# Patient Record
Sex: MALE | Race: WHITE | NOT HISPANIC OR LATINO | Employment: OTHER | URBAN - METROPOLITAN AREA
[De-identification: names, ages, dates, MRNs, and addresses within clinical notes are randomized per-mention and may not be internally consistent; named-entity substitution may affect disease eponyms.]

---

## 2017-05-08 RX ORDER — FLUOXETINE 10 MG/1
CAPSULE ORAL
Qty: 30 CAP | Refills: 5 | Status: SHIPPED | OUTPATIENT
Start: 2017-05-08 | End: 2017-10-25 | Stop reason: SDUPTHER

## 2017-05-19 ENCOUNTER — OFFICE VISIT (OUTPATIENT)
Dept: INTERNAL MEDICINE | Facility: IMAGING CENTER | Age: 82
End: 2017-05-19
Payer: MEDICARE

## 2017-05-19 VITALS
HEART RATE: 82 BPM | SYSTOLIC BLOOD PRESSURE: 128 MMHG | DIASTOLIC BLOOD PRESSURE: 70 MMHG | WEIGHT: 181 LBS | TEMPERATURE: 98.1 F | OXYGEN SATURATION: 92 % | HEIGHT: 70 IN | RESPIRATION RATE: 14 BRPM | BODY MASS INDEX: 25.91 KG/M2

## 2017-05-19 DIAGNOSIS — K58.0 IRRITABLE BOWEL SYNDROME WITH DIARRHEA: ICD-10-CM

## 2017-05-19 DIAGNOSIS — M54.50 ACUTE RIGHT-SIDED LOW BACK PAIN WITHOUT SCIATICA: ICD-10-CM

## 2017-05-19 PROCEDURE — G8599 NO ASA/ANTIPLAT THER USE RNG: HCPCS | Performed by: FAMILY MEDICINE

## 2017-05-19 PROCEDURE — 1036F TOBACCO NON-USER: CPT | Performed by: FAMILY MEDICINE

## 2017-05-19 PROCEDURE — 4040F PNEUMOC VAC/ADMIN/RCVD: CPT | Performed by: FAMILY MEDICINE

## 2017-05-19 PROCEDURE — 99214 OFFICE O/P EST MOD 30 MIN: CPT | Performed by: FAMILY MEDICINE

## 2017-05-19 PROCEDURE — 1101F PT FALLS ASSESS-DOCD LE1/YR: CPT | Mod: 8P | Performed by: FAMILY MEDICINE

## 2017-05-19 PROCEDURE — G8419 CALC BMI OUT NRM PARAM NOF/U: HCPCS | Performed by: FAMILY MEDICINE

## 2017-05-19 PROCEDURE — G8432 DEP SCR NOT DOC, RNG: HCPCS | Performed by: FAMILY MEDICINE

## 2017-05-19 RX ORDER — CHOLESTYRAMINE 4 G/9G
1 POWDER, FOR SUSPENSION ORAL DAILY
Qty: 30 EACH | Refills: 6 | Status: SHIPPED | OUTPATIENT
Start: 2017-05-19 | End: 2017-10-17

## 2017-05-19 NOTE — PROGRESS NOTES
Chief Complaint   Patient presents with   • Diarrhea   • Back Pain     right sided       HISTORY OF PRESENT ILLNESS: Patient is a 81 y.o. male established patient who presents today complaining several loose stools a day for the past 2-3 weeks. He gets this intermittently. No change in his diet or medications. No recent travel. No recent antibiotics. No fever or chills. No abdominal pain. He has had looser stools. He has a sense of urgency. Some increasing gas. He's had no accidents. He does not have to get up at night. No cramping or abdominal pain. Imodium does help. He denies any blood or dark stools.    His weight has been stable. He had something similar a couple years ago but did respond to Questran. He does have his gallbladder.    He's also complaining of some right lower back pain. It is in the area of his SI joint. She remembers no injuries. It comes and goes. No radicular symptoms. He has not taken anything for it.    His last colonoscopy was in 2013 and it was in incomplete colonoscopy due to severe diverticulosis and difficulty with the exam.  He then had a CT scan last August which showed the diverticulosis. No gallstones.      Patient Active Problem List    Diagnosis Date Noted   • Vitamin D deficiency 08/27/2015   • History of shingles 08/27/2015   • PHN (postherpetic neuralgia) 08/27/2015   • Elevated glucose 07/08/2015   • Hyperlipidemia 07/08/2015   • Other specified hypothyroidism 07/08/2015   • MCI (mild cognitive impairment) 07/08/2015   • Mild carotid artery disease 07/08/2015   • TIA (transient ischemic attack) 11/30/2012     Current Outpatient Prescriptions on File Prior to Visit   Medication Sig Dispense Refill   • fluoxetine (PROZAC) 10 MG Cap TAKE ONE CAPSULE BY MOUTH ONCE DAILY FOR DEPRESSION 30 Cap 5   • levothyroxine (SYNTHROID) 75 MCG Tab TAKE 1 TAB BY MOUTH EVERY DAY. 30 Tab 11   • atorvastatin (LIPITOR) 20 MG Tab TAKE 1 TAB BY MOUTH EVERY DAY. 30 Tab 10   • liothyronine (CYTOMEL) 5  "MCG Tab TAKE 2 TABLET BY ORAL ROUTE EVERY DAY 60 Tab 11   • cyanocobalamin (VITAMIN B-12) 100 MCG Tab Take 100 mcg by mouth every day.     • vitamin D (CHOLECALCIFEROL) 1000 UNIT TABS Take 1,000 Units by mouth. 2 tablets daily      • cholestyramine (QUESTRAN) 4 G packet Take 2-4 g by mouth every day. 30 Each 3     No current facility-administered medications on file prior to visit.       Past medical, surgical, family, and social history is reviewed and updated in Epic chart by me today.   Medications and allergies reviewed and updated in Epic chart by me today.     REVIEW OF SYSTEMS:  GENERAL: No fatigue, no weight loss.  HEENT:  Ears--no earache, no change in hearing, no dizziness, no tinnitus.                 Eyes--no blurred vision, no discharge or pain                 Throat--No sore throat, no dysphagia, no hoarseness  CV:  No chest pain,dyspnea,palpitations or edema.  RESP:  No sob,cough,wheezing or hemoptysis.  GI: As above. No nausea or vomiting. No heartburn  :  No dysuria, polyuria, hematuria, incontinence, or nocturia.  MS: Back pain as above.  NEURO:  No seizures, syncope, paralysis, tremor, or weakness.  SKIN: No new or concerning skin lesions or changes.   PSYCH: Mood fine.    Filed Vitals:    05/19/17 1200   BP: 128/70   Pulse: 82   Temp: 36.7 °C (98.1 °F)   Resp: 14   Height: 1.778 m (5' 10\")   Weight: 82.101 kg (181 lb)   SpO2: 92%     Physical Exam:  Gen: Well developed, well nourished. No acute distress.  Alert and oriented x 3.  Neck:  Supple, no adenopathy or thyromegaly.  Heart:  Regular rate and rhythm.  Normal S1, S2. No murmur, gallop or rub.  Lungs:  Clear, No wheezes,rales or rhonchi.  Abdomen: Soft, nontender, nondistended. Normal bowel sounds. No hepatosplenomegaly or masses, or hernias. No rebound or guarding.  Back: His spine is nontender. He does have some tenderness to palpation over the right SI joint. Full range of motion of his back. No erythema, no rashes.  Extremities:  No " edema.  Psych: Mood and affect are appropriate.        Assessment/Plan:  1. Irritable bowel syndrome with diarrhea . I suspect he does have some irritable bowel with diarrhea. Since Questran works well for him previously we'll have him start with 1 mg daily. He may titrate as needed. If symptoms do not improve encouraged him to call.     2. Acute right-sided low back pain without sciatica . May take Tylenol, heating pad, massage.        Follow-up as needed.

## 2017-06-14 PROBLEM — L40.9 PSORIASIS: Status: ACTIVE | Noted: 2017-06-14

## 2017-07-31 RX ORDER — ATORVASTATIN CALCIUM 20 MG/1
TABLET, FILM COATED ORAL
Qty: 30 TAB | Refills: 10 | Status: SHIPPED | OUTPATIENT
Start: 2017-07-31 | End: 2018-07-24 | Stop reason: SDUPTHER

## 2017-08-09 RX ORDER — LIOTHYRONINE SODIUM 5 UG/1
TABLET ORAL
Qty: 60 TAB | Refills: 0 | Status: SHIPPED | OUTPATIENT
Start: 2017-08-09 | End: 2017-10-11 | Stop reason: SDUPTHER

## 2017-10-11 DIAGNOSIS — R53.83 FATIGUE, UNSPECIFIED TYPE: ICD-10-CM

## 2017-10-11 DIAGNOSIS — E55.9 VITAMIN D DEFICIENCY: ICD-10-CM

## 2017-10-11 DIAGNOSIS — E03.8 OTHER SPECIFIED HYPOTHYROIDISM: ICD-10-CM

## 2017-10-11 DIAGNOSIS — E78.2 MIXED HYPERLIPIDEMIA: ICD-10-CM

## 2017-10-11 DIAGNOSIS — E11.9 TYPE 2 DIABETES MELLITUS WITHOUT COMPLICATION, WITHOUT LONG-TERM CURRENT USE OF INSULIN (HCC): ICD-10-CM

## 2017-10-11 RX ORDER — LEVOTHYROXINE SODIUM 0.07 MG/1
TABLET ORAL
Qty: 30 TAB | Refills: 3 | Status: SHIPPED | OUTPATIENT
Start: 2017-10-11 | End: 2018-02-02 | Stop reason: SDUPTHER

## 2017-10-11 RX ORDER — LIOTHYRONINE SODIUM 5 UG/1
TABLET ORAL
Qty: 60 TAB | Refills: 3 | Status: SHIPPED | OUTPATIENT
Start: 2017-10-11 | End: 2018-02-02 | Stop reason: SDUPTHER

## 2017-10-11 NOTE — TELEPHONE ENCOUNTER
I refilled Alfred thyroid meds.   But he is overdue for labwork. I ordered everything and would like him to do fasting labs and them make appt with me.

## 2017-10-12 ENCOUNTER — HOSPITAL ENCOUNTER (OUTPATIENT)
Dept: LAB | Facility: MEDICAL CENTER | Age: 82
End: 2017-10-12
Attending: FAMILY MEDICINE
Payer: MEDICARE

## 2017-10-12 DIAGNOSIS — E11.9 TYPE 2 DIABETES MELLITUS WITHOUT COMPLICATION, WITHOUT LONG-TERM CURRENT USE OF INSULIN (HCC): ICD-10-CM

## 2017-10-12 DIAGNOSIS — E78.2 MIXED HYPERLIPIDEMIA: ICD-10-CM

## 2017-10-12 DIAGNOSIS — E03.8 OTHER SPECIFIED HYPOTHYROIDISM: ICD-10-CM

## 2017-10-12 DIAGNOSIS — E55.9 VITAMIN D DEFICIENCY: ICD-10-CM

## 2017-10-12 DIAGNOSIS — R53.83 FATIGUE, UNSPECIFIED TYPE: ICD-10-CM

## 2017-10-12 LAB
25(OH)D3 SERPL-MCNC: 37 NG/ML (ref 30–100)
ALBUMIN SERPL BCP-MCNC: 4.2 G/DL (ref 3.2–4.9)
ALBUMIN/GLOB SERPL: 1.5 G/DL
ALP SERPL-CCNC: 62 U/L (ref 30–99)
ALT SERPL-CCNC: 41 U/L (ref 2–50)
ANION GAP SERPL CALC-SCNC: 7 MMOL/L (ref 0–11.9)
AST SERPL-CCNC: 25 U/L (ref 12–45)
BASOPHILS # BLD AUTO: 0.3 % (ref 0–1.8)
BASOPHILS # BLD: 0.02 K/UL (ref 0–0.12)
BILIRUB SERPL-MCNC: 0.9 MG/DL (ref 0.1–1.5)
BUN SERPL-MCNC: 18 MG/DL (ref 8–22)
CALCIUM SERPL-MCNC: 9.7 MG/DL (ref 8.5–10.5)
CHLORIDE SERPL-SCNC: 107 MMOL/L (ref 96–112)
CHOLEST SERPL-MCNC: 128 MG/DL (ref 100–199)
CO2 SERPL-SCNC: 26 MMOL/L (ref 20–33)
CREAT SERPL-MCNC: 0.9 MG/DL (ref 0.5–1.4)
CREAT UR-MCNC: 91 MG/DL
EOSINOPHIL # BLD AUTO: 0.34 K/UL (ref 0–0.51)
EOSINOPHIL NFR BLD: 5 % (ref 0–6.9)
ERYTHROCYTE [DISTWIDTH] IN BLOOD BY AUTOMATED COUNT: 49.1 FL (ref 35.9–50)
EST. AVERAGE GLUCOSE BLD GHB EST-MCNC: 180 MG/DL
GFR SERPL CREATININE-BSD FRML MDRD: >60 ML/MIN/1.73 M 2
GLOBULIN SER CALC-MCNC: 2.8 G/DL (ref 1.9–3.5)
GLUCOSE SERPL-MCNC: 151 MG/DL (ref 65–99)
HBA1C MFR BLD: 7.9 % (ref 0–5.6)
HCT VFR BLD AUTO: 44.5 % (ref 42–52)
HDLC SERPL-MCNC: 40 MG/DL
HGB BLD-MCNC: 14.7 G/DL (ref 14–18)
IMM GRANULOCYTES # BLD AUTO: 0.02 K/UL (ref 0–0.11)
IMM GRANULOCYTES NFR BLD AUTO: 0.3 % (ref 0–0.9)
LDLC SERPL CALC-MCNC: 62 MG/DL
LYMPHOCYTES # BLD AUTO: 1.9 K/UL (ref 1–4.8)
LYMPHOCYTES NFR BLD: 27.9 % (ref 22–41)
MCH RBC QN AUTO: 31.4 PG (ref 27–33)
MCHC RBC AUTO-ENTMCNC: 33 G/DL (ref 33.7–35.3)
MCV RBC AUTO: 95.1 FL (ref 81.4–97.8)
MICROALBUMIN UR-MCNC: <0.7 MG/DL
MICROALBUMIN/CREAT UR: NORMAL MG/G (ref 0–30)
MONOCYTES # BLD AUTO: 0.62 K/UL (ref 0–0.85)
MONOCYTES NFR BLD AUTO: 9.1 % (ref 0–13.4)
NEUTROPHILS # BLD AUTO: 3.91 K/UL (ref 1.82–7.42)
NEUTROPHILS NFR BLD: 57.4 % (ref 44–72)
NRBC # BLD AUTO: 0 K/UL
NRBC BLD AUTO-RTO: 0 /100 WBC
PLATELET # BLD AUTO: 206 K/UL (ref 164–446)
PMV BLD AUTO: 11 FL (ref 9–12.9)
POTASSIUM SERPL-SCNC: 4.5 MMOL/L (ref 3.6–5.5)
PROT SERPL-MCNC: 7 G/DL (ref 6–8.2)
RBC # BLD AUTO: 4.68 M/UL (ref 4.7–6.1)
SODIUM SERPL-SCNC: 140 MMOL/L (ref 135–145)
T3FREE SERPL-MCNC: 3.28 PG/ML (ref 2.4–4.2)
T4 FREE SERPL-MCNC: 0.91 NG/DL (ref 0.53–1.43)
TRIGL SERPL-MCNC: 128 MG/DL (ref 0–149)
TSH SERPL DL<=0.005 MIU/L-ACNC: 2.34 UIU/ML (ref 0.3–3.7)
WBC # BLD AUTO: 6.8 K/UL (ref 4.8–10.8)

## 2017-10-12 PROCEDURE — 84481 FREE ASSAY (FT-3): CPT

## 2017-10-12 PROCEDURE — 82570 ASSAY OF URINE CREATININE: CPT

## 2017-10-12 PROCEDURE — 83036 HEMOGLOBIN GLYCOSYLATED A1C: CPT

## 2017-10-12 PROCEDURE — 36415 COLL VENOUS BLD VENIPUNCTURE: CPT

## 2017-10-12 PROCEDURE — 85025 COMPLETE CBC W/AUTO DIFF WBC: CPT

## 2017-10-12 PROCEDURE — 82306 VITAMIN D 25 HYDROXY: CPT

## 2017-10-12 PROCEDURE — 80053 COMPREHEN METABOLIC PANEL: CPT

## 2017-10-12 PROCEDURE — 82043 UR ALBUMIN QUANTITATIVE: CPT

## 2017-10-12 PROCEDURE — 84439 ASSAY OF FREE THYROXINE: CPT

## 2017-10-12 PROCEDURE — 84443 ASSAY THYROID STIM HORMONE: CPT

## 2017-10-12 PROCEDURE — 80061 LIPID PANEL: CPT

## 2017-10-17 ENCOUNTER — OFFICE VISIT (OUTPATIENT)
Dept: INTERNAL MEDICINE | Facility: IMAGING CENTER | Age: 82
End: 2017-10-17
Payer: MEDICARE

## 2017-10-17 VITALS
HEART RATE: 70 BPM | RESPIRATION RATE: 14 BRPM | OXYGEN SATURATION: 94 % | TEMPERATURE: 97.4 F | DIASTOLIC BLOOD PRESSURE: 70 MMHG | SYSTOLIC BLOOD PRESSURE: 138 MMHG | HEIGHT: 70 IN | BODY MASS INDEX: 25.48 KG/M2 | WEIGHT: 178 LBS

## 2017-10-17 DIAGNOSIS — E55.9 VITAMIN D DEFICIENCY: ICD-10-CM

## 2017-10-17 DIAGNOSIS — E78.2 MIXED HYPERLIPIDEMIA: ICD-10-CM

## 2017-10-17 DIAGNOSIS — R35.1 NOCTURIA: ICD-10-CM

## 2017-10-17 DIAGNOSIS — G31.84 MCI (MILD COGNITIVE IMPAIRMENT): ICD-10-CM

## 2017-10-17 DIAGNOSIS — Z00.00 MEDICARE ANNUAL WELLNESS VISIT, SUBSEQUENT: ICD-10-CM

## 2017-10-17 DIAGNOSIS — E11.9 TYPE 2 DIABETES MELLITUS WITHOUT COMPLICATION, WITHOUT LONG-TERM CURRENT USE OF INSULIN (HCC): ICD-10-CM

## 2017-10-17 DIAGNOSIS — E03.8 OTHER SPECIFIED HYPOTHYROIDISM: ICD-10-CM

## 2017-10-17 DIAGNOSIS — Z23 NEED FOR INFLUENZA VACCINATION: ICD-10-CM

## 2017-10-17 DIAGNOSIS — I77.9 BILATERAL CAROTID ARTERY DISEASE (HCC): ICD-10-CM

## 2017-10-17 PROCEDURE — G0008 ADMIN INFLUENZA VIRUS VAC: HCPCS | Performed by: FAMILY MEDICINE

## 2017-10-17 PROCEDURE — G0439 PPPS, SUBSEQ VISIT: HCPCS | Mod: 25 | Performed by: FAMILY MEDICINE

## 2017-10-17 PROCEDURE — 90662 IIV NO PRSV INCREASED AG IM: CPT | Performed by: FAMILY MEDICINE

## 2017-10-17 RX ORDER — GLIMEPIRIDE 2 MG/1
2 TABLET ORAL EVERY MORNING
Qty: 90 TAB | Refills: 1 | Status: SHIPPED | OUTPATIENT
Start: 2017-10-17 | End: 2018-04-09 | Stop reason: SDUPTHER

## 2017-10-17 RX ORDER — LANCETS 30 GAUGE
EACH MISCELLANEOUS
Qty: 100 EACH | Refills: 3 | Status: SHIPPED | OUTPATIENT
Start: 2017-10-17 | End: 2021-03-14

## 2017-10-17 RX ORDER — TAMSULOSIN HYDROCHLORIDE 0.4 MG/1
0.4 CAPSULE ORAL
Qty: 90 CAP | Refills: 1 | Status: SHIPPED | OUTPATIENT
Start: 2017-10-17 | End: 2018-04-09 | Stop reason: SDUPTHER

## 2017-10-17 ASSESSMENT — PATIENT HEALTH QUESTIONNAIRE - PHQ9: CLINICAL INTERPRETATION OF PHQ2 SCORE: 0

## 2017-10-18 PROBLEM — E11.9 TYPE 2 DIABETES MELLITUS WITHOUT COMPLICATION, WITHOUT LONG-TERM CURRENT USE OF INSULIN (HCC): Status: ACTIVE | Noted: 2017-10-18

## 2017-10-18 PROBLEM — I77.9 BILATERAL CAROTID ARTERY DISEASE (HCC): Status: ACTIVE | Noted: 2017-10-18

## 2017-10-18 NOTE — PROGRESS NOTES
Chief Complaint   Patient presents with   • Annual Wellness Visit     and follow up labs         HPI:  Alfred is a 82 y.o. Established male patient here for Medicare Annual Wellness Visit. He is generally doing well. He has had borderline diabetes for several years. His A1c has jumped from 6.7 last year to 7.9 this year. He admits there some room for improvement in his diet. He has never taken medications. He is emphatic that he does not want to take metformin because of family member had a bad reaction.  He does not monitor his blood sugars. The rest of his lab was acceptable.    He does have hypothyroidism and is on replacement therapy. He has mild cognitive impairment and Dr. Mathis has him on a low-dose of Prozac which has helped with his focusing and concentrating.        Patient Active Problem List    Diagnosis Date Noted   • Type 2 diabetes mellitus without complication, without long-term current use of insulin (CMS-HCC) 10/18/2017   • Bilateral carotid artery disease (CMS-HCC) 10/18/2017   • Psoriasis 06/14/2017   • Vitamin D deficiency 08/27/2015   • History of shingles 08/27/2015   • PHN (postherpetic neuralgia) 08/27/2015   • Hyperlipidemia 07/08/2015   • Other specified hypothyroidism 07/08/2015   • MCI (mild cognitive impairment) 07/08/2015   • TIA (transient ischemic attack) 11/30/2012       Current Outpatient Prescriptions   Medication Sig Dispense Refill   • aspirin EC (ECOTRIN) 81 MG Tablet Delayed Response Take 81 mg by mouth every day.     • tamsulosin (FLOMAX) 0.4 MG capsule Take 1 Cap by mouth ONE-HALF HOUR AFTER BREAKFAST. For bladder 90 Cap 1   • glimepiride (AMARYL) 2 MG Tab Take 1 Tab by mouth every morning. For diabetes 90 Tab 1   • Lancets Misc Lancets order: Lancets for glucose meter preferred on patient's insurance. . Sig: use to monitor glucose once daily and prn 100 Each 3   • Blood Glucose Monitoring Suppl Supplies Misc Test strips order: Test strips for glucose meter preferred on  patient's insurance.. Sig: us to monitor glucose every am and as needed. 50 Each 6   • Blood Glucose Monitoring Suppl Device Meter: Dispense glucose meter preferred on patient's insurance.  Sig. Use as directed for blood sugar monitoring. 1 Device 0   • levothyroxine (SYNTHROID) 75 MCG Tab TAKE 1 TAB BY MOUTH EVERY DAY. 30 Tab 3   • liothyronine (CYTOMEL) 5 MCG Tab TAKE 2 TABLETS BY MOUTH ONCE DAILY 60 Tab 3   • atorvastatin (LIPITOR) 20 MG Tab TAKE 1 TAB BY MOUTH EVERY DAY. 30 Tab 10   • fluoxetine (PROZAC) 10 MG Cap TAKE ONE CAPSULE BY MOUTH ONCE DAILY FOR DEPRESSION 30 Cap 5   • cyanocobalamin (VITAMIN B-12) 100 MCG Tab Take 100 mcg by mouth every day.     • vitamin D (CHOLECALCIFEROL) 1000 UNIT TABS Take 1,000 Units by mouth. 2 tablets daily      • cholestyramine (QUESTRAN) 4 G packet Take 2-4 g by mouth every day. 30 Each 3     No current facility-administered medications for this visit.         The patient reports adherence to this regimen     Current supplements as per medication list.   Chronic narcotic pain medicines: no     Allergies: Pcn [penicillins] and Sulfa drugs    Current social contact/activities: Activity with family and friends. He still enjoys fishing.  Exercise: Not regularly.  Is patient current with immunizations?  yes       He  reports that he has never smoked. He has never used smokeless tobacco. He reports that he does not drink alcohol or use drugs.        DPA/Advanced directive: .has an advanced directive -I encouraged him to bring in a copy.    ROS:    Gait: Uses no assistive device   Ostomy: no   Other tubes: no   Amputations: no   Chronic oxygen use no   Last eye exam: Within the past year.  : Denies incontinence. --He does complain of frequent urination and waking at night.      Screening:      Depression Screening    Little interest or pleasure in doing things?  0 - not at all  Feeling down, depressed , or hopeless? 0 - not at all  Patient Health Questionnaire Score: 0     If  depressive symptoms identified deferred to follow up visit unless specifically addressed in assessment and plan.      Screening for Cognitive Impairment    Three Minute Recall (apple, watch, dhara)  3/3    Draw clock face with all 12 numbers set to the hand to show 10 minutes past 11 o'clock  1    Cognitive concerns identified deferred for follow up unless specifically addressed in assessment and plan.    Fall Risk Assessment    Has the patient had two or more falls in the last year or any fall with injury in the last year?  No    Safety Assessment    Throw rugs on floor.  Yes  Handrails on all stairs.  Yes  Good lighting in all hallways.  Yes  Difficulty hearing.  Yes  Patient counseled about all safety risks that were identified.    Functional Assessment ADLs    Are there any barriers preventing you from cooking for yourself or meeting nutritional needs?  No.    Are there any barriers preventing you from driving safely or obtaining transportation?  no   Are there any barriers preventing you from using a telephone or calling for help?  Yes. -hearing   Are there any barriers preventing you from shopping?  No.    Are there any barriers preventing you from taking care of your own finances?  No.    Are there any barriers preventing you from managing your medications?  No.    Are currently engaging any exercise or physical activity?  No.              Patient Care Team:  Day Moraes M.D. as PCP - General (Family Medicine)  Faviola Lennon R.N. as Registered Nurse    Derm:  [facility]  Opth:  Was Dr. Rai  Seeing Dr. Sangeeta Mathis for memory concerns.   GI:  [facility]-- Dr. Hall    Social History   Substance Use Topics   • Smoking status: Never Smoker   • Smokeless tobacco: Never Used   • Alcohol use No      Comment: occ     Family History   Problem Relation Age of Onset   • Stroke Brother    • Stroke Father      He  has a past medical history of Asthma; Diabetes (CMS-HCC); Diverticulitis;  "Esophageal stricture; Hiatal hernia; History of shingles (8/27/2015); Hyperlipidemia (7/8/2015); Hypertension; Hypothyroid; PHN (postherpetic neuralgia) (8/27/2015); Psoriasis (6/14/2017); TIA (transient ischemic attack); and Vitamin D deficiency (8/27/2015). He also has no past medical history of Cancer (CMS-HCC).   Past Surgical History:   Procedure Laterality Date   • CATARACT EXTRACTION WITH IOL     • TONSILLECTOMY     • VASECTOMY       REVIEW OF SYSTEMS:  GENERAL: No fatigue, no weight loss.  HEENT:  Ears--no earache, no change in hearing, no dizziness, no tinnitus.                 Eyes--no blurred vision, no discharge or pain                 Throat--No sore throat, no dysphagia, no hoarseness  CV:  No chest pain,dyspnea,palpitations or edema.  RESP:  No sob,cough,wheezing or hemoptysis.  GI: No dysphagia, heartburn,abdominal pain, nausea, vomiting, diarrhea or constipation.       No melena, jaundice, bleeding, incontinence or change in bowel habits.  : Frequent urination and he wakes about 3 times at night to urinate.  MS:  No joint swelling, myalgias, or arthralgias.  NEURO:  No seizures, syncope, paralysis, tremor, or weakness.  SKIN: No new or concerning skin lesions or changes.   PSYCH: Mood fine.        Exam:     Blood pressure 138/70, pulse 70, temperature 36.3 °C (97.4 °F), resp. rate 14, height 1.778 m (5' 10\"), weight 80.7 kg (178 lb), SpO2 94 %. Body mass index is 25.54 kg/m².    Hearing fair.  - with aids  Dentition good  Alert, oriented in no acute distress.  Eye contact is good, speech goal directed, affect calm  PHYSICAL EXAM;  GENERAL;  WN/WD, No acute distress.   Alert and oriented ×3.  HEENT:  Head normocephalic and atraumatic.    PERRLA, EOMI. No conjunctival injection, no icterus.     TM's normal, nasal mucosa and mouth with no abnormalities.    Oropharynx is clear with no lesions.  NECK: Supple, no adenopathy or thyromegaly.  CV: RR. Normal S1,S2. No murmur, gallop or rub.          No " JVD, Carotid pulses 2+ and sym. No bruits.  LUNGS:  Clear, no wheezes, rales or rhonchi.  BREASTS: Symmetric, no masses or tenderness. No nipple discharge.  AXILLA:  No masses or tenderness.  ABDOMEN: Soft, NT, nondistended. Bowel sounds normal. No hepatospenomegaly or masses. No rebound or guarding. No hernias.  .EXTREMITIES:  No edema.   Feet are examined. Skin is intact and in good condition. Pulses are 2+ and symmetric. Sensation to light tough is intact bilaterally and symmetric.   Monofilament exam is normal bilaterally.   NEURO:  CN II-XII intact. Motor and sensation grossly intact.  SKIN: No rashes or abnormal lesions.    Lab Results   Component Value Date/Time    CHOLSTRLTOT 128 10/12/2017 08:22 AM    LDL 62 10/12/2017 08:22 AM    HDL 40 10/12/2017 08:22 AM    TRIGLYCERIDE 128 10/12/2017 08:22 AM       Lab Results   Component Value Date/Time    SODIUM 140 10/12/2017 08:22 AM    POTASSIUM 4.5 10/12/2017 08:22 AM    CHLORIDE 107 10/12/2017 08:22 AM    CO2 26 10/12/2017 08:22 AM    GLUCOSE 151 (H) 10/12/2017 08:22 AM    BUN 18 10/12/2017 08:22 AM    CREATININE 0.90 10/12/2017 08:22 AM     Lab Results   Component Value Date/Time    ALKPHOSPHAT 62 10/12/2017 08:22 AM    ASTSGOT 25 10/12/2017 08:22 AM    ALTSGPT 41 10/12/2017 08:22 AM    TBILIRUBIN 0.9 10/12/2017 08:22 AM        Lab Results   Component Value Date/Time    WBC 6.8 10/12/2017 08:22 AM    RBC 4.68 (L) 10/12/2017 08:22 AM    HEMOGLOBIN 14.7 10/12/2017 08:22 AM    HEMATOCRIT 44.5 10/12/2017 08:22 AM    MCV 95.1 10/12/2017 08:22 AM    MCH 31.4 10/12/2017 08:22 AM    MCHC 33.0 (L) 10/12/2017 08:22 AM    MPV 11.0 10/12/2017 08:22 AM    NEUTSPOLYS 57.40 10/12/2017 08:22 AM    LYMPHOCYTES 27.90 10/12/2017 08:22 AM    MONOCYTES 9.10 10/12/2017 08:22 AM    EOSINOPHILS 5.00 10/12/2017 08:22 AM    BASOPHILS 0.30 10/12/2017 08:22 AM        Hemoglobin A1c. 7.9.    Assessment and Plan. The following treatment and monitoring plan is recommended:    1. Medicare  annual wellness visit, subsequent . Following diagnoses are all addressed.     2. Need for influenza vaccination  INFLUENZA VACCINE, HIGH DOSE (65+ ONLY)   3. Type 2 diabetes mellitus without complication, without long-term current use of insulin (CMS-HCC) . Education regarding diet, exercise. I encouraged him to start walking 20 minutes daily slowly increasing up to 30 minutes daily. Monitor blood sugar each morning. His goal fasting blood sugar is between 100 and 130. He knows how to use the monitor. His wife is type II diabetic. Education regarding foot and eye care. Encouraged him to get his eye exam annually. Start Amaryl 2 mg. He is on a statin and aspirin. Consider addition of lisinopril. However we'll start him on Flomax for his BPH symptoms. Watch for lightheadedness. And has tended to start lisinopril at the same time.  glimepiride (AMARYL) 2 MG Tab    Education regarding symptoms of low blood sugar. Discussed how to treat a low blood sugar.     Lancets Misc    Blood Glucose Monitoring Suppl Supplies Misc    Blood Glucose Monitoring Suppl Device   4. Bilateral carotid artery disease (CMS-HCC.. Continue statin)     5. Vitamin D deficiency . Encouraged vitamin D replacement.     6. Mixed hyperlipidemia     7. Other specified hypothyroidism . Continue present replacement therapy.     8. MCI (mild cognitive impairment) . Continue low-dose Prozac. He seems to be doing quite well.     9. Nocturia . And Flomax and monitor symptoms.       10. Healthcare Maintenance. Counseled re: nutrition, activity and safety. Reviewed immunizations.     Services needed: No services needed at this time  Health Care Screening recommendations as per orders if indicated.  Referrals offered: PT/OT/Nutrition counseling/Behavioral Health/Smoking cessation as per orders if indicated.    Discussion today about general wellness and lifestyle habits:    · Prevent falls and reduce trip hazards; Cautioned about securing or removing  taina.  · Have a working fire alarm and carbon monoxide detector;   · Engage in regular physical activity and social activities   ·       Follow-up: 3 months.

## 2017-10-25 RX ORDER — FLUOXETINE 10 MG/1
CAPSULE ORAL
Qty: 90 CAP | Refills: 3 | Status: SHIPPED | OUTPATIENT
Start: 2017-10-25 | End: 2017-11-13 | Stop reason: SDUPTHER

## 2017-11-13 RX ORDER — FLUOXETINE 10 MG/1
CAPSULE ORAL
Qty: 90 CAP | Refills: 3 | Status: SHIPPED | OUTPATIENT
Start: 2017-11-13 | End: 2018-07-24

## 2018-01-17 ENCOUNTER — OFFICE VISIT (OUTPATIENT)
Dept: INTERNAL MEDICINE | Facility: IMAGING CENTER | Age: 83
End: 2018-01-17
Payer: MEDICARE

## 2018-01-17 VITALS
TEMPERATURE: 97.2 F | DIASTOLIC BLOOD PRESSURE: 70 MMHG | HEIGHT: 70 IN | OXYGEN SATURATION: 93 % | WEIGHT: 178 LBS | RESPIRATION RATE: 14 BRPM | BODY MASS INDEX: 25.48 KG/M2 | HEART RATE: 68 BPM | SYSTOLIC BLOOD PRESSURE: 136 MMHG

## 2018-01-17 DIAGNOSIS — E78.2 MIXED HYPERLIPIDEMIA: ICD-10-CM

## 2018-01-17 DIAGNOSIS — E03.9 HYPOTHYROIDISM, UNSPECIFIED TYPE: ICD-10-CM

## 2018-01-17 DIAGNOSIS — E11.9 TYPE 2 DIABETES MELLITUS WITHOUT COMPLICATION, WITHOUT LONG-TERM CURRENT USE OF INSULIN (HCC): ICD-10-CM

## 2018-01-17 LAB
HBA1C MFR BLD: 6.1 % (ref ?–5.8)
INT CON NEG: NEGATIVE
INT CON POS: POSITIVE

## 2018-01-17 PROCEDURE — 99214 OFFICE O/P EST MOD 30 MIN: CPT | Performed by: FAMILY MEDICINE

## 2018-01-17 PROCEDURE — 83036 HEMOGLOBIN GLYCOSYLATED A1C: CPT | Performed by: FAMILY MEDICINE

## 2018-01-17 NOTE — PATIENT INSTRUCTIONS
Current Outpatient Prescriptions:   •  fluoxetine (PROZAC) 10 MG Cap, TAKE ONE CAPSULE BY MOUTH ONCE DAILY FOR DEPRESSION, Disp: 90 Cap, Rfl: 3  •  aspirin EC (ECOTRIN) 81 MG Tablet Delayed Response, Take 81 mg by mouth every day., Disp: , Rfl:   •  tamsulosin (FLOMAX) 0.4 MG capsule, Take 1 Cap by mouth ONE-HALF HOUR AFTER BREAKFAST. For bladder, Disp: 90 Cap, Rfl: 1  •  glimepiride (AMARYL) 2 MG Tab, Take 1 Tab by mouth every morning. For diabetes, Disp: 90 Tab, Rfl: 1  •  levothyroxine (SYNTHROID) 75 MCG Tab, TAKE 1 TAB BY MOUTH EVERY DAY., Disp: 30 Tab, Rfl: 3  •  liothyronine (CYTOMEL) 5 MCG Tab, TAKE 2 TABLETS BY MOUTH ONCE DAILY, Disp: 60 Tab, Rfl: 3  •  atorvastatin (LIPITOR) 20 MG Tab, TAKE 1 TAB BY MOUTH EVERY DAY., Disp: 30 Tab, Rfl: 10  •  cholestyramine (QUESTRAN) 4 G packet, Take 2-4 g by mouth every day., Disp: 30 Each, Rfl: 3  •  cyanocobalamin (VITAMIN B-12) 100 MCG Tab, Take 100 mcg by mouth every day., Disp: , Rfl:   •  vitamin D (CHOLECALCIFEROL) 1000 UNIT TABS, Take 1,000 Units by mouth. 2 tablets daily , Disp: , Rfl:   •  Lancets Misc, Lancets order: Lancets for glucose meter preferred on patient's insurance. . Sig: use to monitor glucose once daily and prn, Disp: 100 Each, Rfl: 3  •  Blood Glucose Monitoring Suppl Supplies Misc, Test strips order: Test strips for glucose meter preferred on patient's insurance.. Sig: us to monitor glucose every am and as needed., Disp: 50 Each, Rfl: 6  •  Blood Glucose Monitoring Suppl Device, Meter: Dispense glucose meter preferred on patient's insurance.  Sig. Use as directed for blood sugar monitoring., Disp: 1 Device, Rfl: 0

## 2018-01-18 NOTE — PROGRESS NOTES
Chief Complaint   Patient presents with   • Diabetes       HISTORY OF PRESENT ILLNESS: Patient is a 82 y.o. male established patient who presents today to follow-up on type 2 diabetes. His lab work in October showed his A1c to be 7.9. He was started on glimepiride. He declined to take metformin because his son had suffered some renal issues while on metformin. He is taking just 2 mg of glimepiride.  He is monitoring his blood sugars and his morning numbers are running between 90 and 120. He denies any low blood sugars. He is also walking his dog twice daily for about 15 minutes each time. His weight has stayed stable. He is on a statin and aspirin.  He denies numbness or tingling in his feet. States he is up-to-date with his eye doctor.    He also has hypothyroidism. He is on levothyroxine and Cytomel. His last lab work was good in October. He denies any weight loss, no palpitations, no diarrhea.  Generally he is feeling very well.    He did lose his son within the past 2 months. States the holidays were difficult but he feels like he is doing much better. He is on low-dose Prozac and does not want to change anything. Denies any thoughts of hopelessness or suicide. He is  and lives with his wife. Good support system.      Patient Active Problem List    Diagnosis Date Noted   • Type 2 diabetes mellitus without complication, without long-term current use of insulin (CMS-HCC) 10/18/2017   • Bilateral carotid artery disease (CMS-HCC) 10/18/2017   • Psoriasis 06/14/2017   • Vitamin D deficiency 08/27/2015   • History of shingles 08/27/2015   • PHN (postherpetic neuralgia) 08/27/2015   • Hyperlipidemia 07/08/2015   • Hypothyroidism 07/08/2015   • MCI (mild cognitive impairment) 07/08/2015   • TIA (transient ischemic attack) 11/30/2012       Current Outpatient Prescriptions:   •  fluoxetine (PROZAC) 10 MG Cap, TAKE ONE CAPSULE BY MOUTH ONCE DAILY FOR DEPRESSION, Disp: 90 Cap, Rfl: 3  •  aspirin EC (ECOTRIN) 81 MG  Tablet Delayed Response, Take 81 mg by mouth every day., Disp: , Rfl:   •  tamsulosin (FLOMAX) 0.4 MG capsule, Take 1 Cap by mouth ONE-HALF HOUR AFTER BREAKFAST. For bladder, Disp: 90 Cap, Rfl: 1  •  glimepiride (AMARYL) 2 MG Tab, Take 1 Tab by mouth every morning. For diabetes, Disp: 90 Tab, Rfl: 1  •  levothyroxine (SYNTHROID) 75 MCG Tab, TAKE 1 TAB BY MOUTH EVERY DAY., Disp: 30 Tab, Rfl: 3  •  liothyronine (CYTOMEL) 5 MCG Tab, TAKE 2 TABLETS BY MOUTH ONCE DAILY, Disp: 60 Tab, Rfl: 3  •  atorvastatin (LIPITOR) 20 MG Tab, TAKE 1 TAB BY MOUTH EVERY DAY., Disp: 30 Tab, Rfl: 10  •  cholestyramine (QUESTRAN) 4 G packet, Take 2-4 g by mouth every day., Disp: 30 Each, Rfl: 3  •  cyanocobalamin (VITAMIN B-12) 100 MCG Tab, Take 100 mcg by mouth every day., Disp: , Rfl:   •  vitamin D (CHOLECALCIFEROL) 1000 UNIT TABS, Take 1,000 Units by mouth. 2 tablets daily , Disp: , Rfl:   •  Lancets Misc, Lancets order: Lancets for glucose meter preferred on patient's insurance. . Sig: use to monitor glucose once daily and prn, Disp: 100 Each, Rfl: 3  •  Blood Glucose Monitoring Suppl Supplies Misc, Test strips order: Test strips for glucose meter preferred on patient's insurance.. Sig: us to monitor glucose every am and as needed., Disp: 50 Each, Rfl: 6  •  Blood Glucose Monitoring Suppl Device, Meter: Dispense glucose meter preferred on patient's insurance.  Sig. Use as directed for blood sugar monitoring., Disp: 1 Device, Rfl: 0      Past medical, surgical, family, and social history is reviewed and updated in Epic chart by me today.   Medications and allergies reviewed and updated in Epic chart by me today.     REVIEW OF SYSTEMS:  GENERAL: No fatigue, no weight loss.  HEENT:  Ears--no earache, no change in hearing, no dizziness, no tinnitus                 Eyes--no blurred vision, no discharge or pain                 Throat--No sore throat, no dysphagia, no hoarseness  CV:  No chest pain,dyspnea,palpitations or edema.  RESP:  No  "sob,cough,wheezing or hemoptysis.  GI: No dysphagia, heartburn,abdominal pain, nausea, vomiting, diarrhea or constipation.       No melena, jaundice, bleeding, incontinence or change in bowel habits.  :  No dysuria, polyuria, hematuria, incontinence, or nocturia.  MS:  No joint swelling, myalgias, or arthralgias.  NEURO:  No seizures, syncope, paralysis, tremor, or weakness.  SKIN: No new or concerning skin lesions or changes.   PSYCH: Mood fine.    Vitals:    01/17/18 1400   BP: 136/70   Pulse: 68   Resp: 14   Temp: 36.2 °C (97.2 °F)   SpO2: 93%   Weight: 80.7 kg (178 lb)   Height: 1.778 m (5' 10\")     Physical Exam:  Gen: Well developed, well nourished. No acute distress.  Neck:  Supple, no adenopathy or thyromegaly.  Heart:  Regular rate and rhythm.  Normal S1, S2. No murmur, gallop or rub.  Lungs:  Clear, No wheezes,rales or rhonchi.  Extremities:  No edema.  Psych: Mood and affect are appropriate.      Fingerstick A1c today is excellent at 6.1.    Assessment/Plan:  1. Type 2 diabetes mellitus without complication, without long-term current use of insulin (CMS-[facility]). Excellent control. He'll continue glimepiride 2 mg. We did discuss that this may cause hypoglycemia especially if he skips meals. Discussed treatment of hypoglycemia and he will always keep some glucose tablets or juice with him. Education regarding diet, exercise, foot and eye care. Follow-up in 3 months.  POCT A1C   2. Mixed hyperlipidemia . Continue statin.     3. Hypothyroidism, unspecified type . Continue levothyroxine and Cytomel at present doses          "

## 2018-02-02 DIAGNOSIS — E03.8 OTHER SPECIFIED HYPOTHYROIDISM: ICD-10-CM

## 2018-02-02 RX ORDER — LIOTHYRONINE SODIUM 5 UG/1
TABLET ORAL
Qty: 60 TAB | Refills: 3 | Status: SHIPPED | OUTPATIENT
Start: 2018-02-02 | End: 2018-05-09 | Stop reason: SDUPTHER

## 2018-02-02 RX ORDER — LEVOTHYROXINE SODIUM 0.07 MG/1
TABLET ORAL
Qty: 30 TAB | Refills: 3 | Status: SHIPPED | OUTPATIENT
Start: 2018-02-02 | End: 2018-06-03 | Stop reason: SDUPTHER

## 2018-04-09 DIAGNOSIS — E11.9 TYPE 2 DIABETES MELLITUS WITHOUT COMPLICATION, WITHOUT LONG-TERM CURRENT USE OF INSULIN (HCC): ICD-10-CM

## 2018-04-09 RX ORDER — TAMSULOSIN HYDROCHLORIDE 0.4 MG/1
0.4 CAPSULE ORAL
Qty: 90 CAP | Refills: 1 | Status: SHIPPED | OUTPATIENT
Start: 2018-04-09 | End: 2018-10-12 | Stop reason: SDUPTHER

## 2018-04-09 RX ORDER — GLIMEPIRIDE 2 MG/1
2 TABLET ORAL EVERY MORNING
Qty: 90 TAB | Refills: 1 | Status: SHIPPED | OUTPATIENT
Start: 2018-04-09 | End: 2018-07-18 | Stop reason: SDUPTHER

## 2018-04-10 ENCOUNTER — APPOINTMENT (RX ONLY)
Dept: URBAN - METROPOLITAN AREA CLINIC 20 | Facility: CLINIC | Age: 83
Setting detail: DERMATOLOGY
End: 2018-04-10

## 2018-04-10 DIAGNOSIS — Z85.828 PERSONAL HISTORY OF OTHER MALIGNANT NEOPLASM OF SKIN: ICD-10-CM

## 2018-04-10 DIAGNOSIS — L81.4 OTHER MELANIN HYPERPIGMENTATION: ICD-10-CM

## 2018-04-10 DIAGNOSIS — L82.1 OTHER SEBORRHEIC KERATOSIS: ICD-10-CM

## 2018-04-10 DIAGNOSIS — D22 MELANOCYTIC NEVI: ICD-10-CM

## 2018-04-10 DIAGNOSIS — L57.0 ACTINIC KERATOSIS: ICD-10-CM

## 2018-04-10 DIAGNOSIS — D18.0 HEMANGIOMA: ICD-10-CM

## 2018-04-10 DIAGNOSIS — Z71.89 OTHER SPECIFIED COUNSELING: ICD-10-CM

## 2018-04-10 PROBLEM — D48.5 NEOPLASM OF UNCERTAIN BEHAVIOR OF SKIN: Status: ACTIVE | Noted: 2018-04-10

## 2018-04-10 PROBLEM — D22.62 MELANOCYTIC NEVI OF LEFT UPPER LIMB, INCLUDING SHOULDER: Status: ACTIVE | Noted: 2018-04-10

## 2018-04-10 PROBLEM — D22.61 MELANOCYTIC NEVI OF RIGHT UPPER LIMB, INCLUDING SHOULDER: Status: ACTIVE | Noted: 2018-04-10

## 2018-04-10 PROBLEM — D18.01 HEMANGIOMA OF SKIN AND SUBCUTANEOUS TISSUE: Status: ACTIVE | Noted: 2018-04-10

## 2018-04-10 PROBLEM — D22.5 MELANOCYTIC NEVI OF TRUNK: Status: ACTIVE | Noted: 2018-04-10

## 2018-04-10 PROCEDURE — ? COUNSELING

## 2018-04-10 PROCEDURE — 69100 BIOPSY OF EXTERNAL EAR: CPT | Mod: 59

## 2018-04-10 PROCEDURE — ? BIOPSY BY SHAVE METHOD

## 2018-04-10 PROCEDURE — 17000 DESTRUCT PREMALG LESION: CPT

## 2018-04-10 PROCEDURE — 17003 DESTRUCT PREMALG LES 2-14: CPT

## 2018-04-10 PROCEDURE — 99214 OFFICE O/P EST MOD 30 MIN: CPT | Mod: 25

## 2018-04-10 PROCEDURE — ? OBSERVATION

## 2018-04-10 PROCEDURE — ? LIQUID NITROGEN

## 2018-04-10 PROCEDURE — ? SUNSCREEN RECOMMENDATIONS

## 2018-04-10 ASSESSMENT — LOCATION DETAILED DESCRIPTION DERM
LOCATION DETAILED: SUPERIOR THORACIC SPINE
LOCATION DETAILED: LEFT VENTRAL PROXIMAL FOREARM
LOCATION DETAILED: LEFT INFERIOR VERMILION LIP
LOCATION DETAILED: POSTERIOR MID-PARIETAL SCALP
LOCATION DETAILED: RIGHT RADIAL DORSAL HAND
LOCATION DETAILED: RIGHT CENTRAL LATERAL NECK
LOCATION DETAILED: LEFT RADIAL DORSAL HAND
LOCATION DETAILED: RIGHT INFERIOR MEDIAL FOREHEAD
LOCATION DETAILED: RIGHT INFERIOR UPPER BACK
LOCATION DETAILED: INFERIOR THORACIC SPINE
LOCATION DETAILED: RIGHT MEDIAL UPPER BACK
LOCATION DETAILED: LEFT SUPERIOR PARIETAL SCALP
LOCATION DETAILED: LEFT PROXIMAL DORSAL FOREARM
LOCATION DETAILED: RIGHT VENTRAL DISTAL FOREARM
LOCATION DETAILED: LEFT SUPERIOR FOREHEAD
LOCATION DETAILED: LEFT SUPERIOR MEDIAL MALAR CHEEK
LOCATION DETAILED: RIGHT PROXIMAL DORSAL FOREARM

## 2018-04-10 ASSESSMENT — LOCATION SIMPLE DESCRIPTION DERM
LOCATION SIMPLE: LEFT FOREARM
LOCATION SIMPLE: LEFT CHEEK
LOCATION SIMPLE: NECK
LOCATION SIMPLE: LEFT HAND
LOCATION SIMPLE: UPPER BACK
LOCATION SIMPLE: LEFT FOREHEAD
LOCATION SIMPLE: POSTERIOR SCALP
LOCATION SIMPLE: SCALP
LOCATION SIMPLE: RIGHT HAND
LOCATION SIMPLE: RIGHT UPPER BACK
LOCATION SIMPLE: RIGHT FOREARM
LOCATION SIMPLE: RIGHT FOREHEAD
LOCATION SIMPLE: LEFT LIP

## 2018-04-10 ASSESSMENT — LOCATION ZONE DERM
LOCATION ZONE: TRUNK
LOCATION ZONE: HAND
LOCATION ZONE: FACE
LOCATION ZONE: ARM
LOCATION ZONE: LIP
LOCATION ZONE: SCALP
LOCATION ZONE: NECK

## 2018-04-10 NOTE — PROCEDURE: LIQUID NITROGEN
Consent: The patient's consent was obtained including but not limited to risks of crusting, scabbing, blistering, scarring, darker or lighter pigmentary change, recurrence, incomplete removal and infection. RTC in 2 months if lesion(s) persistent.
Render Post-Care Instructions In Note?: yes
Duration Of Freeze Thaw-Cycle (Seconds): 10
Number Of Freeze-Thaw Cycles: 2 freeze-thaw cycles
Post-Care Instructions: I reviewed with the patient in detail post-care instructions. Patient is to wear sunprotection, and avoid picking at any of the treated lesions. Pt may apply Vaseline to crusted or scabbing areas.
Detail Level: Detailed

## 2018-04-10 NOTE — PROCEDURE: BIOPSY BY SHAVE METHOD
Render Post-Care Instructions In Note?: yes
Post-Care Instructions: I reviewed with the patient in detail post-care instructions. Patient is to keep the biopsy site clean once daily and then apply petroleum and bandaid  until healed.
Size Of Lesion In Cm: 1
Type Of Destruction Used: Curettage
Consent: Written consent was obtained and risks were reviewed including but not limited to scarring, infection, bleeding, scabbing, incomplete removal, nerve damage and allergy to anesthesia.
Hemostasis: Drysol and Electrocautery
Biopsy Type: H and E
Dressing: Band-Aid
Destruction After The Procedure: No
Anesthesia Type: 1% lidocaine with 1:100,000 epinephrine and 408mcg clindamycin/ml and a 1:10 solution of 8.4% sodium bicarbonate
X Size Of Lesion In Cm: 0.8
Biopsy Method: Personna blade
Lab: [id number]
Notification Instructions: Patient will be notified of biopsy results. However, patient instructed to call the office if not contacted within 2 weeks.
Lab Facility: 
Billing Type: Third-Party Bill
Additional Anesthesia Volume In Cc (Will Not Render If 0): 0
Wound Care: Bacitracin
Detail Level: Detailed

## 2018-04-12 DIAGNOSIS — E11.9 TYPE 2 DIABETES MELLITUS WITHOUT COMPLICATION, WITHOUT LONG-TERM CURRENT USE OF INSULIN (HCC): ICD-10-CM

## 2018-04-12 RX ORDER — BLOOD-GLUCOSE METER
KIT MISCELLANEOUS
Qty: 50 STRIP | Refills: 6 | Status: SHIPPED | OUTPATIENT
Start: 2018-04-12 | End: 2018-12-08 | Stop reason: SDUPTHER

## 2018-05-09 DIAGNOSIS — E03.8 OTHER SPECIFIED HYPOTHYROIDISM: ICD-10-CM

## 2018-05-09 RX ORDER — LIOTHYRONINE SODIUM 5 UG/1
10 TABLET ORAL 2 TIMES DAILY
Qty: 60 TAB | Refills: 3 | Status: SHIPPED | OUTPATIENT
Start: 2018-05-09 | End: 2018-05-18 | Stop reason: SDUPTHER

## 2018-05-18 DIAGNOSIS — E03.8 OTHER SPECIFIED HYPOTHYROIDISM: ICD-10-CM

## 2018-05-18 RX ORDER — LIOTHYRONINE SODIUM 5 UG/1
10 TABLET ORAL 2 TIMES DAILY
Qty: 180 TAB | Refills: 3 | Status: SHIPPED | OUTPATIENT
Start: 2018-05-18 | End: 2018-07-17 | Stop reason: SDUPTHER

## 2018-05-26 DIAGNOSIS — E03.8 OTHER SPECIFIED HYPOTHYROIDISM: ICD-10-CM

## 2018-05-29 RX ORDER — LIOTHYRONINE SODIUM 5 UG/1
TABLET ORAL
Qty: 60 TAB | Refills: 3 | Status: SHIPPED | OUTPATIENT
Start: 2018-05-29 | End: 2019-04-22

## 2018-06-03 DIAGNOSIS — E03.8 OTHER SPECIFIED HYPOTHYROIDISM: ICD-10-CM

## 2018-06-04 RX ORDER — LEVOTHYROXINE SODIUM 0.07 MG/1
TABLET ORAL
Qty: 90 TAB | Refills: 3 | Status: SHIPPED | OUTPATIENT
Start: 2018-06-04 | End: 2019-03-28 | Stop reason: SDUPTHER

## 2018-07-02 ENCOUNTER — TELEPHONE (OUTPATIENT)
Dept: INTERNAL MEDICINE | Facility: IMAGING CENTER | Age: 83
End: 2018-07-02

## 2018-07-02 NOTE — TELEPHONE ENCOUNTER
Alfred came in at his wife's appointment. He states his blood sugars are running around 180 fasting. He's on 2mg of glimeperide. Could not tolerate metformin in the past and refused to try again.  Recommended he double the glimepiride to 4 mg daily. Watch for hypoglycemia. Hold if his a.m. blood sugar is less than 120  And follow-up with an appointment next month

## 2018-07-13 ENCOUNTER — APPOINTMENT (RX ONLY)
Dept: URBAN - METROPOLITAN AREA CLINIC 20 | Facility: CLINIC | Age: 83
Setting detail: DERMATOLOGY
End: 2018-07-13

## 2018-07-13 PROBLEM — C44.219 BASAL CELL CARCINOMA OF SKIN OF LEFT EAR AND EXTERNAL AURICULAR CANAL: Status: ACTIVE | Noted: 2018-07-13

## 2018-07-13 PROCEDURE — ? MOHS SURGERY PHONE CONSULTATION

## 2018-07-13 NOTE — PROCEDURE: MOHS SURGERY PHONE CONSULTATION
Patient's Insurance: [employer]
Does The Patient Have An Artificial Heart Valve?: No
Has The Pathology Report Been Received?: Yes
Patient Preferred Phone Number: [phone]
Time Of Mohs Surgery: 8:45AM
Date Of Mohs Surgery: 07/30/2018
Office Location Of Mohs Surgery: [facility]
Referring Provider: DONOVAN SIMS
Which Antibiotic Do They Take For Surgical Prophylaxis?: Amoxicillin (2 grams)
Patient Reported Location: LEFT SU[facility]
Pathology Accession #: [id number]
Detail Level: Simple

## 2018-07-17 DIAGNOSIS — E03.8 OTHER SPECIFIED HYPOTHYROIDISM: ICD-10-CM

## 2018-07-17 RX ORDER — LIOTHYRONINE SODIUM 5 UG/1
10 TABLET ORAL DAILY
Qty: 180 TAB | Refills: 1 | Status: SHIPPED | OUTPATIENT
Start: 2018-07-17 | End: 2018-07-24

## 2018-07-17 NOTE — TELEPHONE ENCOUNTER
Please let Cam know I only want him to take 2 cytomel's daily (not 4)  He may take them at same time.

## 2018-07-18 DIAGNOSIS — E11.9 TYPE 2 DIABETES MELLITUS WITHOUT COMPLICATION, WITHOUT LONG-TERM CURRENT USE OF INSULIN (HCC): ICD-10-CM

## 2018-07-18 RX ORDER — GLIMEPIRIDE 2 MG/1
2 TABLET ORAL EVERY MORNING
Qty: 30 TAB | Refills: 1 | Status: SHIPPED | OUTPATIENT
Start: 2018-07-18 | End: 2018-07-18

## 2018-07-18 RX ORDER — GLIMEPIRIDE 4 MG/1
4 TABLET ORAL EVERY MORNING
Qty: 30 TAB | Refills: 3 | Status: SHIPPED | OUTPATIENT
Start: 2018-07-18 | End: 2018-11-05 | Stop reason: SDUPTHER

## 2018-07-24 ENCOUNTER — OFFICE VISIT (OUTPATIENT)
Dept: INTERNAL MEDICINE | Facility: IMAGING CENTER | Age: 83
End: 2018-07-24
Payer: MEDICARE

## 2018-07-24 VITALS
DIASTOLIC BLOOD PRESSURE: 65 MMHG | HEIGHT: 70 IN | SYSTOLIC BLOOD PRESSURE: 132 MMHG | RESPIRATION RATE: 14 BRPM | TEMPERATURE: 97.5 F | OXYGEN SATURATION: 94 % | WEIGHT: 179 LBS | BODY MASS INDEX: 25.62 KG/M2 | HEART RATE: 66 BPM

## 2018-07-24 DIAGNOSIS — E11.9 TYPE 2 DIABETES MELLITUS WITHOUT COMPLICATION, WITHOUT LONG-TERM CURRENT USE OF INSULIN (HCC): ICD-10-CM

## 2018-07-24 DIAGNOSIS — E78.2 MIXED HYPERLIPIDEMIA: ICD-10-CM

## 2018-07-24 DIAGNOSIS — E03.9 HYPOTHYROIDISM, UNSPECIFIED TYPE: ICD-10-CM

## 2018-07-24 LAB
HBA1C MFR BLD: 6.5 % (ref ?–5.8)
INT CON NEG: NEGATIVE
INT CON POS: POSITIVE

## 2018-07-24 PROCEDURE — 99214 OFFICE O/P EST MOD 30 MIN: CPT | Performed by: FAMILY MEDICINE

## 2018-07-24 PROCEDURE — 83036 HEMOGLOBIN GLYCOSYLATED A1C: CPT | Performed by: FAMILY MEDICINE

## 2018-07-24 RX ORDER — FLUOXETINE HYDROCHLORIDE 20 MG/1
CAPSULE ORAL
Refills: 6 | COMMUNITY
Start: 2018-06-14 | End: 2018-07-24

## 2018-07-24 RX ORDER — ATORVASTATIN CALCIUM 20 MG/1
TABLET, FILM COATED ORAL
Qty: 90 TAB | Refills: 1 | Status: SHIPPED | OUTPATIENT
Start: 2018-07-24 | End: 2019-01-22 | Stop reason: SDUPTHER

## 2018-07-24 NOTE — PROGRESS NOTES
Chief Complaint   Patient presents with   • Diabetes       HISTORY OF PRESENT ILLNESS: Patient is a 82 y.o. male established patient who presents today to follow-up on type 2 diabetes.  He is taking Amaryl 4 mg.  Previously he was on 2 mg.  He was in with his wife and stated his sugars were running close to 200.  We had him increase to 4 mg.  He has been monitoring his blood sugar since his morning readings are running around 140-150.  He denies any low blood sugars.  He declines taking metformin, worried about kidney issues.  He is trying to go for a walk most days and is focusing on his diet as well.  His A1c today is 6.5.  He is on a statin.  And an aspirin.  He has not tolerated even low-dose ACE inhibitors due to lightheadedness.  He denies any numbness or tingling in his feet and he is overdue to see an ophthalmologist.  He states his previous eye doctor was Dr. Rai and retired.    He is also on levothyroxine and Cytomel for hypothyroidism.  He takes 10 mcg of Cytomel once daily.  He states he feels level on this dose.  He denies any palpitations.  No diarrhea and no constipation.  His thyroid function tests have been appropriate.            Patient Active Problem List    Diagnosis Date Noted   • Type 2 diabetes mellitus without complication, without long-term current use of insulin ([facility]) 10/18/2017   • Bilateral carotid artery disease ([facility]) 10/18/2017   • Psoriasis 06/14/2017   • Vitamin D deficiency 08/27/2015   • History of shingles 08/27/2015   • PHN (postherpetic neuralgia) 08/27/2015   • Hyperlipidemia 07/08/2015   • Hypothyroidism 07/08/2015   • MCI (mild cognitive impairment) 07/08/2015   • TIA (transient ischemic attack) 11/30/2012       Current Outpatient Prescriptions:   •  atorvastatin (LIPITOR) 20 MG Tab, TAKE 1 TAB BY MOUTH EVERY DAY., Disp: 90 Tab, Rfl: 1  •  glimepiride (AMARYL) 4 MG Tab, Take 1 Tab by mouth every morning., Disp: 30 Tab, Rfl: 3  •  levothyroxine (SYNTHROID) 75 MCG Tab, TAKE  1 TAB BY MOUTH EVERY DAY., Disp: 90 Tab, Rfl: 3  •  liothyronine (CYTOMEL) 5 MCG Tab, TAKE 2 TABLETS BY MOUTH ONCE DAILY, Disp: 60 Tab, Rfl: 3  •  tamsulosin (FLOMAX) 0.4 MG capsule, TAKE 1 CAP BY MOUTH ONE-HALF HOUR AFTER BREAKFAST. FOR BLADDER, Disp: 90 Cap, Rfl: 1  •  aspirin EC (ECOTRIN) 81 MG Tablet Delayed Response, Take 81 mg by mouth every day., Disp: , Rfl:   •  cyanocobalamin (VITAMIN B-12) 100 MCG Tab, Take 100 mcg by mouth every day., Disp: , Rfl:   •  vitamin D (CHOLECALCIFEROL) 1000 UNIT TABS, Take 1,000 Units by mouth. 2 tablets daily , Disp: , Rfl:   •  FREESTYLE LITE strip, USE TO TEST BLOOD SUGAR EACH MORNING AND AS NEEDED, Disp: 50 Strip, Rfl: 6  •  Lancets Misc, Lancets order: Lancets for glucose meter preferred on patient's insurance. . Sig: use to monitor glucose once daily and prn, Disp: 100 Each, Rfl: 3  •  Blood Glucose Monitoring Suppl Device, Meter: Dispense glucose meter preferred on patient's insurance.  Sig. Use as directed for blood sugar monitoring., Disp: 1 Device, Rfl: 0  •  cholestyramine (QUESTRAN) 4 G packet, Take 2-4 g by mouth every day., Disp: 30 Each, Rfl: 3          Past medical, surgical, family, and social history is reviewed and updated in Epic chart by me today.   Medications and allergies reviewed and updated in Epic chart by me today.     REVIEW OF SYSTEMS:  GENERAL: No fatigue, no weight loss.  HEENT:  Ears--no earache, no change in hearing, no dizziness, no tinnitus.                 Eyes--no blurred vision, no discharge or pain                 Throat--No sore throat, no dysphagia, no hoarseness  CV:  No chest pain,dyspnea,palpitations or edema.  RESP:  No sob,cough,wheezing or hemoptysis.  GI: No dysphagia, heartburn,abdominal pain, nausea, vomiting, diarrhea or constipation.       No melena, jaundice, bleeding, incontinence or change in bowel habits.  :  No dysuria, polyuria, hematuria, incontinence, or nocturia.  MS:  No joint swelling, myalgias, or  "arthralgias.  NEURO:  No seizures, syncope, paralysis, tremor, or weakness.  SKIN: No new or concerning skin lesions or changes.   PSYCH: Mood fine.    Vitals:    07/24/18 1000   BP: 132/65   Pulse: 66   Resp: 14   Temp: 36.4 °C (97.5 °F)   SpO2: 94%   Weight: 81.2 kg (179 lb)   Height: 1.778 m (5' 10\")     Physical Exam:  Gen: Well developed, well nourished. No acute distress.  Alert and oriented x 3.  Neck:  Supple, no adenopathy or thyromegaly.  Heart:  Regular rate and rhythm.  Normal S1, S2. No murmur, gallop or rub.  Lungs:  Clear, No wheezes,rales or rhonchi.  Extremities:  No edema.  Psych: Mood and affect are appropriate.    Fingerstick A1c today is 6.5.      Assessment/Plan:  1. Type 2 diabetes mellitus without complication, without long-term current use of insulin ([facility]).  Good control.  He will continue Amaryl 4 mg daily.  Continue with exercise and diet.  Counseled regarding foot care.  Recommend he see another physician at [facility]. POCT A1C   2. Mixed hyperlipidemia.  Controlled with atorvastatin diet and exercise.    3. Hypothyroidism, unspecified type.  Stable on his present medications.      Follow-up in October for her annual wellness exam and complete blood work.  "

## 2018-07-30 ENCOUNTER — APPOINTMENT (RX ONLY)
Dept: URBAN - METROPOLITAN AREA CLINIC 36 | Facility: CLINIC | Age: 83
Setting detail: DERMATOLOGY
End: 2018-07-30

## 2018-07-30 VITALS — HEART RATE: 56 BPM | DIASTOLIC BLOOD PRESSURE: 72 MMHG | SYSTOLIC BLOOD PRESSURE: 140 MMHG

## 2018-07-30 DIAGNOSIS — L57.8 OTHER SKIN CHANGES DUE TO CHRONIC EXPOSURE TO NONIONIZING RADIATION: ICD-10-CM

## 2018-07-30 PROBLEM — C44.219 BASAL CELL CARCINOMA OF SKIN OF LEFT EAR AND EXTERNAL AURICULAR CANAL: Status: ACTIVE | Noted: 2018-07-30

## 2018-07-30 PROCEDURE — ? PRESCRIPTION

## 2018-07-30 PROCEDURE — 13152 CMPLX RPR E/N/E/L 2.6-7.5 CM: CPT

## 2018-07-30 PROCEDURE — 17311 MOHS 1 STAGE H/N/HF/G: CPT

## 2018-07-30 PROCEDURE — ? COUNSELING

## 2018-07-30 PROCEDURE — ? MOHS SURGERY

## 2018-07-30 PROCEDURE — ? POST-OP WOUND CHECK (NO GLOBAL PERIOD)

## 2018-07-30 RX ORDER — DOXYCYCLINE 100 MG/1
CAPSULE ORAL
Qty: 14 | Refills: 0 | Status: ERX

## 2018-07-30 NOTE — PROCEDURE: POST-OP WOUND CHECK (NO GLOBAL PERIOD)
Detail Level: Detailed
Wound Assessment Override (Optional): this was a mohs from last year. reviewed the map and the photo from last year. scar well healed and site confirmed to be distinct from this one we are treating today

## 2018-08-08 ENCOUNTER — APPOINTMENT (RX ONLY)
Dept: URBAN - METROPOLITAN AREA CLINIC 36 | Facility: CLINIC | Age: 83
Setting detail: DERMATOLOGY
End: 2018-08-08

## 2018-08-08 DIAGNOSIS — Z48.02 ENCOUNTER FOR REMOVAL OF SUTURES: ICD-10-CM

## 2018-08-08 PROCEDURE — ? SUTURE REMOVAL (GLOBAL PERIOD)

## 2018-08-08 PROCEDURE — 99024 POSTOP FOLLOW-UP VISIT: CPT

## 2018-08-08 ASSESSMENT — LOCATION SIMPLE DESCRIPTION DERM: LOCATION SIMPLE: LEFT EAR

## 2018-08-08 ASSESSMENT — LOCATION ZONE DERM: LOCATION ZONE: EAR

## 2018-08-08 ASSESSMENT — LOCATION DETAILED DESCRIPTION DERM: LOCATION DETAILED: LEFT SUPERIOR HELIX

## 2018-08-08 NOTE — PROCEDURE: SUTURE REMOVAL (GLOBAL PERIOD)
Detail Level: Detailed
Add 09035 Cpt? (Important Note: In 2017 The Use Of 47470 Is Being Tracked By Cms To Determine Future Global Period Reimbursement For Global Periods): yes

## 2018-08-10 ENCOUNTER — APPOINTMENT (RX ONLY)
Dept: URBAN - METROPOLITAN AREA CLINIC 20 | Facility: CLINIC | Age: 83
Setting detail: DERMATOLOGY
End: 2018-08-10

## 2018-08-10 ENCOUNTER — OFFICE VISIT (OUTPATIENT)
Dept: INTERNAL MEDICINE | Facility: IMAGING CENTER | Age: 83
End: 2018-08-10
Payer: MEDICARE

## 2018-08-10 VITALS
SYSTOLIC BLOOD PRESSURE: 128 MMHG | BODY MASS INDEX: 25.62 KG/M2 | DIASTOLIC BLOOD PRESSURE: 70 MMHG | RESPIRATION RATE: 14 BRPM | HEIGHT: 70 IN | WEIGHT: 179 LBS | TEMPERATURE: 97.9 F | HEART RATE: 75 BPM | OXYGEN SATURATION: 98 %

## 2018-08-10 DIAGNOSIS — Z71.89 COUNSELING REGARDING END OF LIFE DECISION MAKING: ICD-10-CM

## 2018-08-10 DIAGNOSIS — Z85.828 PERSONAL HISTORY OF OTHER MALIGNANT NEOPLASM OF SKIN: ICD-10-CM

## 2018-08-10 DIAGNOSIS — D18.0 HEMANGIOMA: ICD-10-CM

## 2018-08-10 DIAGNOSIS — D22 MELANOCYTIC NEVI: ICD-10-CM

## 2018-08-10 DIAGNOSIS — L82.1 OTHER SEBORRHEIC KERATOSIS: ICD-10-CM

## 2018-08-10 DIAGNOSIS — Z71.89 OTHER SPECIFIED COUNSELING: ICD-10-CM

## 2018-08-10 DIAGNOSIS — L81.4 OTHER MELANIN HYPERPIGMENTATION: ICD-10-CM

## 2018-08-10 DIAGNOSIS — Z78.9 POLST (PHYSICIAN ORDERS FOR LIFE-SUSTAINING TREATMENT): ICD-10-CM

## 2018-08-10 PROBLEM — D18.01 HEMANGIOMA OF SKIN AND SUBCUTANEOUS TISSUE: Status: ACTIVE | Noted: 2018-08-10

## 2018-08-10 PROBLEM — D22.62 MELANOCYTIC NEVI OF LEFT UPPER LIMB, INCLUDING SHOULDER: Status: ACTIVE | Noted: 2018-08-10

## 2018-08-10 PROBLEM — D22.61 MELANOCYTIC NEVI OF RIGHT UPPER LIMB, INCLUDING SHOULDER: Status: ACTIVE | Noted: 2018-08-10

## 2018-08-10 PROBLEM — D48.5 NEOPLASM OF UNCERTAIN BEHAVIOR OF SKIN: Status: ACTIVE | Noted: 2018-08-10

## 2018-08-10 PROBLEM — D22.5 MELANOCYTIC NEVI OF TRUNK: Status: ACTIVE | Noted: 2018-08-10

## 2018-08-10 PROCEDURE — 11100: CPT

## 2018-08-10 PROCEDURE — ? COUNSELING

## 2018-08-10 PROCEDURE — ? BIOPSY BY SHAVE METHOD

## 2018-08-10 PROCEDURE — 99213 OFFICE O/P EST LOW 20 MIN: CPT | Performed by: FAMILY MEDICINE

## 2018-08-10 PROCEDURE — 99214 OFFICE O/P EST MOD 30 MIN: CPT | Mod: 25

## 2018-08-10 PROCEDURE — ? OBSERVATION

## 2018-08-10 PROCEDURE — ? SUNSCREEN RECOMMENDATIONS

## 2018-08-10 ASSESSMENT — LOCATION SIMPLE DESCRIPTION DERM
LOCATION SIMPLE: RIGHT HAND
LOCATION SIMPLE: LEFT LIP
LOCATION SIMPLE: LEFT FOREARM
LOCATION SIMPLE: RIGHT FOREHEAD
LOCATION SIMPLE: POSTERIOR NECK
LOCATION SIMPLE: RIGHT FOREARM
LOCATION SIMPLE: LEFT EAR
LOCATION SIMPLE: RIGHT UPPER BACK
LOCATION SIMPLE: UPPER BACK
LOCATION SIMPLE: POSTERIOR SCALP
LOCATION SIMPLE: LEFT HAND
LOCATION SIMPLE: NECK

## 2018-08-10 ASSESSMENT — LOCATION DETAILED DESCRIPTION DERM
LOCATION DETAILED: RIGHT INFERIOR UPPER BACK
LOCATION DETAILED: SUPERIOR THORACIC SPINE
LOCATION DETAILED: LEFT INFERIOR VERMILION LIP
LOCATION DETAILED: LEFT RADIAL DORSAL HAND
LOCATION DETAILED: LEFT SUPERIOR HELIX
LOCATION DETAILED: LEFT PROXIMAL DORSAL FOREARM
LOCATION DETAILED: RIGHT VENTRAL DISTAL FOREARM
LOCATION DETAILED: RIGHT PROXIMAL DORSAL FOREARM
LOCATION DETAILED: LEFT MEDIAL TRAPEZIAL NECK
LOCATION DETAILED: POSTERIOR MID-PARIETAL SCALP
LOCATION DETAILED: RIGHT MEDIAL UPPER BACK
LOCATION DETAILED: RIGHT RADIAL DORSAL HAND
LOCATION DETAILED: INFERIOR THORACIC SPINE
LOCATION DETAILED: LEFT VENTRAL PROXIMAL FOREARM
LOCATION DETAILED: RIGHT CENTRAL LATERAL NECK
LOCATION DETAILED: RIGHT INFERIOR MEDIAL FOREHEAD
LOCATION DETAILED: LEFT ANTERIOR EARLOBE

## 2018-08-10 ASSESSMENT — LOCATION ZONE DERM
LOCATION ZONE: EAR
LOCATION ZONE: TRUNK
LOCATION ZONE: ARM
LOCATION ZONE: FACE
LOCATION ZONE: SCALP
LOCATION ZONE: LIP
LOCATION ZONE: NECK
LOCATION ZONE: HAND

## 2018-08-10 NOTE — PROGRESS NOTES
Chief Complaint   Patient presents with   • Other     POLST       HISTORY OF PRESENT ILLNESS: Patient is a 82 y.o. male established patient who presents today with his wife Margaux.  He would like assistance filling out a he states he does have an advanced directive POLST form.  That also expresses his wishes.  we do not have a copy of that on his chart.  He states he will bring one in.  He does not wish for treatments that will extend his life should he be found terminal or not expected to improve.        Patient Active Problem List    Diagnosis Date Noted   • Type 2 diabetes mellitus without complication, without long-term current use of insulin ([facility]) 10/18/2017   • Bilateral carotid artery disease ([facility]) 10/18/2017   • Psoriasis 06/14/2017   • Vitamin D deficiency 08/27/2015   • History of shingles 08/27/2015   • PHN (postherpetic neuralgia) 08/27/2015   • Hyperlipidemia 07/08/2015   • Hypothyroidism 07/08/2015   • MCI (mild cognitive impairment) 07/08/2015   • TIA (transient ischemic attack) 11/30/2012     Current Outpatient Prescriptions on File Prior to Visit   Medication Sig Dispense Refill   • atorvastatin (LIPITOR) 20 MG Tab TAKE 1 TAB BY MOUTH EVERY DAY. 90 Tab 1   • glimepiride (AMARYL) 4 MG Tab Take 1 Tab by mouth every morning. 30 Tab 3   • levothyroxine (SYNTHROID) 75 MCG Tab TAKE 1 TAB BY MOUTH EVERY DAY. 90 Tab 3   • liothyronine (CYTOMEL) 5 MCG Tab TAKE 2 TABLETS BY MOUTH ONCE DAILY 60 Tab 3   • tamsulosin (FLOMAX) 0.4 MG capsule TAKE 1 CAP BY MOUTH ONE-HALF HOUR AFTER BREAKFAST. FOR BLADDER 90 Cap 1   • aspirin EC (ECOTRIN) 81 MG Tablet Delayed Response Take 81 mg by mouth every day.     • cholestyramine (QUESTRAN) 4 G packet Take 2-4 g by mouth every day. 30 Each 3   • cyanocobalamin (VITAMIN B-12) 100 MCG Tab Take 100 mcg by mouth every day.     • vitamin D (CHOLECALCIFEROL) 1000 UNIT TABS Take 1,000 Units by mouth. 2 tablets daily      • FREESTYLE LITE strip USE TO TEST BLOOD SUGAR EACH MORNING  "AND AS NEEDED 50 Strip 6   • Lancets Misc Lancets order: Lancets for glucose meter preferred on patient's insurance. . Sig: use to monitor glucose once daily and prn 100 Each 3   • Blood Glucose Monitoring Suppl Device Meter: Dispense glucose meter preferred on patient's insurance.  Sig. Use as directed for blood sugar monitoring. 1 Device 0     No current facility-administered medications on file prior to visit.          Past medical, surgical, family, and social history is reviewed and updated in Epic chart by me today.   Medications and allergies reviewed and updated in Epic chart by me today.     REVIEW OF SYSTEMS:  GENERAL: No fatigue, no weight loss.  CV:  No chest pain,dyspnea,palpitations or edema.  RESP:  No sob,cough,wheezing or hemoptysis.  GI: No dysphagia, heartburn,abdominal pain, nausea, vomiting, diarrhea or constipation.       No melena, jaundice, bleeding, incontinence or change in bowel habits.  :  No dysuria, polyuria, hematuria, incontinence, or nocturia.  MS:  No joint swelling, myalgias, or arthralgias.  NEURO:  No seizures, syncope, paralysis, tremor, or weakness.  SKIN: No new or concerning skin lesions or changes.   PSYCH: Mood fine.    Vitals:    08/10/18 1300   BP: 128/70   Pulse: 75   Resp: 14   Temp: 36.6 °C (97.9 °F)   SpO2: 98%   Weight: 81.2 kg (179 lb)   Height: 1.778 m (5' 10\")     Physical Exam:  Gen: Well developed, well nourished. No acute distress.  Alert and oriented x 3.  Neck:  Supple, no adenopathy or thyromegaly.  Heart:  Regular rate and rhythm.  Normal S1, S2. No murmur, gallop or rub.  Lungs:  Clear, No wheezes,rales or rhonchi.  Extremities:  No edema.  Psych: Mood and affect are appropriate.    Assessment/Plan:  1. POLST (Physician Orders for Life-Sustaining Treatment).  We discussed the reason for a POLST form. He filled it out under my  supervision today.  He is clear that should he be found without a pulse or respirations that he does not want CPR/resuscitation " attempted.  He also  wishes comfort care only if his condition is  determined to be terminal or with chance of little improvement.    2. Counseling regarding end of life decision making

## 2018-08-10 NOTE — PROCEDURE: BIOPSY BY SHAVE METHOD
Notification Instructions: Patient will be notified of biopsy results. However, patient instructed to call the office if not contacted within 2 weeks.
Lab: [id number]
Detail Level: Detailed
Anesthesia Type: 1% lidocaine with 1:100,000 epinephrine and a 1:12 solution of 8.4% sodium bicarbonate
X Size Of Lesion In Cm: 0.3
Anesthesia Volume In Cc: 1
Bill 44765 For Specimen Handling/Conveyance To Laboratory?: no
Additional Anesthesia Volume In Cc (Will Not Render If 0): 0
Consent: Written consent was obtained and risks were reviewed including but not limited to scarring, infection, bleeding, scabbing, incomplete removal, nerve damage and allergy to anesthesia.
Depth Of Biopsy: dermis
Billing Type: Third-Party Bill
Was A Bandage Applied: Yes
Lab Facility: 
Type Of Destruction Used: Curettage
Biopsy Type: H and E
Biopsy Method: Personna blade
Dressing: Band-Aid
Wound Care: Bacitracin
Hemostasis: Drysol and Electrocautery
Post-Care Instructions: I reviewed with the patient in detail post-care instructions. Patient is to keep the biopsy site clean once daily and then apply petroleum and bandaid  until healed.

## 2018-08-29 ENCOUNTER — APPOINTMENT (RX ONLY)
Dept: URBAN - METROPOLITAN AREA CLINIC 20 | Facility: CLINIC | Age: 83
Setting detail: DERMATOLOGY
End: 2018-08-29

## 2018-08-29 DIAGNOSIS — Z71.89 OTHER SPECIFIED COUNSELING: ICD-10-CM

## 2018-08-29 DIAGNOSIS — L90.5 SCAR CONDITIONS AND FIBROSIS OF SKIN: ICD-10-CM

## 2018-08-29 PROCEDURE — ? OBSERVATION

## 2018-08-29 PROCEDURE — ? COUNSELING

## 2018-08-29 PROCEDURE — ? POST-OP WOUND CHECK

## 2018-08-29 ASSESSMENT — LOCATION ZONE DERM: LOCATION ZONE: NECK

## 2018-08-29 ASSESSMENT — LOCATION DETAILED DESCRIPTION DERM: LOCATION DETAILED: MID TRAPEZIAL NECK

## 2018-08-29 ASSESSMENT — LOCATION SIMPLE DESCRIPTION DERM: LOCATION SIMPLE: TRAPEZIAL NECK

## 2018-10-09 ENCOUNTER — NON-PROVIDER VISIT (OUTPATIENT)
Dept: INTERNAL MEDICINE | Facility: IMAGING CENTER | Age: 83
End: 2018-10-09
Payer: MEDICARE

## 2018-10-09 DIAGNOSIS — Z23 NEED FOR INFLUENZA VACCINATION: ICD-10-CM

## 2018-10-09 PROCEDURE — 90471 IMMUNIZATION ADMIN: CPT | Performed by: FAMILY MEDICINE

## 2018-10-09 PROCEDURE — 90662 IIV NO PRSV INCREASED AG IM: CPT | Performed by: FAMILY MEDICINE

## 2018-10-12 RX ORDER — TAMSULOSIN HYDROCHLORIDE 0.4 MG/1
CAPSULE ORAL
Qty: 90 CAP | Refills: 1 | Status: SHIPPED | OUTPATIENT
Start: 2018-10-12 | End: 2018-10-24

## 2018-10-24 ENCOUNTER — OFFICE VISIT (OUTPATIENT)
Dept: INTERNAL MEDICINE | Facility: IMAGING CENTER | Age: 83
End: 2018-10-24
Payer: MEDICARE

## 2018-10-24 VITALS
WEIGHT: 179 LBS | HEART RATE: 68 BPM | HEIGHT: 70 IN | TEMPERATURE: 98.2 F | SYSTOLIC BLOOD PRESSURE: 126 MMHG | RESPIRATION RATE: 14 BRPM | DIASTOLIC BLOOD PRESSURE: 72 MMHG | BODY MASS INDEX: 25.62 KG/M2 | OXYGEN SATURATION: 97 %

## 2018-10-24 DIAGNOSIS — Z00.00 MEDICARE ANNUAL WELLNESS VISIT, SUBSEQUENT: ICD-10-CM

## 2018-10-24 DIAGNOSIS — G31.84 MCI (MILD COGNITIVE IMPAIRMENT): ICD-10-CM

## 2018-10-24 DIAGNOSIS — E78.2 MIXED HYPERLIPIDEMIA: ICD-10-CM

## 2018-10-24 DIAGNOSIS — F43.21 SITUATIONAL DEPRESSION: ICD-10-CM

## 2018-10-24 DIAGNOSIS — E03.9 HYPOTHYROIDISM, UNSPECIFIED TYPE: ICD-10-CM

## 2018-10-24 DIAGNOSIS — E11.9 TYPE 2 DIABETES MELLITUS WITHOUT COMPLICATION, WITHOUT LONG-TERM CURRENT USE OF INSULIN (HCC): ICD-10-CM

## 2018-10-24 DIAGNOSIS — I65.23 BILATERAL CAROTID ARTERY STENOSIS: ICD-10-CM

## 2018-10-24 PROCEDURE — G0439 PPPS, SUBSEQ VISIT: HCPCS | Performed by: FAMILY MEDICINE

## 2018-10-24 ASSESSMENT — PATIENT HEALTH QUESTIONNAIRE - PHQ9
SUM OF ALL RESPONSES TO PHQ QUESTIONS 1-9: 3
CLINICAL INTERPRETATION OF PHQ2 SCORE: 2
5. POOR APPETITE OR OVEREATING: 0 - NOT AT ALL

## 2018-10-24 ASSESSMENT — ENCOUNTER SYMPTOMS: GENERAL WELL-BEING: GOOD

## 2018-10-24 ASSESSMENT — ACTIVITIES OF DAILY LIVING (ADL): BATHING_REQUIRES_ASSISTANCE: 0

## 2018-10-24 NOTE — PROGRESS NOTES
Chief Complaint   Patient presents with   • Annual Wellness Visit         HPI:  Alfred is a 83 y.o. established male patient here for Medicare Annual Wellness Visit .  He is generally doing well.  His greatest concern is his wife's health.  She has multiple medical problems that she has been dealing with.  He is frustrated that he is not able to help her more.  He denies thoughts of hopelessness or suicide.  He does take Prozac.  States generally his mood is fair.  His appetite is good.  His sleep is good.    He has type 2 diabetes.  He is on glimepiride.  He brings in his numbers today and they are all acceptable.  His last A1c was 6.5 in July.  He is due to see his eye doctor this month and he denies any neuropathy symptoms.        Patient Active Problem List    Diagnosis Date Noted   • Type 2 diabetes mellitus without complication, without long-term current use of insulin ([facility]) 10/18/2017   • Bilateral carotid artery disease ([facility]) 10/18/2017   • Psoriasis 06/14/2017   • Vitamin D deficiency 08/27/2015   • History of shingles 08/27/2015   • PHN (postherpetic neuralgia) 08/27/2015   • Hyperlipidemia 07/08/2015   • Hypothyroidism 07/08/2015   • MCI (mild cognitive impairment) 07/08/2015   • TIA (transient ischemic attack) 11/30/2012       Current Outpatient Prescriptions   Medication Sig Dispense Refill   • atorvastatin (LIPITOR) 20 MG Tab TAKE 1 TAB BY MOUTH EVERY DAY. 90 Tab 1   • glimepiride (AMARYL) 4 MG Tab Take 1 Tab by mouth every morning. 30 Tab 3   • levothyroxine (SYNTHROID) 75 MCG Tab TAKE 1 TAB BY MOUTH EVERY DAY. 90 Tab 3   • liothyronine (CYTOMEL) 5 MCG Tab TAKE 2 TABLETS BY MOUTH ONCE DAILY 60 Tab 3   • aspirin EC (ECOTRIN) 81 MG Tablet Delayed Response Take 81 mg by mouth every day.     • FREESTYLE LITE strip USE TO TEST BLOOD SUGAR EACH MORNING AND AS NEEDED 50 Strip 6   • Lancets Misc Lancets order: Lancets for glucose meter preferred on patient's insurance. . Sig: use to monitor glucose once daily  and prn 100 Each 3   • Blood Glucose Monitoring Suppl Device Meter: Dispense glucose meter preferred on patient's insurance.  Sig. Use as directed for blood sugar monitoring. 1 Device 0   • cholestyramine (QUESTRAN) 4 G packet Take 2-4 g by mouth every day. 30 Each 3   • cyanocobalamin (VITAMIN B-12) 100 MCG Tab Take 100 mcg by mouth every day.     • vitamin D (CHOLECALCIFEROL) 1000 UNIT TABS Take 1,000 Units by mouth. 2 tablets daily        No current facility-administered medications for this visit.         The patient reports adherence to this regimen   Current supplements as per medication list.   Chronic narcotic pain medicines: no     Allergies: Pcn [penicillins] and Sulfa drugs    Current social contact/activities: Active with family.  Exercise: He walks the dog several times a week.  Is patient current with immunizations?  yes       He  reports that he has never smoked. He has never used smokeless tobacco. He reports that he does not drink alcohol or use drugs.        DPA/Advanced directive: .has an advanced directive - a copy has been provided     ROS:    Gait: Uses no assistive device   Ostomy: no   Other tubes: no   Amputations: no   Chronic oxygen use no   Last eye exam: One year ago.  : Denies incontinence.       Screening:          Depression Screening    Little interest or pleasure in doing things?  1 - several days  Feeling down, depressed , or hopeless? 1 - several days  Trouble falling or staying asleep, or sleeping too much?  0 - not at all  Feeling tired or having little energy?  1 - several days  Poor appetite or overeating?  0 - not at all  Feeling bad about yourself - or that you are a failure or have let yourself or your family down? 0 - not at all  Trouble concentrating on things, such as reading the newspaper or watching television? 0 - not at all  Moving or speaking so slowly that other people could have noticed.  Or the opposite - being so fidgety or restless that you have been moving  around a lot more than usual?  0 - not at all  Thoughts that you would be better off dead, or of hurting yourself?  0 - not at all  Patient Health Questionnaire Score: 3    If depressive symptoms identified deferred to follow up visit unless specifically addressed in assessment and plan.    Interpretation of PHQ-9 Total Score   Score Severity   1-4 No Depression   5-9 Mild Depression   10-14 Moderate Depression   15-19 Moderately Severe Depression   20-27 Severe Depression      Screening for Cognitive Impairment    Three Minute Recall (leader, season, table) 2/3    Joseph clock face with all 12 numbers and set the hands to show 10 past 11.  Yes    Cognitive concerns identified deferred for follow up unless specifically addressed in assessment and plan.    Fall Risk Assessment    Has the patient had two or more falls in the last year or any fall with injury in the last year?  No    Safety Assessment    Throw rugs on floor.  No  Handrails on all stairs.  Yes  Good lighting in all hallways.  Yes  Difficulty hearing.  Yes  Patient counseled about all safety risks that were identified.    Functional Assessment ADLs    Are there any barriers preventing you from cooking for yourself or meeting nutritional needs?  No.    Are there any barriers preventing you from driving safely or obtaining transportation?  No.    Are there any barriers preventing you from using a telephone or calling for help?   .    Are there any barriers preventing you from shopping?  No.    Are there any barriers preventing you from taking care of your own finances?  No.    Are there any barriers preventing you from managing your medications?  No.    Are there any barriers preventing you from showering, bathing or dressing yourself? No.    Are you currently engaging in any exercise or physical activity?  No.     What is your perception of your health?  Good.          Patient Care Team:  Day Moraes M.D. as PCP - General (Family Medicine)  Faviola HANNA  "HAN Lennon as Registered Nurse  Derm:  [facility]  Opth:  [facility]  Seeing Dr. Sangeeta Mathis for memory concerns.   GI:  [facility]-- Dr. Hall    Social History   Substance Use Topics   • Smoking status: Never Smoker   • Smokeless tobacco: Never Used   • Alcohol use No      Comment: occ     Family History   Problem Relation Age of Onset   • Stroke Brother    • Stroke Father      He  has a past medical history of Asthma; Diabetes (HCC); Diverticulitis; Esophageal stricture; Hiatal hernia; History of shingles (8/27/2015); Hyperlipidemia (7/8/2015); Hypertension; Hypothyroid; PHN (postherpetic neuralgia) (8/27/2015); Psoriasis (6/14/2017); TIA (transient ischemic attack); and Vitamin D deficiency (8/27/2015). He also has no past medical history of Cancer (HCC).   Past Surgical History:   Procedure Laterality Date   • CATARACT EXTRACTION WITH IOL     • TONSILLECTOMY     • VASECTOMY       REVIEW OF SYSTEMS:  GENERAL: No fatigue, no weight loss.  HEENT:  Ears--no earache, no change in hearing, no dizziness, no tinnitus.                 Eyes--no blurred vision, no discharge or pain                 Throat--No sore throat, no dysphagia, no hoarseness  CV:  No chest pain,dyspnea,palpitations or edema.  RESP:  No sob,cough,wheezing or hemoptysis.  GI: No dysphagia, heartburn,abdominal pain, nausea, vomiting, diarrhea or constipation.       No melena, jaundice, bleeding, incontinence or change in bowel habits.  :  No dysuria, polyuria, hematuria, incontinence, or nocturia.  MS:  No joint swelling, myalgias, or arthralgias.  NEURO:  No seizures, syncope, paralysis, tremor, or weakness.  SKIN: No new or concerning skin lesions or changes.   PSYCH: Mood fine.        Exam:     Blood pressure 126/72, pulse 68, temperature 36.8 °C (98.2 °F), resp. rate 14, height 1.778 m (5' 10\"), weight 81.2 kg (179 lb), SpO2 97 %. Body mass index is 25.68 kg/m².    Hearing fair.  Uses hearing aids  Dentition " fair  Alert, oriented in no acute distress.  Eye contact is good, speech goal directed, affect calm  PHYSICAL EXAM;  GENERAL;  WN/WD, No acute distress.   HEENT:  Head normocephalic and atraumatic.    PERRLA, EOMI. No conjunctival injection, no icterus.     TM's normal, nasal mucosa and mouth with no abnormalities.    Oropharynx is clear with no lesions.  NECK: Supple, no adenopathy or thyromegaly.  CV: RR. Normal S1,S2. No murmur, gallop or rub.          No JVD, Carotid pulses 2+ and sym. No bruits.  LUNGS:  Clear, no wheezes, rales or rhonchi.  ABDOMEN: Soft, NT, nondistended. Bowel sounds normal. No hepatospenomegaly or masses. No rebound or guarding. No hernias.  EXTREMITIES:  No edema.   Feet are examined. Skin is intact and in good condition. Pulses are 2+ and symmetric. Sensation to light tough is intact bilaterally and symmetric.   Monofilament exam is normal bilaterally.   NEURO:  CN II-XII intact. Motor and sensation grossly intact. DTR'S normal and symmetric.  SKIN: No rashes or abnormal lesions.            Assessment and Plan. The following treatment and monitoring plan is recommended:    1. Medicare annual wellness visit, subsequent.  Following diagnoses are addressed.    2. Mixed hyperlipidemia.  Monitor lab work.  He will continue on statin, healthy diet and exercise.    3. Hypothyroidism, unspecified type.  Monitor lab work.  Continue replacement therapy.    4. MCI (mild cognitive impairment).  Stable.  He does see Dr. Mathis every 6 months.  There have been no significant changes in his memory.    5. Type 2 diabetes mellitus without complication, without long-term current use of insulin ([facility]).  Controlled.  Continue present medications.  Monitor lab work.  Counseled regarding foot, eye care.    6. Bilateral carotid artery stenosis.  Stable.  Continue statin, 81 mg aspirin.    7. Situational depression.  Counseled.  Offered counseling.  He has good family support.  Stressed that he does need to  take care of himself.  He will continue on fluoxetine.    8. Healthcare Maintenance. Counseled re: nutrition, activity and safety. Reviewed immunizations.         Services needed: No services needed at this time  Health Care Screening recommendations as per orders if indicated.  Referrals offered: PT/OT/Nutrition counseling/Behavioral Health/Smoking cessation as per orders if indicated.    Discussion today about general wellness and lifestyle habits:    · Prevent falls and reduce trip hazards; Cautioned about securing or removing rugs.  · Have a working fire alarm and carbon monoxide detector;   · Engage in regular physical activity and social activities       Follow-up: 3 months and prn.

## 2018-10-31 ENCOUNTER — HOSPITAL ENCOUNTER (OUTPATIENT)
Facility: MEDICAL CENTER | Age: 83
End: 2018-10-31
Attending: FAMILY MEDICINE
Payer: MEDICARE

## 2018-10-31 ENCOUNTER — NON-PROVIDER VISIT (OUTPATIENT)
Dept: INTERNAL MEDICINE | Facility: IMAGING CENTER | Age: 83
End: 2018-10-31
Payer: MEDICARE

## 2018-10-31 DIAGNOSIS — G31.84 MCI (MILD COGNITIVE IMPAIRMENT): ICD-10-CM

## 2018-10-31 DIAGNOSIS — E03.9 HYPOTHYROIDISM, UNSPECIFIED TYPE: ICD-10-CM

## 2018-10-31 DIAGNOSIS — E78.2 MIXED HYPERLIPIDEMIA: ICD-10-CM

## 2018-10-31 DIAGNOSIS — E11.9 TYPE 2 DIABETES MELLITUS WITHOUT COMPLICATION, WITHOUT LONG-TERM CURRENT USE OF INSULIN (HCC): ICD-10-CM

## 2018-10-31 LAB
BASOPHILS # BLD AUTO: 0.5 % (ref 0–1.8)
BASOPHILS # BLD: 0.03 K/UL (ref 0–0.12)
EOSINOPHIL # BLD AUTO: 0.36 K/UL (ref 0–0.51)
EOSINOPHIL NFR BLD: 5.4 % (ref 0–6.9)
ERYTHROCYTE [DISTWIDTH] IN BLOOD BY AUTOMATED COUNT: 50.9 FL (ref 35.9–50)
HCT VFR BLD AUTO: 47.8 % (ref 42–52)
HGB BLD-MCNC: 15.1 G/DL (ref 14–18)
IMM GRANULOCYTES # BLD AUTO: 0.01 K/UL (ref 0–0.11)
IMM GRANULOCYTES NFR BLD AUTO: 0.2 % (ref 0–0.9)
LYMPHOCYTES # BLD AUTO: 1.97 K/UL (ref 1–4.8)
LYMPHOCYTES NFR BLD: 29.7 % (ref 22–41)
MCH RBC QN AUTO: 30.6 PG (ref 27–33)
MCHC RBC AUTO-ENTMCNC: 31.6 G/DL (ref 33.7–35.3)
MCV RBC AUTO: 97 FL (ref 81.4–97.8)
MONOCYTES # BLD AUTO: 0.6 K/UL (ref 0–0.85)
MONOCYTES NFR BLD AUTO: 9 % (ref 0–13.4)
NEUTROPHILS # BLD AUTO: 3.67 K/UL (ref 1.82–7.42)
NEUTROPHILS NFR BLD: 55.2 % (ref 44–72)
NRBC # BLD AUTO: 0 K/UL
NRBC BLD-RTO: 0 /100 WBC
PLATELET # BLD AUTO: 164 K/UL (ref 164–446)
PMV BLD AUTO: 11 FL (ref 9–12.9)
RBC # BLD AUTO: 4.93 M/UL (ref 4.7–6.1)
WBC # BLD AUTO: 6.6 K/UL (ref 4.8–10.8)

## 2018-10-31 PROCEDURE — 83036 HEMOGLOBIN GLYCOSYLATED A1C: CPT

## 2018-10-31 PROCEDURE — 84439 ASSAY OF FREE THYROXINE: CPT

## 2018-10-31 PROCEDURE — 85025 COMPLETE CBC W/AUTO DIFF WBC: CPT

## 2018-10-31 PROCEDURE — 82570 ASSAY OF URINE CREATININE: CPT

## 2018-10-31 PROCEDURE — 80061 LIPID PANEL: CPT

## 2018-10-31 PROCEDURE — 82043 UR ALBUMIN QUANTITATIVE: CPT

## 2018-10-31 PROCEDURE — 80053 COMPREHEN METABOLIC PANEL: CPT

## 2018-10-31 PROCEDURE — 84481 FREE ASSAY (FT-3): CPT

## 2018-10-31 PROCEDURE — 84443 ASSAY THYROID STIM HORMONE: CPT

## 2018-11-01 LAB
ALBUMIN SERPL BCP-MCNC: 4.5 G/DL (ref 3.2–4.9)
ALBUMIN/GLOB SERPL: 1.5 G/DL
ALP SERPL-CCNC: 62 U/L (ref 30–99)
ALT SERPL-CCNC: 29 U/L (ref 2–50)
ANION GAP SERPL CALC-SCNC: 8 MMOL/L (ref 0–11.9)
AST SERPL-CCNC: 25 U/L (ref 12–45)
BILIRUB SERPL-MCNC: 0.6 MG/DL (ref 0.1–1.5)
BUN SERPL-MCNC: 25 MG/DL (ref 8–22)
CALCIUM SERPL-MCNC: 10.1 MG/DL (ref 8.5–10.5)
CHLORIDE SERPL-SCNC: 107 MMOL/L (ref 96–112)
CHOLEST SERPL-MCNC: 146 MG/DL (ref 100–199)
CO2 SERPL-SCNC: 26 MMOL/L (ref 20–33)
CREAT SERPL-MCNC: 1.07 MG/DL (ref 0.5–1.4)
CREAT UR-MCNC: 113 MG/DL
EST. AVERAGE GLUCOSE BLD GHB EST-MCNC: 163 MG/DL
GLOBULIN SER CALC-MCNC: 3 G/DL (ref 1.9–3.5)
GLUCOSE SERPL-MCNC: 150 MG/DL (ref 65–99)
HBA1C MFR BLD: 7.3 % (ref 0–5.6)
HDLC SERPL-MCNC: 41 MG/DL
LDLC SERPL CALC-MCNC: 78 MG/DL
MICROALBUMIN UR-MCNC: <0.7 MG/DL
MICROALBUMIN/CREAT UR: NORMAL MG/G (ref 0–30)
POTASSIUM SERPL-SCNC: 4.6 MMOL/L (ref 3.6–5.5)
PROT SERPL-MCNC: 7.5 G/DL (ref 6–8.2)
SODIUM SERPL-SCNC: 141 MMOL/L (ref 135–145)
T3FREE SERPL-MCNC: 3.3 PG/ML (ref 2.4–4.2)
T4 FREE SERPL-MCNC: 0.83 NG/DL (ref 0.53–1.43)
TRIGL SERPL-MCNC: 137 MG/DL (ref 0–149)
TSH SERPL DL<=0.005 MIU/L-ACNC: 3.4 UIU/ML (ref 0.38–5.33)

## 2018-11-05 RX ORDER — GLIMEPIRIDE 4 MG/1
TABLET ORAL
Qty: 30 TAB | Refills: 3 | Status: SHIPPED | OUTPATIENT
Start: 2018-11-05 | End: 2019-03-12 | Stop reason: SDUPTHER

## 2018-12-08 DIAGNOSIS — E11.9 TYPE 2 DIABETES MELLITUS WITHOUT COMPLICATION, WITHOUT LONG-TERM CURRENT USE OF INSULIN (HCC): ICD-10-CM

## 2018-12-10 RX ORDER — BLOOD-GLUCOSE METER
KIT MISCELLANEOUS
Qty: 50 STRIP | Refills: 2 | Status: SHIPPED | OUTPATIENT
Start: 2018-12-10 | End: 2019-03-11 | Stop reason: SDUPTHER

## 2019-01-22 RX ORDER — ATORVASTATIN CALCIUM 20 MG/1
TABLET, FILM COATED ORAL
Qty: 90 TAB | Refills: 1 | Status: SHIPPED | OUTPATIENT
Start: 2019-01-22 | End: 2019-02-20 | Stop reason: SDUPTHER

## 2019-01-27 DIAGNOSIS — E03.8 OTHER SPECIFIED HYPOTHYROIDISM: ICD-10-CM

## 2019-01-28 RX ORDER — LIOTHYRONINE SODIUM 5 UG/1
TABLET ORAL
Qty: 180 TAB | Refills: 1 | Status: SHIPPED | OUTPATIENT
Start: 2019-01-28 | End: 2019-04-03 | Stop reason: SDUPTHER

## 2019-02-20 RX ORDER — ATORVASTATIN CALCIUM 20 MG/1
20 TABLET, FILM COATED ORAL
Qty: 90 TAB | Refills: 1 | Status: SHIPPED | OUTPATIENT
Start: 2019-02-20 | End: 2019-04-03 | Stop reason: SDUPTHER

## 2019-02-25 ENCOUNTER — APPOINTMENT (RX ONLY)
Dept: URBAN - METROPOLITAN AREA CLINIC 20 | Facility: CLINIC | Age: 84
Setting detail: DERMATOLOGY
End: 2019-02-25

## 2019-02-25 DIAGNOSIS — D22 MELANOCYTIC NEVI: ICD-10-CM

## 2019-02-25 DIAGNOSIS — L20.89 OTHER ATOPIC DERMATITIS: ICD-10-CM

## 2019-02-25 DIAGNOSIS — D18.0 HEMANGIOMA: ICD-10-CM

## 2019-02-25 DIAGNOSIS — L82.1 OTHER SEBORRHEIC KERATOSIS: ICD-10-CM

## 2019-02-25 DIAGNOSIS — L57.0 ACTINIC KERATOSIS: ICD-10-CM

## 2019-02-25 DIAGNOSIS — Z85.828 PERSONAL HISTORY OF OTHER MALIGNANT NEOPLASM OF SKIN: ICD-10-CM

## 2019-02-25 DIAGNOSIS — Z71.89 OTHER SPECIFIED COUNSELING: ICD-10-CM

## 2019-02-25 DIAGNOSIS — L81.4 OTHER MELANIN HYPERPIGMENTATION: ICD-10-CM

## 2019-02-25 PROBLEM — D22.71 MELANOCYTIC NEVI OF RIGHT LOWER LIMB, INCLUDING HIP: Status: ACTIVE | Noted: 2019-02-25

## 2019-02-25 PROBLEM — D22.72 MELANOCYTIC NEVI OF LEFT LOWER LIMB, INCLUDING HIP: Status: ACTIVE | Noted: 2019-02-25

## 2019-02-25 PROBLEM — L20.84 INTRINSIC (ALLERGIC) ECZEMA: Status: ACTIVE | Noted: 2019-02-25

## 2019-02-25 PROBLEM — D22.61 MELANOCYTIC NEVI OF RIGHT UPPER LIMB, INCLUDING SHOULDER: Status: ACTIVE | Noted: 2019-02-25

## 2019-02-25 PROBLEM — D22.62 MELANOCYTIC NEVI OF LEFT UPPER LIMB, INCLUDING SHOULDER: Status: ACTIVE | Noted: 2019-02-25

## 2019-02-25 PROBLEM — D48.5 NEOPLASM OF UNCERTAIN BEHAVIOR OF SKIN: Status: ACTIVE | Noted: 2019-02-25

## 2019-02-25 PROBLEM — D18.01 HEMANGIOMA OF SKIN AND SUBCUTANEOUS TISSUE: Status: ACTIVE | Noted: 2019-02-25

## 2019-02-25 PROBLEM — D22.5 MELANOCYTIC NEVI OF TRUNK: Status: ACTIVE | Noted: 2019-02-25

## 2019-02-25 PROBLEM — D22.39 MELANOCYTIC NEVI OF OTHER PARTS OF FACE: Status: ACTIVE | Noted: 2019-02-25

## 2019-02-25 PROCEDURE — 11102 TANGNTL BX SKIN SINGLE LES: CPT

## 2019-02-25 PROCEDURE — ? LIQUID NITROGEN

## 2019-02-25 PROCEDURE — ? OBSERVATION

## 2019-02-25 PROCEDURE — ? PRESCRIPTION

## 2019-02-25 PROCEDURE — ? COUNSELING

## 2019-02-25 PROCEDURE — ? SUNSCREEN RECOMMENDATIONS

## 2019-02-25 PROCEDURE — 17000 DESTRUCT PREMALG LESION: CPT | Mod: 59

## 2019-02-25 PROCEDURE — 99214 OFFICE O/P EST MOD 30 MIN: CPT | Mod: 25

## 2019-02-25 PROCEDURE — 17003 DESTRUCT PREMALG LES 2-14: CPT

## 2019-02-25 PROCEDURE — ? BIOPSY BY SHAVE METHOD

## 2019-02-25 RX ORDER — TRIAMCINOLONE ACETONIDE 1 MG/G
CREAM TOPICAL BID
Qty: 1 | Refills: 0 | Status: ERX

## 2019-02-25 ASSESSMENT — LOCATION ZONE DERM
LOCATION ZONE: FACE
LOCATION ZONE: TRUNK
LOCATION ZONE: LIP
LOCATION ZONE: LEG
LOCATION ZONE: SCALP
LOCATION ZONE: EAR
LOCATION ZONE: NECK
LOCATION ZONE: ARM

## 2019-02-25 ASSESSMENT — LOCATION SIMPLE DESCRIPTION DERM
LOCATION SIMPLE: LEFT EAR
LOCATION SIMPLE: LEFT PRETIBIAL REGION
LOCATION SIMPLE: RIGHT LOWER BACK
LOCATION SIMPLE: POSTERIOR SCALP
LOCATION SIMPLE: RIGHT EAR
LOCATION SIMPLE: SCALP
LOCATION SIMPLE: RIGHT FOREARM
LOCATION SIMPLE: NECK
LOCATION SIMPLE: LEFT POSTERIOR THIGH
LOCATION SIMPLE: RIGHT POSTERIOR THIGH
LOCATION SIMPLE: LEFT POSTERIOR UPPER ARM
LOCATION SIMPLE: RIGHT POSTERIOR UPPER ARM
LOCATION SIMPLE: UPPER BACK
LOCATION SIMPLE: RIGHT UPPER BACK
LOCATION SIMPLE: LEFT FOREARM
LOCATION SIMPLE: LEFT LIP
LOCATION SIMPLE: RIGHT FOREHEAD
LOCATION SIMPLE: RIGHT SCALP
LOCATION SIMPLE: RIGHT CHEEK
LOCATION SIMPLE: RIGHT PRETIBIAL REGION

## 2019-02-25 ASSESSMENT — LOCATION DETAILED DESCRIPTION DERM
LOCATION DETAILED: RIGHT DISTAL POSTERIOR THIGH
LOCATION DETAILED: LEFT INFERIOR VERMILION LIP
LOCATION DETAILED: RIGHT CENTRAL PARIETAL SCALP
LOCATION DETAILED: LEFT DISTAL POSTERIOR THIGH
LOCATION DETAILED: RIGHT INFERIOR MEDIAL MIDBACK
LOCATION DETAILED: RIGHT VENTRAL PROXIMAL FOREARM
LOCATION DETAILED: RIGHT SUPERIOR HELIX
LOCATION DETAILED: RIGHT INFERIOR CENTRAL MALAR CHEEK
LOCATION DETAILED: RIGHT INFERIOR MEDIAL UPPER BACK
LOCATION DETAILED: LEFT PROXIMAL POSTERIOR UPPER ARM
LOCATION DETAILED: LEFT ANTERIOR EARLOBE
LOCATION DETAILED: POSTERIOR MID-PARIETAL SCALP
LOCATION DETAILED: LEFT LATERAL PROXIMAL PRETIBIAL REGION
LOCATION DETAILED: RIGHT SUPERIOR UPPER BACK
LOCATION DETAILED: RIGHT DISTAL POSTERIOR UPPER ARM
LOCATION DETAILED: RIGHT MEDIAL FRONTAL SCALP
LOCATION DETAILED: LEFT VENTRAL PROXIMAL FOREARM
LOCATION DETAILED: INFERIOR THORACIC SPINE
LOCATION DETAILED: RIGHT CENTRAL LATERAL NECK
LOCATION DETAILED: RIGHT PROXIMAL PRETIBIAL REGION
LOCATION DETAILED: LEFT SUPERIOR HELIX
LOCATION DETAILED: RIGHT INFERIOR FOREHEAD

## 2019-02-25 NOTE — PROCEDURE: BIOPSY BY SHAVE METHOD
Destruction After The Procedure: No
Consent: Written consent was obtained and risks were reviewed including but not limited to scarring, infection, bleeding, scabbing, incomplete removal, nerve damage and allergy to anesthesia.
Lab: [id number]
Biopsy Method: Personna blade
Detail Level: Detailed
Notification Instructions: Patient will be notified of biopsy results. However, patient instructed to call the office if not contacted within 2 weeks.
Wound Care: Bacitracin
Size Of Lesion In Cm: 0
Render Post-Care Instructions In Note?: yes
Lab Facility: 
Anesthesia Type: 1% lidocaine with 1:100,000 epinephrine and a 1:12 solution of 8.4% sodium bicarbonate
Anesthesia Volume In Cc: 1
Billing Type: Third-Party Bill
Biopsy Type: H and E
Depth Of Biopsy: dermis
Type Of Destruction Used: Curettage
Hemostasis: Drysol and Electrocautery
Post-Care Instructions: I reviewed with the patient in detail post-care instructions. Patient is to keep the biopsy site clean once daily and then apply petroleum and bandaid  until healed.
Dressing: Band-Aid

## 2019-03-07 ENCOUNTER — APPOINTMENT (RX ONLY)
Dept: URBAN - METROPOLITAN AREA CLINIC 4 | Facility: CLINIC | Age: 84
Setting detail: DERMATOLOGY
End: 2019-03-07

## 2019-03-07 PROBLEM — C44.319 BASAL CELL CARCINOMA OF SKIN OF OTHER PARTS OF FACE: Status: ACTIVE | Noted: 2019-03-07

## 2019-03-07 PROCEDURE — 11643 EXC F/E/E/N/L MAL+MRG 2.1-3: CPT | Mod: 79

## 2019-03-07 PROCEDURE — ? EXCISION

## 2019-03-07 PROCEDURE — 13132 CMPLX RPR F/C/C/M/N/AX/G/H/F: CPT | Mod: 79

## 2019-03-07 NOTE — PROCEDURE: EXCISION
Complex Repair And Xenograft Text: The defect edges were debeveled with a #15 scalpel blade.  The primary defect was closed partially with a complex linear closure.  Given the location of the defect, shape of the defect and the proximity to free margins a xenograft was deemed most appropriate to repair the remaining defect.  The graft was trimmed to fit the size of the remaining defect.  The graft was then placed in the primary defect, oriented appropriately, and sutured into place.
Dermal Closure: buried vertical mattress
Transposition Flap Text: The defect edges were debeveled with a #15 scalpel blade.  Given the location of the defect and the proximity to free margins a transposition flap was deemed most appropriate.  Using a sterile surgical marker, an appropriate transposition flap was drawn incorporating the defect.    The area thus outlined was incised deep to adipose tissue with a #15 scalpel blade.  The skin margins were undermined to an appropriate distance in all directions utilizing iris scissors.
Keystone Flap Text: The defect edges were debeveled with a #15 scalpel blade.  Given the location of the defect, shape of the defect a keystone flap was deemed most appropriate.  Using a sterile surgical marker, an appropriate keystone flap was drawn incorporating the defect, outlining the appropriate donor tissue and placing the expected incisions within the relaxed skin tension lines where possible. The area thus outlined was incised deep to adipose tissue with a #15 scalpel blade.  The skin margins were undermined to an appropriate distance in all directions around the primary defect and laterally outward around the flap utilizing iris scissors.
Paramedian Forehead Flap Text: A decision was made to reconstruct the defect utilizing an interpolation axial flap and a staged reconstruction.  A telfa template was made of the defect.  This telfa template was then used to outline the paramedian forehead pedicle flap.  The donor area for the pedicle flap was then injected with anesthesia.  The flap was excised through the skin and subcutaneous tissue down to the layer of the underlying musculature.  The pedicle flap was carefully excised within this deep plane to maintain its blood supply.  The edges of the donor site were undermined.   The donor site was closed in a primary fashion.  The pedicle was then rotated into position and sutured.  Once the tube was sutured into place, adequate blood supply was confirmed with blanching and refill.  The pedicle was then wrapped with xeroform gauze and dressed appropriately with a telfa and gauze bandage to ensure continued blood supply and protect the attached pedicle.
Excision Depth: adipose tissue
Show Repair Anesthesia Variables: Yes
Complex Repair And O-T Advancement Flap Text: The defect edges were debeveled with a #15 scalpel blade.  The primary defect was closed partially with a complex linear closure.  Given the location of the remaining defect, shape of the defect and the proximity to free margins an O-T advancement flap was deemed most appropriate for complete closure of the defect.  Using a sterile surgical marker, an appropriate advancement flap was drawn incorporating the defect and placing the expected incisions within the relaxed skin tension lines where possible.    The area thus outlined was incised deep to adipose tissue with a #15 scalpel blade.  The skin margins were undermined to an appropriate distance in all directions utilizing iris scissors.
Intermediate / Complex Repair - Final Wound Length In Cm: 5.1
Star Wedge Flap Text: The defect edges were debeveled with a #15 scalpel blade.  Given the location of the defect, shape of the defect and the proximity to free margins a star wedge flap was deemed most appropriate.  Using a sterile surgical marker, an appropriate rotation flap was drawn incorporating the defect and placing the expected incisions within the relaxed skin tension lines where possible. The area thus outlined was incised deep to adipose tissue with a #15 scalpel blade.  The skin margins were undermined to an appropriate distance in all directions utilizing iris scissors.
O-T Plasty Text: The defect edges were debeveled with a #15 scalpel blade.  Given the location of the defect, shape of the defect and the proximity to free margins an O-T plasty was deemed most appropriate.  Using a sterile surgical marker, an appropriate O-T plasty was drawn incorporating the defect and placing the expected incisions within the relaxed skin tension lines where possible.    The area thus outlined was incised deep to adipose tissue with a #15 scalpel blade.  The skin margins were undermined to an appropriate distance in all directions utilizing iris scissors.
Complex Repair And Skin Substitute Graft Text: The defect edges were debeveled with a #15 scalpel blade.  The primary defect was closed partially with a complex linear closure.  Given the location of the remaining defect, shape of the defect and the proximity to free margins a skin substitute graft was deemed most appropriate to repair the remaining defect.  The graft was trimmed to fit the size of the remaining defect.  The graft was then placed in the primary defect, oriented appropriately, and sutured into place.
Perilesional Excision Additional Text (Leave Blank If You Do Not Want): The margin was drawn around the clinically apparent lesion. Incisions were then made along these lines to the appropriate tissue plane and the lesion was extirpated.
Path Notes (To The Dermatopathologist): Please check margins.
Lab: [id number]
Lip Wedge Excision Repair Text: Given the location of the defect and the proximity to free margins a full thickness wedge repair was deemed most appropriate.  Using a sterile surgical marker, the appropriate repair was drawn incorporating the defect and placing the expected incisions perpendicular to the vermilion border.  The vermilion border was also meticulously outlined to ensure appropriate reapproximation during the repair.  The area thus outlined was incised through and through with a #15 scalpel blade.  The muscularis and dermis were reaproximated with deep sutures following hemostasis. Care was taken to realign the vermilion border before proceeding with the superficial closure.  Once the vermilion was realigned the superfical and mucosal closure was finished.
Referring Physician (Optional): Lali SCOTT
Complex Repair And O-L Flap Text: The defect edges were debeveled with a #15 scalpel blade.  The primary defect was closed partially with a complex linear closure.  Given the location of the remaining defect, shape of the defect and the proximity to free margins an O-L flap was deemed most appropriate for complete closure of the defect.  Using a sterile surgical marker, an appropriate flap was drawn incorporating the defect and placing the expected incisions within the relaxed skin tension lines where possible.    The area thus outlined was incised deep to adipose tissue with a #15 scalpel blade.  The skin margins were undermined to an appropriate distance in all directions utilizing iris scissors.
Spiral Flap Text: The defect edges were debeveled with a #15 scalpel blade.  Given the location of the defect, shape of the defect and the proximity to free margins a spiral flap was deemed most appropriate.  Using a sterile surgical marker, an appropriate rotation flap was drawn incorporating the defect and placing the expected incisions within the relaxed skin tension lines where possible. The area thus outlined was incised deep to adipose tissue with a #15 scalpel blade.  The skin margins were undermined to an appropriate distance in all directions utilizing iris scissors.
O-Z Plasty Text: The defect edges were debeveled with a #15 scalpel blade.  Given the location of the defect, shape of the defect and the proximity to free margins an O-Z plasty (double transposition flap) was deemed most appropriate.  Using a sterile surgical marker, the appropriate transposition flaps were drawn incorporating the defect and placing the expected incisions within the relaxed skin tension lines where possible.    The area thus outlined was incised deep to adipose tissue with a #15 scalpel blade.  The skin margins were undermined to an appropriate distance in all directions utilizing iris scissors.  Hemostasis was achieved with electrocautery.  The flaps were then transposed into place, one clockwise and the other counterclockwise, and anchored with interrupted buried subcutaneous sutures.
Estimated Blood Loss (Cc): minimal
Excisional Biopsy Additional Text (Leave Blank If You Do Not Want): The margin was drawn around the clinically apparent lesion. An elliptical shape was then drawn on the skin incorporating the lesion and margins.  Incisions were then made along these lines to the appropriate tissue plane and the lesion was extirpated.
Lab Facility: 
Ftsg Text: The defect edges were debeveled with a #15 scalpel blade.  Given the location of the defect, shape of the defect and the proximity to free margins a full thickness skin graft was deemed most appropriate.  Using a sterile surgical marker, the primary defect shape was transferred to the donor site. The area thus outlined was incised deep to adipose tissue with a #15 scalpel blade.  The harvested graft was then trimmed of adipose tissue until only dermis and epidermis was left.  The skin margins of the secondary defect were undermined to an appropriate distance in all directions utilizing iris scissors.  The secondary defect was closed with interrupted buried subcutaneous sutures.  The skin edges were then re-apposed with running  sutures.  The skin graft was then placed in the primary defect and oriented appropriately.
Surgeon (Optional): Marshal
Complex Repair And Bilobe Flap Text: The defect edges were debeveled with a #15 scalpel blade.  The primary defect was closed partially with a complex linear closure.  Given the location of the remaining defect, shape of the defect and the proximity to free margins a bilobe flap was deemed most appropriate for complete closure of the defect.  Using a sterile surgical marker, an appropriate advancement flap was drawn incorporating the defect and placing the expected incisions within the relaxed skin tension lines where possible.    The area thus outlined was incised deep to adipose tissue with a #15 scalpel blade.  The skin margins were undermined to an appropriate distance in all directions utilizing iris scissors.
Rotation Flap Text: The defect edges were debeveled with a #15 scalpel blade.  Given the location of the defect, shape of the defect and the proximity to free margins a rotation flap was deemed most appropriate.  Using a sterile surgical marker, an appropriate rotation flap was drawn incorporating the defect and placing the expected incisions within the relaxed skin tension lines where possible.    The area thus outlined was incised deep to adipose tissue with a #15 scalpel blade.  The skin margins were undermined to an appropriate distance in all directions utilizing iris scissors.
Double O-Z Plasty Text: The defect edges were debeveled with a #15 scalpel blade.  Given the location of the defect, shape of the defect and the proximity to free margins a Double O-Z plasty (double transposition flap) was deemed most appropriate.  Using a sterile surgical marker, the appropriate transposition flaps were drawn incorporating the defect and placing the expected incisions within the relaxed skin tension lines where possible. The area thus outlined was incised deep to adipose tissue with a #15 scalpel blade.  The skin margins were undermined to an appropriate distance in all directions utilizing iris scissors.  Hemostasis was achieved with electrocautery.  The flaps were then transposed into place, one clockwise and the other counterclockwise, and anchored with interrupted buried subcutaneous sutures.
Slit Excision Additional Text (Leave Blank If You Do Not Want): A linear line was drawn on the skin overlying the lesion. An incision was made slowly until the lesion was visualized.  Once visualized, the lesion was removed with blunt dissection.
Split-Thickness Skin Graft Text: The defect edges were debeveled with a #15 scalpel blade.  Given the location of the defect, shape of the defect and the proximity to free margins a split thickness skin graft was deemed most appropriate.  Using a sterile surgical marker, the primary defect shape was transferred to the donor site. The split thickness graft was then harvested.  The skin graft was then placed in the primary defect and oriented appropriately.
Complex Repair And Melolabial Flap Text: The defect edges were debeveled with a #15 scalpel blade.  The primary defect was closed partially with a complex linear closure.  Given the location of the remaining defect, shape of the defect and the proximity to free margins a melolabial flap was deemed most appropriate for complete closure of the defect.  Using a sterile surgical marker, an appropriate advancement flap was drawn incorporating the defect and placing the expected incisions within the relaxed skin tension lines where possible.    The area thus outlined was incised deep to adipose tissue with a #15 scalpel blade.  The skin margins were undermined to an appropriate distance in all directions utilizing iris scissors.
Graft Donor Site Bandage (Optional-Leave Blank If You Don't Want In Note): Steri-strips and a pressure bandage were applied to the donor site.
Hatchet Flap Text: The defect edges were debeveled with a #15 scalpel blade.  Given the location of the defect, shape of the defect and the proximity to free margins a hatchet flap was deemed most appropriate.  Using a sterile surgical marker, an appropriate hatchet flap was drawn incorporating the defect and placing the expected incisions within the relaxed skin tension lines where possible.    The area thus outlined was incised deep to adipose tissue with a #15 scalpel blade.  The skin margins were undermined to an appropriate distance in all directions utilizing iris scissors.
Hemostasis: Electrocautery
Saucerization Excision Additional Text (Leave Blank If You Do Not Want): The margin was drawn around the clinically apparent lesion.  Incisions were then made along these lines, in a tangential fashion, to the appropriate tissue plane and the lesion was extirpated.
Cartilage Graft Text: The defect edges were debeveled with a #15 scalpel blade.  Given the location of the defect, shape of the defect, the fact the defect involved a full thickness cartilage defect a cartilage graft was deemed most appropriate.  An appropriate donor site was identified, cleansed, and anesthetized. The cartilage graft was then harvested and transferred to the recipient site, oriented appropriately and then sutured into place.  The secondary defect was then repaired using a primary closure.
Complex Repair And Rotation Flap Text: The defect edges were debeveled with a #15 scalpel blade.  The primary defect was closed partially with a complex linear closure.  Given the location of the remaining defect, shape of the defect and the proximity to free margins a rotation flap was deemed most appropriate for complete closure of the defect.  Using a sterile surgical marker, an appropriate advancement flap was drawn incorporating the defect and placing the expected incisions within the relaxed skin tension lines where possible.    The area thus outlined was incised deep to adipose tissue with a #15 scalpel blade.  The skin margins were undermined to an appropriate distance in all directions utilizing iris scissors.
Bill 82196 For Specimen Handling/Conveyance To Laboratory?: no
Mucosal Advancement Flap Text: Given the location of the defect, shape of the defect and the proximity to free margins a mucosal advancement flap was deemed most appropriate. Incisions were made with a 15 blade scalpel in the appropriate fashion along the cutaneous vermilion border and the mucosal lip. The remaining actinically damaged mucosal tissue was excised.  The mucosal advancement flap was then elevated to the gingival sulcus with care taken to preserve the neurovascular structures and advanced into the primary defect. Care was taken to ensure that precise realignment of the vermilion border was achieved.
Consent was obtained from the patient. The risks and benefits to therapy were discussed in detail. Specifically, the risks of infection, scarring, bleeding, prolonged wound healing, incomplete removal, allergy to anesthesia, nerve injury and recurrence were addressed. Prior to the procedure, the treatment site was clearly identified and confirmed by the patient. All components of Universal Protocol/PAUSE Rule completed.
Elliptical Excision Additional Text (Leave Blank If You Do Not Want): The margin was drawn around the clinically apparent lesion.  An elliptical shape was then drawn on the skin incorporating the lesion and margins.  Incisions were then made along these lines to the appropriate tissue plane and the lesion was extirpated.
Dressing: dry sterile dressing
Complex Repair And Rhombic Flap Text: The defect edges were debeveled with a #15 scalpel blade.  The primary defect was closed partially with a complex linear closure.  Given the location of the remaining defect, shape of the defect and the proximity to free margins a rhombic flap was deemed most appropriate for complete closure of the defect.  Using a sterile surgical marker, an appropriate advancement flap was drawn incorporating the defect and placing the expected incisions within the relaxed skin tension lines where possible.    The area thus outlined was incised deep to adipose tissue with a #15 scalpel blade.  The skin margins were undermined to an appropriate distance in all directions utilizing iris scissors.
Modified Advancement Flap Text: The defect edges were debeveled with a #15 scalpel blade.  Given the location of the defect, shape of the defect and the proximity to free margins a modified advancement flap was deemed most appropriate.  Using a sterile surgical marker, an appropriate advancement flap was drawn incorporating the defect and placing the expected incisions within the relaxed skin tension lines where possible.    The area thus outlined was incised deep to adipose tissue with a #15 scalpel blade.  The skin margins were undermined to an appropriate distance in all directions utilizing iris scissors.
Deep Sutures: 4-0 Vicryl
Fusiform Excision Additional Text (Leave Blank If You Do Not Want): The margin was drawn around the clinically apparent lesion.  A fusiform shape was then drawn on the skin incorporating the lesion and margins.  Incisions were then made along these lines to the appropriate tissue plane and the lesion was extirpated.
Composite Graft Text: The defect edges were debeveled with a #15 scalpel blade.  Given the location of the defect, shape of the defect, the proximity to free margins and the fact the defect was full thickness a composite graft was deemed most appropriate.  The defect was outline and then transferred to the donor site.  A full thickness graft was then excised from the donor site. The graft was then placed in the primary defect, oriented appropriately and then sutured into place.  The secondary defect was then repaired using a primary closure.
Billing Type: Third-Party Bill
Mercedes Flap Text: The defect edges were debeveled with a #15 scalpel blade.  Given the location of the defect, shape of the defect and the proximity to free margins a Mercedes flap was deemed most appropriate.  Using a sterile surgical marker, an appropriate advancement flap was drawn incorporating the defect and placing the expected incisions within the relaxed skin tension lines where possible. The area thus outlined was incised deep to adipose tissue with a #15 scalpel blade.  The skin margins were undermined to an appropriate distance in all directions utilizing iris scissors.
Additional Anesthesia Volume In Cc: 6
Epidermal Autograft Text: The defect edges were debeveled with a #15 scalpel blade.  Given the location of the defect, shape of the defect and the proximity to free margins an epidermal autograft was deemed most appropriate.  Using a sterile surgical marker, the primary defect shape was transferred to the donor site. The epidermal graft was then harvested.  The skin graft was then placed in the primary defect and oriented appropriately.
Curvilinear Excision Additional Text (Leave Blank If You Do Not Want): The margin was drawn around the clinically apparent lesion.  A curvilinear shape was then drawn on the skin incorporating the lesion and margins.  Incisions were then made along these lines to the appropriate tissue plane and the lesion was extirpated.
Wound Care: Bacitracin
Complex Repair And Transposition Flap Text: The defect edges were debeveled with a #15 scalpel blade.  The primary defect was closed partially with a complex linear closure.  Given the location of the remaining defect, shape of the defect and the proximity to free margins a transposition flap was deemed most appropriate for complete closure of the defect.  Using a sterile surgical marker, an appropriate advancement flap was drawn incorporating the defect and placing the expected incisions within the relaxed skin tension lines where possible.    The area thus outlined was incised deep to adipose tissue with a #15 scalpel blade.  The skin margins were undermined to an appropriate distance in all directions utilizing iris scissors.
Previous Accession (Optional): [id number]
Primary Defect Length (In Cm): 0
Undermining Location (Optional): in the superficial subcutaneous fat
Advancement-Rotation Flap Text: The defect edges were debeveled with a #15 scalpel blade.  Given the location of the defect, shape of the defect and the proximity to free margins an advancement-rotation flap was deemed most appropriate.  Using a sterile surgical marker, an appropriate flap was drawn incorporating the defect and placing the expected incisions within the relaxed skin tension lines where possible. The area thus outlined was incised deep to adipose tissue with a #15 scalpel blade.  The skin margins were undermined to an appropriate distance in all directions utilizing iris scissors.
Double M-Plasty Intermediate Repair Preamble Text (Leave Blank If You Do Not Want): Undermining was performed with blunt dissection.
Dermal Autograft Text: The defect edges were debeveled with a #15 scalpel blade.  Given the location of the defect, shape of the defect and the proximity to free margins a dermal autograft was deemed most appropriate.  Using a sterile surgical marker, the primary defect shape was transferred to the donor site. The area thus outlined was incised deep to adipose tissue with a #15 scalpel blade.  The harvested graft was then trimmed of adipose and epidermal tissue until only dermis was left.  The skin graft was then placed in the primary defect and oriented appropriately.
Post-Care Instructions: I reviewed with the patient in detail post-care instructions:\\n1. Apply bacitracin over the steri-strips.  \\n2. Cut non-stick pad (Telfa) to cover the steri-strips\\n3. Apply tape (hypafix) over the non-stick pad\\n4. Change once per day for 5 days\\n5. Shower with bandage on, change bandage after shower\\n\\nPatient is not to engage in any heavy lifting, exercise, hot tub, or swimming for the next 14 days. Should the patient develop any fevers, chills, bleeding, severe pain patient will contact the office immediately.
Complex Repair And V-Y Plasty Text: The defect edges were debeveled with a #15 scalpel blade.  The primary defect was closed partially with a complex linear closure.  Given the location of the remaining defect, shape of the defect and the proximity to free margins a V-Y plasty was deemed most appropriate for complete closure of the defect.  Using a sterile surgical marker, an appropriate advancement flap was drawn incorporating the defect and placing the expected incisions within the relaxed skin tension lines where possible.    The area thus outlined was incised deep to adipose tissue with a #15 scalpel blade.  The skin margins were undermined to an appropriate distance in all directions utilizing iris scissors.
Anesthesia Volume In Cc: 12
V-Y Flap Text: The defect edges were debeveled with a #15 scalpel blade.  Given the location of the defect, shape of the defect and the proximity to free margins a V-Y flap was deemed most appropriate.  Using a sterile surgical marker, an appropriate advancement flap was drawn incorporating the defect and placing the expected incisions within the relaxed skin tension lines where possible.    The area thus outlined was incised deep to adipose tissue with a #15 scalpel blade.  The skin margins were undermined to an appropriate distance in all directions utilizing iris scissors.
S Plasty Text: Given the location and shape of the defect, and the orientation of relaxed skin tension lines, an S-plasty was deemed most appropriate for repair.  Using a sterile surgical marker, the appropriate outline of the S-plasty was drawn, incorporating the defect and placing the expected incisions within the relaxed skin tension lines where possible.  The area thus outlined was incised deep to adipose tissue with a #15 scalpel blade.  The skin margins were undermined to an appropriate distance in all directions utilizing iris scissors. The skin flaps were advanced over the defect.  The opposing margins were then approximated with interrupted buried subcutaneous sutures.
Skin Substitute Text: The defect edges were debeveled with a #15 scalpel blade.  Given the location of the defect, shape of the defect and the proximity to free margins a skin substitute graft was deemed most appropriate.  The graft material was trimmed to fit the size of the defect. The graft was then placed in the primary defect and oriented appropriately.
Home Suture Removal Text: Patient was provided a home suture removal kit and will remove their sutures at home.  If they have any questions or difficulties they will call the office.
Banner Transposition Flap Text: The defect edges were debeveled with a #15 scalpel blade.  Given the location of the defect and the proximity to free margins a Banner transposition flap was deemed most appropriate.  Using a sterile surgical marker, an appropriate flap drawn around the defect. The area thus outlined was incised deep to adipose tissue with a #15 scalpel blade.  The skin margins were undermined to an appropriate distance in all directions utilizing iris scissors.
Complex Repair And M Plasty Text: The defect edges were debeveled with a #15 scalpel blade.  The primary defect was closed partially with a complex linear closure.  Given the location of the remaining defect, shape of the defect and the proximity to free margins an M plasty was deemed most appropriate for complete closure of the defect.  Using a sterile surgical marker, an appropriate advancement flap was drawn incorporating the defect and placing the expected incisions within the relaxed skin tension lines where possible.    The area thus outlined was incised deep to adipose tissue with a #15 scalpel blade.  The skin margins were undermined to an appropriate distance in all directions utilizing iris scissors.
Date Of Previous Biopsy (Optional): 2/25/19
O-L Flap Text: The defect edges were debeveled with a #15 scalpel blade.  Given the location of the defect, shape of the defect and the proximity to free margins an O-L flap was deemed most appropriate.  Using a sterile surgical marker, an appropriate advancement flap was drawn incorporating the defect and placing the expected incisions within the relaxed skin tension lines where possible.    The area thus outlined was incised deep to adipose tissue with a #15 scalpel blade.  The skin margins were undermined to an appropriate distance in all directions utilizing iris scissors.
V-Y Plasty Text: The defect edges were debeveled with a #15 scalpel blade.  Given the location of the defect, shape of the defect and the proximity to free margins an V-Y advancement flap was deemed most appropriate.  Using a sterile surgical marker, an appropriate advancement flap was drawn incorporating the defect and placing the expected incisions within the relaxed skin tension lines where possible.    The area thus outlined was incised deep to adipose tissue with a #15 scalpel blade.  The skin margins were undermined to an appropriate distance in all directions utilizing iris scissors.
Tissue Cultured Epidermal Autograft Text: The defect edges were debeveled with a #15 scalpel blade.  Given the location of the defect, shape of the defect and the proximity to free margins a tissue cultured epidermal autograft was deemed most appropriate.  The graft was then trimmed to fit the size of the defect.  The graft was then placed in the primary defect and oriented appropriately.
Complex Repair And Double M Plasty Text: The defect edges were debeveled with a #15 scalpel blade.  The primary defect was closed partially with a complex linear closure.  Given the location of the remaining defect, shape of the defect and the proximity to free margins a double M plasty was deemed most appropriate for complete closure of the defect.  Using a sterile surgical marker, an appropriate advancement flap was drawn incorporating the defect and placing the expected incisions within the relaxed skin tension lines where possible.    The area thus outlined was incised deep to adipose tissue with a #15 scalpel blade.  The skin margins were undermined to an appropriate distance in all directions utilizing iris scissors.
Bilobed Flap Text: The defect edges were debeveled with a #15 scalpel blade.  Given the location of the defect and the proximity to free margins a bilobe flap was deemed most appropriate.  Using a sterile surgical marker, an appropriate bilobe flap drawn around the defect.    The area thus outlined was incised deep to adipose tissue with a #15 scalpel blade.  The skin margins were undermined to an appropriate distance in all directions utilizing iris scissors.
Ear Star Wedge Flap Text: The defect edges were debeveled with a #15 blade scalpel.  Given the location of the defect and the proximity to free margins (helical rim) an ear star wedge flap was deemed most appropriate.  Using a sterile surgical marker, the appropriate flap was drawn incorporating the defect and placing the expected incisions between the helical rim and antihelix where possible.  The area thus outlined was incised through and through with a #15 scalpel blade.
Anesthesia Type: 1% lidocaine with epinephrine
H Plasty Text: Given the location of the defect, shape of the defect and the proximity to free margins a H-plasty was deemed most appropriate for repair.  Using a sterile surgical marker, the appropriate advancement arms of the H-plasty were drawn incorporating the defect and placing the expected incisions within the relaxed skin tension lines where possible. The area thus outlined was incised deep to adipose tissue with a #15 scalpel blade. The skin margins were undermined to an appropriate distance in all directions utilizing iris scissors.  The opposing advancement arms were then advanced into place in opposite direction and anchored with interrupted buried subcutaneous sutures.
Xenograft Text: The defect edges were debeveled with a #15 scalpel blade.  Given the location of the defect, shape of the defect and the proximity to free margins a xenograft was deemed most appropriate.  The graft was then trimmed to fit the size of the defect.  The graft was then placed in the primary defect and oriented appropriately.
Epidermal Closure: running subcuticular
Complex Repair And W Plasty Text: The defect edges were debeveled with a #15 scalpel blade.  The primary defect was closed partially with a complex linear closure.  Given the location of the remaining defect, shape of the defect and the proximity to free margins a W plasty was deemed most appropriate for complete closure of the defect.  Using a sterile surgical marker, an appropriate advancement flap was drawn incorporating the defect and placing the expected incisions within the relaxed skin tension lines where possible.    The area thus outlined was incised deep to adipose tissue with a #15 scalpel blade.  The skin margins were undermined to an appropriate distance in all directions utilizing iris scissors.
Bilobed Transposition Flap Text: The defect edges were debeveled with a #15 scalpel blade.  Given the location of the defect and the proximity to free margins a bilobed transposition flap was deemed most appropriate.  Using a sterile surgical marker, an appropriate bilobe flap drawn around the defect.    The area thus outlined was incised deep to adipose tissue with a #15 scalpel blade.  The skin margins were undermined to an appropriate distance in all directions utilizing iris scissors.
O-T Advancement Flap Text: The defect edges were debeveled with a #15 scalpel blade.  Given the location of the defect, shape of the defect and the proximity to free margins an O-T advancement flap was deemed most appropriate.  Using a sterile surgical marker, an appropriate advancement flap was drawn incorporating the defect and placing the expected incisions within the relaxed skin tension lines where possible.    The area thus outlined was incised deep to adipose tissue with a #15 scalpel blade.  The skin margins were undermined to an appropriate distance in all directions utilizing iris scissors.
W Plasty Text: The lesion was extirpated to the level of the fat with a #15 scalpel blade.  Given the location of the defect, shape of the defect and the proximity to free margins a W-plasty was deemed most appropriate for repair.  Using a sterile surgical marker, the appropriate transposition arms of the W-plasty were drawn incorporating the defect and placing the expected incisions within the relaxed skin tension lines where possible.    The area thus outlined was incised deep to adipose tissue with a #15 scalpel blade.  The skin margins were undermined to an appropriate distance in all directions utilizing iris scissors.  The opposing transposition arms were then transposed into place in opposite direction and anchored with interrupted buried subcutaneous sutures.
Double M-Plasty Complex Repair Preamble Text (Leave Blank If You Do Not Want): Extensive wide undermining was performed.
Purse String (Intermediate) Text: Given the location of the defect and the characteristics of the surrounding skin a purse string intermediate closure was deemed most appropriate.  Undermining was performed circumfirentially around the surgical defect.  A purse string suture was then placed and tightened.
Size Of Lesion In Cm: 2.1
Bilateral Helical Rim Advancement Flap Text: The defect edges were debeveled with a #15 blade scalpel.  Given the location of the defect and the proximity to free margins (helical rim) a bilateral helical rim advancement flap was deemed most appropriate.  Using a sterile surgical marker, the appropriate advancement flaps were drawn incorporating the defect and placing the expected incisions between the helical rim and antihelix where possible.  The area thus outlined was incised through and through with a #15 scalpel blade.  With a skin hook and iris scissors, the flaps were gently and sharply undermined and freed up.
Trilobed Flap Text: The defect edges were debeveled with a #15 scalpel blade.  Given the location of the defect and the proximity to free margins a trilobed flap was deemed most appropriate.  Using a sterile surgical marker, an appropriate trilobed flap drawn around the defect.    The area thus outlined was incised deep to adipose tissue with a #15 scalpel blade.  The skin margins were undermined to an appropriate distance in all directions utilizing iris scissors.
Complex Repair And Z Plasty Text: The defect edges were debeveled with a #15 scalpel blade.  The primary defect was closed partially with a complex linear closure.  Given the location of the remaining defect, shape of the defect and the proximity to free margins a Z plasty was deemed most appropriate for complete closure of the defect.  Using a sterile surgical marker, an appropriate advancement flap was drawn incorporating the defect and placing the expected incisions within the relaxed skin tension lines where possible.    The area thus outlined was incised deep to adipose tissue with a #15 scalpel blade.  The skin margins were undermined to an appropriate distance in all directions utilizing iris scissors.
A-T Advancement Flap Text: The defect edges were debeveled with a #15 scalpel blade.  Given the location of the defect, shape of the defect and the proximity to free margins an A-T advancement flap was deemed most appropriate.  Using a sterile surgical marker, an appropriate advancement flap was drawn incorporating the defect and placing the expected incisions within the relaxed skin tension lines where possible.    The area thus outlined was incised deep to adipose tissue with a #15 scalpel blade.  The skin margins were undermined to an appropriate distance in all directions utilizing iris scissors.
Epidermal Closure Graft Donor Site (Optional): simple interrupted
Repair Anesthesia Method: local infiltration
Z Plasty Text: The lesion was extirpated to the level of the fat with a #15 scalpel blade.  Given the location of the defect, shape of the defect and the proximity to free margins a Z-plasty was deemed most appropriate for repair.  Using a sterile surgical marker, the appropriate transposition arms of the Z-plasty were drawn incorporating the defect and placing the expected incisions within the relaxed skin tension lines where possible.    The area thus outlined was incised deep to adipose tissue with a #15 scalpel blade.  The skin margins were undermined to an appropriate distance in all directions utilizing iris scissors.  The opposing transposition arms were then transposed into place in opposite direction and anchored with interrupted buried subcutaneous sutures.
Information: Selecting Yes will display possible errors in your note based on the variables you have selected. This validation is only offered as a suggestion for you. PLEASE NOTE THAT THE VALIDATION TEXT WILL BE REMOVED WHEN YOU FINALIZE YOUR NOTE. IF YOU WANT TO FAX A PRELIMINARY NOTE YOU WILL NEED TO TOGGLE THIS TO 'NO' IF YOU DO NOT WANT IT IN YOUR FAXED NOTE.
Purse String (Simple) Text: Given the location of the defect and the characteristics of the surrounding skin a purse string simple closure was deemed most appropriate.  Undermining was performed circumferentially around the surgical defect.  A purse string suture was then placed and tightened.
Helical Rim Advancement Flap Text: The defect edges were debeveled with a #15 blade scalpel.  Given the location of the defect and the proximity to free margins (helical rim) a double helical rim advancement flap was deemed most appropriate.  Using a sterile surgical marker, the appropriate advancement flaps were drawn incorporating the defect and placing the expected incisions between the helical rim and antihelix where possible.  The area thus outlined was incised through and through with a #15 scalpel blade.  With a skin hook and iris scissors, the flaps were gently and sharply undermined and freed up.
Dorsal Nasal Flap Text: The defect edges were debeveled with a #15 scalpel blade.  Given the location of the defect and the proximity to free margins a dorsal nasal flap was deemed most appropriate.  Using a sterile surgical marker, an appropriate dorsal nasal flap was drawn around the defect.    The area thus outlined was incised deep to adipose tissue with a #15 scalpel blade.  The skin margins were undermined to an appropriate distance in all directions utilizing iris scissors.
Complex Repair And Dorsal Nasal Flap Text: The defect edges were debeveled with a #15 scalpel blade.  The primary defect was closed partially with a complex linear closure.  Given the location of the remaining defect, shape of the defect and the proximity to free margins a dorsal nasal flap was deemed most appropriate for complete closure of the defect.  Using a sterile surgical marker, an appropriate flap was drawn incorporating the defect and placing the expected incisions within the relaxed skin tension lines where possible.    The area thus outlined was incised deep to adipose tissue with a #15 scalpel blade.  The skin margins were undermined to an appropriate distance in all directions utilizing iris scissors.
Crescentic Advancement Flap Text: The defect edges were debeveled with a #15 scalpel blade.  Given the location of the defect and the proximity to free margins a crescentic advancement flap was deemed most appropriate.  Using a sterile surgical marker, the appropriate advancement flap was drawn incorporating the defect and placing the expected incisions within the relaxed skin tension lines where possible.    The area thus outlined was incised deep to adipose tissue with a #15 scalpel blade.  The skin margins were undermined to an appropriate distance in all directions utilizing iris scissors.
Cheek Interpolation Flap Text: A decision was made to reconstruct the defect utilizing an interpolation axial flap and a staged reconstruction.  A telfa template was made of the defect.  This telfa template was then used to outline the Cheek Interpolation flap.  The donor area for the pedicle flap was then injected with anesthesia.  The flap was excised through the skin and subcutaneous tissue down to the layer of the underlying musculature.  The interpolation flap was carefully excised within this deep plane to maintain its blood supply.  The edges of the donor site were undermined.   The donor site was closed in a primary fashion.  The pedicle was then rotated into position and sutured.  Once the tube was sutured into place, adequate blood supply was confirmed with blanching and refill.  The pedicle was then wrapped with xeroform gauze and dressed appropriately with a telfa and gauze bandage to ensure continued blood supply and protect the attached pedicle.
Partial Purse String (Intermediate) Text: Given the location of the defect and the characteristics of the surrounding skin an intermediate purse string closure was deemed most appropriate.  Undermining was performed circumferentially around the surgical defect.  A purse string suture was then placed and tightened. Wound tension of the circular defect prevented complete closure of the wound.
Size Of Margin In Cm: 0.2
Complex Repair And Ftsg Text: The defect edges were debeveled with a #15 scalpel blade.  The primary defect was closed partially with a complex linear closure.  Given the location of the defect, shape of the defect and the proximity to free margins a full thickness skin graft was deemed most appropriate to repair the remaining defect.  The graft was trimmed to fit the size of the remaining defect.  The graft was then placed in the primary defect, oriented appropriately, and sutured into place.
Bi-Rhombic Flap Text: The defect edges were debeveled with a #15 scalpel blade.  Given the location of the defect and the proximity to free margins a bi-rhombic flap was deemed most appropriate.  Using a sterile surgical marker, an appropriate rhombic flap was drawn incorporating the defect. The area thus outlined was incised deep to adipose tissue with a #15 scalpel blade.  The skin margins were undermined to an appropriate distance in all directions utilizing iris scissors.
Island Pedicle Flap Text: The defect edges were debeveled with a #15 scalpel blade.  Given the location of the defect, shape of the defect and the proximity to free margins an island pedicle advancement flap was deemed most appropriate.  Using a sterile surgical marker, an appropriate advancement flap was drawn incorporating the defect, outlining the appropriate donor tissue and placing the expected incisions within the relaxed skin tension lines where possible.    The area thus outlined was incised deep to adipose tissue with a #15 scalpel blade.  The skin margins were undermined to an appropriate distance in all directions around the primary defect and laterally outward around the island pedicle utilizing iris scissors.  There was minimal undermining beneath the pedicle flap.
Burow's Advancement Flap Text: The defect edges were debeveled with a #15 scalpel blade.  Given the location of the defect and the proximity to free margins a Burow's advancement flap was deemed most appropriate.  Using a sterile surgical marker, the appropriate advancement flap was drawn incorporating the defect and placing the expected incisions within the relaxed skin tension lines where possible.    The area thus outlined was incised deep to adipose tissue with a #15 scalpel blade.  The skin margins were undermined to an appropriate distance in all directions utilizing iris scissors.
Cheek-To-Nose Interpolation Flap Text: A decision was made to reconstruct the defect utilizing an interpolation axial flap and a staged reconstruction.  A telfa template was made of the defect.  This telfa template was then used to outline the Cheek-To-Nose Interpolation flap.  The donor area for the pedicle flap was then injected with anesthesia.  The flap was excised through the skin and subcutaneous tissue down to the layer of the underlying musculature.  The interpolation flap was carefully excised within this deep plane to maintain its blood supply.  The edges of the donor site were undermined.   The donor site was closed in a primary fashion.  The pedicle was then rotated into position and sutured.  Once the tube was sutured into place, adequate blood supply was confirmed with blanching and refill.  The pedicle was then wrapped with xeroform gauze and dressed appropriately with a telfa and gauze bandage to ensure continued blood supply and protect the attached pedicle.
Partial Purse String (Simple) Text: Given the location of the defect and the characteristics of the surrounding skin a simple purse string closure was deemed most appropriate.  Undermining was performed circumferentially around the surgical defect.  A purse string suture was then placed and tightened. Wound tension of the circular defect prevented complete closure of the wound.
Complex Repair And Split-Thickness Skin Graft Text: The defect edges were debeveled with a #15 scalpel blade.  The primary defect was closed partially with a complex linear closure.  Given the location of the defect, shape of the defect and the proximity to free margins a split thickness skin graft was deemed most appropriate to repair the remaining defect.  The graft was trimmed to fit the size of the remaining defect.  The graft was then placed in the primary defect, oriented appropriately, and sutured into place.
Excision Method: Elliptical
Rhomboid Transposition Flap Text: The defect edges were debeveled with a #15 scalpel blade.  Given the location of the defect and the proximity to free margins a rhomboid transposition flap was deemed most appropriate.  Using a sterile surgical marker, an appropriate rhomboid flap was drawn incorporating the defect.    The area thus outlined was incised deep to adipose tissue with a #15 scalpel blade.  The skin margins were undermined to an appropriate distance in all directions utilizing iris scissors.
Island Pedicle Flap With Canthal Suspension Text: The defect edges were debeveled with a #15 scalpel blade.  Given the location of the defect, shape of the defect and the proximity to free margins an island pedicle advancement flap was deemed most appropriate.  Using a sterile surgical marker, an appropriate advancement flap was drawn incorporating the defect, outlining the appropriate donor tissue and placing the expected incisions within the relaxed skin tension lines where possible. The area thus outlined was incised deep to adipose tissue with a #15 scalpel blade.  The skin margins were undermined to an appropriate distance in all directions around the primary defect and laterally outward around the island pedicle utilizing iris scissors.  There was minimal undermining beneath the pedicle flap. A suspension suture was placed in the canthal tendon to prevent tension and prevent ectropion.
Advancement Flap (Double) Text: The defect edges were debeveled with a #15 scalpel blade.  Given the location of the defect and the proximity to free margins a double advancement flap was deemed most appropriate.  Using a sterile surgical marker, the appropriate advancement flaps were drawn incorporating the defect and placing the expected incisions within the relaxed skin tension lines where possible.    The area thus outlined was incised deep to adipose tissue with a #15 scalpel blade.  The skin margins were undermined to an appropriate distance in all directions utilizing iris scissors.
Interpolation Flap Text: A decision was made to reconstruct the defect utilizing an interpolation axial flap and a staged reconstruction.  A telfa template was made of the defect.  This telfa template was then used to outline the interpolation flap.  The donor area for the pedicle flap was then injected with anesthesia.  The flap was excised through the skin and subcutaneous tissue down to the layer of the underlying musculature.  The interpolation flap was carefully excised within this deep plane to maintain its blood supply.  The edges of the donor site were undermined.   The donor site was closed in a primary fashion.  The pedicle was then rotated into position and sutured.  Once the tube was sutured into place, adequate blood supply was confirmed with blanching and refill.  The pedicle was then wrapped with xeroform gauze and dressed appropriately with a telfa and gauze bandage to ensure continued blood supply and protect the attached pedicle.
Complex Repair And Single Advancement Flap Text: The defect edges were debeveled with a #15 scalpel blade.  The primary defect was closed partially with a complex linear closure.  Given the location of the remaining defect, shape of the defect and the proximity to free margins a single advancement flap was deemed most appropriate for complete closure of the defect.  Using a sterile surgical marker, an appropriate advancement flap was drawn incorporating the defect and placing the expected incisions within the relaxed skin tension lines where possible.    The area thus outlined was incised deep to adipose tissue with a #15 scalpel blade.  The skin margins were undermined to an appropriate distance in all directions utilizing iris scissors.
Rhombic Flap Text: The defect edges were debeveled with a #15 scalpel blade.  Given the location of the defect and the proximity to free margins a rhombic flap was deemed most appropriate.  Using a sterile surgical marker, an appropriate rhombic flap was drawn incorporating the defect.    The area thus outlined was incised deep to adipose tissue with a #15 scalpel blade.  The skin margins were undermined to an appropriate distance in all directions utilizing iris scissors.
Alar Island Pedicle Flap Text: The defect edges were debeveled with a #15 scalpel blade.  Given the location of the defect, shape of the defect and the proximity to the alar rim an island pedicle advancement flap was deemed most appropriate.  Using a sterile surgical marker, an appropriate advancement flap was drawn incorporating the defect, outlining the appropriate donor tissue and placing the expected incisions within the nasal ala running parallel to the alar rim. The area thus outlined was incised with a #15 scalpel blade.  The skin margins were undermined minimally to an appropriate distance in all directions around the primary defect and laterally outward around the island pedicle utilizing iris scissors.  There was minimal undermining beneath the pedicle flap.
Complex Repair And Epidermal Autograft Text: The defect edges were debeveled with a #15 scalpel blade.  The primary defect was closed partially with a complex linear closure.  Given the location of the defect, shape of the defect and the proximity to free margins an epidermal autograft was deemed most appropriate to repair the remaining defect.  The graft was trimmed to fit the size of the remaining defect.  The graft was then placed in the primary defect, oriented appropriately, and sutured into place.
Advancement Flap (Single) Text: The defect edges were debeveled with a #15 scalpel blade.  Given the location of the defect and the proximity to free margins a single advancement flap was deemed most appropriate.  Using a sterile surgical marker, an appropriate advancement flap was drawn incorporating the defect and placing the expected incisions within the relaxed skin tension lines where possible.    The area thus outlined was incised deep to adipose tissue with a #15 scalpel blade.  The skin margins were undermined to an appropriate distance in all directions utilizing iris scissors.
Melolabial Interpolation Flap Text: A decision was made to reconstruct the defect utilizing an interpolation axial flap and a staged reconstruction.  A telfa template was made of the defect.  This telfa template was then used to outline the melolabial interpolation flap.  The donor area for the pedicle flap was then injected with anesthesia.  The flap was excised through the skin and subcutaneous tissue down to the layer of the underlying musculature.  The pedicle flap was carefully excised within this deep plane to maintain its blood supply.  The edges of the donor site were undermined.   The donor site was closed in a primary fashion.  The pedicle was then rotated into position and sutured.  Once the tube was sutured into place, adequate blood supply was confirmed with blanching and refill.  The pedicle was then wrapped with xeroform gauze and dressed appropriately with a telfa and gauze bandage to ensure continued blood supply and protect the attached pedicle.
Complex Repair And Double Advancement Flap Text: The defect edges were debeveled with a #15 scalpel blade.  The primary defect was closed partially with a complex linear closure.  Given the location of the remaining defect, shape of the defect and the proximity to free margins a double advancement flap was deemed most appropriate for complete closure of the defect.  Using a sterile surgical marker, an appropriate advancement flap was drawn incorporating the defect and placing the expected incisions within the relaxed skin tension lines where possible.    The area thus outlined was incised deep to adipose tissue with a #15 scalpel blade.  The skin margins were undermined to an appropriate distance in all directions utilizing iris scissors.
Melolabial Transposition Flap Text: The defect edges were debeveled with a #15 scalpel blade.  Given the location of the defect and the proximity to free margins a melolabial flap was deemed most appropriate.  Using a sterile surgical marker, an appropriate melolabial transposition flap was drawn incorporating the defect.    The area thus outlined was incised deep to adipose tissue with a #15 scalpel blade.  The skin margins were undermined to an appropriate distance in all directions utilizing iris scissors.
Double Island Pedicle Flap Text: The defect edges were debeveled with a #15 scalpel blade.  Given the location of the defect, shape of the defect and the proximity to free margins a double island pedicle advancement flap was deemed most appropriate.  Using a sterile surgical marker, an appropriate advancement flap was drawn incorporating the defect, outlining the appropriate donor tissue and placing the expected incisions within the relaxed skin tension lines where possible.    The area thus outlined was incised deep to adipose tissue with a #15 scalpel blade.  The skin margins were undermined to an appropriate distance in all directions around the primary defect and laterally outward around the island pedicle utilizing iris scissors.  There was minimal undermining beneath the pedicle flap.
Complex Repair And Dermal Autograft Text: The defect edges were debeveled with a #15 scalpel blade.  The primary defect was closed partially with a complex linear closure.  Given the location of the defect, shape of the defect and the proximity to free margins an dermal autograft was deemed most appropriate to repair the remaining defect.  The graft was trimmed to fit the size of the remaining defect.  The graft was then placed in the primary defect, oriented appropriately, and sutured into place.
Epidermal Sutures: 5-0 Vicryl Rapide
No Repair - Repaired With Adjacent Surgical Defect Text (Leave Blank If You Do Not Want): After the excision the defect was repaired concurrently with another surgical defect which was in close approximation.
Detail Level: Detailed
Mastoid Interpolation Flap Text: A decision was made to reconstruct the defect utilizing an interpolation axial flap and a staged reconstruction.  A telfa template was made of the defect.  This telfa template was then used to outline the mastoid interpolation flap.  The donor area for the pedicle flap was then injected with anesthesia.  The flap was excised through the skin and subcutaneous tissue down to the layer of the underlying musculature.  The pedicle flap was carefully excised within this deep plane to maintain its blood supply.  The edges of the donor site were undermined.   The donor site was closed in a primary fashion.  The pedicle was then rotated into position and sutured.  Once the tube was sutured into place, adequate blood supply was confirmed with blanching and refill.  The pedicle was then wrapped with xeroform gauze and dressed appropriately with a telfa and gauze bandage to ensure continued blood supply and protect the attached pedicle.
Scalpel Size: 15 blade
Complex Repair And Modified Advancement Flap Text: The defect edges were debeveled with a #15 scalpel blade.  The primary defect was closed partially with a complex linear closure.  Given the location of the remaining defect, shape of the defect and the proximity to free margins a modified advancement flap was deemed most appropriate for complete closure of the defect.  Using a sterile surgical marker, an appropriate advancement flap was drawn incorporating the defect and placing the expected incisions within the relaxed skin tension lines where possible.    The area thus outlined was incised deep to adipose tissue with a #15 scalpel blade.  The skin margins were undermined to an appropriate distance in all directions utilizing iris scissors.
Pre-Excision Curettage Text (Leave Blank If You Do Not Want): Prior to drawing the surgical margin the visible lesion was removed with electrodesiccation and curettage to clearly define the lesion size.
Repair Type: Complex
Complex Repair And Tissue Cultured Epidermal Autograft Text: The defect edges were debeveled with a #15 scalpel blade.  The primary defect was closed partially with a complex linear closure.  Given the location of the defect, shape of the defect and the proximity to free margins an tissue cultured epidermal autograft was deemed most appropriate to repair the remaining defect.  The graft was trimmed to fit the size of the remaining defect.  The graft was then placed in the primary defect, oriented appropriately, and sutured into place.
Muscle Hinge Flap Text: The defect edges were debeveled with a #15 scalpel blade.  Given the size, depth and location of the defect and the proximity to free margins a muscle hinge flap was deemed most appropriate.  Using a sterile surgical marker, an appropriate hinge flap was drawn incorporating the defect. The area thus outlined was incised with a #15 scalpel blade.  The skin margins were undermined to an appropriate distance in all directions utilizing iris scissors.
Island Pedicle Flap-Requiring Vessel Identification Text: The defect edges were debeveled with a #15 scalpel blade.  Given the location of the defect, shape of the defect and the proximity to free margins an island pedicle advancement flap was deemed most appropriate.  Using a sterile surgical marker, an appropriate advancement flap was drawn, based on the axial vessel mentioned above, incorporating the defect, outlining the appropriate donor tissue and placing the expected incisions within the relaxed skin tension lines where possible.    The area thus outlined was incised deep to adipose tissue with a #15 scalpel blade.  The skin margins were undermined to an appropriate distance in all directions around the primary defect and laterally outward around the island pedicle utilizing iris scissors.  There was minimal undermining beneath the pedicle flap.
Repair Performed By Another Provider Text (Leave Blank If You Do Not Want): After the tissue was excised the defect was repaired by another provider.
Posterior Auricular Interpolation Flap Text: A decision was made to reconstruct the defect utilizing an interpolation axial flap and a staged reconstruction.  A telfa template was made of the defect.  This telfa template was then used to outline the posterior auricular interpolation flap.  The donor area for the pedicle flap was then injected with anesthesia.  The flap was excised through the skin and subcutaneous tissue down to the layer of the underlying musculature.  The pedicle flap was carefully excised within this deep plane to maintain its blood supply.  The edges of the donor site were undermined.   The donor site was closed in a primary fashion.  The pedicle was then rotated into position and sutured.  Once the tube was sutured into place, adequate blood supply was confirmed with blanching and refill.  The pedicle was then wrapped with xeroform gauze and dressed appropriately with a telfa and gauze bandage to ensure continued blood supply and protect the attached pedicle.
Positioning (Leave Blank If You Do Not Want): The patient was placed in a comfortable position exposing the surgical site.
Complex Repair And A-T Advancement Flap Text: The defect edges were debeveled with a #15 scalpel blade.  The primary defect was closed partially with a complex linear closure.  Given the location of the remaining defect, shape of the defect and the proximity to free margins an A-T advancement flap was deemed most appropriate for complete closure of the defect.  Using a sterile surgical marker, an appropriate advancement flap was drawn incorporating the defect and placing the expected incisions within the relaxed skin tension lines where possible.    The area thus outlined was incised deep to adipose tissue with a #15 scalpel blade.  The skin margins were undermined to an appropriate distance in all directions utilizing iris scissors.

## 2019-03-11 DIAGNOSIS — E11.9 TYPE 2 DIABETES MELLITUS WITHOUT COMPLICATION, WITHOUT LONG-TERM CURRENT USE OF INSULIN (HCC): ICD-10-CM

## 2019-03-11 RX ORDER — BLOOD-GLUCOSE METER
KIT MISCELLANEOUS
Qty: 50 STRIP | Refills: 2 | Status: SHIPPED | OUTPATIENT
Start: 2019-03-11 | End: 2019-04-03 | Stop reason: SDUPTHER

## 2019-03-12 RX ORDER — GLIMEPIRIDE 4 MG/1
TABLET ORAL
Qty: 30 TAB | Refills: 5 | Status: SHIPPED | OUTPATIENT
Start: 2019-03-12 | End: 2019-03-28 | Stop reason: SDUPTHER

## 2019-03-28 DIAGNOSIS — E03.8 OTHER SPECIFIED HYPOTHYROIDISM: ICD-10-CM

## 2019-03-28 RX ORDER — GLIMEPIRIDE 4 MG/1
4 TABLET ORAL EVERY MORNING
Qty: 90 TAB | Refills: 3 | Status: SHIPPED | OUTPATIENT
Start: 2019-03-28 | End: 2019-04-03 | Stop reason: SDUPTHER

## 2019-03-29 RX ORDER — LEVOTHYROXINE SODIUM 0.07 MG/1
75 TABLET ORAL
Qty: 90 TAB | Refills: 3 | Status: SHIPPED | OUTPATIENT
Start: 2019-03-29 | End: 2019-04-03 | Stop reason: SDUPTHER

## 2019-04-03 DIAGNOSIS — E11.9 TYPE 2 DIABETES MELLITUS WITHOUT COMPLICATION, WITHOUT LONG-TERM CURRENT USE OF INSULIN (HCC): ICD-10-CM

## 2019-04-03 DIAGNOSIS — E03.8 OTHER SPECIFIED HYPOTHYROIDISM: ICD-10-CM

## 2019-04-03 RX ORDER — GLIMEPIRIDE 4 MG/1
4 TABLET ORAL EVERY MORNING
Qty: 90 TAB | Refills: 3 | Status: SHIPPED | OUTPATIENT
Start: 2019-04-03 | End: 2019-04-04 | Stop reason: SDUPTHER

## 2019-04-03 RX ORDER — LEVOTHYROXINE SODIUM 0.07 MG/1
75 TABLET ORAL
Qty: 90 TAB | Refills: 3 | Status: SHIPPED | OUTPATIENT
Start: 2019-04-03 | End: 2019-04-18 | Stop reason: SDUPTHER

## 2019-04-03 RX ORDER — LIOTHYRONINE SODIUM 5 UG/1
TABLET ORAL
Qty: 180 TAB | Refills: 3 | Status: SHIPPED | OUTPATIENT
Start: 2019-04-03 | End: 2019-04-18 | Stop reason: SDUPTHER

## 2019-04-03 RX ORDER — ATORVASTATIN CALCIUM 20 MG/1
20 TABLET, FILM COATED ORAL
Qty: 90 TAB | Refills: 3 | Status: SHIPPED | OUTPATIENT
Start: 2019-04-03 | End: 2019-04-18 | Stop reason: SDUPTHER

## 2019-04-04 RX ORDER — GLIMEPIRIDE 4 MG/1
4 TABLET ORAL EVERY MORNING
Qty: 100 TAB | Refills: 3 | Status: SHIPPED | OUTPATIENT
Start: 2019-04-04 | End: 2019-04-18 | Stop reason: SDUPTHER

## 2019-04-18 DIAGNOSIS — E03.8 OTHER SPECIFIED HYPOTHYROIDISM: ICD-10-CM

## 2019-04-18 RX ORDER — LIOTHYRONINE SODIUM 5 UG/1
TABLET ORAL
Qty: 180 TAB | Refills: 3 | Status: SHIPPED | OUTPATIENT
Start: 2019-04-18 | End: 2020-12-31 | Stop reason: SDUPTHER

## 2019-04-18 RX ORDER — CHOLESTYRAMINE 4 G/9G
.5-1 POWDER, FOR SUSPENSION ORAL DAILY
Qty: 30 EACH | Refills: 3 | Status: SHIPPED | OUTPATIENT
Start: 2019-04-18 | End: 2021-08-28

## 2019-04-18 RX ORDER — GLIMEPIRIDE 4 MG/1
4 TABLET ORAL EVERY MORNING
Qty: 100 TAB | Refills: 3 | Status: SHIPPED | OUTPATIENT
Start: 2019-04-18 | End: 2020-02-18 | Stop reason: SDUPTHER

## 2019-04-18 RX ORDER — LEVOTHYROXINE SODIUM 0.07 MG/1
75 TABLET ORAL
Qty: 90 TAB | Refills: 3 | Status: SHIPPED | OUTPATIENT
Start: 2019-04-18 | End: 2020-01-16 | Stop reason: SDUPTHER

## 2019-04-18 RX ORDER — ATORVASTATIN CALCIUM 20 MG/1
20 TABLET, FILM COATED ORAL
Qty: 90 TAB | Refills: 3 | Status: SHIPPED | OUTPATIENT
Start: 2019-04-18 | End: 2019-04-29 | Stop reason: SDUPTHER

## 2019-04-22 ENCOUNTER — OFFICE VISIT (OUTPATIENT)
Dept: INTERNAL MEDICINE | Facility: IMAGING CENTER | Age: 84
End: 2019-04-22
Payer: MEDICARE

## 2019-04-22 ENCOUNTER — HOSPITAL ENCOUNTER (OUTPATIENT)
Facility: MEDICAL CENTER | Age: 84
End: 2019-04-22
Attending: FAMILY MEDICINE
Payer: MEDICARE

## 2019-04-22 VITALS
OXYGEN SATURATION: 97 % | DIASTOLIC BLOOD PRESSURE: 74 MMHG | WEIGHT: 179 LBS | HEIGHT: 70 IN | BODY MASS INDEX: 25.62 KG/M2 | SYSTOLIC BLOOD PRESSURE: 122 MMHG | RESPIRATION RATE: 14 BRPM | HEART RATE: 66 BPM | TEMPERATURE: 97.7 F

## 2019-04-22 DIAGNOSIS — I65.23 BILATERAL CAROTID ARTERY STENOSIS: ICD-10-CM

## 2019-04-22 DIAGNOSIS — Z12.5 SCREENING FOR MALIGNANT NEOPLASM OF PROSTATE: ICD-10-CM

## 2019-04-22 DIAGNOSIS — E11.69 DYSLIPIDEMIA ASSOCIATED WITH TYPE 2 DIABETES MELLITUS (HCC): ICD-10-CM

## 2019-04-22 DIAGNOSIS — I77.89 OTHER SPECIFIED DISORDERS OF ARTERIES AND ARTERIOLES (HCC): ICD-10-CM

## 2019-04-22 DIAGNOSIS — E11.9 TYPE 2 DIABETES MELLITUS WITHOUT COMPLICATION, WITHOUT LONG-TERM CURRENT USE OF INSULIN (HCC): ICD-10-CM

## 2019-04-22 DIAGNOSIS — R35.0 BENIGN PROSTATIC HYPERPLASIA WITH URINARY FREQUENCY: ICD-10-CM

## 2019-04-22 DIAGNOSIS — M79.671 RIGHT FOOT PAIN: ICD-10-CM

## 2019-04-22 DIAGNOSIS — N40.1 BENIGN PROSTATIC HYPERPLASIA WITH URINARY FREQUENCY: ICD-10-CM

## 2019-04-22 DIAGNOSIS — E78.5 DYSLIPIDEMIA ASSOCIATED WITH TYPE 2 DIABETES MELLITUS (HCC): ICD-10-CM

## 2019-04-22 LAB
HBA1C MFR BLD: 6.5 % (ref 0–5.6)
INT CON NEG: ABNORMAL
INT CON POS: ABNORMAL
PSA SERPL-MCNC: 0.17 NG/ML (ref 0–4)

## 2019-04-22 PROCEDURE — 83036 HEMOGLOBIN GLYCOSYLATED A1C: CPT | Performed by: FAMILY MEDICINE

## 2019-04-22 PROCEDURE — 84153 ASSAY OF PSA TOTAL: CPT

## 2019-04-22 PROCEDURE — 99214 OFFICE O/P EST MOD 30 MIN: CPT | Performed by: FAMILY MEDICINE

## 2019-04-22 RX ORDER — TAMSULOSIN HYDROCHLORIDE 0.4 MG/1
0.4 CAPSULE ORAL
Qty: 90 CAP | Refills: 1 | Status: SHIPPED | OUTPATIENT
Start: 2019-04-22 | End: 2019-08-21

## 2019-04-22 RX ORDER — TRIAMCINOLONE ACETONIDE 1 MG/G
CREAM TOPICAL
Refills: 0 | COMMUNITY
Start: 2019-02-25 | End: 2019-08-21

## 2019-04-22 ASSESSMENT — PATIENT HEALTH QUESTIONNAIRE - PHQ9: CLINICAL INTERPRETATION OF PHQ2 SCORE: 0

## 2019-04-24 PROBLEM — R35.0 BENIGN PROSTATIC HYPERPLASIA WITH URINARY FREQUENCY: Status: ACTIVE | Noted: 2019-04-24

## 2019-04-24 PROBLEM — N40.1 BENIGN PROSTATIC HYPERPLASIA WITH URINARY FREQUENCY: Status: ACTIVE | Noted: 2019-04-24

## 2019-04-24 NOTE — PROGRESS NOTES
Chief Complaint   Patient presents with   • Foot Pain     right   • Diabetes   • Urinary Frequency       HISTORY OF PRESENT ILLNESS: Patient is a 83 y.o. male established patient who presents today complaining of right foot pain for the past 2 months.  He denies any injury.  He complains of pain along the lateral aspect of his foot.  No swelling or discoloration.  He switched shoes and for the past week it has felt better.    He also has type II diabetic.  His A1c today is 6.5.  This is down from 7.0.  He is taking glimepiride 4 mg daily.  Unable to tolerate metformin due to GI symptoms.  He denies any low blood sugars.  He is also on a statin and aspirin.  He continues on a statin for dyslipidemia.  He does try to exercise most days.  He does walk the dog.  He denies any numbness or tingling in his feet.  And he is up-to-date with his eye exam.    His last complaint is increased urinary frequency for the past couple of years.  He describes a slower stream.  He has only.  No pain or burning.  Wakes about 3 times at night.  Denies any blood in his urine.          Patient Active Problem List    Diagnosis Date Noted   • Benign prostatic hyperplasia with urinary frequency 04/24/2019   • Type 2 diabetes mellitus without complication, without long-term current use of insulin ([facility]) 10/18/2017   • Bilateral carotid artery disease ([facility]) 10/18/2017   • Psoriasis 06/14/2017   • Vitamin D deficiency 08/27/2015   • History of shingles 08/27/2015   • PHN (postherpetic neuralgia) 08/27/2015   • Hyperlipidemia 07/08/2015   • Hypothyroidism 07/08/2015   • MCI (mild cognitive impairment) 07/08/2015   • TIA (transient ischemic attack) 11/30/2012       Current Outpatient Prescriptions:   •  tamsulosin (FLOMAX) 0.4 MG capsule, Take 1 Cap by mouth ONE-HALF HOUR AFTER BREAKFAST., Disp: 90 Cap, Rfl: 1  •  atorvastatin (LIPITOR) 20 MG Tab, Take 1 Tab by mouth every day., Disp: 90 Tab, Rfl: 3  •  cholestyramine (QUESTRAN) 4 g packet, Take  2-4 g by mouth every day., Disp: 30 Each, Rfl: 3  •  glimepiride (AMARYL) 4 MG Tab, Take 1 Tab by mouth every morning., Disp: 100 Tab, Rfl: 3  •  levothyroxine (SYNTHROID) 75 MCG Tab, Take 1 Tab by mouth every day., Disp: 90 Tab, Rfl: 3  •  liothyronine (CYTOMEL) 5 MCG Tab, TAKE 2 TABLETS BY MOUTH EVERY DAY, Disp: 180 Tab, Rfl: 3  •  vitamin D (CHOLECALCIFEROL) 1000 UNIT Tab, Take 1 Tab by mouth every day. 2 tablets daily, Disp: 60 Tab, Rfl: 3  •  aspirin EC (ECOTRIN) 81 MG Tablet Delayed Response, Take 81 mg by mouth every day., Disp: , Rfl:   •  cyanocobalamin (VITAMIN B-12) 100 MCG Tab, Take 100 mcg by mouth every day., Disp: , Rfl:   •  triamcinolone acetonide (KENALOG) 0.1 % Cream, PLEASE SEE ATTACHED FOR DETAILED DIRECTIONS, Disp: , Rfl: 0  •  glucose blood (FREESTYLE LITE) strip, USE TO TEST BLOOD SUGAR EACH MORNING AND AS NEEDED, Disp: 50 Strip, Rfl: 2  •  Lancets Misc, Lancets order: Lancets for glucose meter preferred on patient's insurance. . Sig: use to monitor glucose once daily and prn, Disp: 100 Each, Rfl: 3  •  Blood Glucose Monitoring Suppl Device, Meter: Dispense glucose meter preferred on patient's insurance.  Sig. Use as directed for blood sugar monitoring., Disp: 1 Device, Rfl: 0      Past medical, surgical, family, and social history is reviewed and updated in Epic chart by me today.   Medications and allergies reviewed and updated in Epic chart by me today.     REVIEW OF SYSTEMS:  GENERAL: No fatigue, no weight loss.  HEENT:  Ears--no earache, no change in hearing, no dizziness, no tinnitus.                 Eyes--no blurred vision, no discharge or pain                 Throat--No sore throat, no dysphagia, no hoarseness  CV:  No chest pain,dyspnea,palpitations or edema.  RESP:  No sob,cough,wheezing or hemoptysis.  GI: No dysphagia, heartburn,abdominal pain, nausea, vomiting, diarrhea or constipation.       No melena, jaundice, bleeding, incontinence or change in bowel habits.  :  As  "above  MS:  As above  NEURO:  No seizures, syncope, paralysis, tremor, or weakness.  SKIN: No new or concerning skin lesions or changes.   PSYCH: Mood fine.    Vitals:    04/22/19 0800   BP: 122/74   Pulse: 66   Resp: 14   Temp: 36.5 °C (97.7 °F)   TempSrc: Temporal   SpO2: 97%   Weight: 81.2 kg (179 lb)   Height: 1.778 m (5' 10\")     Physical Exam:  Gen: Well developed, well nourished. No acute distress.  Neck:  Supple, no adenopathy or thyromegaly.  Heart:  Regular rate and rhythm.  Normal S1, S2. No murmur, gallop or rub.  Lungs:  Clear, No wheezes,rales or rhonchi.  Extremities:  No edema.  Foot: His right foot has no swelling or discoloration.  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  He is nontender.  The place he describes as hurting in the past was along the lateral aspect of his fifth metatarsal.  His neurovascular exam is intact.  Psych: Mood and affect are appropriate.        Assessment/Plan:  1. Right foot pain.  This is feeling better in a different pair of shoes.  Will observe.  If it starts bothering him I would like to do an x-ray and he will call for the past.    2. Type 2 diabetes mellitus without complication, without long-term current use of insulin ([facility])  POCT A1C today is 6.5.  He will continue his present medications.  Education today regarding hypoglycemic episodes.  Also education regarding diet exercise, foot and eye care.   3. Benign prostatic hyperplasia with urinary frequency.  Start Flomax.  Follow-up in 3 months.    4. Screening for malignant neoplasm of prostate  PROSTATE SPECIFIC AG SCREENING   5.  Dyslipidemia.  Discussed again with 2 diabetes.  He will continue on statin and diet with exercise.  6.  History of mild carotid artery disease.  Again continue statin.     "

## 2019-04-29 RX ORDER — ATORVASTATIN CALCIUM 20 MG/1
20 TABLET, FILM COATED ORAL
Qty: 100 TAB | Refills: 3 | Status: SHIPPED | OUTPATIENT
Start: 2019-04-29 | End: 2020-08-26 | Stop reason: SDUPTHER

## 2019-05-25 DIAGNOSIS — E03.8 OTHER SPECIFIED HYPOTHYROIDISM: ICD-10-CM

## 2019-05-28 RX ORDER — LEVOTHYROXINE SODIUM 0.07 MG/1
TABLET ORAL
Qty: 90 TAB | Refills: 3 | Status: SHIPPED | OUTPATIENT
Start: 2019-05-28 | End: 2019-08-21

## 2019-06-14 DIAGNOSIS — R13.19 ESOPHAGEAL DYSPHAGIA: ICD-10-CM

## 2019-06-14 NOTE — PROGRESS NOTES
Cam was here at [facility] appt.  He is having problems with dysphagia again. Mostly solids. Several times each week.  He is trying to take small bites, chew well and drink between.    He saw Dr. Hall in past (2016) for esophageal dilation.  Will refer back to him.  Encouraged him to call for appt.  He is having no heartburn.

## 2019-08-21 ENCOUNTER — OFFICE VISIT (OUTPATIENT)
Dept: INTERNAL MEDICINE | Facility: IMAGING CENTER | Age: 84
End: 2019-08-21
Payer: MEDICARE

## 2019-08-21 VITALS
BODY MASS INDEX: 25.62 KG/M2 | SYSTOLIC BLOOD PRESSURE: 138 MMHG | HEIGHT: 70 IN | DIASTOLIC BLOOD PRESSURE: 76 MMHG | WEIGHT: 179 LBS | OXYGEN SATURATION: 99 % | RESPIRATION RATE: 14 BRPM | HEART RATE: 60 BPM | TEMPERATURE: 97.9 F

## 2019-08-21 DIAGNOSIS — E11.9 TYPE 2 DIABETES MELLITUS WITHOUT COMPLICATION, WITHOUT LONG-TERM CURRENT USE OF INSULIN (HCC): ICD-10-CM

## 2019-08-21 DIAGNOSIS — R03.0 ELEVATED BP WITHOUT DIAGNOSIS OF HYPERTENSION: ICD-10-CM

## 2019-08-21 LAB
HBA1C MFR BLD: 6.6 % (ref 0–5.6)
INT CON NEG: ABNORMAL
INT CON POS: ABNORMAL

## 2019-08-21 PROCEDURE — 83036 HEMOGLOBIN GLYCOSYLATED A1C: CPT | Performed by: FAMILY MEDICINE

## 2019-08-21 PROCEDURE — 99213 OFFICE O/P EST LOW 20 MIN: CPT | Performed by: FAMILY MEDICINE

## 2019-08-21 RX ORDER — CLOBETASOL PROPIONATE 0.5 MG/G
CREAM TOPICAL
Qty: 45 G | Refills: 0 | Status: SHIPPED | OUTPATIENT
Start: 2019-08-21 | End: 2021-08-28

## 2019-08-22 NOTE — PROGRESS NOTES
Chief Complaint   Patient presents with   • Hypertension     high at dentist   • Diabetes       HISTORY OF PRESENT ILLNESS: Patient is a 83 y.o. male established patient who presents today with concerns about an elevated blood pressure reading.  He went to the dentist today and was told his blood pressure was 170s over 90s.  He states it has never been that high.  They did his blood pressure with a wrist cuff.  He denies any chest pain, no shortness of breath, no edema.  He is not on antihypertensives.    He is also has type 2 diabetes.  He is due for an A1c.  His only medication is glimepiride 4 mg.  He is on a statin and an aspirin.  Metformin has caused too much diarrhea.  He is monitoring his diet as well.  He is up-to-date with his eye exam.  He denies any neuropathy.      Patient Active Problem List    Diagnosis Date Noted   • Benign prostatic hyperplasia with urinary frequency 04/24/2019   • Type 2 diabetes mellitus without complication, without long-term current use of insulin ([facility]) 10/18/2017   • Bilateral carotid artery disease ([facility]) 10/18/2017   • Psoriasis 06/14/2017   • Vitamin D deficiency 08/27/2015   • History of shingles 08/27/2015   • PHN (postherpetic neuralgia) 08/27/2015   • Hyperlipidemia 07/08/2015   • Hypothyroidism 07/08/2015   • MCI (mild cognitive impairment) 07/08/2015   • TIA (transient ischemic attack) 11/30/2012     Current Outpatient Medications on File Prior to Visit   Medication Sig Dispense Refill   • atorvastatin (LIPITOR) 20 MG Tab Take 1 Tab by mouth every day. 100 Tab 3   • cholestyramine (QUESTRAN) 4 g packet Take 2-4 g by mouth every day. 30 Each 3   • glimepiride (AMARYL) 4 MG Tab Take 1 Tab by mouth every morning. 100 Tab 3   • levothyroxine (SYNTHROID) 75 MCG Tab Take 1 Tab by mouth every day. 90 Tab 3   • liothyronine (CYTOMEL) 5 MCG Tab TAKE 2 TABLETS BY MOUTH EVERY  Tab 3   • vitamin D (CHOLECALCIFEROL) 1000 UNIT Tab Take 1 Tab by mouth every day. 2 tablets daily  "60 Tab 3   • aspirin EC (ECOTRIN) 81 MG Tablet Delayed Response Take 81 mg by mouth every day.     • cyanocobalamin (VITAMIN B-12) 100 MCG Tab Take 100 mcg by mouth every day.     • glucose blood (FREESTYLE LITE) strip USE TO TEST BLOOD SUGAR EACH MORNING AND AS NEEDED 50 Strip 2   • Lancets Misc Lancets order: Lancets for glucose meter preferred on patient's insurance. . Sig: use to monitor glucose once daily and prn 100 Each 3   • Blood Glucose Monitoring Suppl Device Meter: Dispense glucose meter preferred on patient's insurance.  Sig. Use as directed for blood sugar monitoring. 1 Device 0     No current facility-administered medications on file prior to visit.          Past medical, surgical, family, and social history is reviewed and updated in Epic chart by me today.   Medications and allergies reviewed and updated in Epic chart by me today.     REVIEW OF SYSTEMS:  GENERAL: No fatigue, no weight loss.  HEENT:  Ears--no earache, no change in hearing, no dizziness, no tinnitus.                 Eyes--no blurred vision, no discharge or pain                 Throat--No sore throat, no dysphagia, no hoarseness  CV:  No chest pain,dyspnea,palpitations or edema.  RESP:  No sob,cough,wheezing or hemoptysis.  GI: No dysphagia, heartburn,abdominal pain, nausea, vomiting, diarrhea or constipation.       No melena, jaundice, bleeding, incontinence or change in bowel habits.  :  No dysuria, polyuria, hematuria, incontinence, or nocturia.  MS:  No joint swelling, myalgias, or arthralgias.  NEURO:  No seizures, syncope, paralysis, tremor, or weakness.  SKIN: No new or concerning skin lesions or changes.   PSYCH: Mood fine.    Vitals:    08/21/19 1500   BP: 138/76   Pulse: 60   Resp: 14   Temp: 36.6 °C (97.9 °F)   TempSrc: Temporal   SpO2: 99%   Weight: 81.2 kg (179 lb)   Height: 1.778 m (5' 10\")     Physical Exam: Blood pressure repeat with arm cuff is 132/78  Gen: Well developed, well nourished. No acute distress.  Neck:  " Supple, no adenopathy or thyromegaly.  Heart:  Regular rate and rhythm.  Normal S1, S2. No murmur, gallop or rub.  Lungs:  Clear, No wheezes,rales or rhonchi.  Extremities:  No edema.  Psych: Mood and affect are appropriate.    Fingerstick A1c today is 6.6.      Assessment/Plan:  1. Type 2 diabetes mellitus without complication, without long-term current use of insulin ([facility]).  His diabetes is under very good control.  He denies any low blood sugars.  He will continue on Amaryl, healthy diet, regular exercise.  Education regarding foot and eye care.  Follow-up in 3 to 4 months. POCT A1C   2. Elevated BP without diagnosis of hypertension.  I reassured him that his blood pressure readings here have always been fine and that I suspect it was the cuff that they were using.      All questions were answered and he will follow-up in 3 to 4 months

## 2019-08-26 ENCOUNTER — APPOINTMENT (RX ONLY)
Dept: URBAN - METROPOLITAN AREA CLINIC 20 | Facility: CLINIC | Age: 84
Setting detail: DERMATOLOGY
End: 2019-08-26

## 2019-08-26 DIAGNOSIS — D18.0 HEMANGIOMA: ICD-10-CM

## 2019-08-26 DIAGNOSIS — L57.0 ACTINIC KERATOSIS: ICD-10-CM

## 2019-08-26 DIAGNOSIS — Z85.828 PERSONAL HISTORY OF OTHER MALIGNANT NEOPLASM OF SKIN: ICD-10-CM

## 2019-08-26 DIAGNOSIS — D22 MELANOCYTIC NEVI: ICD-10-CM

## 2019-08-26 DIAGNOSIS — L81.4 OTHER MELANIN HYPERPIGMENTATION: ICD-10-CM

## 2019-08-26 DIAGNOSIS — Z71.89 OTHER SPECIFIED COUNSELING: ICD-10-CM

## 2019-08-26 DIAGNOSIS — L82.1 OTHER SEBORRHEIC KERATOSIS: ICD-10-CM

## 2019-08-26 PROBLEM — D22.61 MELANOCYTIC NEVI OF RIGHT UPPER LIMB, INCLUDING SHOULDER: Status: ACTIVE | Noted: 2019-08-26

## 2019-08-26 PROBLEM — D22.62 MELANOCYTIC NEVI OF LEFT UPPER LIMB, INCLUDING SHOULDER: Status: ACTIVE | Noted: 2019-08-26

## 2019-08-26 PROBLEM — D22.72 MELANOCYTIC NEVI OF LEFT LOWER LIMB, INCLUDING HIP: Status: ACTIVE | Noted: 2019-08-26

## 2019-08-26 PROBLEM — D18.01 HEMANGIOMA OF SKIN AND SUBCUTANEOUS TISSUE: Status: ACTIVE | Noted: 2019-08-26

## 2019-08-26 PROBLEM — D22.39 MELANOCYTIC NEVI OF OTHER PARTS OF FACE: Status: ACTIVE | Noted: 2019-08-26

## 2019-08-26 PROBLEM — D22.71 MELANOCYTIC NEVI OF RIGHT LOWER LIMB, INCLUDING HIP: Status: ACTIVE | Noted: 2019-08-26

## 2019-08-26 PROBLEM — D22.5 MELANOCYTIC NEVI OF TRUNK: Status: ACTIVE | Noted: 2019-08-26

## 2019-08-26 PROCEDURE — 99214 OFFICE O/P EST MOD 30 MIN: CPT | Mod: 25

## 2019-08-26 PROCEDURE — 17003 DESTRUCT PREMALG LES 2-14: CPT

## 2019-08-26 PROCEDURE — ? COUNSELING

## 2019-08-26 PROCEDURE — ? OBSERVATION

## 2019-08-26 PROCEDURE — 17000 DESTRUCT PREMALG LESION: CPT

## 2019-08-26 PROCEDURE — ? LIQUID NITROGEN

## 2019-08-26 PROCEDURE — ? SUNSCREEN RECOMMENDATIONS

## 2019-08-26 ASSESSMENT — LOCATION DETAILED DESCRIPTION DERM
LOCATION DETAILED: RIGHT VENTRAL PROXIMAL FOREARM
LOCATION DETAILED: RIGHT INFERIOR MEDIAL UPPER BACK
LOCATION DETAILED: RIGHT PROXIMAL PRETIBIAL REGION
LOCATION DETAILED: LEFT VENTRAL PROXIMAL FOREARM
LOCATION DETAILED: RIGHT INFERIOR FOREHEAD
LOCATION DETAILED: RIGHT INFERIOR CENTRAL MALAR CHEEK
LOCATION DETAILED: RIGHT SCAPHA
LOCATION DETAILED: POSTERIOR MID-PARIETAL SCALP
LOCATION DETAILED: LEFT LATERAL PROXIMAL PRETIBIAL REGION
LOCATION DETAILED: RIGHT DISTAL POSTERIOR THIGH
LOCATION DETAILED: RIGHT CENTRAL LATERAL NECK
LOCATION DETAILED: INFERIOR THORACIC SPINE
LOCATION DETAILED: LEFT ANTERIOR EARLOBE
LOCATION DETAILED: RIGHT CENTRAL FRONTAL SCALP
LOCATION DETAILED: LEFT INFERIOR FOREHEAD
LOCATION DETAILED: LEFT INFERIOR VERMILION LIP
LOCATION DETAILED: RIGHT INFERIOR MEDIAL MIDBACK
LOCATION DETAILED: RIGHT SUPERIOR UPPER BACK
LOCATION DETAILED: MID-FRONTAL SCALP
LOCATION DETAILED: LEFT PROXIMAL POSTERIOR UPPER ARM
LOCATION DETAILED: LEFT DISTAL POSTERIOR THIGH
LOCATION DETAILED: LEFT SUPERIOR PARIETAL SCALP
LOCATION DETAILED: RIGHT DISTAL POSTERIOR UPPER ARM
LOCATION DETAILED: LEFT SUPERIOR HELIX

## 2019-08-26 ASSESSMENT — LOCATION SIMPLE DESCRIPTION DERM
LOCATION SIMPLE: UPPER BACK
LOCATION SIMPLE: RIGHT FOREHEAD
LOCATION SIMPLE: RIGHT CHEEK
LOCATION SIMPLE: RIGHT POSTERIOR UPPER ARM
LOCATION SIMPLE: RIGHT PRETIBIAL REGION
LOCATION SIMPLE: RIGHT UPPER BACK
LOCATION SIMPLE: POSTERIOR SCALP
LOCATION SIMPLE: LEFT POSTERIOR UPPER ARM
LOCATION SIMPLE: RIGHT POSTERIOR THIGH
LOCATION SIMPLE: RIGHT FOREARM
LOCATION SIMPLE: NECK
LOCATION SIMPLE: LEFT LIP
LOCATION SIMPLE: LEFT EAR
LOCATION SIMPLE: LEFT PRETIBIAL REGION
LOCATION SIMPLE: RIGHT LOWER BACK
LOCATION SIMPLE: RIGHT SCALP
LOCATION SIMPLE: ANTERIOR SCALP
LOCATION SIMPLE: LEFT FOREARM
LOCATION SIMPLE: LEFT FOREHEAD
LOCATION SIMPLE: RIGHT EAR
LOCATION SIMPLE: SCALP
LOCATION SIMPLE: LEFT POSTERIOR THIGH

## 2019-08-26 ASSESSMENT — LOCATION ZONE DERM
LOCATION ZONE: LEG
LOCATION ZONE: FACE
LOCATION ZONE: NECK
LOCATION ZONE: TRUNK
LOCATION ZONE: LIP
LOCATION ZONE: ARM
LOCATION ZONE: EAR
LOCATION ZONE: SCALP

## 2019-08-26 NOTE — PROCEDURE: LIQUID NITROGEN
Post-Care Instructions: I reviewed with the patient in detail post-care instructions. Patient is to wear sunprotection, and avoid picking at any of the treated lesions. Pt may apply Vaseline to crusted or scabbing areas.
Duration Of Freeze Thaw-Cycle (Seconds): 10
Render Note In Bullet Format When Appropriate: No
Consent: The patient's consent was obtained including but not limited to risks of crusting, scabbing, blistering, scarring, darker or lighter pigmentary change, recurrence, incomplete removal and infection. RTC in 2 months if lesion(s) persistent.
Detail Level: Detailed
Render Post-Care Instructions In Note?: yes
Number Of Freeze-Thaw Cycles: 2 freeze-thaw cycles

## 2019-09-25 ENCOUNTER — OFFICE VISIT (OUTPATIENT)
Dept: INTERNAL MEDICINE | Facility: IMAGING CENTER | Age: 84
End: 2019-09-25
Payer: MEDICARE

## 2019-09-25 VITALS
DIASTOLIC BLOOD PRESSURE: 70 MMHG | HEIGHT: 70 IN | HEART RATE: 67 BPM | TEMPERATURE: 98.2 F | WEIGHT: 179 LBS | BODY MASS INDEX: 25.62 KG/M2 | RESPIRATION RATE: 14 BRPM | SYSTOLIC BLOOD PRESSURE: 124 MMHG | OXYGEN SATURATION: 92 %

## 2019-09-25 DIAGNOSIS — N40.1 BENIGN PROSTATIC HYPERPLASIA WITH URINARY FREQUENCY: ICD-10-CM

## 2019-09-25 DIAGNOSIS — Z23 NEED FOR INFLUENZA VACCINATION: ICD-10-CM

## 2019-09-25 DIAGNOSIS — R35.0 BENIGN PROSTATIC HYPERPLASIA WITH URINARY FREQUENCY: ICD-10-CM

## 2019-09-25 DIAGNOSIS — R33.9 URINARY RETENTION: ICD-10-CM

## 2019-09-25 PROCEDURE — G0008 ADMIN INFLUENZA VIRUS VAC: HCPCS | Performed by: FAMILY MEDICINE

## 2019-09-25 PROCEDURE — 90662 IIV NO PRSV INCREASED AG IM: CPT | Performed by: FAMILY MEDICINE

## 2019-09-25 PROCEDURE — 99213 OFFICE O/P EST LOW 20 MIN: CPT | Mod: 25 | Performed by: FAMILY MEDICINE

## 2019-09-25 NOTE — PROGRESS NOTES
Chief Complaint   Patient presents with   • Other     difficulty urinating       HISTORY OF PRESENT ILLNESS: Patient is a 84 y.o. male established patient who presents today here complaining of urinary retention last night.  He has been having some allergy symptoms.  He took 1 Claritin-D at about 2:00 yesterday afternoon.  By 5:00 he had noticed that he was having a difficult time urinating.  It lasted through most of the night.  This morning he is doing fine.  He states he is urinating as usual.  He denies any pain.  He does not feel pressure.  No blood.    He does have a history of BPH.  He has taken tamsulosin for a short period of time and did not think it made much difference.  He wakes 2-3 times at night.  He does not Messerly want to take more medication.    In regards to his allergy symptoms he wonders what else he could take safely.      Patient Active Problem List    Diagnosis Date Noted   • Benign prostatic hyperplasia with urinary frequency 04/24/2019   • Type 2 diabetes mellitus without complication, without long-term current use of insulin ([facility]) 10/18/2017   • Bilateral carotid artery disease ([facility]) 10/18/2017   • Psoriasis 06/14/2017   • Vitamin D deficiency 08/27/2015   • History of shingles 08/27/2015   • PHN (postherpetic neuralgia) 08/27/2015   • Hyperlipidemia 07/08/2015   • Hypothyroidism 07/08/2015   • MCI (mild cognitive impairment) 07/08/2015   • TIA (transient ischemic attack) 11/30/2012     Current Outpatient Medications on File Prior to Visit   Medication Sig Dispense Refill   • atorvastatin (LIPITOR) 20 MG Tab Take 1 Tab by mouth every day. 100 Tab 3   • cholestyramine (QUESTRAN) 4 g packet Take 2-4 g by mouth every day. 30 Each 3   • glimepiride (AMARYL) 4 MG Tab Take 1 Tab by mouth every morning. 100 Tab 3   • levothyroxine (SYNTHROID) 75 MCG Tab Take 1 Tab by mouth every day. 90 Tab 3   • liothyronine (CYTOMEL) 5 MCG Tab TAKE 2 TABLETS BY MOUTH EVERY  Tab 3   • vitamin D  "(CHOLECALCIFEROL) 1000 UNIT Tab Take 1 Tab by mouth every day. 2 tablets daily 60 Tab 3   • aspirin EC (ECOTRIN) 81 MG Tablet Delayed Response Take 81 mg by mouth every day.     • cyanocobalamin (VITAMIN B-12) 100 MCG Tab Take 100 mcg by mouth every day.     • clobetasol (TEMOVATE) 0.05 % Cream Apply to involved area twice daily for 2 weeks. 45 g 0   • glucose blood (FREESTYLE LITE) strip USE TO TEST BLOOD SUGAR EACH MORNING AND AS NEEDED 50 Strip 2   • Lancets Misc Lancets order: Lancets for glucose meter preferred on patient's insurance. . Sig: use to monitor glucose once daily and prn 100 Each 3   • Blood Glucose Monitoring Suppl Device Meter: Dispense glucose meter preferred on patient's insurance.  Sig. Use as directed for blood sugar monitoring. 1 Device 0     No current facility-administered medications on file prior to visit.          Past medical, surgical, family, and social history is reviewed and updated in Epic chart by me today.   Medications and allergies reviewed and updated in Epic chart by me today.     REVIEW OF SYSTEMS:  GENERAL: No fatigue, no weight loss.  HEENT:  Allergy symptoms,--itchy eyes, nasal congestion.  No fevers or chills.  CV:  No chest pain,dyspnea,palpitations or edema.  RESP:  No sob,cough,wheezing or hemoptysis.  GI: No dysphagia, heartburn,abdominal pain, nausea, vomiting, diarrhea or constipation.       No melena, jaundice, bleeding, incontinence or change in bowel habits.  : Frequent urination and wakes 2-3 times at night.  Episode of urinary retention as above.  MS:  No joint swelling, myalgias, or arthralgias.  NEURO:  No seizures, syncope, paralysis, tremor, or weakness.  SKIN: No new or concerning skin lesions or changes.   PSYCH: Mood fine.    Vitals:    09/25/19 1400   BP: 124/70   Pulse: 67   Resp: 14   Temp: 36.8 °C (98.2 °F)   TempSrc: Temporal   SpO2: 92%   Weight: 81.2 kg (179 lb)   Height: 1.778 m (5' 10\")     Physical Exam:  GENERAL;  WD/WN, No acute " distress.  HEENT:  PERRLA, EOMI, no conjuctival injection, no icterus.                 TM's normal bilat.                 Nasal mucosa erythematous and edematous.                 Pharynx mildly injected. No exudate.  NECK: Supple with no adenopathy or thyromegaly.  LUNGS: Clear with no rales, rhonchi, or wheezes.  COR: RR with no murmur, gallop or rub.  Abdomen: Soft, nontender, nondistended. Normal bowel sounds. No hepatosplenomegaly or masses, or hernias. No rebound or guarding.  EXT: No edema.      Assessment/Plan:  1. Benign prostatic hyperplasia with urinary frequency.  Education.  He states he is doing okay now as long as he does not take any further Claritin-D.    2. Urinary retention.  This seems to be an isolated episode.  If symptoms persist or he has issues again he will call.  I do suspect this was secondary to Claritin-D and he will avoid it in the future.  He may use Flonase or Nasacort for allergies.    3. Need for influenza vaccination  INFLUENZA VACCINE, HIGH DOSE (65+ ONLY)

## 2019-11-04 RX ORDER — TAMSULOSIN HYDROCHLORIDE 0.4 MG/1
CAPSULE ORAL
Qty: 90 CAP | Refills: 1 | Status: SHIPPED | OUTPATIENT
Start: 2019-11-04 | End: 2021-11-30 | Stop reason: SDUPTHER

## 2019-11-04 RX ORDER — MELATONIN
Qty: 60 TAB | Refills: 5 | Status: SHIPPED | OUTPATIENT
Start: 2019-11-04 | End: 2020-12-14 | Stop reason: SDUPTHER

## 2020-01-16 ENCOUNTER — OFFICE VISIT (OUTPATIENT)
Dept: INTERNAL MEDICINE | Facility: IMAGING CENTER | Age: 85
End: 2020-01-16
Payer: MEDICARE

## 2020-01-16 ENCOUNTER — HOSPITAL ENCOUNTER (OUTPATIENT)
Facility: MEDICAL CENTER | Age: 85
End: 2020-01-16
Attending: FAMILY MEDICINE
Payer: MEDICARE

## 2020-01-16 VITALS
RESPIRATION RATE: 14 BRPM | OXYGEN SATURATION: 97 % | DIASTOLIC BLOOD PRESSURE: 70 MMHG | BODY MASS INDEX: 25.62 KG/M2 | TEMPERATURE: 97.9 F | HEART RATE: 68 BPM | HEIGHT: 70 IN | WEIGHT: 179 LBS | SYSTOLIC BLOOD PRESSURE: 145 MMHG

## 2020-01-16 DIAGNOSIS — N13.8 BPH WITH OBSTRUCTION/LOWER URINARY TRACT SYMPTOMS: ICD-10-CM

## 2020-01-16 DIAGNOSIS — E03.9 HYPOTHYROIDISM, UNSPECIFIED TYPE: ICD-10-CM

## 2020-01-16 DIAGNOSIS — E11.69 DYSLIPIDEMIA ASSOCIATED WITH TYPE 2 DIABETES MELLITUS (HCC): ICD-10-CM

## 2020-01-16 DIAGNOSIS — E78.5 DYSLIPIDEMIA ASSOCIATED WITH TYPE 2 DIABETES MELLITUS (HCC): ICD-10-CM

## 2020-01-16 DIAGNOSIS — N40.1 BPH WITH OBSTRUCTION/LOWER URINARY TRACT SYMPTOMS: ICD-10-CM

## 2020-01-16 DIAGNOSIS — R53.83 FATIGUE, UNSPECIFIED TYPE: ICD-10-CM

## 2020-01-16 DIAGNOSIS — I77.89 OTHER SPECIFIED DISORDERS OF ARTERIES AND ARTERIOLES (HCC): ICD-10-CM

## 2020-01-16 DIAGNOSIS — E11.9 TYPE 2 DIABETES MELLITUS WITHOUT COMPLICATION, WITHOUT LONG-TERM CURRENT USE OF INSULIN (HCC): ICD-10-CM

## 2020-01-16 DIAGNOSIS — I65.23 BILATERAL CAROTID ARTERY STENOSIS: ICD-10-CM

## 2020-01-16 LAB
ALBUMIN SERPL BCP-MCNC: 4.5 G/DL (ref 3.2–4.9)
ALBUMIN/GLOB SERPL: 1.7 G/DL
ALP SERPL-CCNC: 74 U/L (ref 30–99)
ALT SERPL-CCNC: 40 U/L (ref 2–50)
ANION GAP SERPL CALC-SCNC: 11 MMOL/L (ref 0–11.9)
AST SERPL-CCNC: 25 U/L (ref 12–45)
BASOPHILS # BLD AUTO: 0.2 % (ref 0–1.8)
BASOPHILS # BLD: 0.01 K/UL (ref 0–0.12)
BILIRUB SERPL-MCNC: 0.9 MG/DL (ref 0.1–1.5)
BUN SERPL-MCNC: 18 MG/DL (ref 8–22)
CALCIUM SERPL-MCNC: 9.6 MG/DL (ref 8.5–10.5)
CHLORIDE SERPL-SCNC: 105 MMOL/L (ref 96–112)
CO2 SERPL-SCNC: 25 MMOL/L (ref 20–33)
CREAT SERPL-MCNC: 1.02 MG/DL (ref 0.5–1.4)
EOSINOPHIL # BLD AUTO: 0.12 K/UL (ref 0–0.51)
EOSINOPHIL NFR BLD: 1.9 % (ref 0–6.9)
ERYTHROCYTE [DISTWIDTH] IN BLOOD BY AUTOMATED COUNT: 50.4 FL (ref 35.9–50)
GLOBULIN SER CALC-MCNC: 2.7 G/DL (ref 1.9–3.5)
GLUCOSE SERPL-MCNC: 139 MG/DL (ref 65–99)
HCT VFR BLD AUTO: 46.7 % (ref 42–52)
HGB BLD-MCNC: 15.3 G/DL (ref 14–18)
IMM GRANULOCYTES # BLD AUTO: 0.01 K/UL (ref 0–0.11)
IMM GRANULOCYTES NFR BLD AUTO: 0.2 % (ref 0–0.9)
LYMPHOCYTES # BLD AUTO: 1.69 K/UL (ref 1–4.8)
LYMPHOCYTES NFR BLD: 26.4 % (ref 22–41)
MCH RBC QN AUTO: 32.1 PG (ref 27–33)
MCHC RBC AUTO-ENTMCNC: 32.8 G/DL (ref 33.7–35.3)
MCV RBC AUTO: 97.9 FL (ref 81.4–97.8)
MONOCYTES # BLD AUTO: 0.56 K/UL (ref 0–0.85)
MONOCYTES NFR BLD AUTO: 8.8 % (ref 0–13.4)
NEUTROPHILS # BLD AUTO: 4.01 K/UL (ref 1.82–7.42)
NEUTROPHILS NFR BLD: 62.5 % (ref 44–72)
NRBC # BLD AUTO: 0 K/UL
NRBC BLD-RTO: 0 /100 WBC
PLATELET # BLD AUTO: 213 K/UL (ref 164–446)
PMV BLD AUTO: 11.2 FL (ref 9–12.9)
POTASSIUM SERPL-SCNC: 4.4 MMOL/L (ref 3.6–5.5)
PROT SERPL-MCNC: 7.2 G/DL (ref 6–8.2)
RBC # BLD AUTO: 4.77 M/UL (ref 4.7–6.1)
SODIUM SERPL-SCNC: 141 MMOL/L (ref 135–145)
WBC # BLD AUTO: 6.4 K/UL (ref 4.8–10.8)

## 2020-01-16 PROCEDURE — 84439 ASSAY OF FREE THYROXINE: CPT

## 2020-01-16 PROCEDURE — 84443 ASSAY THYROID STIM HORMONE: CPT

## 2020-01-16 PROCEDURE — 83036 HEMOGLOBIN GLYCOSYLATED A1C: CPT

## 2020-01-16 PROCEDURE — 85025 COMPLETE CBC W/AUTO DIFF WBC: CPT

## 2020-01-16 PROCEDURE — 80053 COMPREHEN METABOLIC PANEL: CPT

## 2020-01-16 PROCEDURE — 84481 FREE ASSAY (FT-3): CPT

## 2020-01-16 PROCEDURE — 99214 OFFICE O/P EST MOD 30 MIN: CPT | Performed by: FAMILY MEDICINE

## 2020-01-16 RX ORDER — LEVOTHYROXINE SODIUM 0.07 MG/1
75 TABLET ORAL
Qty: 90 TAB | Refills: 3 | Status: SHIPPED | OUTPATIENT
Start: 2020-01-16 | End: 2020-12-31 | Stop reason: SDUPTHER

## 2020-01-16 RX ORDER — FINASTERIDE 5 MG/1
5 TABLET, FILM COATED ORAL DAILY
Qty: 90 TAB | Refills: 1 | Status: SHIPPED | OUTPATIENT
Start: 2020-01-16 | End: 2020-12-31 | Stop reason: SDUPTHER

## 2020-01-16 RX ORDER — TAMSULOSIN HYDROCHLORIDE 0.4 MG/1
0.4 CAPSULE ORAL
Qty: 90 CAP | Refills: 1 | Status: SHIPPED | OUTPATIENT
Start: 2020-01-16 | End: 2020-08-07

## 2020-01-16 RX ORDER — VENLAFAXINE HYDROCHLORIDE 37.5 MG/1
37.5 CAPSULE, EXTENDED RELEASE ORAL
COMMUNITY
Start: 2019-12-30 | End: 2021-11-30

## 2020-01-16 ASSESSMENT — PATIENT HEALTH QUESTIONNAIRE - PHQ9: CLINICAL INTERPRETATION OF PHQ2 SCORE: 0

## 2020-01-16 NOTE — PROGRESS NOTES
Chief Complaint   Patient presents with   • Urinary Retention     prostate symptoms       HISTORY OF PRESENT ILLNESS: Patient is a 84 y.o. male established patient who presents today complaining of urinary symptoms.  He states when he gets the urge to urinate he struggles to empty his bladder.  He has a very slow stream and has some dribbling.  He has to stand for quite a while before starting to void and it takes quite a while to empty his bladder.  He is never quite sure if he has completely emptied it.  He wakes 3-4 times at night.  No pain or burning with urination.  No history of UTIs.  He has taken Flomax in the past but not real consistently.  It was prescribed in the fall and he took it for 1 month.  He did see urology years ago.    He is also due for lab work for type 2 diabetes.  He is on glimepiride.  He is exercising.  He does not monitor his sugars regularly but his last A1c was 6.6 in August.  He denies any low blood sugars.  He is on a statin.  He is not on a blood pressure medication.  He does take an 81 mg aspirin daily.  He denies any numbness or tingling in his feet.  His last eye exam showed no retinopathy.        Patient Active Problem List    Diagnosis Date Noted   • Benign prostatic hyperplasia with urinary frequency 04/24/2019   • Type 2 diabetes mellitus without complication, without long-term current use of insulin ([facility]) 10/18/2017   • Bilateral carotid artery disease ([facility]) 10/18/2017   • Psoriasis 06/14/2017   • Vitamin D deficiency 08/27/2015   • History of shingles 08/27/2015   • PHN (postherpetic neuralgia) 08/27/2015   • Hyperlipidemia 07/08/2015   • Hypothyroidism 07/08/2015   • MCI (mild cognitive impairment) 07/08/2015   • TIA (transient ischemic attack) 11/30/2012     Current Outpatient Medications on File Prior to Visit   Medication Sig Dispense Refill   • atorvastatin (LIPITOR) 20 MG Tab Take 1 Tab by mouth every day. 100 Tab 3   • glimepiride (AMARYL) 4 MG Tab Take 1 Tab by  mouth every morning. 100 Tab 3   • liothyronine (CYTOMEL) 5 MCG Tab TAKE 2 TABLETS BY MOUTH EVERY  Tab 3   • aspirin EC (ECOTRIN) 81 MG Tablet Delayed Response Take 81 mg by mouth every day.     • venlafaxine XR (EFFEXOR XR) 37.5 MG CAPSULE SR 24 HR TK ONE C PO QAM AFTER MEAL     • tamsulosin (FLOMAX) 0.4 MG capsule TAKE 1 CAPSULE BY MOUTH 0.5 HOUR AFTER BREAKFAST 90 Cap 1   • vitamin D3, cholecalciferol, 1000 UNIT Tab TAKE 2 TABLETS BY MOUTH EVERY DAY 60 Tab 5   • clobetasol (TEMOVATE) 0.05 % Cream Apply to involved area twice daily for 2 weeks. 45 g 0   • cholestyramine (QUESTRAN) 4 g packet Take 2-4 g by mouth every day. 30 Each 3   • glucose blood (FREESTYLE LITE) strip USE TO TEST BLOOD SUGAR EACH MORNING AND AS NEEDED 50 Strip 2   • Lancets Misc Lancets order: Lancets for glucose meter preferred on patient's insurance. . Sig: use to monitor glucose once daily and prn 100 Each 3   • Blood Glucose Monitoring Suppl Device Meter: Dispense glucose meter preferred on patient's insurance.  Sig. Use as directed for blood sugar monitoring. 1 Device 0   • cyanocobalamin (VITAMIN B-12) 100 MCG Tab Take 100 mcg by mouth every day.       No current facility-administered medications on file prior to visit.          Past medical, surgical, family, and social history is reviewed and updated in Epic chart by me today.   Medications and allergies reviewed and updated in Epic chart by me today.     REVIEW OF SYSTEMS:  GENERAL: Mild fatigue.  Fatigue, no weight loss.  HEENT:  Ears--no earache, no change in hearing, no dizziness, no tinnitus.                 Eyes--no blurred vision, no discharge or pain                 Throat--No sore throat, no dysphagia, no hoarseness  CV:  No chest pain,dyspnea,palpitations or edema.  RESP:  No sob,cough,wheezing or hemoptysis.  GI: No dysphagia, heartburn,abdominal pain, nausea, vomiting, diarrhea or constipation.       No melena, jaundice, bleeding, incontinence or change in bowel  "habits.  : Symptoms as above.  MS:  No joint swelling, myalgias, or arthralgias.  NEURO:  No seizures, syncope, paralysis, tremor, or weakness.  SKIN: No new or concerning skin lesions or changes.   PSYCH: Mood fine--Dr. Mathis started him on Effexor.  He states his mood is good.  He denies any thoughts of depression, hopelessness or suicide..    Vitals:    01/16/20 1000   BP: 145/70   Pulse: 68   Resp: 14   Temp: 36.6 °C (97.9 °F)   TempSrc: Temporal   SpO2: 97%   Weight: 81.2 kg (179 lb)   Height: 1.778 m (5' 10\")     Physical Exam:  Gen: Well developed, well nourished. No acute distress.  Neck:  Supple, no adenopathy or thyromegaly.  Heart:  Regular rate and rhythm.  Normal S1, S2. No murmur, gallop or rub.  Lungs:  Clear, No wheezes,rales or rhonchi.  Abdomen: Soft, nontender, nondistended. Normal bowel sounds. No hepatosplenomegaly or masses, or hernias. No rebound or guarding.  Rectal: No masses lesions or tenderness.  His prostate is enlarged, smooth, nontender.  Extremities:  No edema.  Psych: Mood and affect are appropriate.      Assessment/Plan:  1. BPH with obstruction/lower urinary tract symptoms.  We will start him back on Flomax 0.4 mg daily.  Also add Proscar 5 mg.  I did also like him to get a urology consult.  Follow-up here in 3 months.    2. Type 2 diabetes mellitus without complication, without long-term current use of insulin ([facility])  Comp Metabolic Panel   Counseled regarding diet, exercise, foot and eye care.  Monitor lab work. Lipid Profile    HEMOGLOBIN A1C   3. Dyslipidemia associated with type 2 diabetes mellitus ([facility])  Comp Metabolic Panel    Lipid Profile   4. Hypothyroidism, unspecified type.  He will continue his present medications.  Monitor lab work TSH    FREE THYROXINE    T3 FREE    levothyroxine (SYNTHROID) 75 MCG Tab   5. Fatigue, unspecified type  CBC WITH DIFFERENTIAL    Comp Metabolic Panel   6. Other specified disorders of arteries and arterioles ([facility]).  He continues on a " statin and aspirin.  Encouraged good control of his diabetes and will monitor his lab work.  He is asymptomatic.  Follow    7. Bilateral carotid artery stenosis       Follow-up here in 3 months

## 2020-01-17 LAB
EST. AVERAGE GLUCOSE BLD GHB EST-MCNC: 160 MG/DL
HBA1C MFR BLD: 7.2 % (ref 0–5.6)
T3FREE SERPL-MCNC: 3.95 PG/ML (ref 2.4–4.2)
T4 FREE SERPL-MCNC: 0.9 NG/DL (ref 0.53–1.43)
TSH SERPL DL<=0.005 MIU/L-ACNC: 3.36 UIU/ML (ref 0.38–5.33)

## 2020-02-18 RX ORDER — GLIMEPIRIDE 4 MG/1
4 TABLET ORAL EVERY MORNING
Qty: 100 TAB | Refills: 3 | Status: SHIPPED | OUTPATIENT
Start: 2020-02-18 | End: 2020-06-22 | Stop reason: SDUPTHER

## 2020-02-26 ENCOUNTER — APPOINTMENT (RX ONLY)
Dept: URBAN - METROPOLITAN AREA CLINIC 20 | Facility: CLINIC | Age: 85
Setting detail: DERMATOLOGY
End: 2020-02-26

## 2020-02-26 DIAGNOSIS — L81.4 OTHER MELANIN HYPERPIGMENTATION: ICD-10-CM

## 2020-02-26 DIAGNOSIS — D22 MELANOCYTIC NEVI: ICD-10-CM

## 2020-02-26 DIAGNOSIS — L57.0 ACTINIC KERATOSIS: ICD-10-CM

## 2020-02-26 DIAGNOSIS — Z71.89 OTHER SPECIFIED COUNSELING: ICD-10-CM

## 2020-02-26 DIAGNOSIS — D18.0 HEMANGIOMA: ICD-10-CM

## 2020-02-26 DIAGNOSIS — L82.1 OTHER SEBORRHEIC KERATOSIS: ICD-10-CM

## 2020-02-26 DIAGNOSIS — Z85.828 PERSONAL HISTORY OF OTHER MALIGNANT NEOPLASM OF SKIN: ICD-10-CM

## 2020-02-26 PROBLEM — D22.62 MELANOCYTIC NEVI OF LEFT UPPER LIMB, INCLUDING SHOULDER: Status: ACTIVE | Noted: 2020-02-26

## 2020-02-26 PROBLEM — D18.01 HEMANGIOMA OF SKIN AND SUBCUTANEOUS TISSUE: Status: ACTIVE | Noted: 2020-02-26

## 2020-02-26 PROBLEM — D22.61 MELANOCYTIC NEVI OF RIGHT UPPER LIMB, INCLUDING SHOULDER: Status: ACTIVE | Noted: 2020-02-26

## 2020-02-26 PROBLEM — D22.5 MELANOCYTIC NEVI OF TRUNK: Status: ACTIVE | Noted: 2020-02-26

## 2020-02-26 PROCEDURE — ? OBSERVATION

## 2020-02-26 PROCEDURE — ? COUNSELING

## 2020-02-26 PROCEDURE — 17003 DESTRUCT PREMALG LES 2-14: CPT

## 2020-02-26 PROCEDURE — ? SUNSCREEN RECOMMENDATIONS

## 2020-02-26 PROCEDURE — 99214 OFFICE O/P EST MOD 30 MIN: CPT | Mod: 25

## 2020-02-26 PROCEDURE — 17000 DESTRUCT PREMALG LESION: CPT

## 2020-02-26 PROCEDURE — ? LIQUID NITROGEN

## 2020-02-26 ASSESSMENT — LOCATION DETAILED DESCRIPTION DERM
LOCATION DETAILED: RIGHT INFERIOR MEDIAL FOREHEAD
LOCATION DETAILED: LEFT RADIAL DORSAL HAND
LOCATION DETAILED: LEFT INFERIOR FOREHEAD
LOCATION DETAILED: LEFT ANTERIOR EARLOBE
LOCATION DETAILED: RIGHT RADIAL DORSAL HAND
LOCATION DETAILED: POSTERIOR MID-PARIETAL SCALP
LOCATION DETAILED: LEFT VENTRAL PROXIMAL FOREARM
LOCATION DETAILED: RIGHT INFERIOR UPPER BACK
LOCATION DETAILED: RIGHT CENTRAL FRONTAL SCALP
LOCATION DETAILED: RIGHT VENTRAL DISTAL FOREARM
LOCATION DETAILED: RIGHT PROXIMAL DORSAL FOREARM
LOCATION DETAILED: RIGHT CENTRAL LATERAL NECK
LOCATION DETAILED: RIGHT SUPERIOR LATERAL NECK
LOCATION DETAILED: INFERIOR THORACIC SPINE
LOCATION DETAILED: LEFT SUPERIOR HELIX
LOCATION DETAILED: SUPERIOR THORACIC SPINE
LOCATION DETAILED: LEFT PROXIMAL DORSAL FOREARM
LOCATION DETAILED: RIGHT MEDIAL UPPER BACK
LOCATION DETAILED: LEFT INFERIOR VERMILION LIP

## 2020-02-26 ASSESSMENT — LOCATION SIMPLE DESCRIPTION DERM
LOCATION SIMPLE: NECK
LOCATION SIMPLE: UPPER BACK
LOCATION SIMPLE: RIGHT UPPER BACK
LOCATION SIMPLE: LEFT HAND
LOCATION SIMPLE: RIGHT HAND
LOCATION SIMPLE: RIGHT FOREHEAD
LOCATION SIMPLE: RIGHT FOREARM
LOCATION SIMPLE: RIGHT SCALP
LOCATION SIMPLE: LEFT FOREARM
LOCATION SIMPLE: LEFT FOREHEAD
LOCATION SIMPLE: LEFT LIP
LOCATION SIMPLE: POSTERIOR SCALP
LOCATION SIMPLE: LEFT EAR

## 2020-02-26 ASSESSMENT — LOCATION ZONE DERM
LOCATION ZONE: LIP
LOCATION ZONE: SCALP
LOCATION ZONE: HAND
LOCATION ZONE: ARM
LOCATION ZONE: TRUNK
LOCATION ZONE: NECK
LOCATION ZONE: FACE
LOCATION ZONE: EAR

## 2020-02-26 NOTE — PROCEDURE: LIQUID NITROGEN
Consent: The patient's consent was obtained including but not limited to risks of crusting, scabbing, blistering, scarring, darker or lighter pigmentary change, recurrence, incomplete removal and infection. RTC in 2 months if lesion(s) persistent.
Detail Level: Detailed
Render Post-Care Instructions In Note?: yes
Duration Of Freeze Thaw-Cycle (Seconds): 10
Number Of Freeze-Thaw Cycles: 2 freeze-thaw cycles
Post-Care Instructions: I reviewed with the patient in detail post-care instructions. Patient is to wear sunprotection, and avoid picking at any of the treated lesions. Pt may apply Vaseline to crusted or scabbing areas.
Render Note In Bullet Format When Appropriate: No

## 2020-06-22 RX ORDER — GLIMEPIRIDE 4 MG/1
4 TABLET ORAL EVERY MORNING
Qty: 100 TAB | Refills: 3 | Status: SHIPPED | OUTPATIENT
Start: 2020-06-22 | End: 2021-06-02 | Stop reason: SDUPTHER

## 2020-08-07 ENCOUNTER — OFFICE VISIT (OUTPATIENT)
Dept: INTERNAL MEDICINE | Facility: IMAGING CENTER | Age: 85
End: 2020-08-07
Payer: MEDICARE

## 2020-08-07 VITALS
DIASTOLIC BLOOD PRESSURE: 75 MMHG | WEIGHT: 176 LBS | BODY MASS INDEX: 25.2 KG/M2 | OXYGEN SATURATION: 94 % | TEMPERATURE: 98.1 F | RESPIRATION RATE: 17 BRPM | HEART RATE: 68 BPM | SYSTOLIC BLOOD PRESSURE: 120 MMHG | HEIGHT: 70 IN

## 2020-08-07 DIAGNOSIS — N40.1 BENIGN PROSTATIC HYPERPLASIA WITH URINARY FREQUENCY: ICD-10-CM

## 2020-08-07 DIAGNOSIS — I65.23 BILATERAL CAROTID ARTERY STENOSIS: ICD-10-CM

## 2020-08-07 DIAGNOSIS — I10 ESSENTIAL HYPERTENSION: ICD-10-CM

## 2020-08-07 DIAGNOSIS — E03.9 HYPOTHYROIDISM, UNSPECIFIED TYPE: ICD-10-CM

## 2020-08-07 DIAGNOSIS — Z00.00 MEDICARE ANNUAL WELLNESS VISIT, SUBSEQUENT: ICD-10-CM

## 2020-08-07 DIAGNOSIS — F32.A MILD DEPRESSION: ICD-10-CM

## 2020-08-07 DIAGNOSIS — G31.84 MCI (MILD COGNITIVE IMPAIRMENT): ICD-10-CM

## 2020-08-07 DIAGNOSIS — E78.2 MIXED HYPERLIPIDEMIA: ICD-10-CM

## 2020-08-07 DIAGNOSIS — R35.0 BENIGN PROSTATIC HYPERPLASIA WITH URINARY FREQUENCY: ICD-10-CM

## 2020-08-07 DIAGNOSIS — E11.9 TYPE 2 DIABETES MELLITUS WITHOUT COMPLICATION, WITHOUT LONG-TERM CURRENT USE OF INSULIN (HCC): ICD-10-CM

## 2020-08-07 DIAGNOSIS — E55.9 VITAMIN D DEFICIENCY: ICD-10-CM

## 2020-08-07 PROCEDURE — 99999 PR NO CHARGE: CPT | Performed by: FAMILY MEDICINE

## 2020-08-07 PROCEDURE — G0439 PPPS, SUBSEQ VISIT: HCPCS | Performed by: FAMILY MEDICINE

## 2020-08-07 ASSESSMENT — FIBROSIS 4 INDEX: FIB4 SCORE: 1.52

## 2020-08-07 ASSESSMENT — ACTIVITIES OF DAILY LIVING (ADL): BATHING_REQUIRES_ASSISTANCE: 0

## 2020-08-07 ASSESSMENT — PATIENT HEALTH QUESTIONNAIRE - PHQ9: CLINICAL INTERPRETATION OF PHQ2 SCORE: 0

## 2020-08-07 ASSESSMENT — ENCOUNTER SYMPTOMS: GENERAL WELL-BEING: EXCELLENT

## 2020-08-07 NOTE — PATIENT INSTRUCTIONS
Current Outpatient Medications on File Prior to Visit   Medication Sig Dispense Refill   • glimepiride (AMARYL) 4 MG Tab Take 1 Tab by mouth every morning. 100 Tab 3   • venlafaxine XR (EFFEXOR XR) 37.5 MG CAPSULE SR 24 HR TK ONE C PO QAM AFTER MEAL     • finasteride (PROSCAR) 5 MG Tab Take 1 Tab by mouth every day. 90 Tab 1   • levothyroxine (SYNTHROID) 75 MCG Tab Take 1 Tab by mouth every day. 90 Tab 3   • tamsulosin (FLOMAX) 0.4 MG capsule TAKE 1 CAPSULE BY MOUTH 0.5 HOUR AFTER BREAKFAST 90 Cap 1   • vitamin D3, cholecalciferol, 1000 UNIT Tab TAKE 2 TABLETS BY MOUTH EVERY DAY 60 Tab 5   • atorvastatin (LIPITOR) 20 MG Tab Take 1 Tab by mouth every day. 100 Tab 3   • cholestyramine (QUESTRAN) 4 g packet Take 2-4 g by mouth every day. 30 Each 3   • liothyronine (CYTOMEL) 5 MCG Tab TAKE 2 TABLETS BY MOUTH EVERY  Tab 3   • aspirin EC (ECOTRIN) 81 MG Tablet Delayed Response Take 81 mg by mouth every day.     • clobetasol (TEMOVATE) 0.05 % Cream Apply to involved area twice daily for 2 weeks. 45 g 0   • glucose blood (FREESTYLE LITE) strip USE TO TEST BLOOD SUGAR EACH MORNING AND AS NEEDED 50 Strip 2   • Lancets Misc Lancets order: Lancets for glucose meter preferred on patient's insurance. . Sig: use to monitor glucose once daily and prn 100 Each 3   • Blood Glucose Monitoring Suppl Device Meter: Dispense glucose meter preferred on patient's insurance.  Sig. Use as directed for blood sugar monitoring. 1 Device 0   • cyanocobalamin (VITAMIN B-12) 100 MCG Tab Take 100 mcg by mouth every day.       No current facility-administered medications on file prior to visit.

## 2020-08-07 NOTE — PROGRESS NOTES
Chief Complaint   Patient presents with   • Medicare Annual Wellness     DMV form.    • Diabetes Follow-up         HPI:  Alfred is a 84 y.o. established male patient here for Medicare Annual Wellness Visit .  He is generally doing well.  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  He does have family nearby and also has a helper come in 3 times a week to assist with getting her up and showered.    He admits he is not been exercising as regularly.    He does have type 2 diabetes.  Denies any numbness or tingling in his feet.  He is due for an eye exam.  He denies any change in his vision.    He also has mild cognitive impairment and depression.  He sees Dr. Mathis.  He is now on Effexor.  Generally he feels his mood is good.  Denies any thoughts of hopelessness or suicide.        Patient Active Problem List    Diagnosis Date Noted   • Benign prostatic hyperplasia with urinary frequency 04/24/2019   • Type 2 diabetes mellitus without complication, without long-term current use of insulin ([facility]) 10/18/2017   • Bilateral carotid artery disease ([facility]) 10/18/2017   • Psoriasis 06/14/2017   • Vitamin D deficiency 08/27/2015   • History of shingles 08/27/2015   • Hyperlipidemia 07/08/2015   • Hypothyroidism 07/08/2015   • MCI (mild cognitive impairment) 07/08/2015       Current Outpatient Medications   Medication Sig Dispense Refill   • glimepiride (AMARYL) 4 MG Tab Take 1 Tab by mouth every morning. 100 Tab 3   • venlafaxine XR (EFFEXOR XR) 37.5 MG CAPSULE SR 24 HR TK ONE C PO QAM AFTER MEAL     • finasteride (PROSCAR) 5 MG Tab Take 1 Tab by mouth every day. 90 Tab 1   • levothyroxine (SYNTHROID) 75 MCG Tab Take 1 Tab by mouth every day. 90 Tab 3   • tamsulosin (FLOMAX) 0.4 MG capsule TAKE 1 CAPSULE BY MOUTH 0.5 HOUR AFTER BREAKFAST 90 Cap 1   • vitamin D3, cholecalciferol, 1000 UNIT Tab TAKE 2 TABLETS BY MOUTH EVERY DAY 60 Tab 5   • atorvastatin (LIPITOR) 20 MG Tab Take 1 Tab by mouth every day. 100 Tab 3   • cholestyramine  (QUESTRAN) 4 g packet Take 2-4 g by mouth every day. 30 Each 3   • liothyronine (CYTOMEL) 5 MCG Tab TAKE 2 TABLETS BY MOUTH EVERY  Tab 3   • aspirin EC (ECOTRIN) 81 MG Tablet Delayed Response Take 81 mg by mouth every day.     • clobetasol (TEMOVATE) 0.05 % Cream Apply to involved area twice daily for 2 weeks. 45 g 0   • glucose blood (FREESTYLE LITE) strip USE TO TEST BLOOD SUGAR EACH MORNING AND AS NEEDED 50 Strip 2   • Lancets Misc Lancets order: Lancets for glucose meter preferred on patient's insurance. . Sig: use to monitor glucose once daily and prn 100 Each 3   • Blood Glucose Monitoring Suppl Device Meter: Dispense glucose meter preferred on patient's insurance.  Sig. Use as directed for blood sugar monitoring. 1 Device 0   • cyanocobalamin (VITAMIN B-12) 100 MCG Tab Take 100 mcg by mouth every day.       No current facility-administered medications for this visit.         He is compliant with medications  Current supplements as per medication list.   Chronic narcotic pain medicines: No    Allergies: Pcn [penicillins] and Sulfa drugs    Current social contact/activities: Active with family.  Exercise: Minimal  Is patient current with immunizations?  No  If no, due for Shingrix, and tetanus  He  reports that he has never smoked. He has never used smokeless tobacco. He reports that he does not drink alcohol or use drugs.        DPA/Advanced directive: He does have advanced directives    ROS:    Gait: Uses none  Ostomy: No  Other tubes: No  Amputations: No  Chronic oxygen use: No  Last eye exam: Within the past 2 years  : Denies incontinence.       Screening:      Depression Screening    Little interest or pleasure in doing things?  0 - not at all  Feeling down, depressed , or hopeless? 0 - not at all  Patient Health Questionnaire Score: 0     If depressive symptoms identified deferred to follow up visit unless specifically addressed in assessment and plan.    Interpretation of PHQ-9 Total Score    Score Severity   1-4 No Depression   5-9 Mild Depression   10-14 Moderate Depression   15-19 Moderately Severe Depression   20-27 Severe Depression    Screening for Cognitive Impairment    Three Minute Recall (river, deonte, finger) 1/3    Joseph clock face with all 12 numbers and set the hands to show 10 past 11.  Yes    Cognitive concerns identified deferred for follow up unless specifically addressed in assessment and plan.    Fall Risk Assessment    Has the patient had two or more falls in the last year or any fall with injury in the last year?  No    Safety Assessment    Throw rugs on floor.  No  Handrails on all stairs.  Yes  Good lighting in all hallways.  Yes  Difficulty hearing.  No  Patient counseled about all safety risks that were identified.    Functional Assessment ADLs    Are there any barriers preventing you from cooking for yourself or meeting nutritional needs?  No.    Are there any barriers preventing you from driving safely or obtaining transportation?  No.    Are there any barriers preventing you from using a telephone or calling for help?  No.    Are there any barriers preventing you from shopping?  No.    Are there any barriers preventing you from taking care of your own finances?  No.    Are there any barriers preventing you from managing your medications?  No.    Are there any barriers preventing you from showering, bathing or dressing yourself?  No.    Are you currently engaging in any exercise or physical activity?  Yes.     What is your perception of your health?  Excellent.          Patient Care Team:  Day Moraes M.D. as PCP - General (Family Medicine)  Faviola Lennon R.N. as Registered Nurse  Derm:  [facility]  Opth:  [facility]  Seeing Dr. Sangeeta Mathis for memory concerns.   GI:  [facility]-- Dr. Hall      Social History     Tobacco Use   • Smoking status: Never Smoker   • Smokeless tobacco: Never Used   Substance Use Topics   • Alcohol use: No      "Comment: occ   • Drug use: No     Family History   Problem Relation Age of Onset   • Stroke Brother    • Stroke Father      He  has a past medical history of Asthma, BPH (benign prostatic hyperplasia), Diabetes (HCC), Diverticulitis, Esophageal stricture, Hiatal hernia, History of shingles (8/27/2015), Hyperlipidemia (7/8/2015), Hypertension, Hypothyroid, PHN (postherpetic neuralgia) (8/27/2015), Psoriasis (6/14/2017), TIA (transient ischemic attack), and Vitamin D deficiency (8/27/2015). He also has no past medical history of Cancer (HCC).   Past Surgical History:   Procedure Laterality Date   • CATARACT EXTRACTION WITH IOL     • TONSILLECTOMY     • VASECTOMY       REVIEW OF SYSTEMS:  GENERAL: No fatigue, no weight loss.  HEENT:  Ears--no earache, no change in hearing, no dizziness, no tinnitus.                 Eyes--no blurred vision, no discharge or pain                 Throat--No sore throat, no dysphagia, no hoarseness  CV:  No chest pain,dyspnea,palpitations or edema.  RESP:  No sob,cough,wheezing or hemoptysis.  GI: No dysphagia, heartburn,abdominal pain, nausea, vomiting, diarrhea or constipation.       No melena, jaundice, bleeding, incontinence or change in bowel habits.  :  No dysuria, polyuria, hematuria, incontinence, or nocturia.  MS:  No joint swelling, myalgias, or arthralgias.  NEURO:  No seizures, syncope, paralysis, tremor, or weakness.  SKIN: No new or concerning skin lesions or changes.   PSYCH: Mood fine.        Exam:     /75 (BP Location: Left arm, Patient Position: Sitting, BP Cuff Size: Adult)   Pulse 68   Temp 36.7 °C (98.1 °F) (Temporal)   Resp 17   Ht 1.778 m (5' 10\")   Wt 79.8 kg (176 lb)   SpO2 94%  Body mass index is 25.25 kg/m².    Hearing poor---wears aids sometimes   Dentition good  Alert, oriented in no acute distress.  Eye contact is good, speech goal directed, affect calm  PHYSICAL EXAM;  GENERAL;  WN/WD, No acute distress.   HEENT:  Head normocephalic and " atraumatic.    PERRLA, EOMI. No conjunctival injection, no icterus.     TM's normal, nasal mucosa and mouth with no abnormalities.    Oropharynx is clear with no lesions.  NECK: Supple, no adenopathy or thyromegaly.  CV: RR. Normal S1,S2. No murmur, gallop or rub.          No JVD, Carotid pulses 2+ and sym. No bruits.  LUNGS:  Clear, no wheezes, rales or rhonchi.  BREASTS: Symmetric, no masses or tenderness. No nipple discharge.  AXILLA:  No masses or tenderness.  ABDOMEN: Soft, NT, nondistended. Bowel sounds normal. No hepatospenomegaly or masses. No rebound or guarding. No hernias.  EXTREMITIES:  No edema.   Feet are examined. Skin is intact and in good condition. Pulses are 2+ and symmetric. Sensation to light tough is intact bilaterally and symmetric.   Monofilament exam is normal bilaterally.   NEURO:  CN II-XII intact. Motor and sensation grossly intact.   SKIN: No rashes or abnormal lesions.      Assessment and Plan. The following treatment and monitoring plan is recommended:    1. Medicare annual wellness visit, subsequent   following diagnoses are addressed.   2. Type 2 diabetes mellitus without complication, without long-term current use of insulin (HCC).  Counseled regarding diet, exercise.  Lab work he will return for next week.  Education regarding foot and eye care.  Encouraged him to make an appointment with his eye doctor Comp Metabolic Panel    Lipid Profile    MICROALBUMIN CREAT RATIO URINE    HEMOGLOBIN A1C   3. Hypothyroidism, unspecified type.  Continue present medication and monitor lab work TSH    FREE THYROXINE    T3 FREE   4. Mixed hyperlipidemia.  Continue atorvastatin, diet and exercise Comp Metabolic Panel    Lipid Profile   5. Essential hypertension.  Controlled.  He has been on medication frequently however his blood pressures been controlled and will continue off medication. CBC WITH DIFFERENTIAL    Comp Metabolic Panel   6. Vitamin D deficiency.  Encouraged him to continue taking his  supplementation.    7. MCI (mild cognitive impairment).  Stable    8. Mild depression (HCC).  Stable    9. Bilateral carotid artery stenosis.  Continue statin and aspirin.    10. Benign prostatic hyperplasia with urinary frequency.  He is now on Flomax and finasteride and is seeing urology regularly.    11. Healthcare Maintenance. Counseled re: nutrition, activity and safety. Reviewed immunizations.       Services needed:none  Health Care Screening recommendations as per orders if indicated.  Referrals offered: PT/OT/Nutrition counseling/Behavioral Health/Smoking cessation as per orders if indicated.    Discussion today about general wellness and lifestyle habits:    · Prevent falls and reduce trip hazards; Cautioned about securing or removing rugs.  · Have a working fire alarm and carbon monoxide detector;   · Engage in regular physical activity and social activities   ·       Follow-up: 6 months.

## 2020-08-09 PROBLEM — N40.0 BENIGN PROSTATIC HYPERPLASIA: Status: ACTIVE | Noted: 2020-01-22

## 2020-08-09 PROBLEM — N40.1 LOWER URINARY TRACT SYMPTOMS DUE TO BENIGN PROSTATIC HYPERPLASIA: Status: ACTIVE | Noted: 2020-01-22

## 2020-08-11 ENCOUNTER — APPOINTMENT (OUTPATIENT)
Dept: INTERNAL MEDICINE | Facility: IMAGING CENTER | Age: 85
End: 2020-08-11
Payer: MEDICARE

## 2020-08-11 ENCOUNTER — HOSPITAL ENCOUNTER (OUTPATIENT)
Facility: MEDICAL CENTER | Age: 85
End: 2020-08-11
Attending: FAMILY MEDICINE
Payer: MEDICARE

## 2020-08-11 DIAGNOSIS — I10 ESSENTIAL HYPERTENSION: ICD-10-CM

## 2020-08-11 DIAGNOSIS — E78.2 MIXED HYPERLIPIDEMIA: ICD-10-CM

## 2020-08-11 DIAGNOSIS — E11.9 TYPE 2 DIABETES MELLITUS WITHOUT COMPLICATION, WITHOUT LONG-TERM CURRENT USE OF INSULIN (HCC): ICD-10-CM

## 2020-08-11 DIAGNOSIS — E03.9 HYPOTHYROIDISM, UNSPECIFIED TYPE: ICD-10-CM

## 2020-08-11 LAB
ALBUMIN SERPL BCP-MCNC: 4.4 G/DL (ref 3.2–4.9)
ALBUMIN/GLOB SERPL: 1.8 G/DL
ALP SERPL-CCNC: 74 U/L (ref 30–99)
ALT SERPL-CCNC: 67 U/L (ref 2–50)
ANION GAP SERPL CALC-SCNC: 13 MMOL/L (ref 7–16)
AST SERPL-CCNC: 41 U/L (ref 12–45)
BASOPHILS # BLD AUTO: 0.2 % (ref 0–1.8)
BASOPHILS # BLD: 0.01 K/UL (ref 0–0.12)
BILIRUB SERPL-MCNC: 0.8 MG/DL (ref 0.1–1.5)
BUN SERPL-MCNC: 19 MG/DL (ref 8–22)
CALCIUM SERPL-MCNC: 9.5 MG/DL (ref 8.5–10.5)
CHLORIDE SERPL-SCNC: 105 MMOL/L (ref 96–112)
CHOLEST SERPL-MCNC: 132 MG/DL (ref 100–199)
CO2 SERPL-SCNC: 22 MMOL/L (ref 20–33)
CREAT SERPL-MCNC: 0.92 MG/DL (ref 0.5–1.4)
CREAT UR-MCNC: 150.96 MG/DL
EOSINOPHIL # BLD AUTO: 0.21 K/UL (ref 0–0.51)
EOSINOPHIL NFR BLD: 3.3 % (ref 0–6.9)
ERYTHROCYTE [DISTWIDTH] IN BLOOD BY AUTOMATED COUNT: 49.8 FL (ref 35.9–50)
EST. AVERAGE GLUCOSE BLD GHB EST-MCNC: 169 MG/DL
GLOBULIN SER CALC-MCNC: 2.4 G/DL (ref 1.9–3.5)
GLUCOSE SERPL-MCNC: 154 MG/DL (ref 65–99)
HBA1C MFR BLD: 7.5 % (ref 0–5.6)
HCT VFR BLD AUTO: 42.3 % (ref 42–52)
HDLC SERPL-MCNC: 37 MG/DL
HGB BLD-MCNC: 13.6 G/DL (ref 14–18)
IMM GRANULOCYTES # BLD AUTO: 0.02 K/UL (ref 0–0.11)
IMM GRANULOCYTES NFR BLD AUTO: 0.3 % (ref 0–0.9)
LDLC SERPL CALC-MCNC: 72 MG/DL
LYMPHOCYTES # BLD AUTO: 1.72 K/UL (ref 1–4.8)
LYMPHOCYTES NFR BLD: 27 % (ref 22–41)
MCH RBC QN AUTO: 30.9 PG (ref 27–33)
MCHC RBC AUTO-ENTMCNC: 32.2 G/DL (ref 33.7–35.3)
MCV RBC AUTO: 96.1 FL (ref 81.4–97.8)
MICROALBUMIN UR-MCNC: <1.2 MG/DL
MICROALBUMIN/CREAT UR: NORMAL MG/G (ref 0–30)
MONOCYTES # BLD AUTO: 0.56 K/UL (ref 0–0.85)
MONOCYTES NFR BLD AUTO: 8.8 % (ref 0–13.4)
NEUTROPHILS # BLD AUTO: 3.86 K/UL (ref 1.82–7.42)
NEUTROPHILS NFR BLD: 60.4 % (ref 44–72)
NRBC # BLD AUTO: 0 K/UL
NRBC BLD-RTO: 0 /100 WBC
PLATELET # BLD AUTO: 185 K/UL (ref 164–446)
PMV BLD AUTO: 10.7 FL (ref 9–12.9)
POTASSIUM SERPL-SCNC: 4.6 MMOL/L (ref 3.6–5.5)
PROT SERPL-MCNC: 6.8 G/DL (ref 6–8.2)
RBC # BLD AUTO: 4.4 M/UL (ref 4.7–6.1)
SODIUM SERPL-SCNC: 140 MMOL/L (ref 135–145)
T3FREE SERPL-MCNC: 2.95 PG/ML (ref 2–4.4)
T4 FREE SERPL-MCNC: 1.07 NG/DL (ref 0.93–1.7)
TRIGL SERPL-MCNC: 113 MG/DL (ref 0–149)
TSH SERPL DL<=0.005 MIU/L-ACNC: 3 UIU/ML (ref 0.38–5.33)
WBC # BLD AUTO: 6.4 K/UL (ref 4.8–10.8)

## 2020-08-11 PROCEDURE — 85025 COMPLETE CBC W/AUTO DIFF WBC: CPT

## 2020-08-11 PROCEDURE — 80061 LIPID PANEL: CPT

## 2020-08-11 PROCEDURE — 80053 COMPREHEN METABOLIC PANEL: CPT

## 2020-08-11 PROCEDURE — 82043 UR ALBUMIN QUANTITATIVE: CPT

## 2020-08-11 PROCEDURE — 82570 ASSAY OF URINE CREATININE: CPT

## 2020-08-11 PROCEDURE — 84481 FREE ASSAY (FT-3): CPT

## 2020-08-11 PROCEDURE — 83036 HEMOGLOBIN GLYCOSYLATED A1C: CPT

## 2020-08-11 PROCEDURE — 84439 ASSAY OF FREE THYROXINE: CPT

## 2020-08-11 PROCEDURE — 84443 ASSAY THYROID STIM HORMONE: CPT

## 2020-08-26 RX ORDER — ATORVASTATIN CALCIUM 20 MG/1
20 TABLET, FILM COATED ORAL
Qty: 100 TAB | Refills: 3 | Status: SHIPPED | OUTPATIENT
Start: 2020-08-26 | End: 2021-12-06 | Stop reason: SDUPTHER

## 2020-10-13 ENCOUNTER — NON-PROVIDER VISIT (OUTPATIENT)
Dept: INTERNAL MEDICINE | Facility: IMAGING CENTER | Age: 85
End: 2020-10-13
Payer: MEDICARE

## 2020-10-13 DIAGNOSIS — Z23 NEED FOR VACCINATION: ICD-10-CM

## 2020-10-13 PROCEDURE — 90662 IIV NO PRSV INCREASED AG IM: CPT | Performed by: FAMILY MEDICINE

## 2020-10-13 PROCEDURE — G0008 ADMIN INFLUENZA VIRUS VAC: HCPCS | Performed by: FAMILY MEDICINE

## 2020-10-23 ENCOUNTER — OFFICE VISIT (OUTPATIENT)
Dept: URGENT CARE | Facility: PHYSICIAN GROUP | Age: 85
End: 2020-10-23
Payer: MEDICARE

## 2020-10-23 VITALS
TEMPERATURE: 96.8 F | DIASTOLIC BLOOD PRESSURE: 80 MMHG | RESPIRATION RATE: 14 BRPM | WEIGHT: 175 LBS | OXYGEN SATURATION: 94 % | HEART RATE: 78 BPM | SYSTOLIC BLOOD PRESSURE: 132 MMHG | HEIGHT: 70 IN | BODY MASS INDEX: 25.05 KG/M2

## 2020-10-23 DIAGNOSIS — J02.0 PHARYNGITIS DUE TO STREPTOCOCCUS SPECIES: ICD-10-CM
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
## 2020-10-23 LAB
INT CON NEG: NEGATIVE
INT CON POS: POSITIVE
S PYO AG THROAT QL: POSITIVE

## 2020-10-23 PROCEDURE — 87880 STREP A ASSAY W/OPTIC: CPT | Performed by: FAMILY MEDICINE

## 2020-10-23 PROCEDURE — 99213 OFFICE O/P EST LOW 20 MIN: CPT | Performed by: FAMILY MEDICINE

## 2020-10-23 RX ORDER — AZITHROMYCIN 250 MG/1
TABLET, FILM COATED ORAL
Qty: 6 TAB | Refills: 0 | Status: SHIPPED | OUTPATIENT
Start: 2020-10-23 | End: 2021-03-09

## 2020-10-23 RX ORDER — LORATADINE 10 MG/1
10 CAPSULE, LIQUID FILLED ORAL DAILY
Qty: 30 CAP | Refills: 1 | Status: SHIPPED | OUTPATIENT
Start: 2020-10-23 | End: 2020-12-14 | Stop reason: SDUPTHER

## 2020-10-23 ASSESSMENT — FIBROSIS 4 INDEX: FIB4 SCORE: 2.25

## 2020-10-23 ASSESSMENT — ENCOUNTER SYMPTOMS
VOMITING: 0
TROUBLE SWALLOWING: 0
NAUSEA: 0
SORE THROAT: 1
FEVER: 0
SHORTNESS OF BREATH: 0
COUGH: 0
MYALGIAS: 0
CHILLS: 0
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
## 2020-10-23 NOTE — PATIENT INSTRUCTIONS
Allergic Rhinitis, Adult  Allergic rhinitis is a reaction to allergens in the air. Allergens are tiny specks (particles) in the air that cause your body to have an allergic reaction. This condition cannot be passed from person to person (is not contagious). Allergic rhinitis cannot be cured, but it can be controlled.  There are two types of allergic rhinitis:  · Seasonal. This type is also called hay fever. It happens only during certain times of the year.  · Perennial. This type can happen at any time of the year.  What are the causes?  This condition may be caused by:  · Pollen from grasses, trees, and weeds.  · House dust mites.  · Pet dander.  · Mold.  What are the signs or symptoms?  Symptoms of this condition include:  · Sneezing.  · Runny or stuffy nose (nasal congestion).  · A lot of mucus in the back of the throat (postnasal drip).  · Itchy nose.  · Tearing of the eyes.  · Trouble sleeping.  · Being sleepy during day.  How is this treated?  There is no cure for this condition. You should avoid things that trigger your symptoms (allergens). Treatment can help to relieve symptoms. This may include:  · Medicines that block allergy symptoms, such as antihistamines. These may be given as a shot, nasal spray, or pill.  · Shots that are given until your body becomes less sensitive to the allergen (desensitization).  · Stronger medicines, if all other treatments have not worked.  Follow these instructions at home:  Avoiding allergens    · Find out what you are allergic to. Common allergens include smoke, dust, and pollen.  · Avoid them if you can. These are some of the things that you can do to avoid allergens:  ? Replace carpet with wood, tile, or vinyl nico. Carpet can trap dander and dust.  ? Clean any mold found in the home.  ? Do not smoke. Do not allow smoking in your home.  ? Change your heating and air conditioning filter at least once a month.  ? During allergy season:  § Keep windows closed as much as  you can. If possible, use air conditioning when there is a lot of pollen in the air.  § Use a special filter for allergies with your furnace and air conditioner.  § Plan outdoor activities when pollen counts are lowest. This is usually during the early morning or evening hours.  § If you do go outdoors when pollen count is high, wear a special mask for people with allergies.  § When you come indoors, take a shower and change your clothes before sitting on furniture or bedding.  General instructions  · Do not use fans in your home.  · Do not hang clothes outside to dry.  · Wear sunglasses to keep pollen out of your eyes.  · Wash your hands right away after you touch household pets.  · Take over-the-counter and prescription medicines only as told by your doctor.  · Keep all follow-up visits as told by your doctor. This is important.  Contact a doctor if:  · You have a fever.  · You have a cough that does not go away (is persistent).  · You start to make whistling sounds when you breathe (wheeze).  · Your symptoms do not get better with treatment.  · You have thick fluid coming from your nose.  · You start to have nosebleeds.  Get help right away if:  · Your tongue or your lips are swollen.  · You have trouble breathing.  · You feel dizzy or you feel like you are going to pass out (faint).  · You have cold sweats.  Summary  · Allergic rhinitis is a reaction to allergens in the air.  · This condition may be caused by allergens. These include pollen, dust mites, pet dander, and mold.  · Symptoms include a runny, itchy nose, sneezing, or tearing eyes. You may also have trouble sleeping or feel sleepy during the day.  · Treatment includes taking medicines and avoiding allergens. You may also get shots or take stronger medicines.  · Get help if you have a fever or a cough that does not stop. Get help right away if you are short of breath.  This information is not intended to replace advice given to you by your health care  provider. Make sure you discuss any questions you have with your health care provider.  Document Released: 04/18/2012 Document Revised: 04/07/2020 Document Reviewed: 07/09/2019  [employer] Patient Education © 2020 [employer]  Pharyngitis    Pharyngitis is a sore throat (pharynx). This is when there is redness, pain, and swelling in your throat. Most of the time, this condition gets better on its own. In some cases, you may need medicine.  Follow these instructions at home:  · Take over-the-counter and prescription medicines only as told by your doctor.  ? If you were prescribed an antibiotic medicine, take it as told by your doctor. Do not stop taking the antibiotic even if you start to feel better.  ? Do not give children aspirin. Aspirin has been linked to Reye syndrome.  · Drink enough water and fluids to keep your pee (urine) clear or pale yellow.  · Get a lot of rest.  · Rinse your mouth (gargle) with a salt-water mixture 3-4 times a day or as needed. To make a salt-water mixture, completely dissolve ½-1 tsp of salt in 1 cup of warm water.  · If your doctor approves, you may use throat lozenges or sprays to soothe your throat.  Contact a doctor if:  · You have large, tender lumps in your neck.  · You have a rash.  · You cough up green, yellow-brown, or bloody spit.  Get help right away if:  · You have a stiff neck.  · You drool or cannot swallow liquids.  · You cannot drink or take medicines without throwing up.  · You have very bad pain that does not go away with medicine.  · You have problems breathing, and it is not from a stuffy nose.  · You have new pain and swelling in your knees, ankles, wrists, or elbows.  Summary  · Pharyngitis is a sore throat (pharynx). This is when there is redness, pain, and swelling in your throat.  · If you were prescribed an antibiotic medicine, take it as told by your doctor. Do not stop taking the antibiotic even if you start to feel better.  · Most of the time, pharyngitis  gets better on its own. Sometimes, you may need medicine.  This information is not intended to replace advice given to you by your health care provider. Make sure you discuss any questions you have with your health care provider.  Document Released: 06/05/2009 Document Revised: 11/30/2018 Document Reviewed: 01/23/2018  [employer] Patient Education © 2020 [employer]

## 2020-11-17 DIAGNOSIS — Z87.19 HISTORY OF ESOPHAGEAL STRICTURE: ICD-10-CM

## 2020-11-17 DIAGNOSIS — R13.19 ESOPHAGEAL DYSPHAGIA: ICD-10-CM

## 2020-11-17 NOTE — PROGRESS NOTES
Cam came in with his wife at her appointment.    He is again having difficulty swallowing.  Food gets stuck in his esophagus and he has had some near choking episodes.  He did see GI see last year and had a dilation.  Will refer him back to them.    He is instructed to take small bites, chew completely and slowly and drink plenty of fluids.

## 2020-11-30 ENCOUNTER — APPOINTMENT (RX ONLY)
Dept: URBAN - METROPOLITAN AREA CLINIC 20 | Facility: CLINIC | Age: 85
Setting detail: DERMATOLOGY
End: 2020-11-30

## 2020-11-30 DIAGNOSIS — D18.0 HEMANGIOMA: ICD-10-CM

## 2020-11-30 DIAGNOSIS — L259 CONTACT DERMATITIS AND OTHER ECZEMA, UNSPECIFIED CAUSE: ICD-10-CM

## 2020-11-30 DIAGNOSIS — L57.0 ACTINIC KERATOSIS: ICD-10-CM

## 2020-11-30 DIAGNOSIS — L81.4 OTHER MELANIN HYPERPIGMENTATION: ICD-10-CM

## 2020-11-30 DIAGNOSIS — Z71.89 OTHER SPECIFIED COUNSELING: ICD-10-CM

## 2020-11-30 DIAGNOSIS — I83.9 ASYMPTOMATIC VARICOSE VEINS OF LOWER EXTREMITIES: ICD-10-CM

## 2020-11-30 DIAGNOSIS — D22 MELANOCYTIC NEVI: ICD-10-CM

## 2020-11-30 DIAGNOSIS — L82.1 OTHER SEBORRHEIC KERATOSIS: ICD-10-CM

## 2020-11-30 DIAGNOSIS — Z85.828 PERSONAL HISTORY OF OTHER MALIGNANT NEOPLASM OF SKIN: ICD-10-CM

## 2020-11-30 PROBLEM — D22.71 MELANOCYTIC NEVI OF RIGHT LOWER LIMB, INCLUDING HIP: Status: ACTIVE | Noted: 2020-11-30

## 2020-11-30 PROBLEM — D22.5 MELANOCYTIC NEVI OF TRUNK: Status: ACTIVE | Noted: 2020-11-30

## 2020-11-30 PROBLEM — D22.72 MELANOCYTIC NEVI OF LEFT LOWER LIMB, INCLUDING HIP: Status: ACTIVE | Noted: 2020-11-30

## 2020-11-30 PROBLEM — D22.39 MELANOCYTIC NEVI OF OTHER PARTS OF FACE: Status: ACTIVE | Noted: 2020-11-30

## 2020-11-30 PROBLEM — I83.93 ASYMPTOMATIC VARICOSE VEINS OF BILATERAL LOWER EXTREMITIES: Status: ACTIVE | Noted: 2020-11-30

## 2020-11-30 PROBLEM — D22.62 MELANOCYTIC NEVI OF LEFT UPPER LIMB, INCLUDING SHOULDER: Status: ACTIVE | Noted: 2020-11-30

## 2020-11-30 PROBLEM — D18.01 HEMANGIOMA OF SKIN AND SUBCUTANEOUS TISSUE: Status: ACTIVE | Noted: 2020-11-30

## 2020-11-30 PROBLEM — L30.8 OTHER SPECIFIED DERMATITIS: Status: ACTIVE | Noted: 2020-11-30

## 2020-11-30 PROBLEM — D22.61 MELANOCYTIC NEVI OF RIGHT UPPER LIMB, INCLUDING SHOULDER: Status: ACTIVE | Noted: 2020-11-30

## 2020-11-30 PROCEDURE — ? SUNSCREEN RECOMMENDATIONS

## 2020-11-30 PROCEDURE — ? MEDICATION COUNSELING

## 2020-11-30 PROCEDURE — ? COUNSELING

## 2020-11-30 PROCEDURE — ? PRESCRIPTION

## 2020-11-30 PROCEDURE — ? LIQUID NITROGEN

## 2020-11-30 PROCEDURE — 99214 OFFICE O/P EST MOD 30 MIN: CPT | Mod: 25

## 2020-11-30 PROCEDURE — ? OBSERVATION

## 2020-11-30 PROCEDURE — 17000 DESTRUCT PREMALG LESION: CPT

## 2020-11-30 PROCEDURE — 17003 DESTRUCT PREMALG LES 2-14: CPT

## 2020-11-30 RX ORDER — CLOBETASOL PROPIONATE 0.5 MG/G
CREAM TOPICAL
Qty: 1 | Refills: 1 | Status: ERX | COMMUNITY
Start: 2020-11-30

## 2020-11-30 RX ADMIN — CLOBETASOL PROPIONATE 1: 0.5 CREAM TOPICAL at 00:00

## 2020-11-30 ASSESSMENT — LOCATION DETAILED DESCRIPTION DERM
LOCATION DETAILED: LEFT VENTRAL PROXIMAL FOREARM
LOCATION DETAILED: POSTERIOR MID-PARIETAL SCALP
LOCATION DETAILED: RIGHT INFERIOR CENTRAL MALAR CHEEK
LOCATION DETAILED: LEFT PROXIMAL CALF
LOCATION DETAILED: RIGHT INFERIOR FOREHEAD
LOCATION DETAILED: LEFT LATERAL PROXIMAL PRETIBIAL REGION
LOCATION DETAILED: RIGHT ANKLE
LOCATION DETAILED: RIGHT VENTRAL PROXIMAL FOREARM
LOCATION DETAILED: LEFT ANTERIOR EARLOBE
LOCATION DETAILED: LEFT DORSAL FOOT
LOCATION DETAILED: RIGHT PROXIMAL PRETIBIAL REGION
LOCATION DETAILED: INFERIOR THORACIC SPINE
LOCATION DETAILED: RIGHT SUPERIOR UPPER BACK
LOCATION DETAILED: RIGHT PROXIMAL CALF
LOCATION DETAILED: RIGHT DISTAL POSTERIOR THIGH
LOCATION DETAILED: LEFT CENTRAL FRONTAL SCALP
LOCATION DETAILED: RIGHT DISTAL POSTERIOR UPPER ARM
LOCATION DETAILED: LEFT SUPERIOR HELIX
LOCATION DETAILED: LEFT PROXIMAL POSTERIOR UPPER ARM
LOCATION DETAILED: LEFT DISTAL POSTERIOR THIGH
LOCATION DETAILED: RIGHT INFERIOR MEDIAL MIDBACK
LOCATION DETAILED: LEFT INFERIOR VERMILION LIP
LOCATION DETAILED: RIGHT INFERIOR MEDIAL UPPER BACK
LOCATION DETAILED: RIGHT CENTRAL LATERAL NECK

## 2020-11-30 ASSESSMENT — LOCATION SIMPLE DESCRIPTION DERM
LOCATION SIMPLE: RIGHT ANKLE
LOCATION SIMPLE: RIGHT LOWER BACK
LOCATION SIMPLE: RIGHT FOREHEAD
LOCATION SIMPLE: RIGHT CHEEK
LOCATION SIMPLE: NECK
LOCATION SIMPLE: UPPER BACK
LOCATION SIMPLE: LEFT POSTERIOR THIGH
LOCATION SIMPLE: RIGHT PRETIBIAL REGION
LOCATION SIMPLE: LEFT SCALP
LOCATION SIMPLE: RIGHT CALF
LOCATION SIMPLE: RIGHT POSTERIOR UPPER ARM
LOCATION SIMPLE: LEFT CALF
LOCATION SIMPLE: LEFT PRETIBIAL REGION
LOCATION SIMPLE: POSTERIOR SCALP
LOCATION SIMPLE: LEFT FOREARM
LOCATION SIMPLE: LEFT FOOT
LOCATION SIMPLE: RIGHT UPPER BACK
LOCATION SIMPLE: RIGHT POSTERIOR THIGH
LOCATION SIMPLE: RIGHT FOREARM
LOCATION SIMPLE: LEFT POSTERIOR UPPER ARM
LOCATION SIMPLE: LEFT LIP
LOCATION SIMPLE: LEFT EAR

## 2020-11-30 ASSESSMENT — LOCATION ZONE DERM
LOCATION ZONE: FEET
LOCATION ZONE: NECK
LOCATION ZONE: ARM
LOCATION ZONE: TRUNK
LOCATION ZONE: EAR
LOCATION ZONE: LEG
LOCATION ZONE: SCALP
LOCATION ZONE: FACE
LOCATION ZONE: LIP

## 2020-11-30 NOTE — PROCEDURE: LIQUID NITROGEN
Consent: The patient's consent was obtained including but not limited to risks of crusting, scabbing, blistering, scarring, darker or lighter pigmentary change, recurrence, incomplete removal and infection. RTC in 2 months if lesion(s) persistent.
Number Of Freeze-Thaw Cycles: 2 freeze-thaw cycles
Duration Of Freeze Thaw-Cycle (Seconds): 10
Render Note In Bullet Format When Appropriate: No
Post-Care Instructions: I reviewed with the patient in detail post-care instructions. Patient is to wear sunprotection, and avoid picking at any of the treated lesions. Pt may apply Vaseline to crusted or scabbing areas.
Detail Level: Detailed
Render Post-Care Instructions In Note?: yes

## 2020-11-30 NOTE — PROCEDURE: MEDICATION COUNSELING
Drysol Counseling:  I discussed with the patient the risks of drysol/aluminum chloride including but not limited to skin rash, itching, irritation, burning.
Rhofade Counseling: Rhofade is a topical medication which can decrease superficial blood flow where applied. Side effects are uncommon and include stinging, redness and allergic reactions.
Rituxan Counseling:  I discussed with the patient the risks of Rituxan infusions. Side effects can include infusion reactions, severe drug rashes including mucocutaneous reactions, reactivation of latent hepatitis and other infections and rarely progressive multifocal leukoencephalopathy.  All of the patient's questions and concerns were addressed.
Glycopyrrolate Counseling:  I discussed with the patient the risks of glycopyrrolate including but not limited to skin rash, drowsiness, dry mouth, difficulty urinating, and blurred vision.
Ivermectin Pregnancy And Lactation Text: This medication is Pregnancy Category C and it isn't known if it is safe during pregnancy. It is also excreted in breast milk.
Opioid Pregnancy And Lactation Text: These medications can lead to premature delivery and should be avoided during pregnancy. These medications are also present in breast milk in small amounts.
Griseofulvin Pregnancy And Lactation Text: This medication is Pregnancy Category X and is known to cause serious birth defects. It is unknown if this medication is excreted in breast milk but breast feeding should be avoided.
Isotretinoin Pregnancy And Lactation Text: This medication is Pregnancy Category X and is considered extremely dangerous during pregnancy. It is unknown if it is excreted in breast milk.
Erythromycin Counseling:  I discussed with the patient the risks of erythromycin including but not limited to GI upset, allergic reaction, drug rash, diarrhea, increase in liver enzymes, and yeast infections.
Itraconazole Counseling:  I discussed with the patient the risks of itraconazole including but not limited to liver damage, nausea/vomiting, neuropathy, and severe allergy.  The patient understands that this medication is best absorbed when taken with acidic beverages such as non-diet cola or ginger ale.  The patient understands that monitoring is required including baseline LFTs and repeat LFTs at intervals.  The patient understands that they are to contact us or the primary physician if concerning signs are noted.
Isotretinoin Counseling: Patient should get monthly blood tests, not donate blood, not drive at night if vision affected, not share medication, and not undergo elective surgery for 6 months after tx completed. Side effects reviewed, pt to contact office should one occur.
Erythromycin Pregnancy And Lactation Text: This medication is Pregnancy Category B and is considered safe during pregnancy. It is also excreted in breast milk.
Azathioprine Counseling:  I discussed with the patient the risks of azathioprine including but not limited to myelosuppression, immunosuppression, hepatotoxicity, lymphoma, and infections.  The patient understands that monitoring is required including baseline LFTs, Creatinine, possible TPMP genotyping and weekly CBCs for the first month and then every 2 weeks thereafter.  The patient verbalized understanding of the proper use and possible adverse effects of azathioprine.  All of the patient's questions and concerns were addressed.
Spironolactone Counseling: Patient advised regarding risks of diarrhea, abdominal pain, hyperkalemia, birth defects (for female patients), liver toxicity and renal toxicity. The patient may need blood work to monitor liver and kidney function and potassium levels while on therapy. The patient verbalized understanding of the proper use and possible adverse effects of spironolactone.  All of the patient's questions and concerns were addressed.
Rhofade Pregnancy And Lactation Text: This medication has not been assigned a Pregnancy Risk Category. It is unknown if the medication is excreted in breast milk.
Glycopyrrolate Pregnancy And Lactation Text: This medication is Pregnancy Category B and is considered safe during pregnancy. It is unknown if it is excreted breast milk.
High Dose Vitamin A Counseling: Side effects reviewed, pt to contact office should one occur.
Drysol Pregnancy And Lactation Text: This medication is considered safe during pregnancy and breast feeding.
Rituxan Pregnancy And Lactation Text: This medication is Pregnancy Category C and it isn't know if it is safe during pregnancy. It is unknown if this medication is excreted in breast milk but similar antibodies are known to be excreted.
Siliq Counseling:  I discussed with the patient the risks of Siliq including but not limited to new or worsening depression, suicidal thoughts and behavior, immunosuppression, malignancy, posterior leukoencephalopathy syndrome, and serious infections.  The patient understands that monitoring is required including a PPD at baseline and must alert us or the primary physician if symptoms of infection or other concerning signs are noted. There is also a special program designed to monitor depression which is required with Siliq.
Itraconazole Pregnancy And Lactation Text: This medication is Pregnancy Category C and it isn't know if it is safe during pregnancy. It is also excreted in breast milk.
Spironolactone Pregnancy And Lactation Text: This medication can cause feminization of the male fetus and should be avoided during pregnancy. The active metabolite is also found in breast milk.
Azathioprine Pregnancy And Lactation Text: This medication is Pregnancy Category D and isn't considered safe during pregnancy. It is unknown if this medication is excreted in breast milk.
Elidel Counseling: Patient may experience a mild burning sensation during topical application. Elidel is not approved in children less than 2 years of age. There have been case reports of hematologic and skin malignancies in patients using topical calcineurin inhibitors although causality is questionable.
Metronidazole Counseling:  I discussed with the patient the risks of metronidazole including but not limited to seizures, nausea/vomiting, a metallic taste in the mouth, nausea/vomiting and severe allergy.
Detail Level: Zone
High Dose Vitamin A Pregnancy And Lactation Text: High dose vitamin A therapy is contraindicated during pregnancy and breast feeding.
Sski Pregnancy And Lactation Text: This medication is Pregnancy Category D and isn't considered safe during pregnancy. It is excreted in breast milk.
Metronidazole Pregnancy And Lactation Text: This medication is Pregnancy Category B and considered safe during pregnancy.  It is also excreted in breast milk.
SSKI Counseling:  I discussed with the patient the risks of SSKI including but not limited to thyroid abnormalities, metallic taste, GI upset, fever, headache, acne, arthralgias, paraesthesias, lymphadenopathy, easy bleeding, arrhythmias, and allergic reaction.
Cellcept Counseling:  I discussed with the patient the risks of mycophenolate mofetil including but not limited to infection/immunosuppression, GI upset, hypokalemia, hypercholesterolemia, bone marrow suppression, lymphoproliferative disorders, malignancy, GI ulceration/bleed/perforation, colitis, interstitial lung disease, kidney failure, progressive multifocal leukoencephalopathy, and birth defects.  The patient understands that monitoring is required including a baseline creatinine and regular CBC testing. In addition, patient must alert us immediately if symptoms of infection or other concerning signs are noted.
Siliq Pregnancy And Lactation Text: The risk during pregnancy and breastfeeding is uncertain with this medication.
Ketoconazole Counseling:   Patient counseled regarding improving absorption with orange juice.  Adverse effects include but are not limited to breast enlargement, headache, diarrhea, nausea, upset stomach, liver function test abnormalities, taste disturbance, and stomach pain.  There is a rare possibility of liver failure that can occur when taking ketoconazole. The patient understands that monitoring of LFTs may be required, especially at baseline. The patient verbalized understanding of the proper use and possible adverse effects of ketoconazole.  All of the patient's questions and concerns were addressed.
Solaraze Counseling:  I discussed with the patient the risks of Solaraze including but not limited to erythema, scaling, itching, weeping, crusting, and pain.
Arava Counseling:  Patient counseled regarding adverse effects of Arava including but not limited to nausea, vomiting, abnormalities in liver function tests. Patients may develop mouth sores, rash, diarrhea, and abnormalities in blood counts. The patient understands that monitoring is required including LFTs and blood counts.  There is a rare possibility of scarring of the liver and lung problems that can occur when taking methotrexate. Persistent nausea, loss of appetite, pale stools, dark urine, cough, and shortness of breath should be reported immediately. Patient advised to discontinue Arava treatment and consult with a physician prior to attempting conception. The patient will have to undergo a treatment to eliminate Arava from the body prior to conception.
Hydroxychloroquine Counseling:  I discussed with the patient that a baseline ophthalmologic exam is needed at the start of therapy and every year thereafter while on therapy. A CBC may also be warranted for monitoring.  The side effects of this medication were discussed with the patient, including but not limited to agranulocytosis, aplastic anemia, seizures, rashes, retinopathy, and liver toxicity. Patient instructed to call the office should any adverse effect occur.  The patient verbalized understanding of the proper use and possible adverse effects of Plaquenil.  All the patient's questions and concerns were addressed.
Solaraze Pregnancy And Lactation Text: This medication is Pregnancy Category B and is considered safe. There is some data to suggest avoiding during the third trimester. It is unknown if this medication is excreted in breast milk.
Thalidomide Counseling: I discussed with the patient the risks of thalidomide including but not limited to birth defects, anxiety, weakness, chest pain, dizziness, cough and severe allergy.
Ketoconazole Pregnancy And Lactation Text: This medication is Pregnancy Category C and it isn't know if it is safe during pregnancy. It is also excreted in breast milk and breast feeding isn't recommended.
Arava Pregnancy And Lactation Text: This medication is Pregnancy Category X and is absolutely contraindicated during pregnancy. It is unknown if it is excreted in breast milk.
Hydroxychloroquine Pregnancy And Lactation Text: This medication has been shown to cause fetal harm but it isn't assigned a Pregnancy Risk Category. There are small amounts excreted in breast milk.
Minocycline Counseling: Patient advised regarding possible photosensitivity and discoloration of the teeth, skin, lips, tongue and gums.  Patient instructed to avoid sunlight, if possible.  When exposed to sunlight, patients should wear protective clothing, sunglasses, and sunscreen.  The patient was instructed to call the office immediately if the following severe adverse effects occur:  hearing changes, easy bruising/bleeding, severe headache, or vision changes.  The patient verbalized understanding of the proper use and possible adverse effects of minocycline.  All of the patient's questions and concerns were addressed.
Include Pregnancy/Lactation Warning?: No
Simponi Counseling:  I discussed with the patient the risks of golimumab including but not limited to myelosuppression, immunosuppression, autoimmune hepatitis, demyelinating diseases, lymphoma, and serious infections.  The patient understands that monitoring is required including a PPD at baseline and must alert us or the primary physician if symptoms of infection or other concerning signs are noted.
Cimzia Counseling:  I discussed with the patient the risks of Cimzia including but not limited to immunosuppression, allergic reactions and infections.  The patient understands that monitoring is required including a PPD at baseline and must alert us or the primary physician if symptoms of infection or other concerning signs are noted.
Elidel Pregnancy And Lactation Text: This medication is Pregnancy Category C. It is unknown if this medication is excreted in breast milk.
Cimzia Pregnancy And Lactation Text: This medication crosses the placenta but can be considered safe in certain situations. Cimzia may be excreted in breast milk.
Clofazimine Pregnancy And Lactation Text: This medication is Pregnancy Category C and isn't considered safe during pregnancy. It is excreted in breast milk.
Niacinamide Pregnancy And Lactation Text: These medications are considered safe during pregnancy.
Niacinamide Counseling: I recommended taking niacin or niacinamide, also know as vitamin B3, twice daily. Recent evidence suggests that taking vitamin B3 (500 mg twice daily) can reduce the risk of actinic keratoses and non-melanoma skin cancers. Side effects of vitamin B3 include flushing and headache.
Clofazimine Counseling:  I discussed with the patient the risks of clofazimine including but not limited to skin and eye pigmentation, liver damage, nausea/vomiting, gastrointestinal bleeding and allergy.
Terbinafine Counseling: Patient counseling regarding adverse effects of terbinafine including but not limited to headache, diarrhea, rash, upset stomach, liver function test abnormalities, itching, taste/smell disturbance, nausea, abdominal pain, and flatulence.  There is a rare possibility of liver failure that can occur when taking terbinafine.  The patient understands that a baseline LFT and kidney function test may be required. The patient verbalized understanding of the proper use and possible adverse effects of terbinafine.  All of the patient's questions and concerns were addressed.
Eucrisa Counseling: Patient may experience a mild burning sensation during topical application. Eucrisa is not approved in children less than 2 years of age.
Topical Retinoid counseling:  Patient advised to apply a pea-sized amount only at bedtime and wait 30 minutes after washing their face before applying.  If too drying, patient may add a non-comedogenic moisturizer. The patient verbalized understanding of the proper use and possible adverse effects of retinoids.  All of the patient's questions and concerns were addressed.
Minocycline Pregnancy And Lactation Text: This medication is Pregnancy Category D and not consider safe during pregnancy. It is also excreted in breast milk.
Cyclophosphamide Counseling:  I discussed with the patient the risks of cyclophosphamide including but not limited to hair loss, hormonal abnormalities, decreased fertility, abdominal pain, diarrhea, nausea and vomiting, bone marrow suppression and infection. The patient understands that monitoring is required while taking this medication.
Quinolones Counseling:  I discussed with the patient the risks of fluoroquinolones including but not limited to GI upset, allergic reaction, drug rash, diarrhea, dizziness, photosensitivity, yeast infections, liver function test abnormalities, tendonitis/tendon rupture.
Cyclophosphamide Pregnancy And Lactation Text: This medication is Pregnancy Category D and it isn't considered safe during pregnancy. This medication is excreted in breast milk.
Colchicine Counseling:  Patient counseled regarding adverse effects including but not limited to stomach upset (nausea, vomiting, stomach pain, or diarrhea).  Patient instructed to limit alcohol consumption while taking this medication.  Colchicine may reduce blood counts especially with prolonged use.  The patient understands that monitoring of kidney function and blood counts may be required, especially at baseline. The patient verbalized understanding of the proper use and possible adverse effects of colchicine.  All of the patient's questions and concerns were addressed.
Nsaids Counseling: NSAID Counseling: I discussed with the patient that NSAIDs should be taken with food. Prolonged use of NSAIDs can result in the development of stomach ulcers.  Patient advised to stop taking NSAIDs if abdominal pain occurs.  The patient verbalized understanding of the proper use and possible adverse effects of NSAIDs.  All of the patient's questions and concerns were addressed.
Cosentyx Counseling:  I discussed with the patient the risks of Cosentyx including but not limited to worsening of Crohn's disease, immunosuppression, allergic reactions and infections.  The patient understands that monitoring is required including a PPD at baseline and must alert us or the primary physician if symptoms of infection or other concerning signs are noted.
Tranexamic Acid Counseling:  Patient advised of the small risk of bleeding problems with tranexamic acid. They were also instructed to call if they developed any nausea, vomiting or diarrhea. All of the patient's questions and concerns were addressed.
Hydroquinone Counseling:  Patient advised that medication may result in skin irritation, lightening (hypopigmentation), dryness, and burning.  In the event of skin irritation, the patient was advised to reduce the amount of the drug applied or use it less frequently.  Rarely, spots that are treated with hydroquinone can become darker (pseudoochronosis).  Should this occur, patient instructed to stop medication and call the office. The patient verbalized understanding of the proper use and possible adverse effects of hydroquinone.  All of the patient's questions and concerns were addressed.
Eucrisa Pregnancy And Lactation Text: This medication has not been assigned a Pregnancy Risk Category but animal studies failed to show danger with the topical medication. It is unknown if the medication is excreted in breast milk.
Skyrizi Counseling: I discussed with the patient the risks of risankizumab-rzaa including but not limited to immunosuppression, and serious infections.  The patient understands that monitoring is required including a PPD at baseline and must alert us or the primary physician if symptoms of infection or other concerning signs are noted.
Terbinafine Pregnancy And Lactation Text: This medication is Pregnancy Category B and is considered safe during pregnancy. It is also excreted in breast milk and breast feeding isn't recommended.
Nsaids Pregnancy And Lactation Text: These medications are considered safe up to 30 weeks gestation. It is excreted in breast milk.
Tranexamic Acid Pregnancy And Lactation Text: It is unknown if this medication is safe during pregnancy or breast feeding.
Cosentyx Pregnancy And Lactation Text: This medication is Pregnancy Category B and is considered safe during pregnancy. It is unknown if this medication is excreted in breast milk.
Cyclosporine Counseling:  I discussed with the patient the risks of cyclosporine including but not limited to hypertension, gingival hyperplasia,myelosuppression, immunosuppression, liver damage, kidney damage, neurotoxicity, lymphoma, and serious infections. The patient understands that monitoring is required including baseline blood pressure, CBC, CMP, lipid panel and uric acid, and then 1-2 times monthly CMP and blood pressure.
Tazorac Pregnancy And Lactation Text: This medication is not safe during pregnancy. It is unknown if this medication is excreted in breast milk.
Tazorac Counseling:  Patient advised that medication is irritating and drying.  Patient may need to apply sparingly and wash off after an hour before eventually leaving it on overnight.  The patient verbalized understanding of the proper use and possible adverse effects of tazorac.  All of the patient's questions and concerns were addressed.
Azithromycin Counseling:  I discussed with the patient the risks of azithromycin including but not limited to GI upset, allergic reaction, drug rash, diarrhea, and yeast infections.
Rifampin Counseling: I discussed with the patient the risks of rifampin including but not limited to liver damage, kidney damage, red-orange body fluids, nausea/vomiting and severe allergy.
Stelara Counseling:  I discussed with the patient the risks of ustekinumab including but not limited to immunosuppression, malignancy, posterior leukoencephalopathy syndrome, and serious infections.  The patient understands that monitoring is required including a PPD at baseline and must alert us or the primary physician if symptoms of infection or other concerning signs are noted.
Dupixent Counseling: I discussed with the patient the risks of dupilumab including but not limited to eye infection and irritation, cold sores, injection site reactions, worsening of asthma, allergic reactions and increased risk of parasitic infection.  Live vaccines should be avoided while taking dupilumab. Dupilumab will also interact with certain medications such as warfarin and cyclosporine. The patient understands that monitoring is required and they must alert us or the primary physician if symptoms of infection or other concerning signs are noted.
Topical Clindamycin Counseling: Patient counseled that this medication may cause skin irritation or allergic reactions.  In the event of skin irritation, the patient was advised to reduce the amount of the drug applied or use it less frequently.   The patient verbalized understanding of the proper use and possible adverse effects of clindamycin.  All of the patient's questions and concerns were addressed.
Odomzo Counseling- I discussed with the patient the risks of Odomzo including but not limited to nausea, vomiting, diarrhea, constipation, weight loss, changes in the sense of taste, decreased appetite, muscle spasms, and hair loss.  The patient verbalized understanding of the proper use and possible adverse effects of Odomzo.  All of the patient's questions and concerns were addressed.
Cimetidine Counseling:  I discussed with the patient the risks of Cimetidine including but not limited to gynecomastia, headache, diarrhea, nausea, drowsiness, arrhythmias, pancreatitis, skin rashes, psychosis, bone marrow suppression and kidney toxicity.
Cyclosporine Pregnancy And Lactation Text: This medication is Pregnancy Category C and it isn't know if it is safe during pregnancy. This medication is excreted in breast milk.
Valtrex Counseling: I discussed with the patient the risks of valacyclovir including but not limited to kidney damage, nausea, vomiting and severe allergy.  The patient understands that if the infection seems to be worsening or is not improving, they are to call.
Topical Clindamycin Pregnancy And Lactation Text: This medication is Pregnancy Category B and is considered safe during pregnancy. It is unknown if it is excreted in breast milk.
Rifampin Pregnancy And Lactation Text: This medication is Pregnancy Category C and it isn't know if it is safe during pregnancy. It is also excreted in breast milk and should not be used if you are breast feeding.
Methotrexate Counseling:  Patient counseled regarding adverse effects of methotrexate including but not limited to nausea, vomiting, abnormalities in liver function tests. Patients may develop mouth sores, rash, diarrhea, and abnormalities in blood counts. The patient understands that monitoring is required including LFT's and blood counts.  There is a rare possibility of scarring of the liver and lung problems that can occur when taking methotrexate. Persistent nausea, loss of appetite, pale stools, dark urine, cough, and shortness of breath should be reported immediately. Patient advised to discontinue methotrexate treatment at least three months before attempting to become pregnant.  I discussed the need for folate supplements while taking methotrexate.  These supplements can decrease side effects during methotrexate treatment. The patient verbalized understanding of the proper use and possible adverse effects of methotrexate.  All of the patient's questions and concerns were addressed.
Azithromycin Pregnancy And Lactation Text: This medication is considered safe during pregnancy and is also secreted in breast milk.
Dupixent Pregnancy And Lactation Text: This medication likely crosses the placenta but the risk for the fetus is uncertain. This medication is excreted in breast milk.
Taltz Counseling: I discussed with the patient the risks of ixekizumab including but not limited to immunosuppression, serious infections, worsening of inflammatory bowel disease and drug reactions.  The patient understands that monitoring is required including a PPD at baseline and must alert us or the primary physician if symptoms of infection or other concerning signs are noted.
Imiquimod Counseling:  I discussed with the patient the risks of imiquimod including but not limited to erythema, scaling, itching, weeping, crusting, and pain.  Patient understands that the inflammatory response to imiquimod is variable from person to person and was educated regarded proper titration schedule.  If flu-like symptoms develop, patient knows to discontinue the medication and contact us.
Enbrel Counseling:  I discussed with the patient the risks of etanercept including but not limited to myelosuppression, immunosuppression, autoimmune hepatitis, demyelinating diseases, lymphoma, and infections.  The patient understands that monitoring is required including a PPD at baseline and must alert us or the primary physician if symptoms of infection or other concerning signs are noted.
Doxepin Counseling:  Patient advised that the medication is sedating and not to drive a car after taking this medication. Patient informed of potential adverse effects including but not limited to dry mouth, urinary retention, and blurry vision.  The patient verbalized understanding of the proper use and possible adverse effects of doxepin.  All of the patient's questions and concerns were addressed.
Valtrex Pregnancy And Lactation Text: this medication is Pregnancy Category B and is considered safe during pregnancy. This medication is not directly found in breast milk but it's metabolite acyclovir is present.
Benzoyl Peroxide Counseling: Patient counseled that medicine may cause skin irritation and bleach clothing.  In the event of skin irritation, the patient was advised to reduce the amount of the drug applied or use it less frequently.   The patient verbalized understanding of the proper use and possible adverse effects of benzoyl peroxide.  All of the patient's questions and concerns were addressed.
Minoxidil Counseling: Minoxidil is a topical medication which can increase blood flow where it is applied. It is uncertain how this medication increases hair growth. Side effects are uncommon and include stinging and allergic reactions.
Topical Sulfur Applications Counseling: Topical Sulfur Counseling: Patient counseled that this medication may cause skin irritation or allergic reactions.  In the event of skin irritation, the patient was advised to reduce the amount of the drug applied or use it less frequently.   The patient verbalized understanding of the proper use and possible adverse effects of topical sulfur application.  All of the patient's questions and concerns were addressed.
Dapsone Counseling: I discussed with the patient the risks of dapsone including but not limited to hemolytic anemia, agranulocytosis, rashes, methemoglobinemia, kidney failure, peripheral neuropathy, headaches, GI upset, and liver toxicity.  Patients who start dapsone require monitoring including baseline LFTs and weekly CBCs for the first month, then every month thereafter.  The patient verbalized understanding of the proper use and possible adverse effects of dapsone.  All of the patient's questions and concerns were addressed.
Sarecycline Counseling: Patient advised regarding possible photosensitivity and discoloration of the teeth, skin, lips, tongue and gums.  Patient instructed to avoid sunlight, if possible.  When exposed to sunlight, patients should wear protective clothing, sunglasses, and sunscreen.  The patient was instructed to call the office immediately if the following severe adverse effects occur:  hearing changes, easy bruising/bleeding, severe headache, or vision changes.  The patient verbalized understanding of the proper use and possible adverse effects of sarecycline.  All of the patient's questions and concerns were addressed.
Methotrexate Pregnancy And Lactation Text: This medication is Pregnancy Category X and is known to cause fetal harm. This medication is excreted in breast milk.
Bactrim Counseling:  I discussed with the patient the risks of sulfa antibiotics including but not limited to GI upset, allergic reaction, drug rash, diarrhea, dizziness, photosensitivity, and yeast infections.  Rarely, more serious reactions can occur including but not limited to aplastic anemia, agranulocytosis, methemoglobinemia, blood dyscrasias, liver or kidney failure, lung infiltrates or desquamative/blistering drug rashes.
Bactrim Pregnancy And Lactation Text: This medication is Pregnancy Category D and is known to cause fetal risk.  It is also excreted in breast milk.
Prednisone Counseling:  I discussed with the patient the risks of prolonged use of prednisone including but not limited to weight gain, insomnia, osteoporosis, mood changes, diabetes, susceptibility to infection, glaucoma and high blood pressure.  In cases where prednisone use is prolonged, patients should be monitored with blood pressure checks, serum glucose levels and an eye exam.  Additionally, the patient may need to be placed on GI prophylaxis, PCP prophylaxis, and calcium and vitamin D supplementation and/or a bisphosphonate.  The patient verbalized understanding of the proper use and the possible adverse effects of prednisone.  All of the patient's questions and concerns were addressed.
Otezla Counseling: The side effects of Otezla were discussed with the patient, including but not limited to worsening or new depression, weight loss, diarrhea, nausea, upper respiratory tract infection, and headache. Patient instructed to call the office should any adverse effect occur.  The patient verbalized understanding of the proper use and possible adverse effects of Otezla.  All the patient's questions and concerns were addressed.
Dapsone Pregnancy And Lactation Text: This medication is Pregnancy Category C and is not considered safe during pregnancy or breast feeding.
Topical Sulfur Applications Pregnancy And Lactation Text: This medication is Pregnancy Category C and has an unknown safety profile during pregnancy. It is unknown if this topical medication is excreted in breast milk.
Doxepin Pregnancy And Lactation Text: This medication is Pregnancy Category C and it isn't known if it is safe during pregnancy. It is also excreted in breast milk and breast feeding isn't recommended.
Hydroxyzine Counseling: Patient advised that the medication is sedating and not to drive a car after taking this medication.  Patient informed of potential adverse effects including but not limited to dry mouth, urinary retention, and blurry vision.  The patient verbalized understanding of the proper use and possible adverse effects of hydroxyzine.  All of the patient's questions and concerns were addressed.
Otezla Pregnancy And Lactation Text: This medication is Pregnancy Category C and it isn't known if it is safe during pregnancy. It is unknown if it is excreted in breast milk.
Tetracycline Counseling: Patient counseled regarding possible photosensitivity and increased risk for sunburn.  Patient instructed to avoid sunlight, if possible.  When exposed to sunlight, patients should wear protective clothing, sunglasses, and sunscreen.  The patient was instructed to call the office immediately if the following severe adverse effects occur:  hearing changes, easy bruising/bleeding, severe headache, or vision changes.  The patient verbalized understanding of the proper use and possible adverse effects of tetracycline.  All of the patient's questions and concerns were addressed. Patient understands to avoid pregnancy while on therapy due to potential birth defects.
Cephalexin Counseling: I counseled the patient regarding use of cephalexin as an antibiotic for prophylactic and/or therapeutic purposes. Cephalexin (commonly prescribed under brand name Keflex) is a cephalosporin antibiotic which is active against numerous classes of bacteria, including most skin bacteria. Side effects may include nausea, diarrhea, gastrointestinal upset, rash, hives, yeast infections, and in rare cases, hepatitis, kidney disease, seizures, fever, confusion, neurologic symptoms, and others. Patients with severe allergies to penicillin medications are cautioned that there is about a 10% incidence of cross-reactivity with cephalosporins. When possible, patients with penicillin allergies should use alternatives to cephalosporins for antibiotic therapy.
Tremfya Counseling: I discussed with the patient the risks of guselkumab including but not limited to immunosuppression, serious infections, worsening of inflammatory bowel disease and drug reactions.  The patient understands that monitoring is required including a PPD at baseline and must alert us or the primary physician if symptoms of infection or other concerning signs are noted.
Humira Counseling:  I discussed with the patient the risks of adalimumab including but not limited to myelosuppression, immunosuppression, autoimmune hepatitis, demyelinating diseases, lymphoma, and serious infections.  The patient understands that monitoring is required including a PPD at baseline and must alert us or the primary physician if symptoms of infection or other concerning signs are noted.
Benzoyl Peroxide Pregnancy And Lactation Text: This medication is Pregnancy Category C. It is unknown if benzoyl peroxide is excreted in breast milk.
Oxybutynin Counseling:  I discussed with the patient the risks of oxybutynin including but not limited to skin rash, drowsiness, dry mouth, difficulty urinating, and blurred vision.
Carac Counseling:  I discussed with the patient the risks of Carac including but not limited to erythema, scaling, itching, weeping, crusting, and pain.
Hydroxyzine Pregnancy And Lactation Text: This medication is not safe during pregnancy and should not be taken. It is also excreted in breast milk and breast feeding isn't recommended.
Mirvaso Counseling: Mirvaso is a topical medication which can decrease superficial blood flow where applied. Side effects are uncommon and include stinging, redness and allergic reactions.
Cephalexin Pregnancy And Lactation Text: This medication is Pregnancy Category B and considered safe during pregnancy.  It is also excreted in breast milk but can be used safely for shorter doses.
Wartpeel Counseling:  I discussed with the patient the risks of Wartpeel including but not limited to erythema, scaling, itching, weeping, crusting, and pain.
Erivedge Counseling- I discussed with the patient the risks of Erivedge including but not limited to nausea, vomiting, diarrhea, constipation, weight loss, changes in the sense of taste, decreased appetite, muscle spasms, and hair loss.  The patient verbalized understanding of the proper use and possible adverse effects of Erivedge.  All of the patient's questions and concerns were addressed.
Propranolol Counseling:  I discussed with the patient the risks of propranolol including but not limited to low heart rate, low blood pressure, low blood sugar, restlessness and increased cold sensitivity. They should call the office if they experience any of these side effects.
Clindamycin Counseling: I counseled the patient regarding use of clindamycin as an antibiotic for prophylactic and/or therapeutic purposes. Clindamycin is active against numerous classes of bacteria, including skin bacteria. Side effects may include nausea, diarrhea, gastrointestinal upset, rash, hives, yeast infections, and in rare cases, colitis.
Wartpeel Pregnancy And Lactation Text: This medication is Pregnancy Category X and contraindicated in pregnancy and in women who may become pregnant. It is unknown if this medication is excreted in breast milk.
Xeljanz Counseling: I discussed with the patient the risks of Xeljanz therapy including increased risk of infection, liver issues, headache, diarrhea, or cold symptoms. Live vaccines should be avoided. They were instructed to call if they have any problems.
Acitretin Counseling:  I discussed with the patient the risks of acitretin including but not limited to hair loss, dry lips/skin/eyes, liver damage, hyperlipidemia, depression/suicidal ideation, photosensitivity.  Serious rare side effects can include but are not limited to pancreatitis, pseudotumor cerebri, bony changes, clot formation/stroke/heart attack.  Patient understands that alcohol is contraindicated since it can result in liver toxicity and significantly prolong the elimination of the drug by many years.
Ilumya Counseling: I discussed with the patient the risks of tildrakizumab including but not limited to immunosuppression, malignancy, posterior leukoencephalopathy syndrome, and serious infections.  The patient understands that monitoring is required including a PPD at baseline and must alert us or the primary physician if symptoms of infection or other concerning signs are noted.
Acitretin Pregnancy And Lactation Text: This medication is Pregnancy Category X and should not be given to women who are pregnant or may become pregnant in the future. This medication is excreted in breast milk.
Propranolol Pregnancy And Lactation Text: This medication is Pregnancy Category C and it isn't known if it is safe during pregnancy. It is excreted in breast milk.
Albendazole Counseling:  I discussed with the patient the risks of albendazole including but not limited to cytopenia, kidney damage, nausea/vomiting and severe allergy.  The patient understands that this medication is being used in an off-label manner.
Fluconazole Counseling:  Patient counseled regarding adverse effects of fluconazole including but not limited to headache, diarrhea, nausea, upset stomach, liver function test abnormalities, taste disturbance, and stomach pain.  There is a rare possibility of liver failure that can occur when taking fluconazole.  The patient understands that monitoring of LFTs and kidney function test may be required, especially at baseline. The patient verbalized understanding of the proper use and possible adverse effects of fluconazole.  All of the patient's questions and concerns were addressed.
Finasteride Pregnancy And Lactation Text: This medication is absolutely contraindicated during pregnancy. It is unknown if it is excreted in breast milk.
Zyclara Counseling:  I discussed with the patient the risks of imiquimod including but not limited to erythema, scaling, itching, weeping, crusting, and pain.  Patient understands that the inflammatory response to imiquimod is variable from person to person and was educated regarded proper titration schedule.  If flu-like symptoms develop, patient knows to discontinue the medication and contact us.
Finasteride Male Counseling: Finasteride Counseling:  I discussed with the patient the risks of use of finasteride including but not limited to decreased libido, decreased ejaculate volume, gynecomastia, and depression. Women should not handle medication.  All of the patient's questions and concerns were addressed.
Calcipotriene Counseling:  I discussed with the patient the risks of calcipotriene including but not limited to erythema, scaling, itching, and irritation.
Picato Counseling:  I discussed with the patient the risks of Picato including but not limited to erythema, scaling, itching, weeping, crusting, and pain.
Clindamycin Pregnancy And Lactation Text: This medication can be used in pregnancy if certain situations. Clindamycin is also present in breast milk.
Xelgonzaloz Pregnancy And Lactation Text: This medication is Pregnancy Category D and is not considered safe during pregnancy.  The risk during breast feeding is also uncertain.
Doxycycline Counseling:  Patient counseled regarding possible photosensitivity and increased risk for sunburn.  Patient instructed to avoid sunlight, if possible.  When exposed to sunlight, patients should wear protective clothing, sunglasses, and sunscreen.  The patient was instructed to call the office immediately if the following severe adverse effects occur:  hearing changes, easy bruising/bleeding, severe headache, or vision changes.  The patient verbalized understanding of the proper use and possible adverse effects of doxycycline.  All of the patient's questions and concerns were addressed.
Xolair Counseling:  Patient informed of potential adverse effects including but not limited to fever, muscle aches, rash and allergic reactions.  The patient verbalized understanding of the proper use and possible adverse effects of Xolair.  All of the patient's questions and concerns were addressed.
Infliximab Counseling:  I discussed with the patient the risks of infliximab including but not limited to myelosuppression, immunosuppression, autoimmune hepatitis, demyelinating diseases, lymphoma, and serious infections.  The patient understands that monitoring is required including a PPD at baseline and must alert us or the primary physician if symptoms of infection or other concerning signs are noted.
Gabapentin Counseling: I discussed with the patient the risks of gabapentin including but not limited to dizziness, somnolence, fatigue and ataxia.
Birth Control Pills Counseling: Birth Control Pill Counseling: I discussed with the patient the potential side effects of OCPs including but not limited to increased risk of stroke, heart attack, thrombophlebitis, deep venous thrombosis, hepatic adenomas, breast changes, GI upset, headaches, and depression.  The patient verbalized understanding of the proper use and possible adverse effects of OCPs. All of the patient's questions and concerns were addressed.
5-Fu Counseling: 5-Fluorouracil Counseling:  I discussed with the patient the risks of 5-fluorouracil including but not limited to erythema, scaling, itching, weeping, crusting, and pain.
Calcipotriene Pregnancy And Lactation Text: This medication has not been proven safe during pregnancy. It is unknown if this medication is excreted in breast milk.
Bexarotene Counseling:  I discussed with the patient the risks of bexarotene including but not limited to hair loss, dry lips/skin/eyes, liver abnormalities, hyperlipidemia, pancreatitis, depression/suicidal ideation, photosensitivity, drug rash/allergic reactions, hypothyroidism, anemia, leukopenia, infection, cataracts, and teratogenicity.  Patient understands that they will need regular blood tests to check lipid profile, liver function tests, white blood cell count, thyroid function tests and pregnancy test if applicable.
Birth Control Pills Pregnancy And Lactation Text: This medication should be avoided if pregnant and for the first 30 days post-partum.
Griseofulvin Counseling:  I discussed with the patient the risks of griseofulvin including but not limited to photosensitivity, cytopenia, liver damage, nausea/vomiting and severe allergy.  The patient understands that this medication is best absorbed when taken with a fatty meal (e.g., ice cream or french fries).
Doxycycline Pregnancy And Lactation Text: This medication is Pregnancy Category D and not consider safe during pregnancy. It is also excreted in breast milk but is considered safe for shorter treatment courses.
Xolair Pregnancy And Lactation Text: This medication is Pregnancy Category B and is considered safe during pregnancy. This medication is excreted in breast milk.
Protopic Pregnancy And Lactation Text: This medication is Pregnancy Category C. It is unknown if this medication is excreted in breast milk when applied topically.
Opioid Counseling: I discussed with the patient the potential side effects of opioids including but not limited to addiction, altered mental status, and depression. I stressed avoiding alcohol, benzodiazepines, muscle relaxants and sleep aids unless specifically okayed by a physician. The patient verbalized understanding of the proper use and possible adverse effects of opioids. All of the patient's questions and concerns were addressed. They were instructed to flush the remaining pills down the toilet if they did not need them for pain.
Ivermectin Counseling:  Patient instructed to take medication on an empty stomach with a full glass of water.  Patient informed of potential adverse effects including but not limited to nausea, diarrhea, dizziness, itching, and swelling of the extremities or lymph nodes.  The patient verbalized understanding of the proper use and possible adverse effects of ivermectin.  All of the patient's questions and concerns were addressed.
Protopic Counseling: Patient may experience a mild burning sensation during topical application. Protopic is not approved in children less than 2 years of age. There have been case reports of hematologic and skin malignancies in patients using topical calcineurin inhibitors although causality is questionable.
Bexarotene Pregnancy And Lactation Text: This medication is Pregnancy Category X and should not be given to women who are pregnant or may become pregnant. This medication should not be used if you are breast feeding.

## 2020-12-07 ENCOUNTER — NON-PROVIDER VISIT (OUTPATIENT)
Dept: INTERNAL MEDICINE | Facility: IMAGING CENTER | Age: 85
End: 2020-12-07
Payer: MEDICARE
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
## 2020-12-14 RX ORDER — LORATADINE 10 MG/1
10 CAPSULE, LIQUID FILLED ORAL DAILY
Qty: 90 CAP | Refills: 3 | Status: SHIPPED | OUTPATIENT
Start: 2020-12-14 | End: 2021-08-28

## 2020-12-14 RX ORDER — MELATONIN
Qty: 180 TAB | Refills: 3 | Status: SHIPPED | OUTPATIENT
Start: 2020-12-14 | End: 2021-11-23

## 2020-12-31 DIAGNOSIS — E03.8 OTHER SPECIFIED HYPOTHYROIDISM: ICD-10-CM

## 2020-12-31 DIAGNOSIS — E03.9 HYPOTHYROIDISM, UNSPECIFIED TYPE: ICD-10-CM

## 2020-12-31 RX ORDER — LEVOTHYROXINE SODIUM 0.07 MG/1
75 TABLET ORAL
Qty: 90 TAB | Refills: 3 | Status: SHIPPED | OUTPATIENT
Start: 2020-12-31 | End: 2022-01-17 | Stop reason: SDUPTHER

## 2020-12-31 RX ORDER — LIOTHYRONINE SODIUM 5 UG/1
TABLET ORAL
Qty: 180 TAB | Refills: 3 | Status: SHIPPED | OUTPATIENT
Start: 2020-12-31 | End: 2022-01-17 | Stop reason: SDUPTHER

## 2020-12-31 RX ORDER — FINASTERIDE 5 MG/1
5 TABLET, FILM COATED ORAL DAILY
Qty: 90 TAB | Refills: 3 | Status: SHIPPED | OUTPATIENT
Start: 2020-12-31 | End: 2022-01-17 | Stop reason: SDUPTHER

## 2021-01-11 DIAGNOSIS — Z23 NEED FOR VACCINATION: ICD-10-CM

## 2021-01-21 ENCOUNTER — IMMUNIZATION (OUTPATIENT)
Dept: FAMILY PLANNING/WOMEN'S HEALTH CLINIC | Facility: IMMUNIZATION CENTER | Age: 86
End: 2021-01-21
Attending: INTERNAL MEDICINE
Payer: MEDICARE

## 2021-01-21 DIAGNOSIS — Z23 NEED FOR VACCINATION: ICD-10-CM

## 2021-01-21 DIAGNOSIS — Z23 ENCOUNTER FOR VACCINATION: Primary | ICD-10-CM

## 2021-01-21 PROCEDURE — 0001A PFIZER SARS-COV-2 VACCINE: CPT

## 2021-01-21 PROCEDURE — 91300 PFIZER SARS-COV-2 VACCINE: CPT

## 2021-02-11 ENCOUNTER — IMMUNIZATION (OUTPATIENT)
Dept: FAMILY PLANNING/WOMEN'S HEALTH CLINIC | Facility: IMMUNIZATION CENTER | Age: 86
End: 2021-02-11
Attending: INTERNAL MEDICINE
Payer: MEDICARE

## 2021-02-11 DIAGNOSIS — Z23 ENCOUNTER FOR VACCINATION: Primary | ICD-10-CM

## 2021-02-11 PROCEDURE — 0002A PFIZER SARS-COV-2 VACCINE: CPT

## 2021-02-11 PROCEDURE — 91300 PFIZER SARS-COV-2 VACCINE: CPT

## 2021-03-09 ENCOUNTER — OFFICE VISIT (OUTPATIENT)
Dept: INTERNAL MEDICINE | Facility: IMAGING CENTER | Age: 86
End: 2021-03-09
Payer: MEDICARE

## 2021-03-09 VITALS
DIASTOLIC BLOOD PRESSURE: 70 MMHG | RESPIRATION RATE: 14 BRPM | OXYGEN SATURATION: 98 % | HEIGHT: 70 IN | HEART RATE: 73 BPM | TEMPERATURE: 97.5 F | WEIGHT: 175 LBS | SYSTOLIC BLOOD PRESSURE: 114 MMHG | BODY MASS INDEX: 25.05 KG/M2

## 2021-03-09 DIAGNOSIS — G31.84 MCI (MILD COGNITIVE IMPAIRMENT): ICD-10-CM

## 2021-03-09 DIAGNOSIS — E03.9 HYPOTHYROIDISM, UNSPECIFIED TYPE: ICD-10-CM

## 2021-03-09 DIAGNOSIS — I65.23 BILATERAL CAROTID ARTERY STENOSIS: ICD-10-CM

## 2021-03-09 DIAGNOSIS — R35.0 BENIGN PROSTATIC HYPERPLASIA WITH URINARY FREQUENCY: ICD-10-CM

## 2021-03-09 DIAGNOSIS — E11.9 TYPE 2 DIABETES MELLITUS WITHOUT COMPLICATION, WITHOUT LONG-TERM CURRENT USE OF INSULIN (HCC): ICD-10-CM

## 2021-03-09 DIAGNOSIS — N40.0 BENIGN PROSTATIC HYPERPLASIA, UNSPECIFIED WHETHER LOWER URINARY TRACT SYMPTOMS PRESENT: ICD-10-CM

## 2021-03-09 DIAGNOSIS — R00.1 BRADYCARDIA: ICD-10-CM

## 2021-03-09 DIAGNOSIS — E78.5 HYPERLIPIDEMIA, UNSPECIFIED HYPERLIPIDEMIA TYPE: ICD-10-CM

## 2021-03-09 DIAGNOSIS — E55.9 VITAMIN D DEFICIENCY: ICD-10-CM

## 2021-03-09 DIAGNOSIS — N40.1 BENIGN PROSTATIC HYPERPLASIA WITH URINARY FREQUENCY: ICD-10-CM

## 2021-03-09 PROCEDURE — 99215 OFFICE O/P EST HI 40 MIN: CPT | Performed by: FAMILY MEDICINE

## 2021-03-09 ASSESSMENT — FIBROSIS 4 INDEX: FIB4 SCORE: 2.25

## 2021-03-09 ASSESSMENT — PATIENT HEALTH QUESTIONNAIRE - PHQ9: CLINICAL INTERPRETATION OF PHQ2 SCORE: 0

## 2021-03-14 PROBLEM — N40.1 BENIGN PROSTATIC HYPERPLASIA WITH URINARY FREQUENCY: Status: RESOLVED | Noted: 2019-04-24 | Resolved: 2021-03-14

## 2021-03-14 PROBLEM — N40.1 LOWER URINARY TRACT SYMPTOMS DUE TO BENIGN PROSTATIC HYPERPLASIA: Status: RESOLVED | Noted: 2020-01-22 | Resolved: 2021-03-14

## 2021-03-14 PROBLEM — R35.0 BENIGN PROSTATIC HYPERPLASIA WITH URINARY FREQUENCY: Status: RESOLVED | Noted: 2019-04-24 | Resolved: 2021-03-14

## 2021-03-14 NOTE — PROGRESS NOTES
"Chief Complaint   Patient presents with   • Establish Care       Subjective:     HPI:   Alfred Erazo is a 85 y.o. male here to establish care and to discuss the evaluation and management of:       Problem   Benign Prostatic Hyperplasia   Type 2 diabetes mellitus without complication, without long-term current use of insulin (HCC)    Denies c/o     Bilateral Carotid Artery Disease (Hcc)   Vitamin D Deficiency    Currently taking vit d3     Hyperlipidemia    Tolerating statin w/o side effects     Hypothyroidism    Has been taking cytomel and levothryoxine     Mci (Mild Cognitive Impairment)    followed by Neurology/ Dr. Mathis     Lower Urinary Tract Symptoms Due to Benign Prostatic Hyperplasia (Resolved)   Benign Prostatic Hyperplasia With Urinary Frequency (Resolved)             Objective:     /70   Pulse 73   Temp 36.4 °C (97.5 °F) (Temporal)   Resp 14   Ht 1.778 m (5' 10\")   Wt 79.4 kg (175 lb)   SpO2 98%  Body mass index is 25.11 kg/m².    Physical Exam:  Physical Exam  Constitutional: Well-developed and well-nourished. Not diaphoretic. No distress.   Skin: Skin is warm and dry. No rash noted.  Head: Atraumatic without lesions.  Eyes: Conjunctivae and extraocular motions are normal.   Ears:  External ears unremarkable.    Nose: Nares patent. Mucosa without edema or erythema. No discharge. No facial tenderness.     Neck: Supple, No thyromegaly present. No JVD  Cardiovascular: Regular rate and rhythm.   Chest: Effort normal. Clear to auscultation throughout. No adventitious sounds.   Abdomen:  without distention.  .  Extremities: No cyanosis, clubbing, erythema, nor edema.   Neurological: Alert and oriented x 3.    Psychiatric:  Behavior, mood, and affect are appropriate     Assessment and Plan:     The following treatment plan was discussed/researched:           Type 2 diabetes mellitus without complication, without long-term current use of insulin (HCC)  Well controlled on Glimepiride w/o side " effects  Lab Results   Component Value Date/Time    HBA1C 7.5 (H) 08/11/2020 10:30 AM        Benign prostatic hyperplasia  Dr JAMES  4/28  Stable on Flomax and Proscar    Vitamin D deficiency  -stable continue current regimen        Hyperlipidemia  Lab Results   Component Value Date/Time    CHOLSTRLTOT 132 08/11/2020 1030    TRIGLYCERIDE 113 08/11/2020 1030    HDL 37 (A) 08/11/2020 1030    LDL 72 08/11/2020 1030     -stable continue current regimen  atorvastatin (LIPITOR) 20 MG Tab      Hypothyroidism  -stable continue current regimen    liothyronine (CYTOMEL) 5 MCG Tab  levothyroxine (SYNTHROID) 75 MCG Tab    Results for FRANKY TEMPLE (MRN [id number]) as of 3/14/2021 14:00    8/11/2020 10:30  TSH: 3.000  Free T-4: 1.07  T3,Free: 2.95        MCI (mild cognitive impairment)  Cont regular f/u w/ neuro    Bilateral carotid artery disease (CMS-HCC) (HCC)  Stable on statin                                                                                                                                                                                 Any change or worsening of signs or symptoms, patient encouraged to follow-up or report to emergency room for further evaluation. Patient verbalizes understanding and agrees.         PLEASE NOTE: This dictation was created using voice recognition software. I have made every reasonable attempt to correct obvious errors, but I expect that there are errors of grammar and possibly content that I did not discover before finalizing the note.      My total time spent caring for the patient on the day of the encounter was  greater than 40 minutes.   This includes obtaining history, reviewing chart, physical exam, patient education, reviewing outside records, placing orders, interpreting tests and coordinating care.

## 2021-03-14 NOTE — ASSESSMENT & PLAN NOTE
-stable continue current regimen      
-stable continue current regimen    liothyronine (CYTOMEL) 5 MCG Tab  levothyroxine (SYNTHROID) 75 MCG Tab    Results for FRANKY TEMPLE (MRN [id number]) as of 3/14/2021 14:00    8/11/2020 10:30  TSH: 3.000  Free T-4: 1.07  T3,Free: 2.95      
Cont regular f/u  
Dr JAMES  4/28    
Lab Results   Component Value Date/Time    CHOLSTRLTOT 132 08/11/2020 1030    TRIGLYCERIDE 113 08/11/2020 1030    HDL 37 (A) 08/11/2020 1030    LDL 72 08/11/2020 1030     -stable continue current regimen  atorvastatin (LIPITOR) 20 MG Tab    
Stable on Flomax and Proscar  
Stable on statin  
Well controlled on Glimepiride w/o side effects  Lab Results   Component Value Date/Time    HBA1C 7.5 (H) 08/11/2020 10:30 AM      
8

## 2021-05-20 ENCOUNTER — PATIENT MESSAGE (OUTPATIENT)
Dept: HEALTH INFORMATION MANAGEMENT | Facility: OTHER | Age: 86
End: 2021-05-20

## 2021-05-24 ENCOUNTER — APPOINTMENT (RX ONLY)
Dept: URBAN - METROPOLITAN AREA CLINIC 20 | Facility: CLINIC | Age: 86
Setting detail: DERMATOLOGY
End: 2021-05-24

## 2021-05-24 DIAGNOSIS — L57.0 ACTINIC KERATOSIS: ICD-10-CM

## 2021-05-24 DIAGNOSIS — L82.1 OTHER SEBORRHEIC KERATOSIS: ICD-10-CM

## 2021-05-24 DIAGNOSIS — Z71.89 OTHER SPECIFIED COUNSELING: ICD-10-CM

## 2021-05-24 DIAGNOSIS — L85.8 OTHER SPECIFIED EPIDERMAL THICKENING: ICD-10-CM

## 2021-05-24 DIAGNOSIS — Z85.828 PERSONAL HISTORY OF OTHER MALIGNANT NEOPLASM OF SKIN: ICD-10-CM

## 2021-05-24 DIAGNOSIS — D18.0 HEMANGIOMA: ICD-10-CM

## 2021-05-24 DIAGNOSIS — D22 MELANOCYTIC NEVI: ICD-10-CM

## 2021-05-24 DIAGNOSIS — L81.4 OTHER MELANIN HYPERPIGMENTATION: ICD-10-CM

## 2021-05-24 PROBLEM — D22.61 MELANOCYTIC NEVI OF RIGHT UPPER LIMB, INCLUDING SHOULDER: Status: ACTIVE | Noted: 2021-05-24

## 2021-05-24 PROBLEM — D22.5 MELANOCYTIC NEVI OF TRUNK: Status: ACTIVE | Noted: 2021-05-24

## 2021-05-24 PROBLEM — D18.01 HEMANGIOMA OF SKIN AND SUBCUTANEOUS TISSUE: Status: ACTIVE | Noted: 2021-05-24

## 2021-05-24 PROBLEM — D22.62 MELANOCYTIC NEVI OF LEFT UPPER LIMB, INCLUDING SHOULDER: Status: ACTIVE | Noted: 2021-05-24

## 2021-05-24 PROBLEM — D48.5 NEOPLASM OF UNCERTAIN BEHAVIOR OF SKIN: Status: ACTIVE | Noted: 2021-05-24

## 2021-05-24 PROCEDURE — ? SUNSCREEN RECOMMENDATIONS

## 2021-05-24 PROCEDURE — 17000 DESTRUCT PREMALG LESION: CPT | Mod: 59

## 2021-05-24 PROCEDURE — ? BIOPSY BY SHAVE METHOD

## 2021-05-24 PROCEDURE — ? OBSERVATION

## 2021-05-24 PROCEDURE — 99213 OFFICE O/P EST LOW 20 MIN: CPT | Mod: 25

## 2021-05-24 PROCEDURE — ? LIQUID NITROGEN

## 2021-05-24 PROCEDURE — 11102 TANGNTL BX SKIN SINGLE LES: CPT

## 2021-05-24 PROCEDURE — ? COUNSELING

## 2021-05-24 ASSESSMENT — LOCATION ZONE DERM
LOCATION ZONE: FACE
LOCATION ZONE: LIP
LOCATION ZONE: NECK
LOCATION ZONE: HAND
LOCATION ZONE: ARM
LOCATION ZONE: TRUNK
LOCATION ZONE: SCALP
LOCATION ZONE: EAR

## 2021-05-24 ASSESSMENT — LOCATION SIMPLE DESCRIPTION DERM
LOCATION SIMPLE: UPPER BACK
LOCATION SIMPLE: RIGHT HAND
LOCATION SIMPLE: LEFT HAND
LOCATION SIMPLE: POSTERIOR SCALP
LOCATION SIMPLE: RIGHT UPPER BACK
LOCATION SIMPLE: LEFT FOREARM
LOCATION SIMPLE: RIGHT FOREARM
LOCATION SIMPLE: RIGHT FOREHEAD
LOCATION SIMPLE: LEFT FOREHEAD
LOCATION SIMPLE: LEFT LIP
LOCATION SIMPLE: NECK
LOCATION SIMPLE: LEFT EAR
LOCATION SIMPLE: RIGHT CHEEK

## 2021-05-24 ASSESSMENT — LOCATION DETAILED DESCRIPTION DERM
LOCATION DETAILED: LEFT INFERIOR VERMILION LIP
LOCATION DETAILED: RIGHT INFERIOR UPPER BACK
LOCATION DETAILED: RIGHT INFERIOR MEDIAL FOREHEAD
LOCATION DETAILED: LEFT INFERIOR FOREHEAD
LOCATION DETAILED: RIGHT MEDIAL UPPER BACK
LOCATION DETAILED: SUPERIOR THORACIC SPINE
LOCATION DETAILED: LEFT ANTERIOR EARLOBE
LOCATION DETAILED: RIGHT SUPERIOR UPPER BACK
LOCATION DETAILED: INFERIOR THORACIC SPINE
LOCATION DETAILED: RIGHT SUPERIOR MEDIAL MALAR CHEEK
LOCATION DETAILED: RIGHT SUPERIOR NASAL CHEEK
LOCATION DETAILED: RIGHT CENTRAL LATERAL NECK
LOCATION DETAILED: POSTERIOR MID-PARIETAL SCALP
LOCATION DETAILED: LEFT SUPERIOR HELIX
LOCATION DETAILED: LEFT RADIAL DORSAL HAND
LOCATION DETAILED: RIGHT RADIAL DORSAL HAND
LOCATION DETAILED: RIGHT PROXIMAL DORSAL FOREARM
LOCATION DETAILED: RIGHT MEDIAL MALAR CHEEK
LOCATION DETAILED: LEFT VENTRAL PROXIMAL FOREARM
LOCATION DETAILED: LEFT PROXIMAL DORSAL FOREARM
LOCATION DETAILED: RIGHT VENTRAL DISTAL FOREARM

## 2021-05-24 NOTE — PROCEDURE: LIQUID NITROGEN
Render Note In Bullet Format When Appropriate: No
Number Of Freeze-Thaw Cycles: 2 freeze-thaw cycles
Duration Of Freeze Thaw-Cycle (Seconds): 10
Consent: The patient's consent was obtained including but not limited to risks of crusting, scabbing, blistering, scarring, darker or lighter pigmentary change, recurrence, incomplete removal and infection. RTC in 2 months if lesion(s) persistent.
Render Post-Care Instructions In Note?: yes
Detail Level: Detailed
Post-Care Instructions: I reviewed with the patient in detail post-care instructions. Patient is to wear sunprotection, and avoid picking at any of the treated lesions. Pt may apply Vaseline to crusted or scabbing areas.

## 2021-06-02 DIAGNOSIS — E11.9 TYPE 2 DIABETES MELLITUS WITHOUT COMPLICATION, WITHOUT LONG-TERM CURRENT USE OF INSULIN (HCC): ICD-10-CM

## 2021-06-02 RX ORDER — GLIMEPIRIDE 4 MG/1
4 TABLET ORAL EVERY MORNING
Qty: 100 TABLET | Refills: 3 | Status: SHIPPED | OUTPATIENT
Start: 2021-06-02 | End: 2022-08-04

## 2021-08-02 ENCOUNTER — OFFICE VISIT (OUTPATIENT)
Dept: INTERNAL MEDICINE | Facility: IMAGING CENTER | Age: 86
End: 2021-08-02
Payer: MEDICARE

## 2021-08-02 VITALS
HEART RATE: 70 BPM | SYSTOLIC BLOOD PRESSURE: 106 MMHG | RESPIRATION RATE: 14 BRPM | OXYGEN SATURATION: 92 % | HEIGHT: 70 IN | DIASTOLIC BLOOD PRESSURE: 70 MMHG | BODY MASS INDEX: 25.11 KG/M2 | TEMPERATURE: 97.8 F

## 2021-08-02 DIAGNOSIS — L84 CALLUS OF FOOT: ICD-10-CM

## 2021-08-02 PROCEDURE — 99212 OFFICE O/P EST SF 10 MIN: CPT | Performed by: FAMILY MEDICINE

## 2021-08-02 NOTE — PROGRESS NOTES
"Chief Complaint   Patient presents with   • Foot Pain     right       Subjective:     HPI:   Alfred Erazo is a 85 y.o. male here  to discuss the evaluation and management of:    Right foot pain    At the ball of his foot  Hurts to walk  Denies trauma  Denies redness, swelling, heat    No problems updated.     Objective:     /70   Pulse 70   Temp 36.6 °C (97.8 °F) (Temporal)   Resp 14   Ht 1.778 m (5' 10\")   SpO2 92%  Body mass index is 25.11 kg/m².    Physical Exam:  Physical Exam  Constitutional: Well-developed and well-nourished. Not diaphoretic. No distress.   Skin: Skin is warm and dry. No rash noted.  Head: Atraumatic without lesions.  Eyes: Conjunctivae and extraocular motions are normal.   Ears:  External ears unremarkable.             Chest: Effort normal   Extremities: No cyanosis, clubbing, erythema, nor edema.  On the plantar surface of the right first and TP joint large callus  No evidence of erythema  Neurological: Alert and oriented x 3.    Psychiatric:  Behavior, mood, and affect are appropriate     Assessment and Plan:     The following treatment plan was discussed:     1. Callus of foot  REFERRAL TO PODIATRY            Any change or worsening of signs or symptoms, patient encouraged to follow-up or report to emergency room for further evaluation. Patient verbalizes understanding and agrees.    Follow-Up: No follow-ups on file.      PLEASE NOTE: This dictation was created using voice recognition software. I have made every reasonable attempt to correct obvious errors, but I expect that there are errors of grammar and possibly content that I did not discover before finalizing the note.    My total time spent caring for the patient on the day of the encounter was  greater than  12 minutes.   This includes obtaining history, reviewing chart, physical exam, patient education, reviewing outside records, placing orders, interpreting tests and coordinating care.       "

## 2021-08-09 ENCOUNTER — HOSPITAL ENCOUNTER (OUTPATIENT)
Facility: MEDICAL CENTER | Age: 86
End: 2021-08-09
Attending: FAMILY MEDICINE
Payer: MEDICARE

## 2021-08-09 ENCOUNTER — NON-PROVIDER VISIT (OUTPATIENT)
Dept: INTERNAL MEDICINE | Facility: IMAGING CENTER | Age: 86
End: 2021-08-09
Payer: MEDICARE

## 2021-08-09 DIAGNOSIS — Z20.822 ENCOUNTER FOR LABORATORY TESTING FOR COVID-19 VIRUS: ICD-10-CM

## 2021-08-09 PROCEDURE — U0005 INFEC AGEN DETEC AMPLI PROBE: HCPCS

## 2021-08-09 PROCEDURE — U0003 INFECTIOUS AGENT DETECTION BY NUCLEIC ACID (DNA OR RNA); SEVERE ACUTE RESPIRATORY SYNDROME CORONAVIRUS 2 (SARS-COV-2) (CORONAVIRUS DISEASE [COVID-19]), AMPLIFIED PROBE TECHNIQUE, MAKING USE OF HIGH THROUGHPUT TECHNOLOGIES AS DESCRIBED BY CMS-2020-01-R: HCPCS

## 2021-08-10 DIAGNOSIS — Z20.822 ENCOUNTER FOR LABORATORY TESTING FOR COVID-19 VIRUS: ICD-10-CM

## 2021-08-10 LAB
COVID ORDER STATUS COVID19: NORMAL
SARS-COV-2 RNA RESP QL NAA+PROBE: NOTDETECTED
SPECIMEN SOURCE: NORMAL

## 2021-08-11 ENCOUNTER — TELEPHONE (OUTPATIENT)
Dept: INTERNAL MEDICINE | Facility: IMAGING CENTER | Age: 86
End: 2021-08-11

## 2021-08-28 ENCOUNTER — APPOINTMENT (OUTPATIENT)
Dept: RADIOLOGY | Facility: MEDICAL CENTER | Age: 86
End: 2021-08-28
Attending: EMERGENCY MEDICINE
Payer: MEDICARE

## 2021-08-28 ENCOUNTER — APPOINTMENT (OUTPATIENT)
Dept: RADIOLOGY | Facility: MEDICAL CENTER | Age: 86
End: 2021-08-28
Attending: STUDENT IN AN ORGANIZED HEALTH CARE EDUCATION/TRAINING PROGRAM
Payer: MEDICARE

## 2021-08-28 ENCOUNTER — HOSPITAL ENCOUNTER (OUTPATIENT)
Facility: MEDICAL CENTER | Age: 86
End: 2021-08-29
Attending: EMERGENCY MEDICINE | Admitting: STUDENT IN AN ORGANIZED HEALTH CARE EDUCATION/TRAINING PROGRAM
Payer: MEDICARE

## 2021-08-28 DIAGNOSIS — I63.00 CEREBROVASCULAR ACCIDENT (CVA) DUE TO THROMBOSIS OF PRECEREBRAL ARTERY (HCC): ICD-10-CM

## 2021-08-28 DIAGNOSIS — R09.89 SUSPECTED CEREBROVASCULAR ACCIDENT (CVA): ICD-10-CM

## 2021-08-28 DIAGNOSIS — I49.9 CARDIAC ARRHYTHMIA, UNSPECIFIED CARDIAC ARRHYTHMIA TYPE: ICD-10-CM

## 2021-08-28 DIAGNOSIS — R79.89 ELEVATED TROPONIN: ICD-10-CM

## 2021-08-28 DIAGNOSIS — U07.1 COVID-19 VIRUS INFECTION: ICD-10-CM

## 2021-08-28 DIAGNOSIS — G45.9 TIA (TRANSIENT ISCHEMIC ATTACK): ICD-10-CM

## 2021-08-28 DIAGNOSIS — R47.01 EXPRESSIVE APHASIA: ICD-10-CM

## 2021-08-28 PROBLEM — K21.9 GERD (GASTROESOPHAGEAL REFLUX DISEASE): Status: ACTIVE | Noted: 2021-08-28

## 2021-08-28 PROBLEM — R62.7 FAILURE TO THRIVE IN ADULT: Status: ACTIVE | Noted: 2021-08-28

## 2021-08-28 PROBLEM — E53.8 B12 DEFICIENCY: Status: ACTIVE | Noted: 2021-08-28

## 2021-08-28 PROBLEM — R54 AGE-RELATED PHYSICAL DEBILITY: Status: ACTIVE | Noted: 2021-08-28

## 2021-08-28 PROBLEM — Z71.89 ACP (ADVANCE CARE PLANNING): Status: ACTIVE | Noted: 2021-08-28

## 2021-08-28 LAB
ALBUMIN SERPL BCP-MCNC: 4.1 G/DL (ref 3.2–4.9)
ALBUMIN/GLOB SERPL: 1.4 G/DL
ALP SERPL-CCNC: 81 U/L (ref 30–99)
ALT SERPL-CCNC: 38 U/L (ref 2–50)
AMPHET UR QL SCN: NEGATIVE
ANION GAP SERPL CALC-SCNC: 17 MMOL/L (ref 7–16)
APPEARANCE UR: CLEAR
APTT PPP: 27.8 SEC (ref 24.7–36)
AST SERPL-CCNC: 33 U/L (ref 12–45)
BARBITURATES UR QL SCN: NEGATIVE
BASOPHILS # BLD AUTO: 0.2 % (ref 0–1.8)
BASOPHILS # BLD: 0.02 K/UL (ref 0–0.12)
BENZODIAZ UR QL SCN: NEGATIVE
BILIRUB SERPL-MCNC: 0.7 MG/DL (ref 0.1–1.5)
BILIRUB UR QL STRIP.AUTO: NEGATIVE
BUN SERPL-MCNC: 10 MG/DL (ref 8–22)
BZE UR QL SCN: NEGATIVE
CALCIUM SERPL-MCNC: 9.4 MG/DL (ref 8.5–10.5)
CANNABINOIDS UR QL SCN: NEGATIVE
CHLORIDE SERPL-SCNC: 103 MMOL/L (ref 96–112)
CK SERPL-CCNC: 38 U/L (ref 0–154)
CK SERPL-CCNC: 46 U/L (ref 0–154)
CO2 SERPL-SCNC: 18 MMOL/L (ref 20–33)
COLOR UR: YELLOW
CREAT SERPL-MCNC: 0.88 MG/DL (ref 0.5–1.4)
CRP SERPL HS-MCNC: 1.8 MG/DL (ref 0–0.75)
CRP SERPL HS-MCNC: 2.03 MG/DL (ref 0–0.75)
D DIMER PPP IA.FEU-MCNC: 1.32 UG/ML (FEU) (ref 0–0.5)
EKG IMPRESSION: NORMAL
EKG IMPRESSION: NORMAL
EOSINOPHIL # BLD AUTO: 0.14 K/UL (ref 0–0.51)
EOSINOPHIL NFR BLD: 1.4 % (ref 0–6.9)
ERYTHROCYTE [DISTWIDTH] IN BLOOD BY AUTOMATED COUNT: 49.5 FL (ref 35.9–50)
EST. AVERAGE GLUCOSE BLD GHB EST-MCNC: 226 MG/DL
FLUAV RNA SPEC QL NAA+PROBE: NEGATIVE
FLUBV RNA SPEC QL NAA+PROBE: NEGATIVE
GLOBULIN SER CALC-MCNC: 2.9 G/DL (ref 1.9–3.5)
GLUCOSE BLD-MCNC: 120 MG/DL (ref 65–99)
GLUCOSE SERPL-MCNC: 173 MG/DL (ref 65–99)
GLUCOSE UR STRIP.AUTO-MCNC: >=1000 MG/DL
HBA1C MFR BLD: 9.5 % (ref 4–5.6)
HCT VFR BLD AUTO: 43.8 % (ref 42–52)
HGB BLD-MCNC: 14.4 G/DL (ref 14–18)
HIV 1+2 AB+HIV1 P24 AG SERPL QL IA: NORMAL
IMM GRANULOCYTES # BLD AUTO: 0.06 K/UL (ref 0–0.11)
IMM GRANULOCYTES NFR BLD AUTO: 0.6 % (ref 0–0.9)
INR PPP: 1.1 (ref 0.87–1.13)
KETONES UR STRIP.AUTO-MCNC: NEGATIVE MG/DL
LEUKOCYTE ESTERASE UR QL STRIP.AUTO: NEGATIVE
LYMPHOCYTES # BLD AUTO: 1.39 K/UL (ref 1–4.8)
LYMPHOCYTES NFR BLD: 13.5 % (ref 22–41)
MCH RBC QN AUTO: 31.9 PG (ref 27–33)
MCHC RBC AUTO-ENTMCNC: 32.9 G/DL (ref 33.7–35.3)
MCV RBC AUTO: 96.9 FL (ref 81.4–97.8)
METHADONE UR QL SCN: NEGATIVE
MICRO URNS: ABNORMAL
MONOCYTES # BLD AUTO: 0.79 K/UL (ref 0–0.85)
MONOCYTES NFR BLD AUTO: 7.7 % (ref 0–13.4)
NEUTROPHILS # BLD AUTO: 7.87 K/UL (ref 1.82–7.42)
NEUTROPHILS NFR BLD: 76.6 % (ref 44–72)
NITRITE UR QL STRIP.AUTO: NEGATIVE
NRBC # BLD AUTO: 0 K/UL
NRBC BLD-RTO: 0 /100 WBC
OPIATES UR QL SCN: NEGATIVE
OXYCODONE UR QL SCN: NEGATIVE
PCP UR QL SCN: NEGATIVE
PH UR STRIP.AUTO: 5.5 [PH] (ref 5–8)
PLATELET # BLD AUTO: 228 K/UL (ref 164–446)
PMV BLD AUTO: 11.1 FL (ref 9–12.9)
POTASSIUM SERPL-SCNC: 4.2 MMOL/L (ref 3.6–5.5)
PROCALCITONIN SERPL-MCNC: 0.05 NG/ML
PROCALCITONIN SERPL-MCNC: <0.05 NG/ML
PROPOXYPH UR QL SCN: NEGATIVE
PROT SERPL-MCNC: 7 G/DL (ref 6–8.2)
PROT UR QL STRIP: NEGATIVE MG/DL
PROTHROMBIN TIME: 13.9 SEC (ref 12–14.6)
RBC # BLD AUTO: 4.52 M/UL (ref 4.7–6.1)
RBC UR QL AUTO: NEGATIVE
RSV RNA SPEC QL NAA+PROBE: NEGATIVE
SARS-COV-2 RNA RESP QL NAA+PROBE: DETECTED
SODIUM SERPL-SCNC: 138 MMOL/L (ref 135–145)
SP GR UR STRIP.AUTO: 1.01
SPECIMEN SOURCE: ABNORMAL
TREPONEMA PALLIDUM IGG+IGM AB [PRESENCE] IN SERUM OR PLASMA BY IMMUNOASSAY: NORMAL
TROPONIN T SERPL-MCNC: 28 NG/L (ref 6–19)
TROPONIN T SERPL-MCNC: 30 NG/L (ref 6–19)
UROBILINOGEN UR STRIP.AUTO-MCNC: 0.2 MG/DL
WBC # BLD AUTO: 10.3 K/UL (ref 4.8–10.8)

## 2021-08-28 PROCEDURE — 70498 CT ANGIOGRAPHY NECK: CPT | Mod: MG

## 2021-08-28 PROCEDURE — 82550 ASSAY OF CK (CPK): CPT

## 2021-08-28 PROCEDURE — 87389 HIV-1 AG W/HIV-1&-2 AB AG IA: CPT

## 2021-08-28 PROCEDURE — 85730 THROMBOPLASTIN TIME PARTIAL: CPT

## 2021-08-28 PROCEDURE — 70496 CT ANGIOGRAPHY HEAD: CPT | Mod: MG

## 2021-08-28 PROCEDURE — 85379 FIBRIN DEGRADATION QUANT: CPT

## 2021-08-28 PROCEDURE — G0378 HOSPITAL OBSERVATION PER HR: HCPCS

## 2021-08-28 PROCEDURE — 85025 COMPLETE CBC W/AUTO DIFF WBC: CPT

## 2021-08-28 PROCEDURE — 85610 PROTHROMBIN TIME: CPT

## 2021-08-28 PROCEDURE — 80307 DRUG TEST PRSMV CHEM ANLYZR: CPT

## 2021-08-28 PROCEDURE — 93005 ELECTROCARDIOGRAM TRACING: CPT | Mod: XE | Performed by: EMERGENCY MEDICINE

## 2021-08-28 PROCEDURE — 83036 HEMOGLOBIN GLYCOSYLATED A1C: CPT

## 2021-08-28 PROCEDURE — 99497 ADVNCD CARE PLAN 30 MIN: CPT | Performed by: STUDENT IN AN ORGANIZED HEALTH CARE EDUCATION/TRAINING PROGRAM

## 2021-08-28 PROCEDURE — A9270 NON-COVERED ITEM OR SERVICE: HCPCS | Performed by: STUDENT IN AN ORGANIZED HEALTH CARE EDUCATION/TRAINING PROGRAM

## 2021-08-28 PROCEDURE — 0241U HCHG SARS-COV-2 COVID-19 NFCT DS RESP RNA 4 TRGT MIC: CPT

## 2021-08-28 PROCEDURE — 93005 ELECTROCARDIOGRAM TRACING: CPT

## 2021-08-28 PROCEDURE — 70551 MRI BRAIN STEM W/O DYE: CPT | Mod: MG

## 2021-08-28 PROCEDURE — 81003 URINALYSIS AUTO W/O SCOPE: CPT | Mod: XU

## 2021-08-28 PROCEDURE — 86780 TREPONEMA PALLIDUM: CPT

## 2021-08-28 PROCEDURE — 84484 ASSAY OF TROPONIN QUANT: CPT

## 2021-08-28 PROCEDURE — 86140 C-REACTIVE PROTEIN: CPT

## 2021-08-28 PROCEDURE — 84145 PROCALCITONIN (PCT): CPT

## 2021-08-28 PROCEDURE — C9803 HOPD COVID-19 SPEC COLLECT: HCPCS | Performed by: EMERGENCY MEDICINE

## 2021-08-28 PROCEDURE — 99220 PR INITIAL OBSERVATION CARE,LEVL III: CPT | Mod: CS,25 | Performed by: STUDENT IN AN ORGANIZED HEALTH CARE EDUCATION/TRAINING PROGRAM

## 2021-08-28 PROCEDURE — 94760 N-INVAS EAR/PLS OXIMETRY 1: CPT

## 2021-08-28 PROCEDURE — 82962 GLUCOSE BLOOD TEST: CPT

## 2021-08-28 PROCEDURE — 70450 CT HEAD/BRAIN W/O DYE: CPT | Mod: ME

## 2021-08-28 PROCEDURE — 80053 COMPREHEN METABOLIC PANEL: CPT

## 2021-08-28 PROCEDURE — 700102 HCHG RX REV CODE 250 W/ 637 OVERRIDE(OP): Performed by: STUDENT IN AN ORGANIZED HEALTH CARE EDUCATION/TRAINING PROGRAM

## 2021-08-28 PROCEDURE — 700117 HCHG RX CONTRAST REV CODE 255: Performed by: EMERGENCY MEDICINE

## 2021-08-28 PROCEDURE — 99285 EMERGENCY DEPT VISIT HI MDM: CPT

## 2021-08-28 RX ORDER — OMEPRAZOLE 20 MG/1
20 CAPSULE, DELAYED RELEASE ORAL DAILY
Status: SHIPPED | COMMUNITY
End: 2021-11-30 | Stop reason: SDUPTHER

## 2021-08-28 RX ORDER — VENLAFAXINE HYDROCHLORIDE 37.5 MG/1
37.5 CAPSULE, EXTENDED RELEASE ORAL
Status: DISCONTINUED | OUTPATIENT
Start: 2021-08-29 | End: 2021-08-29 | Stop reason: HOSPADM

## 2021-08-28 RX ORDER — OMEPRAZOLE 20 MG/1
20 CAPSULE, DELAYED RELEASE ORAL DAILY
Status: DISCONTINUED | OUTPATIENT
Start: 2021-08-29 | End: 2021-08-29 | Stop reason: HOSPADM

## 2021-08-28 RX ORDER — ONDANSETRON 2 MG/ML
4 INJECTION INTRAMUSCULAR; INTRAVENOUS EVERY 4 HOURS PRN
Status: DISCONTINUED | OUTPATIENT
Start: 2021-08-28 | End: 2021-08-29 | Stop reason: HOSPADM

## 2021-08-28 RX ORDER — HYDRALAZINE HYDROCHLORIDE 20 MG/ML
10 INJECTION INTRAMUSCULAR; INTRAVENOUS
Status: DISCONTINUED | OUTPATIENT
Start: 2021-08-28 | End: 2021-08-29 | Stop reason: HOSPADM

## 2021-08-28 RX ORDER — ASPIRIN 325 MG
325 TABLET ORAL ONCE
Status: COMPLETED | OUTPATIENT
Start: 2021-08-28 | End: 2021-08-28

## 2021-08-28 RX ORDER — MELOXICAM 15 MG/1
15 TABLET ORAL DAILY
Status: SHIPPED | COMMUNITY
End: 2021-11-30

## 2021-08-28 RX ORDER — ATORVASTATIN CALCIUM 40 MG/1
40 TABLET, FILM COATED ORAL EVERY EVENING
Status: DISCONTINUED | OUTPATIENT
Start: 2021-08-28 | End: 2021-08-29 | Stop reason: HOSPADM

## 2021-08-28 RX ORDER — ONDANSETRON 4 MG/1
4 TABLET, ORALLY DISINTEGRATING ORAL EVERY 4 HOURS PRN
Status: DISCONTINUED | OUTPATIENT
Start: 2021-08-28 | End: 2021-08-29 | Stop reason: HOSPADM

## 2021-08-28 RX ORDER — UBIDECARENONE 75 MG
100 CAPSULE ORAL DAILY
Status: DISCONTINUED | OUTPATIENT
Start: 2021-08-29 | End: 2021-08-29 | Stop reason: HOSPADM

## 2021-08-28 RX ORDER — INSULIN LISPRO 100 [IU]/ML
1-6 INJECTION, SOLUTION INTRAVENOUS; SUBCUTANEOUS EVERY 6 HOURS
Status: DISCONTINUED | OUTPATIENT
Start: 2021-08-28 | End: 2021-08-29 | Stop reason: HOSPADM

## 2021-08-28 RX ORDER — AMOXICILLIN 250 MG
2 CAPSULE ORAL 2 TIMES DAILY
Status: DISCONTINUED | OUTPATIENT
Start: 2021-08-28 | End: 2021-08-29 | Stop reason: HOSPADM

## 2021-08-28 RX ORDER — BISACODYL 10 MG
10 SUPPOSITORY, RECTAL RECTAL
Status: DISCONTINUED | OUTPATIENT
Start: 2021-08-28 | End: 2021-08-29 | Stop reason: HOSPADM

## 2021-08-28 RX ORDER — LEVOTHYROXINE SODIUM 0.07 MG/1
75 TABLET ORAL
Status: DISCONTINUED | OUTPATIENT
Start: 2021-08-29 | End: 2021-08-29 | Stop reason: HOSPADM

## 2021-08-28 RX ORDER — DEXTROSE MONOHYDRATE 25 G/50ML
50 INJECTION, SOLUTION INTRAVENOUS
Status: DISCONTINUED | OUTPATIENT
Start: 2021-08-28 | End: 2021-08-29 | Stop reason: HOSPADM

## 2021-08-28 RX ORDER — ACETAMINOPHEN 325 MG/1
650 TABLET ORAL EVERY 6 HOURS PRN
Status: DISCONTINUED | OUTPATIENT
Start: 2021-08-28 | End: 2021-08-29 | Stop reason: HOSPADM

## 2021-08-28 RX ORDER — TAMSULOSIN HYDROCHLORIDE 0.4 MG/1
0.4 CAPSULE ORAL
Status: DISCONTINUED | OUTPATIENT
Start: 2021-08-29 | End: 2021-08-29 | Stop reason: HOSPADM

## 2021-08-28 RX ORDER — FINASTERIDE 5 MG/1
5 TABLET, FILM COATED ORAL DAILY
Status: DISCONTINUED | OUTPATIENT
Start: 2021-08-29 | End: 2021-08-29 | Stop reason: HOSPADM

## 2021-08-28 RX ORDER — POLYETHYLENE GLYCOL 3350 17 G/17G
1 POWDER, FOR SOLUTION ORAL
Status: DISCONTINUED | OUTPATIENT
Start: 2021-08-28 | End: 2021-08-29 | Stop reason: HOSPADM

## 2021-08-28 RX ORDER — VITAMIN B COMPLEX
1000 TABLET ORAL DAILY
Status: DISCONTINUED | OUTPATIENT
Start: 2021-08-29 | End: 2021-08-29 | Stop reason: HOSPADM

## 2021-08-28 RX ORDER — LIOTHYRONINE SODIUM 5 UG/1
5 TABLET ORAL
Status: DISCONTINUED | OUTPATIENT
Start: 2021-08-29 | End: 2021-08-29 | Stop reason: HOSPADM

## 2021-08-28 RX ORDER — LABETALOL HYDROCHLORIDE 5 MG/ML
10 INJECTION, SOLUTION INTRAVENOUS
Status: DISCONTINUED | OUTPATIENT
Start: 2021-08-28 | End: 2021-08-29 | Stop reason: HOSPADM

## 2021-08-28 RX ADMIN — ASPIRIN 325 MG: 325 TABLET, FILM COATED ORAL at 20:30

## 2021-08-28 RX ADMIN — IOHEXOL 70 ML: 350 INJECTION, SOLUTION INTRAVENOUS at 21:00

## 2021-08-28 RX ADMIN — ATORVASTATIN CALCIUM 40 MG: 20 TABLET, FILM COATED ORAL at 20:55

## 2021-08-28 ASSESSMENT — LIFESTYLE VARIABLES
ALCOHOL_USE: NO
SUBSTANCE_ABUSE: 0
HAVE YOU EVER FELT YOU SHOULD CUT DOWN ON YOUR DRINKING: NO
DOES PATIENT WANT TO STOP DRINKING: NO
CONSUMPTION TOTAL: NEGATIVE
TOTAL SCORE: 0
TOTAL SCORE: 0
EVER FELT BAD OR GUILTY ABOUT YOUR DRINKING: NO
EVER HAD A DRINK FIRST THING IN THE MORNING TO STEADY YOUR NERVES TO GET RID OF A HANGOVER: NO
ON A TYPICAL DAY WHEN YOU DRINK ALCOHOL HOW MANY DRINKS DO YOU HAVE: 0
AVERAGE NUMBER OF DAYS PER WEEK YOU HAVE A DRINK CONTAINING ALCOHOL: 0
HOW MANY TIMES IN THE PAST YEAR HAVE YOU HAD 5 OR MORE DRINKS IN A DAY: 0
TOTAL SCORE: 0
HAVE PEOPLE ANNOYED YOU BY CRITICIZING YOUR DRINKING: NO

## 2021-08-28 ASSESSMENT — ENCOUNTER SYMPTOMS
BLURRED VISION: 0
HEADACHES: 0
TREMORS: 1
NAUSEA: 0
LOSS OF CONSCIOUSNESS: 0
SPUTUM PRODUCTION: 0
HEARTBURN: 0
WEAKNESS: 0
PALPITATIONS: 0
DOUBLE VISION: 0
BRUISES/BLEEDS EASILY: 0
DEPRESSION: 0
SENSORY CHANGE: 0
FLANK PAIN: 0
MYALGIAS: 0
SHORTNESS OF BREATH: 0
DIZZINESS: 0
FALLS: 0
FEVER: 0
TINGLING: 0
SPEECH CHANGE: 1
FOCAL WEAKNESS: 0
COUGH: 0
CHILLS: 0

## 2021-08-28 ASSESSMENT — PATIENT HEALTH QUESTIONNAIRE - PHQ9
1. LITTLE INTEREST OR PLEASURE IN DOING THINGS: NOT AT ALL
2. FEELING DOWN, DEPRESSED, IRRITABLE, OR HOPELESS: NOT AT ALL
SUM OF ALL RESPONSES TO PHQ9 QUESTIONS 1 AND 2: 0

## 2021-08-28 ASSESSMENT — FIBROSIS 4 INDEX
FIB4 SCORE: 2.25
FIB4 SCORE: 1.95

## 2021-08-29 ENCOUNTER — APPOINTMENT (OUTPATIENT)
Dept: CARDIOLOGY | Facility: MEDICAL CENTER | Age: 86
End: 2021-08-29
Attending: PSYCHIATRY & NEUROLOGY
Payer: MEDICARE

## 2021-08-29 ENCOUNTER — PHARMACY VISIT (OUTPATIENT)
Dept: PHARMACY | Facility: MEDICAL CENTER | Age: 86
End: 2021-08-29
Payer: MEDICARE

## 2021-08-29 ENCOUNTER — APPOINTMENT (OUTPATIENT)
Dept: RADIOLOGY | Facility: MEDICAL CENTER | Age: 86
End: 2021-08-29
Attending: NURSE PRACTITIONER
Payer: MEDICARE

## 2021-08-29 VITALS
DIASTOLIC BLOOD PRESSURE: 97 MMHG | HEART RATE: 67 BPM | WEIGHT: 171.52 LBS | TEMPERATURE: 97.1 F | SYSTOLIC BLOOD PRESSURE: 140 MMHG | OXYGEN SATURATION: 93 % | HEIGHT: 70 IN | RESPIRATION RATE: 18 BRPM | BODY MASS INDEX: 24.55 KG/M2

## 2021-08-29 PROBLEM — R47.01 EXPRESSIVE APHASIA: Status: RESOLVED | Noted: 2021-08-28 | Resolved: 2021-08-29

## 2021-08-29 PROBLEM — Z66 DNR (DO NOT RESUSCITATE): Chronic | Status: ACTIVE | Noted: 2021-08-29

## 2021-08-29 LAB
ALBUMIN SERPL BCP-MCNC: 3.4 G/DL (ref 3.2–4.9)
ALBUMIN/GLOB SERPL: 1.4 G/DL
ALP SERPL-CCNC: 68 U/L (ref 30–99)
ALT SERPL-CCNC: 30 U/L (ref 2–50)
ANION GAP SERPL CALC-SCNC: 13 MMOL/L (ref 7–16)
AST SERPL-CCNC: 25 U/L (ref 12–45)
BASOPHILS # BLD AUTO: 0.4 % (ref 0–1.8)
BASOPHILS # BLD: 0.03 K/UL (ref 0–0.12)
BILIRUB SERPL-MCNC: 0.6 MG/DL (ref 0.1–1.5)
BUN SERPL-MCNC: 9 MG/DL (ref 8–22)
CALCIUM SERPL-MCNC: 9 MG/DL (ref 8.5–10.5)
CHLORIDE SERPL-SCNC: 106 MMOL/L (ref 96–112)
CHOLEST SERPL-MCNC: 117 MG/DL (ref 100–199)
CO2 SERPL-SCNC: 21 MMOL/L (ref 20–33)
CREAT SERPL-MCNC: 0.87 MG/DL (ref 0.5–1.4)
EOSINOPHIL # BLD AUTO: 0.2 K/UL (ref 0–0.51)
EOSINOPHIL NFR BLD: 2.5 % (ref 0–6.9)
ERYTHROCYTE [DISTWIDTH] IN BLOOD BY AUTOMATED COUNT: 47.2 FL (ref 35.9–50)
GLOBULIN SER CALC-MCNC: 2.5 G/DL (ref 1.9–3.5)
GLUCOSE SERPL-MCNC: 246 MG/DL (ref 65–99)
HCT VFR BLD AUTO: 36.9 % (ref 42–52)
HDLC SERPL-MCNC: 35 MG/DL
HGB BLD-MCNC: 12.6 G/DL (ref 14–18)
IMM GRANULOCYTES # BLD AUTO: 0.02 K/UL (ref 0–0.11)
IMM GRANULOCYTES NFR BLD AUTO: 0.2 % (ref 0–0.9)
LDLC SERPL CALC-MCNC: 53 MG/DL
LV EJECT FRACT  99904: 60
LV EJECT FRACT MOD 2C 99903: 67.76
LV EJECT FRACT MOD 4C 99902: 60.24
LV EJECT FRACT MOD BP 99901: 66.29
LYMPHOCYTES # BLD AUTO: 2 K/UL (ref 1–4.8)
LYMPHOCYTES NFR BLD: 24.6 % (ref 22–41)
MAGNESIUM SERPL-MCNC: 1.7 MG/DL (ref 1.5–2.5)
MCH RBC QN AUTO: 32.1 PG (ref 27–33)
MCHC RBC AUTO-ENTMCNC: 34.1 G/DL (ref 33.7–35.3)
MCV RBC AUTO: 93.9 FL (ref 81.4–97.8)
MONOCYTES # BLD AUTO: 0.8 K/UL (ref 0–0.85)
MONOCYTES NFR BLD AUTO: 9.9 % (ref 0–13.4)
NEUTROPHILS # BLD AUTO: 5.07 K/UL (ref 1.82–7.42)
NEUTROPHILS NFR BLD: 62.4 % (ref 44–72)
NRBC # BLD AUTO: 0 K/UL
NRBC BLD-RTO: 0 /100 WBC
PHOSPHATE SERPL-MCNC: 2.4 MG/DL (ref 2.5–4.5)
PLATELET # BLD AUTO: 209 K/UL (ref 164–446)
PMV BLD AUTO: 11 FL (ref 9–12.9)
POTASSIUM SERPL-SCNC: 3.8 MMOL/L (ref 3.6–5.5)
PROT SERPL-MCNC: 5.9 G/DL (ref 6–8.2)
RBC # BLD AUTO: 3.93 M/UL (ref 4.7–6.1)
SODIUM SERPL-SCNC: 140 MMOL/L (ref 135–145)
TRIGL SERPL-MCNC: 143 MG/DL (ref 0–149)
TROPONIN T SERPL-MCNC: 30 NG/L (ref 6–19)
WBC # BLD AUTO: 8.1 K/UL (ref 4.8–10.8)

## 2021-08-29 PROCEDURE — 84100 ASSAY OF PHOSPHORUS: CPT

## 2021-08-29 PROCEDURE — 700102 HCHG RX REV CODE 250 W/ 637 OVERRIDE(OP): Performed by: STUDENT IN AN ORGANIZED HEALTH CARE EDUCATION/TRAINING PROGRAM

## 2021-08-29 PROCEDURE — 80053 COMPREHEN METABOLIC PANEL: CPT

## 2021-08-29 PROCEDURE — G0378 HOSPITAL OBSERVATION PER HR: HCPCS

## 2021-08-29 PROCEDURE — A9270 NON-COVERED ITEM OR SERVICE: HCPCS | Performed by: NURSE PRACTITIONER

## 2021-08-29 PROCEDURE — 74176 CT ABD & PELVIS W/O CONTRAST: CPT | Mod: ME

## 2021-08-29 PROCEDURE — 92610 EVALUATE SWALLOWING FUNCTION: CPT

## 2021-08-29 PROCEDURE — 85025 COMPLETE CBC W/AUTO DIFF WBC: CPT

## 2021-08-29 PROCEDURE — 83735 ASSAY OF MAGNESIUM: CPT

## 2021-08-29 PROCEDURE — 93308 TTE F-UP OR LMTD: CPT | Mod: 26 | Performed by: INTERNAL MEDICINE

## 2021-08-29 PROCEDURE — 93308 TTE F-UP OR LMTD: CPT

## 2021-08-29 PROCEDURE — 99217 PR OBSERVATION CARE DISCHARGE: CPT | Performed by: HOSPITALIST

## 2021-08-29 PROCEDURE — RXMED WILLOW AMBULATORY MEDICATION CHARGE: Performed by: NURSE PRACTITIONER

## 2021-08-29 PROCEDURE — 700102 HCHG RX REV CODE 250 W/ 637 OVERRIDE(OP): Performed by: NURSE PRACTITIONER

## 2021-08-29 PROCEDURE — 97161 PT EVAL LOW COMPLEX 20 MIN: CPT

## 2021-08-29 PROCEDURE — 80061 LIPID PANEL: CPT

## 2021-08-29 PROCEDURE — 99204 OFFICE O/P NEW MOD 45 MIN: CPT | Performed by: PSYCHIATRY & NEUROLOGY

## 2021-08-29 PROCEDURE — A9270 NON-COVERED ITEM OR SERVICE: HCPCS | Performed by: STUDENT IN AN ORGANIZED HEALTH CARE EDUCATION/TRAINING PROGRAM

## 2021-08-29 PROCEDURE — 84484 ASSAY OF TROPONIN QUANT: CPT

## 2021-08-29 RX ORDER — METRONIDAZOLE 500 MG/1
500 TABLET ORAL EVERY 8 HOURS
Status: DISCONTINUED | OUTPATIENT
Start: 2021-08-29 | End: 2021-08-29 | Stop reason: HOSPADM

## 2021-08-29 RX ORDER — CIPROFLOXACIN 500 MG/1
500 TABLET, FILM COATED ORAL EVERY 12 HOURS
Qty: 14 TABLET | Refills: 0 | Status: SHIPPED | OUTPATIENT
Start: 2021-08-29 | End: 2021-11-23

## 2021-08-29 RX ORDER — CLOPIDOGREL BISULFATE 75 MG/1
75 TABLET ORAL DAILY
Status: DISCONTINUED | OUTPATIENT
Start: 2021-08-29 | End: 2021-08-29 | Stop reason: HOSPADM

## 2021-08-29 RX ORDER — CIPROFLOXACIN 500 MG/1
500 TABLET, FILM COATED ORAL EVERY 12 HOURS
Status: DISCONTINUED | OUTPATIENT
Start: 2021-08-29 | End: 2021-08-29 | Stop reason: HOSPADM

## 2021-08-29 RX ORDER — CLOPIDOGREL BISULFATE 75 MG/1
75 TABLET ORAL DAILY
Qty: 20 TABLET | Refills: 0 | Status: SHIPPED | OUTPATIENT
Start: 2021-08-29 | End: 2021-09-13 | Stop reason: SDUPTHER

## 2021-08-29 RX ORDER — METRONIDAZOLE 500 MG/1
500 TABLET ORAL EVERY 8 HOURS
Qty: 21 TABLET | Refills: 0 | Status: SHIPPED | OUTPATIENT
Start: 2021-08-29 | End: 2021-11-23

## 2021-08-29 RX ADMIN — LEVOTHYROXINE SODIUM 75 MCG: 0.07 TABLET ORAL at 06:22

## 2021-08-29 RX ADMIN — METRONIDAZOLE 500 MG: 500 TABLET ORAL at 14:41

## 2021-08-29 RX ADMIN — OMEPRAZOLE 20 MG: 20 CAPSULE, DELAYED RELEASE ORAL at 06:23

## 2021-08-29 RX ADMIN — FINASTERIDE 5 MG: 5 TABLET, FILM COATED ORAL at 06:23

## 2021-08-29 RX ADMIN — VENLAFAXINE HYDROCHLORIDE 37.5 MG: 37.5 CAPSULE, EXTENDED RELEASE ORAL at 09:56

## 2021-08-29 RX ADMIN — LIOTHYRONINE SODIUM 5 MCG: 5 TABLET ORAL at 06:22

## 2021-08-29 RX ADMIN — ASPIRIN 81 MG: 81 TABLET, COATED ORAL at 06:22

## 2021-08-29 RX ADMIN — VITAM B12 100 MCG: 100 TAB at 06:23

## 2021-08-29 RX ADMIN — CLOPIDOGREL BISULFATE 75 MG: 75 TABLET ORAL at 09:57

## 2021-08-29 RX ADMIN — Medication 1000 UNITS: at 09:57

## 2021-08-29 ASSESSMENT — COGNITIVE AND FUNCTIONAL STATUS - GENERAL
MOBILITY SCORE: 24
SUGGESTED CMS G CODE MODIFIER MOBILITY: CH

## 2021-08-29 ASSESSMENT — GAIT ASSESSMENTS
GAIT LEVEL OF ASSIST: SUPERVISED
DISTANCE (FEET): 40

## 2021-08-29 NOTE — ED NOTES
Lab called with critical result of COVID + at 1951. Critical lab result read back to lab.   Dr. Zavaleta notified of critical lab result at 1952.  Critical lab result read back by Dr. Zavaleta.

## 2021-08-29 NOTE — PROGRESS NOTES
Monitor summary per monitor tech report:    SB/SR Rhythm rate 50-80  1 degree/ BBB/ pauses ectopy up to 1.7sec ( Dr. Rubalcava notified.)  .20/.14/.42

## 2021-08-29 NOTE — ED NOTES
Med rec complete per pt family   Allergies reviewed   No abx last 30 days     Family at bedside provided pictures of medications.      Pt says he took everything yesterday evening (8/27/21) except for the meloxicam that he took this morning (8/28/21).

## 2021-08-29 NOTE — ASSESSMENT & PLAN NOTE
ASHVIN 8/28/2021 ~@1600  Not a tPA candidate due to low NIH score, case has been discussed with Dr. Pimentel at ER arrival    CT head, CTA head/neck were nondiagnostic  Permissive hypertension 24 to 72 hours  ASA/statin  PT/OT/ST  Follow-up a.m. lipid panel  Follow-up echo, MRI brain

## 2021-08-29 NOTE — H&P
Hospital Medicine History & Physical Note    Date of Service  8/28/2021    Primary Care Physician  Wilma Guillermo M.D.    Consultants  Neurology (Dr. Pimentel - call back if MRI + for CVA)    Code Status  Full Code    Chief Complaint  Chief Complaint   Patient presents with   • ALOC     pt arrives via [employer] with c/o ALOC. per report family called for ALOC, LKW 1100, EMS states AAOx1 on their arrival, on fire arrival patient AAOx4, mildly confused re: details of todays events. fire reports pt lost wife 2 days ago, also recently had covid approx 2.5 weeks ago. glucose 184 PTA. PIV est.    Expressive aphasia, confusion    History of Presenting Illness  Alfred Erazo is a 85 y.o. male with multiple comorbidities who presented 8/28/2021 with progressive aphasia/word salad, altered mental status first noted by daughter-in-law approximately 4 PM prior to ER arrival.  History obtained from patient's son and daughter-in-law who were at bedside in ER as patient is a poor historian.  Per family member, patient resides with wife who had passed away 4 days prior to ER presentation from complication of COVID-19 pneumonia.  Patient himself has been vaccinated for Covid,  but recently tested positive outpatient on 8/16/2021 --no respiratory distress, had subsequent negative home antigen test prior to ER presentation.  He again tested for Covid in ER today.  Per family, patient was found to be in a state of confusion as well as trouble getting the words out approximately 4 PM.  Daughter-in-law became concerned and EMS was subsequently called.  SBP greater than 160, glucose 173.  CT head and CTA head/neck were nondiagnostic.  Case has been discussed with neurology, Dr. Pimentel., who recommended CVA work-up.  Not a TPA candidate given low NIH score.  Admitted to medicine service for further evaluation and treatment.    ED course: None    I discussed the plan of care with patient, family and bedside RN.    Review of  Systems  Review of Systems   Constitutional: Positive for malaise/fatigue. Negative for chills and fever.   HENT: Positive for hearing loss. Negative for tinnitus.    Eyes: Negative for blurred vision and double vision.   Respiratory: Negative for cough, sputum production and shortness of breath.    Cardiovascular: Negative for chest pain, palpitations and leg swelling.   Gastrointestinal: Negative for heartburn and nausea.   Genitourinary: Negative for dysuria and flank pain.   Musculoskeletal: Negative for falls and myalgias.   Skin: Negative for itching and rash.   Neurological: Positive for tremors and speech change. Negative for dizziness, tingling, sensory change, focal weakness, loss of consciousness, weakness and headaches.        Altered mental status, resolved  Word salad   Endo/Heme/Allergies: Negative for environmental allergies. Does not bruise/bleed easily.   Psychiatric/Behavioral: Negative for depression, substance abuse and suicidal ideas.   All other systems reviewed and are negative.      Past Medical History   has a past medical history of Asthma, BPH (benign prostatic hyperplasia), Diabetes (HCC), Diverticulitis, Esophageal stricture, Hiatal hernia, History of shingles (8/27/2015), Hyperlipidemia (7/8/2015), Hypertension, Hypothyroid, PHN (postherpetic neuralgia) (8/27/2015), Psoriasis (6/14/2017), TIA (transient ischemic attack), and Vitamin D deficiency (8/27/2015).    Surgical History   has a past surgical history that includes cataract extraction with iol; tonsillectomy; and vasectomy.     Family History  family history includes Stroke in his brother and father.    Father had CVA in his 70s  Family history reviewed with patient. There is family history that is pertinent to the chief complaint.     Social History   reports that he has never smoked. He has never used smokeless tobacco. He reports that he does not drink alcohol and does not use drugs.    Allergies  Allergies   Allergen  "Reactions   • Pcn [Penicillins]      \"my joints start to bubble up\"   • Sulfa Drugs      \"my joints start to bubble up\"  \"Its been 40 yrs since I've had either of those\"       Medications  Prior to Admission Medications   Prescriptions Last Dose Informant Patient Reported? Taking?   Blood Glucose Monitoring Suppl Device   No No   Sig: Meter: Dispense glucose meter preferred on patient's insurance.  Sig. Use as directed for blood sugar monitoring.   Loratadine (CLARITIN) 10 MG Cap   No No   Sig: Take 10 mg by mouth every day.   aspirin EC (ECOTRIN) 81 MG Tablet Delayed Response   Yes No   Sig: Take 81 mg by mouth every day.   atorvastatin (LIPITOR) 20 MG Tab   No No   Sig: Take 1 Tab by mouth every day.   cholestyramine (QUESTRAN) 4 g packet   No No   Sig: Take 2-4 g by mouth every day.   clobetasol (TEMOVATE) 0.05 % Cream   No No   Sig: Apply to involved area twice daily for 2 weeks.   cyanocobalamin (VITAMIN B-12) 100 MCG Tab   Yes No   Sig: Take 100 mcg by mouth every day.   finasteride (PROSCAR) 5 MG Tab   No No   Sig: Take 1 Tab by mouth every day.   glimepiride (AMARYL) 4 MG Tab   No No   Sig: Take 1 tablet by mouth every morning.   levothyroxine (SYNTHROID) 75 MCG Tab   No No   Sig: Take 1 Tab by mouth every day.   liothyronine (CYTOMEL) 5 MCG Tab   No No   Sig: TAKE 2 TABLETS BY MOUTH EVERY DAY   tamsulosin (FLOMAX) 0.4 MG capsule   No No   Sig: TAKE 1 CAPSULE BY MOUTH 0.5 HOUR AFTER BREAKFAST   venlafaxine XR (EFFEXOR XR) 37.5 MG CAPSULE SR 24 HR   Yes No   Sig: TK ONE C PO QAM AFTER MEAL   vitamin D (CHOLECALCIFEROL) 1000 Unit Tab   No No   Sig: TAKE 2 TABLETS BY MOUTH EVERY DAY      Facility-Administered Medications: None       Physical Exam  Temp:  [36.6 °C (97.8 °F)] 36.6 °C (97.8 °F)  Pulse:  [75-84] 81  Resp:  [16-28] 18  BP: (126-161)/(71-75) 161/75  SpO2:  [92 %-95 %] 95 %    Physical Exam  Vitals and nursing note reviewed.   Constitutional:       General: He is not in acute distress.     Appearance: " He is not toxic-appearing or diaphoretic.   HENT:      Head: Normocephalic and atraumatic.      Nose: Nose normal.      Mouth/Throat:      Mouth: Mucous membranes are moist.      Pharynx: Oropharynx is clear.   Eyes:      General: No scleral icterus.     Extraocular Movements: Extraocular movements intact.   Cardiovascular:      Rate and Rhythm: Normal rate and regular rhythm.      Pulses: Normal pulses.      Heart sounds: No friction rub.   Pulmonary:      Effort: Pulmonary effort is normal. No respiratory distress.      Breath sounds: No wheezing.   Abdominal:      General: There is no distension.      Palpations: Abdomen is soft.      Tenderness: There is no abdominal tenderness. There is no guarding or rebound.   Musculoskeletal:         General: No swelling or tenderness. Normal range of motion.      Cervical back: Neck supple. No tenderness.   Skin:     General: Skin is warm and dry.      Capillary Refill: Capillary refill takes less than 2 seconds.   Neurological:      General: No focal deficit present.      Mental Status: He is alert. Mental status is at baseline.      Cranial Nerves: No cranial nerve deficit.      Sensory: No sensory deficit.      Motor: No weakness.      Comments: AAO x3 at time of evaluation  No facial droop, no pronator drift  No expressive aphasia   Psychiatric:         Mood and Affect: Mood normal.         Laboratory:  Recent Labs     08/28/21  1705   WBC 10.3   RBC 4.52*   HEMOGLOBIN 14.4   HEMATOCRIT 43.8   MCV 96.9   MCH 31.9   MCHC 32.9*   RDW 49.5   PLATELETCT 228   MPV 11.1     Recent Labs     08/28/21  1705   SODIUM 138   POTASSIUM 4.2   CHLORIDE 103   CO2 18*   GLUCOSE 173*   BUN 10   CREATININE 0.88   CALCIUM 9.4     Recent Labs     08/28/21  1705   ALTSGPT 38   ASTSGOT 33   ALKPHOSPHAT 81   TBILIRUBIN 0.7   GLUCOSE 173*     Recent Labs     08/28/21  1705   APTT 27.8   INR 1.10     No results for input(s): NTPROBNP in the last 72 hours.      Recent Labs     08/28/21  1705    TROPONINT 30*       Imaging:  CT-CTA NECK WITH & W/O-POST PROCESSING   Final Result      1.  No focal stenosis, dissection or occlusion of the cervical carotid or vertebral arteries.   2.  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      CT-CTA HEAD WITH & W/O-POST PROCESS   Final Result      1.  No intracranial aneurysm, focal stenosis or abrupt vessel cut off.   2.  Hypoplastic distal LEFT vertebral artery.         CT-HEAD W/O   Final Result         NO ACUTE ABNORMALITIES ARE NOTED ON CT SCAN OF THE HEAD.      Findings are consistent with atrophy.  Decreased attenuation in the periventricular white matter likely indicates microvascular ischemic disease.      EC-ECHOCARDIOGRAM COMPLETE W/O CONT    (Results Pending)   MR-BRAIN-W/O    (Results Pending)       X-Ray:  I have personally reviewed the images and compared with prior images.  EKG:  I have personally reviewed the images and compared with prior images.    Assessment/Plan:  I anticipate this patient is appropriate for observation status at this time.    * Expressive aphasia  Assessment & Plan  LKN 2021 ~@1600  Not a tPA candidate due to low NIH score, case has been discussed with Dr. Pimentel at ER arrival    CT head, CTA head/neck were nondiagnostic  Permissive hypertension 24 to 72 hours  ASA/statin  PT/OT/ST  Follow-up a.m. lipid panel  Follow-up echo, MRI brain    TIA (transient ischemic attack)  Assessment & Plan  Suspected, r/o CVA  As noted in expressive aphasia    COVID-19  Assessment & Plan  RECOVERED  He was vaccinated early  [id number]  Outpatient positive COVID on 2021 -- no respiratory distress  -reported having negative antigen test prior to ER arrival  -wife  of complication from COVID-19 2021    COVID positive again  -- but on RA and appears to have been recovered  No role for decadron or Remdesivir      Elevated troponin  Assessment & Plan  Minimal -- 30 -- cont trending  No chest pain/SOB  NOT ACS    Failure to  thrive in adult  Assessment & Plan  Sadden by recent passing of wife due to COVID 4-days prior to admission 8/24    GERD (gastroesophageal reflux disease)  Assessment & Plan  PPI    B12 deficiency  Assessment & Plan  Supplement    ACP (advance care planning)  Assessment & Plan  Plan of care discussed with patient, patient's son and patient's daughter-in-law who were at bedside in ER --work-up for CVA, and admit to hospital    Patient has POLST --noting DNR/DNI --1 inquire to confirm status, patient stated he wishes to be resuscitated fully at this time  Changed to full CODE STATUS  ACP: 18 minutes    Age-related physical debility  Assessment & Plan  PT/OT    Benign prostatic hyperplasia- (present on admission)  Assessment & Plan  Flomax, finasteride    Bilateral carotid artery disease (HCC)- (present on admission)  Assessment & Plan  Known  No high grade stenosis seen on CTA head/neck  ASA/statin    Type 2 diabetes mellitus without complication, without long-term current use of insulin (HCC)- (present on admission)  Assessment & Plan  glargine + ISS while inpt    Vitamin D deficiency- (present on admission)  Assessment & Plan  supplement    Hypothyroidism- (present on admission)  Assessment & Plan  Continue home regimen    Hyperlipidemia- (present on admission)  Assessment & Plan  Statin  Target LDL < 70      VTE prophylaxis: SCDs/TEDs

## 2021-08-29 NOTE — ED TRIAGE NOTES
Chief Complaint   Patient presents with   • ALOC     pt arrives via [employer] fire with c/o ALOC. per report family called for ALOC, LKW 1100, EMS states AAOx1 on their arrival, on fire arrival patient AAOx4, mildly confused re: details of todays events. fire reports pt lost wife 2 days ago, also recently had covid approx 2.5 weeks ago. glucose 184 PTA. PIV est.      On arrival pt AAOX4. Pt speech clear, fluent, smile symmetrical, denies pain, denies N/T, denies weakness. Pt states he does recall having difficulty answering specific detailed questions for his granddaughter today upon waking from a nap. On arrival answering everything appropriately. Grasps strong, equal bilat.

## 2021-08-29 NOTE — DISCHARGE INSTRUCTIONS
Discharge Instructions    Discharged to home by car with relative. Discharged via wheelchair, hospital escort: Yes.  Special equipment needed: Not Applicable    Be sure to schedule a follow-up appointment with your primary care doctor or any specialists as instructed.     Discharge Plan:   Diet Plan: Discussed  Activity Level: Discussed  Confirmed Follow up Appointment: Patient to Call and Schedule Appointment  Confirmed Symptoms Management: Discussed  Medication Reconciliation Updated: Yes    I understand that a diet low in cholesterol, fat, and sodium is recommended for good health. Unless I have been given specific instructions below for another diet, I accept this instruction as my diet prescription.   Other diet: regular    Special Instructions: None    · Is patient discharged on Warfarin / Coumadin?   No     Depression / Suicide Risk    As you are discharged from this [facility] facility, it is important to learn how to keep safe from harming yourself.    Recognize the warning signs:  · Abrupt changes in personality, positive or negative- including increase in energy   · Giving away possessions  · Change in eating patterns- significant weight changes-  positive or negative  · Change in sleeping patterns- unable to sleep or sleeping all the time   · Unwillingness or inability to communicate  · Depression  · Unusual sadness, discouragement and loneliness  · Talk of wanting to die  · Neglect of personal appearance   · Rebelliousness- reckless behavior  · Withdrawal from people/activities they love  · Confusion- inability to concentrate     If you or a loved one observes any of these behaviors or has concerns about self-harm, here's what you can do:  · Talk about it- your feelings and reasons for harming yourself  · Remove any means that you might use to hurt yourself (examples: pills, rope, extension cords, firearm)  · Get professional help from the community (Mental Health, Substance Abuse, psychological  counseling)  · Do not be alone:Call your Safe Contact- someone whom you trust who will be there for you.  · Call your local CRISIS HOTLINE 240-3794 or 690-449-9755  · Call your local Children's Mobile Crisis Response Team [place] (241) 945-3878 or www.mafringue.com  · Call the toll free National Suicide Prevention Hotlines   · National Suicide Prevention Lifeline 729-552-DLRP (1999)  · National Hope Line Network 800-SUICIDE (566-3275)    Discharge Instructions per MARY Willis    1.  Review your discharge Medication Reconciliation form. You may be provided with new prescriptions or changes to previously prescribed medications.  Be sure to take all of your prescribed medications exactly as directed.  Further questions can be directed to your local pharmacist or primary care provider (PCP).    SIGNIFICANT CHANGES:  Aspirin 81 mg daily.  After 21 days discontinue aspirin and continue on Plavix alone.  For the first 21 days aspirin and Plavix will be taken together.    Plavix 75 mg daily forever  Cipro and Flagyl or antibiotics for the next 7 days    2. Follow-up with your PCP.  An appointment may have already been scheduled for you.  Please review your discharge paperwork and locate this information.  Please be sure to call your PCP to cancel if you are unable to make this appointment or require schedule changes.  It is critical to to follow-up with your PCP to review hospital records and ensure any further diagnostic work-up, prescription refills, or referrals.     3. ACTIVITIES: After discharge from the hospital, you may resume routine activities including walking and going u/down stairs. However, no strenuous activity. For further clarification, call your PCP     4. DRIVING: You may drive whenever you are off pain medications and are able to perform the activities needed to drive, i.e. turning, bending, twisting, etc.     5. BOWEL FUNCTION: A few patients, after hospitalizations, will develop bowel  irregularity. You could also experience constipation due to pain medication and decreased activity level. If you feel this is occurring, please take an over-the-counter laxative (Milk of Magnesia, Ex-Lax, Senokot, etc.) until the problem has resolved.     6. PAIN MEDICATION: You may be given a prescription for pain medication at discharge. Please take these as directed. It is important to remember not to take medications on an empty stomach as this may cause nausea/vomiting.  You can transition from the prescription-strength pain medication to over-the-counter medications as your pain improves, such as tylenol if you are medically cleared to do so.     7. LABS/IMAGING: You may be asked to complete labs or imaging prior to your next provider appointment. If you use [facility], a paper order is not required. If you use another lab or imaging center, be sure you have your order requisition form at discharge. Contact scheduling or use [employer] to arrange a lab appointment.    8. BLOOD PRESSURE: Measure your blood pressure and pulse every day and write it down on a piece of paper or record it in a smart phone alfredo.  Bring this to your next PCP appointment.  If you feel dizzy, take your blood pressure and pulse and call your PCP.    9. RETURN TO THE ER: Return to the ER if you develop recurrent chest pain, shortness of breath, bloody sputum, fever of 101.5 or greater, intractable nausea or vomiting, blood in the vomit or urine, intractable pain, new onset inability to ambulate, focal motor weakness, acute vision disturbance, acute speech disturbance, intractable abdominal pain, diffuse watery diarrhea or any other worrisome symptoms.

## 2021-08-29 NOTE — ED NOTES
ERP at bedside. Pt ambulates to restroom and back to room with steady, upright gait with RN standby. Provided with urine cup to provide sample.

## 2021-08-29 NOTE — CARE PLAN
Problem: Knowledge Deficit - Stroke Education  Goal: Patient's knowledge of stroke and risk factors will improve  Outcome: Progressing     Problem: Neuro Status  Goal: Neuro status will remain stable or improve  Outcome: Progressing     Problem: Hemodynamic Monitoring  Goal: Patient's hemodynamics, fluid balance and neurologic status will be stable or improve  Outcome: Progressing     Problem: Self Care  Goal: Patient will have the ability to perform ADLs independently or with assistance (bathe, groom, dress, toilet and feed)  Outcome: Progressing     Problem: Knowledge Deficit - Standard  Goal: Patient and family/care givers will demonstrate understanding of plan of care, disease process/condition, diagnostic tests and medications  Outcome: Progressing     The patient is Stable - Low risk of patient condition declining or worsening    Shift Goals  Clinical Goals: maintian neurologic stability  Patient Goals: mri    Progress made toward(s) clinical / shift goals: pt able to make needs known, pt understands POC and safety precautions, pt able to perform ADLs with minimal assistance    Patient is not progressing towards the following goals:

## 2021-08-29 NOTE — PROGRESS NOTES
Pt brought to floor by this RN and Zoll. Pt alert and oriented. VSS on arrival. Pt with bilateral [place] and hearing aids. Pt able to ambulate to bathroom and bed with steady gait. No reports of pain. Admission completed. Pt refusing long acting insulin. Pt educated on reasoning of sliding scale and basal insulin. Pt continues to decline. Safety/fall precautions, call bell use, NIHSS/neuro checks and POC explained to pt.

## 2021-08-29 NOTE — ED NOTES
Gina RN at bedside for placement of new PIV for contrast CT. Pt resting in bed, NADN, VSS, call light in reach.

## 2021-08-29 NOTE — CONSULTS
"Consults   [facility] Neurology Consult:    Referring Physician: PEYTON Walsh    Reason for consultation: Transient speech difficulty    HPI: Alfred Erazo is a 85 y.o. male with multiple vascular nancy factors presenting to the hospital for transient speech difficulties that resolved upon presentation. He was recently diagnosed with covid. On presentation denies any weakness, sensory loss, headache, vertigo, nausea problems with gait or other neurological symptoms. Ez any chest pain or SOB at present.    ROS:     As above. All other systems reviewed and are negative.    Past Medical History:    has a past medical history of Asthma, BPH (benign prostatic hyperplasia), Diabetes (HCC), Diverticulitis, Esophageal stricture, Hiatal hernia, History of shingles (8/27/2015), Hyperlipidemia (7/8/2015), Hypertension, Hypothyroid, PHN (postherpetic neuralgia) (8/27/2015), Psoriasis (6/14/2017), TIA (transient ischemic attack), and Vitamin D deficiency (8/27/2015). He also has no past medical history of Cancer (HCC).    FHx:  family history includes Stroke in his brother and father.    SHx:   reports that he has never smoked. He has never used smokeless tobacco. He reports that he does not drink alcohol and does not use drugs.    Allergies:  Allergies   Allergen Reactions   • Pcn [Penicillins]      \"my joints start to bubble up\"   • Sulfa Drugs      \"my joints start to bubble up\"  \"Its been 40 yrs since I've had either of those\"       Medications:    Current Facility-Administered Medications:   •  clopidogrel (PLAVIX) tablet 75 mg, 75 mg, Oral, DAILY, SANYA Walsh.  •  Allow for permissive hypertension: SBP up to 220 mmHg/DBP to 120 mmHg; If SBP greater than 220 mmHg or DBP greater than 120 mmHg administer PRN antihypertensive medications., , Other, PHARMACY TO DOSE, Mike Jackson M.D.  •  senna-docusate (PERICOLACE or SENOKOT S) 8.6-50 MG per tablet 2 Tablet, 2 Tablet, Oral, BID **AND** polyethylene glycol/lytes " (MIRALAX) PACKET 1 Packet, 1 Packet, Oral, QDAY PRN **AND** magnesium hydroxide (MILK OF MAGNESIA) suspension 30 mL, 30 mL, Oral, QDAY PRN **AND** bisacodyl (DULCOLAX) suppository 10 mg, 10 mg, Rectal, QDAY PRN, Mike Jackson M.D.  •  acetaminophen (Tylenol) tablet 650 mg, 650 mg, Oral, Q6HRS PRN, Mike Jackson M.D.  •  labetalol (NORMODYNE/TRANDATE) injection 10 mg, 10 mg, Intravenous, Q10 MIN PRN, Mike Jackson M.D.  •  hydrALAZINE (APRESOLINE) injection 10 mg, 10 mg, Intravenous, Q2HRS PRN, Mike Jackson M.D.  •  ondansetron (ZOFRAN) syringe/vial injection 4 mg, 4 mg, Intravenous, Q4HRS PRN, Mike Jackson M.D.  •  ondansetron (ZOFRAN ODT) dispertab 4 mg, 4 mg, Oral, Q4HRS PRN, Mike Jackson M.D.  •  insulin glargine (Semglee) injection, 10 Units, Subcutaneous, Q EVENING **AND** insulin lispro (AdmeLOG) injection, 1-6 Units, Subcutaneous, Q6HRS **AND** POC blood glucose manual result, , , Q6H **AND** NOTIFY MD and PharmD, , , Once **AND** glucose 4 g chewable tablet 16 g, 16 g, Oral, Q15 MIN PRN **AND** dextrose 50% (D50W) injection 50 mL, 50 mL, Intravenous, Q15 MIN PRN, Mike Jackson M.D.  •  atorvastatin (LIPITOR) tablet 40 mg, 40 mg, Oral, Q EVENING, Mike Jackson M.D., 40 mg at 08/28/21 2055  •  aspirin EC (ECOTRIN) tablet 81 mg, 81 mg, Oral, DAILY, Mike Jackson M.D., 81 mg at 08/29/21 0622  •  cyanocobalamin (VITAMIN B-12) tablet 100 mcg, 100 mcg, Oral, DAILY, Mike Jackson M.D., 100 mcg at 08/29/21 0623  •  finasteride (PROSCAR) tablet 5 mg, 5 mg, Oral, DAILY, Mike Jackson M.D., 5 mg at 08/29/21 0623  •  levothyroxine (SYNTHROID) tablet 75 mcg, 75 mcg, Oral, QDAY, Mike Jackson M.D., 75 mcg at 08/29/21 0622  •  liothyronine (CYTOMEL) tablet 5 mcg, 5 mcg, Oral, QAM AC, Mike Jackson M.D., 5 mcg at 08/29/21 0622  •  omeprazole (PRILOSEC) capsule 20 mg, 20 mg, Oral, DAILY, Mike Jackson M.D., 20 mg at 08/29/21 0623  •  tamsulosin (FLOMAX) capsule 0.4 mg, 0.4 mg, Oral, AFTER BREAKFAST, Mike Jackson M.D.  •   "venlafaxine XR (EFFEXOR XR) capsule 37.5 mg, 37.5 mg, Oral, QDAY with Breakfast, Mike Jackson M.D.  •  vitamin D (cholecalciferol) tablet 1,000 Units, 1,000 Units, Oral, DAILY, Mike Jackson M.D.    Vitals:   Vitals:    08/28/21 2100 08/28/21 2317 08/29/21 0244 08/29/21 0748   BP: (!) 161/75 (!) 163/77 142/67 140/97   Pulse: 81 71 75 67   Resp: 18 18 18 18   Temp: 36.6 °C (97.8 °F) 36.6 °C (97.9 °F) 36.8 °C (98.2 °F) 36.2 °C (97.1 °F)   TempSrc: Temporal Temporal Temporal Temporal   SpO2: 95% 94% 95% 93%   Weight: 77.8 kg (171 lb 8.3 oz)      Height: 1.778 m (5' 10\")          Labs:  Lab Results   Component Value Date/Time    PROTHROMBTM 13.9 08/28/2021 05:05 PM    INR 1.10 08/28/2021 05:05 PM      Lab Results   Component Value Date/Time    WBC 8.1 08/29/2021 02:46 AM    RBC 3.93 (L) 08/29/2021 02:46 AM    HEMOGLOBIN 12.6 (L) 08/29/2021 02:46 AM    HEMATOCRIT 36.9 (L) 08/29/2021 02:46 AM    MCV 93.9 08/29/2021 02:46 AM    MCH 32.1 08/29/2021 02:46 AM    MCHC 34.1 08/29/2021 02:46 AM    MPV 11.0 08/29/2021 02:46 AM    NEUTSPOLYS 62.40 08/29/2021 02:46 AM    LYMPHOCYTES 24.60 08/29/2021 02:46 AM    MONOCYTES 9.90 08/29/2021 02:46 AM    EOSINOPHILS 2.50 08/29/2021 02:46 AM    BASOPHILS 0.40 08/29/2021 02:46 AM      Lab Results   Component Value Date/Time    SODIUM 140 08/29/2021 02:46 AM    POTASSIUM 3.8 08/29/2021 02:46 AM    CHLORIDE 106 08/29/2021 02:46 AM    CO2 21 08/29/2021 02:46 AM    GLUCOSE 246 (H) 08/29/2021 02:46 AM    BUN 9 08/29/2021 02:46 AM    CREATININE 0.87 08/29/2021 02:46 AM      Lab Results   Component Value Date/Time    CHOLSTRLTOT 117 08/29/2021 02:46 AM    LDL 53 08/29/2021 02:46 AM    HDL 35 (A) 08/29/2021 02:46 AM    TRIGLYCERIDE 143 08/29/2021 02:46 AM       Lab Results   Component Value Date/Time    ALKPHOSPHAT 68 08/29/2021 02:46 AM    ASTSGOT 25 08/29/2021 02:46 AM    ALTSGPT 30 08/29/2021 02:46 AM    TBILIRUBIN 0.6 08/29/2021 02:46 AM      No results found for: TSH  Lab Results   Component " Value Date/Time    FREET4 1.07 08/11/2020 10:30 AM    FREET4 0.90 01/16/2020 11:00 AM     Lab Results   Component Value Date/Time    FREET3 2.95 08/11/2020 10:30 AM    FREET3 3.95 01/16/2020 11:00 AM       Imaging/Testing:  Small punctate infarct left posterior parietal lobe. Chronic small infarcts centrum semioval and cerebellum/    Physical Exam:     General: NAD  Cardio: Normal S1/S2. No peripheral edema.   Pulm: CTAX2. No respiratory distress.   Skin: Warm, dry, no rashes or lesions.  Psychiatric: Appropriate affect. No active psychosis.  HEENT: Atraumatic head.  Abdomen: Soft, non tender.     Neurologic:  Mental Status: AAOx4. Able to follow commands/cross midline. Speech fluent/nondysarthric. Language functions intact. No neglect/apraxia.  Cranial Nerves: PERRL. EOMi. Face symmetric, palate/tongue midline. Visual fields full to confrontation. Facial sensation intact.   Motor: Normal muscle tone and bulk. Strength is 5/5 throughout. No abnormal movements.  Reflexes: deferred  Coordination: Finger-nose/heel-shin without ataxia or dysmetria.   Sensation: Normal  soft touch.  Gait/Station: deferred due to preference preference    Assessment/Plan:    Alfred Erazo is a 85 y.o. male with multiple vascular nancy factors presenting to the hospital for transient speech difficulties that resolved upon presentation. Stroke mechanism unclear at present. DDx: Small vessel, embolic, covid-less likely.    Plan:  - DATP x 21 days then monotherapy w/ ASA daily  - Statin  - Follow up TTE   - Would benefit from cardiac monitoring as an outpatient   - Stroke bridge clinic      Plan discussed with consulting physician and patient's nurse.       Lorne Pimentel M.D., Diplomat of the American Board of Psychiatry and Neurology  [facility]  Acute Neurology Consultant

## 2021-08-29 NOTE — THERAPY
"Speech Language Pathology   Clinical Swallow Evaluation     Patient Name: Aflred Erazo  AGE:  85 y.o., SEX:  male  Medical Record #: [id number]  Today's Date: 8/29/2021       Precautions: Fall Risk, Swallow Precautions ( See Comments)  Comments: reflux precautions    Assessment    Patient is 85 y.o. male admitted 8/28/21 for ALOC, new onset of aphasia.     MRI of brain: Punctate acute infarct in the left posterior frontal lobe. Moderate diffuse cerebral substance loss. Advanced microangiopathic ischemic change versus demyelination or gliosis. 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 Chronic pansinusitis with postsurgical changes of the bilateral maxillary sinuses.     No current CXR, on room air.     Patient seen on this date for a clinical swallow evaluation. Per RN, pt on modified diet of purees due to reported hx of esophageal stricture. Upon entry, pt very [place] but able to respond appropriately when clinician speaking low and slow. Pt reported he had \"esophageal stretching\" 4 months ago and has been consuming items consistent with regular diet without difficulty. Son arrived after session and confirmed history.    Oral motor examination was unremarkable. Missing upper teeth and wears denture plate at baseline which are not at bedside. Patient denied difficulty chewing without them except steak.     PO trials were administered and consisted of ice chips, thin liquids, pudding, and soft and regular solids in mixed consistency form. Onset of swallow trigger was timely. Patient had audible gurgling sound x2 from upper esophagus concerning for esophageal dysmotility or possible reflux. Otherwise, presented with no overt s/sx of aspiration. Vocal quality and breath sounds remained clear. Mastication timely and functional for regular solids.    Education provided to patient regarding current status and SLP recs.     Plan    Recommend a regular/thin liquid diet. SLP following 1-2 times likely to " ensure diet tolerance.     Consider placing orders for a cognitive-linguistic evaluation with any concerns for changes in speech or mentation.     Recommend Speech Therapy 2 times per week until therapy goals are met for the following treatments:  Dysphagia Training and Patient / Family / Caregiver Education.    Discharge Recommendations: Anticipate that the patient will have no further speech therapy needs after discharge from the hospital  Objective       08/29/21 0925   Vitals   O2 Delivery Device None - Room Air   Prior Level Of Function   Communication Within Functional Limits   Swallow Within Functional Limits   Dentition Poor Quality    Dentures Uppers  (not at bedside)   Hearing Impaired Both Ears   Hearing Aid Right;Left   Vision Wears Corrective Lenses   Patient's Primary Language English   Oral Motor Eval    Is Patient Able to Complete Oral Motor Eval Yes, Within Normal Limits   Laryngeal Function   Voice Quality Within Functional Limits   Volutional Cough Within Functional Limits   Excursion Upon Swallow Complete   Oral Food Presentation   Ice Chips Within Functional Limits   Single Swallow Thin (0) Within Functional Limits   Serial Swallow Thin (0) Within Functional Limits   Minced & Moist (5) - (Dysphagia II) Within Functional Limits   Soft & Bite-Sized (6) - (Dysphagia III) Within Functional Limits   Regular (7) Within Functional Limits   Self Feeding Independent   Dysphagia Strategies / Recommendations   Compensatory Strategies Monitor During Meals;Head of Bed 90 Degrees During Eating / Drinking;Single Sips / Bites  (reflux precautions)   Diet / Liquid Recommendation Regular (7);Thin (0)   Medication Administration  Whole with Liquid Wash   Therapy Interventions Dysphagia Therapy By Speech Language Pathologist;Pharyngeal / Laryngeal Exercises   Short Term Goals   Short Term Goal # 1 Patient will consume a RG7/TN0 diet with no overt s/sx of aspiration.

## 2021-08-29 NOTE — ASSESSMENT & PLAN NOTE
Lacunar CVA  Neuro following  CTA H & N neg for LVO  Lipid panel - LDL at goal  PT/OT/SLP  DAPT x 21 D then monotherapy  LKN time 11 a.m. 8/29 - allow permissive htn per neuro (24-48)  Glu control

## 2021-08-29 NOTE — PROGRESS NOTES
4 Eyes Skin Assessment Completed by Mitzi RN and CINTHIA Mliton.    Head WDL  Ears WDL  Nose WDL  Mouth WDL  Neck WDL  Breast/Chest WDL  Shoulder Blades WDL  Spine WDL  (R) Arm/Elbow/Hand WDL  (L) Arm/Elbow/Hand WDL  Abdomen WDL  Groin WDL  Scrotum/Coccyx/Buttocks WDL  (R) Leg WDL  (L) Leg WDL  (R) Heel/Foot/Toe WDL  (L) Heel/Foot/Toe WDL          Devices In Places Tele Box and Pulse Ox      Interventions In Place N/A    Possible Skin Injury No    Pictures Uploaded Into Epic N/A  Wound Consult Placed N/A  RN Wound Prevention Protocol Ordered No

## 2021-08-29 NOTE — DISCHARGE SUMMARY
Discharge Summary    CHIEF COMPLAINT ON ADMISSION  Chief Complaint   Patient presents with   • ALOC     pt arrives via [employer] fire with c/o ALOC. per report family called for ALOC, LKW 1100, EMS states AAOx1 on their arrival, on fire arrival patient AAOx4, mildly confused re: details of todays events. fire reports pt lost wife 2 days ago, also recently had covid approx 2.5 weeks ago. glucose 184 PTA. PIV est.        Reason for Admission  ems     Admission Date  8/28/2021    CODE STATUS  DNAR/DNI    HPI & HOSPITAL COURSE  This is a 85 y.o. male here with acute CVA and diverticulitis.  Please see the dictated H&P for complete details.  In short, patient has been suffering from COVID-19 pneumonia and lost his wife 4 days prior to arrival (due to Covid pneumonia).  Who had altered mental status.  Family called EMS.  He was not given TPA due to low NIH score.    CT CTA of the head and neck negative for LVO.  MRI brain was completed and positive for tiny acute lacunar infarct in the left posterior frontal lobe.  There were chronic bilateral centrum semiovale and left cerebellar infarcts noted.    Neurology was consulted and recommended dual antiplatelet therapy for 21 days followed by monotherapy alone with aspirin.  He is currently taking a statin with goal LDL.  Echocardiogram was negative for acute acute pathology such as embolism, heart failure, or valvular disease.  Bubble study was not performed.    Incidentally, patient complained of suprapubic and left lower quadrant abdominal pain and diarrhea for the last 3 days.  CT scan of the abdomen and pelvis without contrast revealed mild diverticulitis but no evidence of abscess or perforation.    CODE STATUS discussions ensued as the patient has a POLST form that indicates comfort care measures only.  However he has been receiving selective intervention.  I discussed this with the patient as well as his daughter.  He has mild cognitive impairment but is  capacitated for medical decision-making.  I have updated his POLST form to include selective intervention, CODE STATUS is DNR/DNI, and no aggressive interventions such as feeding tubes or IV fluid should he have a serious medical deterioration.    Patient plans to live with his daughter for the next 2 weeks and then alternate with his son.  He has been independent with ambulation around the room.  Is been evaluated by therapies and cleared for discharge home.  However, as is cognitive impairment worsens he will likely need to progress more toward comfort care and 24/7 care.  I discussed this with the daughter and she is making arrangements with the son.    At discharge he will be given antiplatelet therapy as mentioned above, Cipro and Flagyl.  Unfortunately he has a penicillin allergy and is not a good candidate for Augmentin.  I discussed potential side effects with the daughter and to return to the ER should he experience adverse effects to consider switching to Augmentin.    Finally, he may benefit from a cardiac event monitor.  However, I suspect the may be a poor candidate for anticoagulation given his advanced age should he develop atrial fibrillation.    Therefore, he is discharged in good and stable condition to home with close outpatient follow-up.    The patient recovered much more quickly than anticipated on admission.    Discharge Date  8/29/2021    FOLLOW UP ITEMS POST DISCHARGE  [facility] follow-up    DISCHARGE DIAGNOSES  Principal Problem:    CVA (cerebral vascular accident) (HCC) POA: Yes  Active Problems:    COVID-19 POA: Yes    Hyperlipidemia POA: Yes      Overview: Tolerating statin w/o side effects    Hypothyroidism POA: Yes      Overview: Has been taking cytomel and levothryoxine    Vitamin D deficiency POA: Yes      Overview: Currently taking vit d3    Type 2 diabetes mellitus without complication, without long-term current use of insulin (HCC) POA: Yes      Overview: Denies c/o     Bilateral carotid artery disease (HCC) POA: Yes    Benign prostatic hyperplasia POA: Yes      Overview: Dr JAMES      4/28    Age-related physical debility POA: Yes    ACP (advance care planning) POA: Yes    B12 deficiency POA: Yes    GERD (gastroesophageal reflux disease) POA: Yes    Failure to thrive in adult POA: Yes    Elevated troponin POA: Yes    DNR (do not resuscitate) (Chronic) POA: Yes  Resolved Problems:    Expressive aphasia POA: Yes      FOLLOW UP  Wilma Guillermo M.D.  [address] [facility]  [address]  [facility] [address]  [phone]    Schedule an appointment as soon as possible for a visit  As needed      MEDICATIONS ON DISCHARGE     Medication List      Start taking these medications      Instructions   ciprofloxacin 500 MG Tabs  Commonly known as: CIPRO   Take 1 Tablet by mouth every 12 hours.  Dose: 500 mg     clopidogrel 75 MG Tabs  Commonly known as: PLAVIX   Take 1 Tablet by mouth every day.  Dose: 75 mg     metroNIDAZOLE 500 MG Tabs  Commonly known as: FLAGYL   Take 1 Tablet by mouth every 8 hours.  Dose: 500 mg        Continue taking these medications      Instructions   aspirin EC 81 MG Tbec  Commonly known as: ECOTRIN   Take 81 mg by mouth every day.  Dose: 81 mg     atorvastatin 20 MG Tabs  Commonly known as: LIPITOR   Take 1 Tab by mouth every day.  Dose: 20 mg     Blood Glucose Monitoring Suppl Lyndsey   Meter: Dispense glucose meter preferred on patient's insurance.  Sig. Use as directed for blood sugar monitoring.     cyanocobalamin 100 MCG Tabs  Commonly known as: VITAMIN B-12   Take 100 mcg by mouth every day.  Dose: 100 mcg     finasteride 5 MG Tabs  Commonly known as: PROSCAR   Take 1 Tab by mouth every day.  Dose: 5 mg     glimepiride 4 MG Tabs  Commonly known as: AMARYL   Take 1 tablet by mouth every morning.  Dose: 4 mg     levothyroxine 75 MCG Tabs  Commonly known as: SYNTHROID   Take 1 Tab by mouth every day.  Dose: 75 mcg     liothyronine 5 MCG Tabs  Commonly known as: CYTOMEL    "TAKE 2 TABLETS BY MOUTH EVERY DAY     meloxicam 15 MG tablet  Commonly known as: MOBIC   Take 15 mg by mouth every day.  Dose: 15 mg     omeprazole 20 MG delayed-release capsule  Commonly known as: PRILOSEC   Take 20 mg by mouth every day.  Dose: 20 mg     tamsulosin 0.4 MG capsule  Commonly known as: FLOMAX   TAKE 1 CAPSULE BY MOUTH 0.5 HOUR AFTER BREAKFAST     venlafaxine XR 37.5 MG Cp24  Commonly known as: EFFEXOR XR   TK ONE C PO QAM AFTER MEAL     vitamin D 1000 Unit Tabs  Commonly known as: Cholecalciferol   TAKE 2 TABLETS BY MOUTH EVERY DAY            Allergies  Allergies   Allergen Reactions   • Pcn [Penicillins]      \"my joints start to bubble up\"   • Sulfa Drugs      \"my joints start to bubble up\"  \"Its been 40 yrs since I've had either of those\"       DIET  Orders Placed This Encounter   Procedures   • Diet Order Diet: Regular     Standing Status:   Standing     Number of Occurrences:   1     Order Specific Question:   Diet:     Answer:   Regular [1]       ACTIVITY  As tolerated.  Weight bearing as tolerated    CONSULTATIONS  Neuro    LABORATORY  Lab Results   Component Value Date    SODIUM 140 08/29/2021    POTASSIUM 3.8 08/29/2021    CHLORIDE 106 08/29/2021    CO2 21 08/29/2021    GLUCOSE 246 (H) 08/29/2021    BUN 9 08/29/2021    CREATININE 0.87 08/29/2021        Lab Results   Component Value Date    WBC 8.1 08/29/2021    HEMOGLOBIN 12.6 (L) 08/29/2021    HEMATOCRIT 36.9 (L) 08/29/2021    PLATELETCT 209 08/29/2021        Total time of the discharge process exceeds 38 minutes.  "

## 2021-08-29 NOTE — THERAPY
Physical Therapy   Initial Evaluation     Patient Name: Alfred Erazo  Age:  85 y.o., Sex:  male  Medical Record #: [id number]  Today's Date: 8/29/2021     Precautions  Precautions: Fall Risk  Comments: reflux precautions    Assessment  Patient is 85 y.o. male with a diagnosis of TIA following admit for transient speech difficulties. Patient states symptoms have since resolved.  Patient demonstrates good functional mobility, strength and balance.  Ambulatory without AD inside hospital room without noted loss of balance.  No further skilled acute physical therapy needs at this time.  Patient plans to stay a few weeks with adult son or daughter following discharge from hospital as he states his spouse passed away last week.  Plan    Recommend Physical Therapy for Evaluation only     DC Equipment Recommendations: None  Discharge Recommendations: Anticipate that the patient will have no further physical therapy needs after discharge from the hospital       Objective       08/29/21 1148   Prior Living Situation   Prior Services None   Housing / Facility 1 Story House   Steps Into Home 2   Steps In Home 0   Equipment Owned None   Lives with - Patient's Self Care Capacity Alone and Able to Care For Self   Comments Plans to stay with daughter or son over the next few weeks - spouse passed away last week   Prior Level of Functional Mobility   Bed Mobility Independent   Transfer Status Independent   Ambulation Independent   Distance Ambulation (Feet)   (community)   Assistive Devices Used None   Stairs Independent   Gait Analysis   Gait Level Of Assist Supervised   Assistive Device None   Distance (Feet) 40   # of Times Distance was Traveled 1   Vision Deficits Impacting Mobility Denies   Bed Mobility    Supine to Sit Independent   Sit to Supine Independent   Scooting Independent   Functional Mobility   Sit to Stand Independent   Bed, Chair, Wheelchair Transfer Independent   Transfer Method Stand Step   Mobility Ambulation 2 laps  around hospital room   Patient / Family Goals    Patient / Family Goal #1 To go home

## 2021-08-29 NOTE — ED NOTES
Patient ambulated to restroom with steady, upright gait and back to cart. Pt AAOx4, speech clear, fluent, denies complaints. Neuro assessment unchanged. Patient remains on monitoring, call light within reach, family at bedside.

## 2021-08-29 NOTE — ASSESSMENT & PLAN NOTE
Plan of care discussed with patient, patient's son and patient's daughter-in-law who were at bedside in ER --work-up for CVA, and admit to hospital    Patient has POLST --noting DNR/DNI --1 inquire to confirm status, patient stated he wishes to be resuscitated fully at this time  Changed to full CODE STATUS  ACP: 18 minutes

## 2021-08-29 NOTE — ASSESSMENT & PLAN NOTE
RECOVERED  He was vaccinated early  [id number]  Outpatient positive COVID on 2021 -- no respiratory distress  -reported having negative antigen test prior to ER arrival  -wife  of complication from COVID-19 2021    COVID positive again  -- but on RA and appears to have been recovered  No role for decadron or Remdesivir

## 2021-08-29 NOTE — CARE PLAN
The patient is Stable - Low risk of patient condition declining or worsening    Shift Goals  Clinical Goals: maintian neurologic stability  Patient Goals: mri    Progress made toward(s) clinical / shift goals:    Problem: Hemodynamic Monitoring  Goal: Patient's hemodynamics, fluid balance and neurologic status will be stable or improve  Outcome: Progressing     Problem: Risk for Aspiration  Goal: Patient's risk for aspiration will be absent or decrease  Outcome: Progressing     Problem: Urinary Elimination  Goal: Establish and maintain regular urinary output  Outcome: Progressing       Patient is not progressing towards the following goals:

## 2021-08-31 ENCOUNTER — TELEMEDICINE (OUTPATIENT)
Dept: INTERNAL MEDICINE | Facility: IMAGING CENTER | Age: 86
End: 2021-08-31
Payer: MEDICARE

## 2021-08-31 DIAGNOSIS — Z09 HOSPITAL DISCHARGE FOLLOW-UP: Primary | ICD-10-CM

## 2021-08-31 DIAGNOSIS — U07.1 COVID-19: ICD-10-CM

## 2021-08-31 DIAGNOSIS — I63.9 CEREBROVASCULAR ACCIDENT (CVA), UNSPECIFIED MECHANISM (HCC): ICD-10-CM

## 2021-08-31 DIAGNOSIS — K57.92 DIVERTICULITIS: ICD-10-CM

## 2021-08-31 PROCEDURE — 99214 OFFICE O/P EST MOD 30 MIN: CPT | Mod: 95 | Performed by: FAMILY MEDICINE

## 2021-08-31 NOTE — PROGRESS NOTES
Subjective:     This evaluation was conducted via Instant AV using secure and encrypted videoconferencing technology. The patient was physically located at Home in [place], [place].     The patient's identity was confirmed and verbal consent was obtained for this telemedicine encounter.        Alfred Erazo is a 85 y.o. male, accompanied by his son who presents for Hospital Follow-up.    POST DISCHARGE   Discharge Date:*21  Date of Outreach Call: *21     HPI:   Recently hospitalized for stroke.    He was sent to the hospital via [facility] department with complaints of confusion. Patient reports that he was trying to say the names of some friends in a photo and he could not speak  Of note his wife had recently  from COVID-19  with Acute Pneumonia    He tested positive for Covid on     Then he had a negative home test but when he presented to the ER on the  he tested positive  In addition he was having some GI complaints and a CT scan showed mild diverticulitis    He was sent home the next day because he was doing so well    He is to take Plavix with aspirin for 3 weeks and follow-up with his neurologist, Dr. Mathis    He feels well, has a good appetite and is tolerating his antibiotics Cipro and Flagyl      He has family that lives across the street and is checking on him and helping him with his appointments    Patient denies dizziness, weakness, cough or other URI symptoms    Current medicines (including reconciliation performed today)  Current Outpatient Medications   Medication Sig Dispense Refill   • clopidogrel (PLAVIX) 75 MG Tab Take 1 Tablet by mouth every day. 20 Tablet 0   • ciprofloxacin (CIPRO) 500 MG Tab Take 1 Tablet by mouth every 12 hours. 14 Tablet 0   • metroNIDAZOLE (FLAGYL) 500 MG Tab Take 1 Tablet by mouth every 8 hours. 21 Tablet 0   • omeprazole (PRILOSEC) 20 MG delayed-release capsule Take 20 mg by mouth every day.     • meloxicam (MOBIC) 15 MG tablet Take 15 mg  by mouth every day.     • glimepiride (AMARYL) 4 MG Tab Take 1 tablet by mouth every morning. 100 tablet 3   • levothyroxine (SYNTHROID) 75 MCG Tab Take 1 Tab by mouth every day. 90 Tab 3   • finasteride (PROSCAR) 5 MG Tab Take 1 Tab by mouth every day. 90 Tab 3   • liothyronine (CYTOMEL) 5 MCG Tab TAKE 2 TABLETS BY MOUTH EVERY  Tab 3   • vitamin D (CHOLECALCIFEROL) 1000 Unit Tab TAKE 2 TABLETS BY MOUTH EVERY  Tab 3   • atorvastatin (LIPITOR) 20 MG Tab Take 1 Tab by mouth every day. 100 Tab 3   • venlafaxine XR (EFFEXOR XR) 37.5 MG CAPSULE SR 24 HR TK ONE C PO QAM AFTER MEAL     • tamsulosin (FLOMAX) 0.4 MG capsule TAKE 1 CAPSULE BY MOUTH 0.5 HOUR AFTER BREAKFAST 90 Cap 1   • aspirin EC (ECOTRIN) 81 MG Tablet Delayed Response Take 81 mg by mouth every day.     • Blood Glucose Monitoring Suppl Device Meter: Dispense glucose meter preferred on patient's insurance.  Sig. Use as directed for blood sugar monitoring. 1 Device 0   • cyanocobalamin (VITAMIN B-12) 100 MCG Tab Take 100 mcg by mouth every day.       No current facility-administered medications for this visit.       Allergies:   Pcn [penicillins] and Sulfa drugs    Social History     Tobacco Use   • Smoking status: Never Smoker   • Smokeless tobacco: Never Used   Vaping Use   • Vaping Use: Never used   Substance Use Topics   • Alcohol use: No     Comment: occ   • Drug use: No            Objective:     There were no vitals filed for this visit.  There is no height or weight on file to calculate BMI.    Physical Exam:   Physical Exam  Constitutional:       General: He is not in acute distress.     Appearance: Normal appearance. He is not ill-appearing or toxic-appearing.   HENT:      Head: Normocephalic and atraumatic.   Eyes:      Conjunctiva/sclera: Conjunctivae normal.   Pulmonary:      Effort: Pulmonary effort is normal.   Neurological:      General: No focal deficit present.      Mental Status: He is alert. Mental status is at baseline.    Psychiatric:         Mood and Affect: Mood normal.         Behavior: Behavior normal.         Thought Content: Thought content normal.         Judgment: Judgment normal.         Assessment and Plan:   1. Hospital discharge [id number] well at home, recuperating    2. Cerebrovascular accident (CVA), unspecified mechanism (HCC) stable on Plavix and aspirin-  Family will make follow-up appointment with neurologist    3. Diverticulitis      tolerating antibiotics well  Finish full course of Cipro and Flagyl      - Chart and discharge summary were reviewed.   - Hospitalization and results reviewed with patient.   - Medications reviewed including instructions regarding high risk medications, dosing and side effects.  - Recommended Services: No services needed at this time  - Advance directive/POLST on file?  Yes    Follow-up: 1 month for annual wellness visit    Face-to-face transitional care management services with HIGH (today's visit is at least 7 days post discharge & LACE+ score 59+) medical decision complexity were provided.     LACE+ Historical Score Over Time (0-28: Low, 29-58: Medium, 59+: High): 76

## 2021-09-13 RX ORDER — CLOPIDOGREL BISULFATE 75 MG/1
75 TABLET ORAL DAILY
Qty: 90 TABLET | Refills: 3 | Status: SHIPPED | OUTPATIENT
Start: 2021-09-13 | End: 2022-03-23

## 2021-11-23 ENCOUNTER — APPOINTMENT (OUTPATIENT)
Dept: RADIOLOGY | Facility: MEDICAL CENTER | Age: 86
End: 2021-11-23
Attending: EMERGENCY MEDICINE
Payer: MEDICARE

## 2021-11-23 ENCOUNTER — HOSPITAL ENCOUNTER (OUTPATIENT)
Facility: MEDICAL CENTER | Age: 86
End: 2021-11-24
Attending: EMERGENCY MEDICINE | Admitting: STUDENT IN AN ORGANIZED HEALTH CARE EDUCATION/TRAINING PROGRAM
Payer: MEDICARE

## 2021-11-23 ENCOUNTER — APPOINTMENT (OUTPATIENT)
Dept: RADIOLOGY | Facility: MEDICAL CENTER | Age: 86
End: 2021-11-23
Attending: STUDENT IN AN ORGANIZED HEALTH CARE EDUCATION/TRAINING PROGRAM
Payer: MEDICARE

## 2021-11-23 DIAGNOSIS — I44.1 WENCKEBACH SECOND DEGREE AV BLOCK: ICD-10-CM

## 2021-11-23 DIAGNOSIS — E11.9 TYPE 2 DIABETES MELLITUS WITHOUT COMPLICATION, WITHOUT LONG-TERM CURRENT USE OF INSULIN (HCC): ICD-10-CM

## 2021-11-23 DIAGNOSIS — R09.89 SUSPECTED CEREBROVASCULAR ACCIDENT (CVA): ICD-10-CM

## 2021-11-23 DIAGNOSIS — R47.01 APHASIA: ICD-10-CM

## 2021-11-23 LAB
ABO + RH BLD: NORMAL
ABO GROUP BLD: NORMAL
ALBUMIN SERPL BCP-MCNC: 4.4 G/DL (ref 3.2–4.9)
ALBUMIN/GLOB SERPL: 1.8 G/DL
ALP SERPL-CCNC: 82 U/L (ref 30–99)
ALT SERPL-CCNC: 28 U/L (ref 2–50)
ANION GAP SERPL CALC-SCNC: 12 MMOL/L (ref 7–16)
APTT PPP: 28.2 SEC (ref 24.7–36)
AST SERPL-CCNC: 21 U/L (ref 12–45)
BASOPHILS # BLD AUTO: 0.3 % (ref 0–1.8)
BASOPHILS # BLD: 0.02 K/UL (ref 0–0.12)
BILIRUB SERPL-MCNC: 0.5 MG/DL (ref 0.1–1.5)
BLD GP AB SCN SERPL QL: NORMAL
BUN SERPL-MCNC: 17 MG/DL (ref 8–22)
CALCIUM SERPL-MCNC: 9.7 MG/DL (ref 8.4–10.2)
CHLORIDE SERPL-SCNC: 105 MMOL/L (ref 96–112)
CO2 SERPL-SCNC: 23 MMOL/L (ref 20–33)
CREAT SERPL-MCNC: 1.04 MG/DL (ref 0.5–1.4)
EOSINOPHIL # BLD AUTO: 0.12 K/UL (ref 0–0.51)
EOSINOPHIL NFR BLD: 1.6 % (ref 0–6.9)
ERYTHROCYTE [DISTWIDTH] IN BLOOD BY AUTOMATED COUNT: 48.2 FL (ref 35.9–50)
GLOBULIN SER CALC-MCNC: 2.5 G/DL (ref 1.9–3.5)
GLUCOSE BLD-MCNC: 177 MG/DL (ref 65–99)
GLUCOSE BLD-MCNC: 188 MG/DL (ref 65–99)
GLUCOSE BLD-MCNC: 225 MG/DL (ref 65–99)
GLUCOSE SERPL-MCNC: 248 MG/DL (ref 65–99)
HCT VFR BLD AUTO: 41 % (ref 42–52)
HGB BLD-MCNC: 13.6 G/DL (ref 14–18)
IMM GRANULOCYTES # BLD AUTO: 0.04 K/UL (ref 0–0.11)
IMM GRANULOCYTES NFR BLD AUTO: 0.5 % (ref 0–0.9)
INR PPP: 1.07 (ref 0.87–1.13)
LYMPHOCYTES # BLD AUTO: 1.34 K/UL (ref 1–4.8)
LYMPHOCYTES NFR BLD: 17.7 % (ref 22–41)
MCH RBC QN AUTO: 31.9 PG (ref 27–33)
MCHC RBC AUTO-ENTMCNC: 33.2 G/DL (ref 33.7–35.3)
MCV RBC AUTO: 96 FL (ref 81.4–97.8)
MONOCYTES # BLD AUTO: 0.55 K/UL (ref 0–0.85)
MONOCYTES NFR BLD AUTO: 7.3 % (ref 0–13.4)
NEUTROPHILS # BLD AUTO: 5.51 K/UL (ref 1.82–7.42)
NEUTROPHILS NFR BLD: 72.6 % (ref 44–72)
NRBC # BLD AUTO: 0 K/UL
NRBC BLD-RTO: 0 /100 WBC
PLATELET # BLD AUTO: 191 K/UL (ref 164–446)
PMV BLD AUTO: 10.1 FL (ref 9–12.9)
POTASSIUM SERPL-SCNC: 4.1 MMOL/L (ref 3.6–5.5)
PROT SERPL-MCNC: 6.9 G/DL (ref 6–8.2)
PROTHROMBIN TIME: 13.1 SEC (ref 12–14.6)
RBC # BLD AUTO: 4.27 M/UL (ref 4.7–6.1)
RH BLD: NORMAL
SODIUM SERPL-SCNC: 140 MMOL/L (ref 135–145)
TROPONIN T SERPL-MCNC: 30 NG/L (ref 6–19)
TSH SERPL DL<=0.005 MIU/L-ACNC: 2.43 UIU/ML (ref 0.38–5.33)
WBC # BLD AUTO: 7.6 K/UL (ref 4.8–10.8)

## 2021-11-23 PROCEDURE — 99285 EMERGENCY DEPT VISIT HI MDM: CPT

## 2021-11-23 PROCEDURE — 700117 HCHG RX CONTRAST REV CODE 255: Performed by: EMERGENCY MEDICINE

## 2021-11-23 PROCEDURE — 74018 RADEX ABDOMEN 1 VIEW: CPT

## 2021-11-23 PROCEDURE — 84484 ASSAY OF TROPONIN QUANT: CPT

## 2021-11-23 PROCEDURE — 70498 CT ANGIOGRAPHY NECK: CPT | Mod: MG

## 2021-11-23 PROCEDURE — 0042T CT-CEREBRAL PERFUSION ANALYSIS: CPT

## 2021-11-23 PROCEDURE — 70496 CT ANGIOGRAPHY HEAD: CPT | Mod: MG

## 2021-11-23 PROCEDURE — 700111 HCHG RX REV CODE 636 W/ 250 OVERRIDE (IP): Performed by: STUDENT IN AN ORGANIZED HEALTH CARE EDUCATION/TRAINING PROGRAM

## 2021-11-23 PROCEDURE — 96372 THER/PROPH/DIAG INJ SC/IM: CPT

## 2021-11-23 PROCEDURE — 71045 X-RAY EXAM CHEST 1 VIEW: CPT

## 2021-11-23 PROCEDURE — 84443 ASSAY THYROID STIM HORMONE: CPT

## 2021-11-23 PROCEDURE — 94760 N-INVAS EAR/PLS OXIMETRY 1: CPT

## 2021-11-23 PROCEDURE — 93005 ELECTROCARDIOGRAM TRACING: CPT | Performed by: EMERGENCY MEDICINE

## 2021-11-23 PROCEDURE — 86900 BLOOD TYPING SEROLOGIC ABO: CPT

## 2021-11-23 PROCEDURE — G0378 HOSPITAL OBSERVATION PER HR: HCPCS

## 2021-11-23 PROCEDURE — 96372 THER/PROPH/DIAG INJ SC/IM: CPT | Mod: XU

## 2021-11-23 PROCEDURE — 700102 HCHG RX REV CODE 250 W/ 637 OVERRIDE(OP): Performed by: STUDENT IN AN ORGANIZED HEALTH CARE EDUCATION/TRAINING PROGRAM

## 2021-11-23 PROCEDURE — 99220 PR INITIAL OBSERVATION CARE,LEVL III: CPT | Performed by: STUDENT IN AN ORGANIZED HEALTH CARE EDUCATION/TRAINING PROGRAM

## 2021-11-23 PROCEDURE — 36415 COLL VENOUS BLD VENIPUNCTURE: CPT

## 2021-11-23 PROCEDURE — 82306 VITAMIN D 25 HYDROXY: CPT

## 2021-11-23 PROCEDURE — 80053 COMPREHEN METABOLIC PANEL: CPT

## 2021-11-23 PROCEDURE — 86901 BLOOD TYPING SEROLOGIC RH(D): CPT

## 2021-11-23 PROCEDURE — 85730 THROMBOPLASTIN TIME PARTIAL: CPT

## 2021-11-23 PROCEDURE — 85025 COMPLETE CBC W/AUTO DIFF WBC: CPT

## 2021-11-23 PROCEDURE — A9270 NON-COVERED ITEM OR SERVICE: HCPCS | Performed by: STUDENT IN AN ORGANIZED HEALTH CARE EDUCATION/TRAINING PROGRAM

## 2021-11-23 PROCEDURE — 70450 CT HEAD/BRAIN W/O DYE: CPT | Mod: MG

## 2021-11-23 PROCEDURE — 85610 PROTHROMBIN TIME: CPT

## 2021-11-23 PROCEDURE — 86850 RBC ANTIBODY SCREEN: CPT

## 2021-11-23 PROCEDURE — 82962 GLUCOSE BLOOD TEST: CPT

## 2021-11-23 RX ORDER — VENLAFAXINE HYDROCHLORIDE 37.5 MG/1
37.5 CAPSULE, EXTENDED RELEASE ORAL
Status: DISCONTINUED | OUTPATIENT
Start: 2021-11-23 | End: 2021-11-24 | Stop reason: HOSPADM

## 2021-11-23 RX ORDER — ACETAMINOPHEN 325 MG/1
650 TABLET ORAL EVERY 6 HOURS PRN
Status: DISCONTINUED | OUTPATIENT
Start: 2021-11-23 | End: 2021-11-24 | Stop reason: HOSPADM

## 2021-11-23 RX ORDER — DEXTROSE MONOHYDRATE 25 G/50ML
50 INJECTION, SOLUTION INTRAVENOUS
Status: DISCONTINUED | OUTPATIENT
Start: 2021-11-23 | End: 2021-11-24 | Stop reason: HOSPADM

## 2021-11-23 RX ORDER — CLOPIDOGREL BISULFATE 75 MG/1
75 TABLET ORAL DAILY
Status: DISCONTINUED | OUTPATIENT
Start: 2021-11-23 | End: 2021-11-24 | Stop reason: HOSPADM

## 2021-11-23 RX ORDER — LEVOTHYROXINE SODIUM 0.07 MG/1
75 TABLET ORAL DAILY
Status: DISCONTINUED | OUTPATIENT
Start: 2021-11-23 | End: 2021-11-24 | Stop reason: HOSPADM

## 2021-11-23 RX ORDER — ATORVASTATIN CALCIUM 40 MG/1
80 TABLET, FILM COATED ORAL DAILY
Status: DISCONTINUED | OUTPATIENT
Start: 2021-11-24 | End: 2021-11-24 | Stop reason: HOSPADM

## 2021-11-23 RX ORDER — HYDRALAZINE HYDROCHLORIDE 20 MG/ML
10 INJECTION INTRAMUSCULAR; INTRAVENOUS
Status: DISCONTINUED | OUTPATIENT
Start: 2021-11-23 | End: 2021-11-24 | Stop reason: HOSPADM

## 2021-11-23 RX ORDER — INSULIN LISPRO 100 [IU]/ML
1-6 INJECTION, SOLUTION INTRAVENOUS; SUBCUTANEOUS EVERY 6 HOURS
Status: DISCONTINUED | OUTPATIENT
Start: 2021-11-23 | End: 2021-11-24 | Stop reason: HOSPADM

## 2021-11-23 RX ORDER — LABETALOL HYDROCHLORIDE 5 MG/ML
10 INJECTION, SOLUTION INTRAVENOUS
Status: DISCONTINUED | OUTPATIENT
Start: 2021-11-23 | End: 2021-11-24 | Stop reason: HOSPADM

## 2021-11-23 RX ORDER — AMOXICILLIN 250 MG
2 CAPSULE ORAL 2 TIMES DAILY
Status: DISCONTINUED | OUTPATIENT
Start: 2021-11-23 | End: 2021-11-24 | Stop reason: HOSPADM

## 2021-11-23 RX ORDER — BISACODYL 10 MG
10 SUPPOSITORY, RECTAL RECTAL
Status: DISCONTINUED | OUTPATIENT
Start: 2021-11-23 | End: 2021-11-24 | Stop reason: HOSPADM

## 2021-11-23 RX ORDER — POLYETHYLENE GLYCOL 3350 17 G/17G
1 POWDER, FOR SOLUTION ORAL
Status: DISCONTINUED | OUTPATIENT
Start: 2021-11-23 | End: 2021-11-24 | Stop reason: HOSPADM

## 2021-11-23 RX ORDER — TAMSULOSIN HYDROCHLORIDE 0.4 MG/1
0.4 CAPSULE ORAL
Status: DISCONTINUED | OUTPATIENT
Start: 2021-11-23 | End: 2021-11-24 | Stop reason: HOSPADM

## 2021-11-23 RX ORDER — LIOTHYRONINE SODIUM 5 UG/1
10 TABLET ORAL
Status: DISCONTINUED | OUTPATIENT
Start: 2021-11-23 | End: 2021-11-24 | Stop reason: HOSPADM

## 2021-11-23 RX ORDER — HEPARIN SODIUM 5000 [USP'U]/ML
5000 INJECTION, SOLUTION INTRAVENOUS; SUBCUTANEOUS EVERY 8 HOURS
Status: DISCONTINUED | OUTPATIENT
Start: 2021-11-23 | End: 2021-11-23

## 2021-11-23 RX ORDER — ATORVASTATIN CALCIUM 10 MG/1
20 TABLET, FILM COATED ORAL DAILY
Status: DISCONTINUED | OUTPATIENT
Start: 2021-11-23 | End: 2021-11-23

## 2021-11-23 RX ORDER — FINASTERIDE 5 MG/1
5 TABLET, FILM COATED ORAL DAILY
Status: DISCONTINUED | OUTPATIENT
Start: 2021-11-23 | End: 2021-11-24 | Stop reason: HOSPADM

## 2021-11-23 RX ORDER — OMEPRAZOLE 20 MG/1
20 CAPSULE, DELAYED RELEASE ORAL DAILY
Status: DISCONTINUED | OUTPATIENT
Start: 2021-11-23 | End: 2021-11-24 | Stop reason: HOSPADM

## 2021-11-23 RX ADMIN — LEVOTHYROXINE SODIUM 75 MCG: 0.07 TABLET ORAL at 16:03

## 2021-11-23 RX ADMIN — LIOTHYRONINE SODIUM 10 MCG: 5 TABLET ORAL at 16:12

## 2021-11-23 RX ADMIN — OMEPRAZOLE 20 MG: 20 CAPSULE, DELAYED RELEASE ORAL at 16:00

## 2021-11-23 RX ADMIN — IOHEXOL 40 ML: 350 INJECTION, SOLUTION INTRAVENOUS at 13:48

## 2021-11-23 RX ADMIN — VENLAFAXINE HYDROCHLORIDE 37.5 MG: 37.5 CAPSULE, EXTENDED RELEASE ORAL at 16:01

## 2021-11-23 RX ADMIN — CLOPIDOGREL BISULFATE 75 MG: 75 TABLET ORAL at 16:02

## 2021-11-23 RX ADMIN — HEPARIN SODIUM 5000 UNITS: 5000 INJECTION, SOLUTION INTRAVENOUS; SUBCUTANEOUS at 16:05

## 2021-11-23 RX ADMIN — INSULIN LISPRO 1 UNITS: 100 INJECTION, SOLUTION INTRAVENOUS; SUBCUTANEOUS at 18:08

## 2021-11-23 RX ADMIN — FINASTERIDE 5 MG: 5 TABLET, FILM COATED ORAL at 16:12

## 2021-11-23 RX ADMIN — IOHEXOL 100 ML: 350 INJECTION, SOLUTION INTRAVENOUS at 13:47

## 2021-11-23 RX ADMIN — ATORVASTATIN CALCIUM 20 MG: 10 TABLET, FILM COATED ORAL at 16:01

## 2021-11-23 RX ADMIN — TAMSULOSIN HYDROCHLORIDE 0.4 MG: 0.4 CAPSULE ORAL at 16:03

## 2021-11-23 ASSESSMENT — PAIN DESCRIPTION - PAIN TYPE: TYPE: ACUTE PAIN

## 2021-11-23 ASSESSMENT — FIBROSIS 4 INDEX
FIB4 SCORE: 1.83
FIB4 SCORE: 1.75

## 2021-11-23 NOTE — ED NOTES
Pt taken via gurney on transport monitor to Imaging by primary RN; PIV placed blood drawn and sent to lab    FSBG - 225

## 2021-11-23 NOTE — ED NOTES
Condom cath placed and pt provided additional warm blankets  Pt very [place], no weakness noted, family reports pt speech is now improved.

## 2021-11-23 NOTE — ED PROVIDER NOTES
"ED Provider Note    CHIEF COMPLAINT  Chief Complaint   Patient presents with   • ALOC     stroke rule out       HPI  Alfred Erazo is a 86 y.o. male who presents to the emergency department with altered mental status.  Patient has a history of TIA.  On review of chart patient had a stroke in August of this last year.  Son was traveling.  Patient went to  the son.  It was noted that the patient seemed confused or could not find words when the son was picked up at about 1230.  It is unclear exactly when the symptoms started.  Patient denies a headache.  He reports he is thirsty.  He denies chest pain, shortness of breath, abdominal pain, weakness numbness.    REVIEW OF SYSTEMS  As per HPI, otherwise a 10 point review of systems is negative    PAST MEDICAL HISTORY  Past Medical History:   Diagnosis Date   • Asthma    • BPH (benign prostatic hyperplasia)    • Diabetes (HCC)    • Diverticulitis    • Esophageal stricture     dilated spring 2016   • Hiatal hernia    • History of shingles 8/27/2015   • Hyperlipidemia 7/8/2015   • Hypertension    • Hypothyroid    • PHN (postherpetic neuralgia) 8/27/2015   • Psoriasis 6/14/2017   • TIA (transient ischemic attack)    • Vitamin D deficiency 8/27/2015       Family history  No pertinent family medical history    SOCIAL HISTORY  Social History     Tobacco Use   • Smoking status: Never Smoker   • Smokeless tobacco: Never Used   Vaping Use   • Vaping Use: Never used   Substance Use Topics   • Alcohol use: No     Comment: occ   • Drug use: No       SURGICAL HISTORY  Past Surgical History:   Procedure Laterality Date   • CATARACT EXTRACTION WITH IOL     • TONSILLECTOMY     • VASECTOMY         CURRENT MEDICATIONS  Home Medications    **Home medications have not yet been reviewed for this encounter**         ALLERGIES  Allergies   Allergen Reactions   • Pcn [Penicillins]      \"my joints start to bubble up\"   • Sulfa Drugs      \"my joints start to bubble up\"  \"Its been 40 yrs since " "I've had either of those\"       PHYSICAL EXAM  VITAL SIGNS: BP (!) 202/76   Pulse 87   Temp 36.2 °C (97.1 °F) (Temporal)   Resp 16   Ht 1.778 m (5' 10\")   Wt 77.9 kg (171 lb 11.8 oz)   SpO2 96%   BMI 24.64 kg/m²    Constitutional: Awake and alert  HENT: Normal inspection  Eyes: Normal inspection  Neck: Grossly normal range of motion.  Cardiovascular: Normal heart rate, Normal rhythm.  Symmetric peripheral pulses.   Thorax & Lungs: No respiratory distress, No wheezing, No rales, No rhonchi, No chest tenderness.   Abdomen: Bowel sounds normal, soft, non-distended, nontender, no mass  Skin: No obvious rash.  Back: No tenderness, No CVA tenderness.   Extremities: No clubbing, cyanosis, edema, no Homans or cords.  Neurologic:      NIHSS    1a. Level of Consciousness  0. Alert. Keenly Responsive  1. Not Alert but arousable by minor stimulation  2. Not Alert. Requires repeated stimulation  3. Responds only w reflex motor/autonomic effects or totally unresponsive  Score:0  1b. Level of Consciousness: ask month and age  0. Answers both questions correctly  1. Answers one question correctly  2. Answers neither question correctly  Score: 1  1c. Level of Consciousness: ask to open and close eyes/make a fist with non-paretic hand  0. Performs both tasks correctly  1. Performs one task correctly  2. Performs neither task correctly  Score: 0  2. Best gaze: Horizontal eye movement             0 Normal             1 Partial gaze palsy. Forced deviation or total palsy not present             2 Forced deviation. Not overcome by oculo-cephalic maneuver            Score: 0  3. Visual Fields             0 No loss             1 Partial hemianopia             2 Complete hemianopia             3 Bilateral hemianopia. Blindness             Score:0  4. Facial Palsy           0 Normal symmetrical movements           1 Minor paralysis           2 Partial paralysis           3 Complete paralysis           Score: 0  5. Motor Arm Right and " Left           0 No drift           1 Drift but does not hit bed           2 Some effort against gravity. Hits bed           3 No effort against gravity           4 No movement          UN Amputation/joint fusion/explain          Score R: 0          Score L: 0  6. Motor Leg: Right and Left          0 No drift          1 Drift but does not hit bed          2 Some effort against gravity. Hits bed          3 No effort against gravity          4 No movement          UN Amputation/joint fusion/explain          Score R :0          Score L :0  7. Limb Ataxia. Finger to nose/ shin-heel         0 Absent         1 Present in one limb         2 Present in two limbs         UN Amputation/joint fusion explain         Score:0  8. Sensory. Pinprick         0 Normal. No sensory loss         1 Mild to moderate sensory loss         2 Severe or total sensory loss         Score: 0  9. Best language. Show the kitchen/cookie picture         0 No aphasia         1 Mild to moderate aphasia         2 Severe aphasia         3 Mute. Global aphasia         Score:1  10. Dysarthria. Show the reading list         0 Normal         1 Mild to moderate dysarthria         2 Severe dysarthria         3 Intubated or other physical barrier         Score: 0  11. Extinction and Inattention         0 No abnormality         1 Extinction to simultaneous stimulation         2 Profound [place]-inattention or extinction         Score: 0        Total score: 2      RADIOLOGY/PROCEDURES  CT-CTA NECK WITH & W/O-POST PROCESSING   Final Result      1. No evidence of flow-limiting stenosis in the cervical carotid or cervical vertebral arteries.      2. Diminutive left vertebral artery, similar to prior.      CT-CTA HEAD WITH & W/O-POST PROCESS   Final Result         1. No hemodynamically significant narrowing of the major intracranial vessels.      CT-CEREBRAL PERFUSION ANALYSIS   Final Result      1.  Cerebral blood flow less than 30% likely representing completed  infarct = 0 mL.      2.  T Max more than 6 seconds likely representing combination of completed infarct and ischemia = 0 mL.      3.  Mismatched volume likely representing ischemic brain/penumbra = None      4.  Please note that the cerebral perfusion was performed on the limited brain tissue around the basal ganglia region. Infarct/ischemia outside the CT perfusion sections can be missed in this study.      DX-CHEST-PORTABLE (1 VIEW)   Final Result      1.  Bilateral interstitial prominence.      2.  Mild cardiomegaly.      CT-HEAD W/O   Final Result         1. No acute intracranial findings. No evidence of acute intracranial hemorrhage or mass lesion.      2. White matter lucencies most consistent with chronic small vessel ischemic change.                          Imaging is interpreted by radiologist    Labs:  Results for orders placed or performed during the hospital encounter of 11/23/21   CBC WITH DIFFERENTIAL   Result Value Ref Range    WBC 7.6 4.8 - 10.8 K/uL    RBC 4.27 (L) 4.70 - 6.10 M/uL    Hemoglobin 13.6 (L) 14.0 - 18.0 g/dL    Hematocrit 41.0 (L) 42.0 - 52.0 %    MCV 96.0 81.4 - 97.8 fL    MCH 31.9 27.0 - 33.0 pg    MCHC 33.2 (L) 33.7 - 35.3 g/dL    RDW 48.2 35.9 - 50.0 fL    Platelet Count 191 164 - 446 K/uL    MPV 10.1 9.0 - 12.9 fL    Neutrophils-Polys 72.60 (H) 44.00 - 72.00 %    Lymphocytes 17.70 (L) 22.00 - 41.00 %    Monocytes 7.30 0.00 - 13.40 %    Eosinophils 1.60 0.00 - 6.90 %    Basophils 0.30 0.00 - 1.80 %    Immature Granulocytes 0.50 0.00 - 0.90 %    Nucleated RBC 0.00 /100 WBC    Neutrophils (Absolute) 5.51 1.82 - 7.42 K/uL    Lymphs (Absolute) 1.34 1.00 - 4.80 K/uL    Monos (Absolute) 0.55 0.00 - 0.85 K/uL    Eos (Absolute) 0.12 0.00 - 0.51 K/uL    Baso (Absolute) 0.02 0.00 - 0.12 K/uL    Immature Granulocytes (abs) 0.04 0.00 - 0.11 K/uL    NRBC (Absolute) 0.00 K/uL   COMP METABOLIC PANEL   Result Value Ref Range    Sodium 140 135 - 145 mmol/L    Potassium 4.1 3.6 - 5.5 mmol/L     Chloride 105 96 - 112 mmol/L    Co2 23 20 - 33 mmol/L    Anion Gap 12.0 7.0 - 16.0    Glucose 248 (H) 65 - 99 mg/dL    Bun 17 8 - 22 mg/dL    Creatinine 1.04 0.50 - 1.40 mg/dL    Calcium 9.7 8.4 - 10.2 mg/dL    AST(SGOT) 21 12 - 45 U/L    ALT(SGPT) 28 2 - 50 U/L    Alkaline Phosphatase 82 30 - 99 U/L    Total Bilirubin 0.5 0.1 - 1.5 mg/dL    Albumin 4.4 3.2 - 4.9 g/dL    Total Protein 6.9 6.0 - 8.2 g/dL    Globulin 2.5 1.9 - 3.5 g/dL    A-G Ratio 1.8 g/dL   PROTHROMBIN TIME   Result Value Ref Range    PT 13.1 12.0 - 14.6 sec    INR 1.07 0.87 - 1.13   APTT   Result Value Ref Range    APTT 28.2 24.7 - 36.0 sec   COD (ADULT)   Result Value Ref Range    ABO Grouping Only O     Rh Grouping Only POS     Antibody Screen-Cod NEG    TROPONIN   Result Value Ref Range    Troponin T 30 (H) 6 - 19 ng/L   ABO Rh Confirm   Result Value Ref Range    ABO Rh Confirm O POS    ESTIMATED GFR   Result Value Ref Range    GFR If African American >60 >60 mL/min/1.73 m 2    GFR If Non African American >60 >60 mL/min/1.73 m 2   POCT glucose device results   Result Value Ref Range    Glucose - Accu-Ck 225 (H) 65 - 99 mg/dL       Medications   iohexol (OMNIPAQUE) 350 mg/mL (IV) (100 mL Intravenous Given 11/23/21 1347)   iohexol (OMNIPAQUE) 350 mg/mL (IV) (40 mL Intravenous Given 11/23/21 1348)       COURSE & MEDICAL DECISION MAKING  Patient presents with an expressive aphasia.  Time of onset is unclear and therefore is not an alteplase candidate.  Order stroke evaluation.    Work-up returned as above.  Patient will need to be admitted for MRI.  He recently had an echocardiogram.  Would consider bubble study.  He was discontinued from dual antiplatelet therapy now only taking aspirin.  Dual therapy will likely need to be reinitiated.  Consulted hospitalist.  Patient will be admitted.  Discussed with Dr. Duong    FINAL IMPRESSION  1.  Expressive aphasia      This dictation was created using voice recognition software. The accuracy of the  dictation is limited to the abilities of the software.  The nursing notes were reviewed and certain aspects of this information were incorporated into this note.      Electronically signed by: Cliff Barroso M.D., 11/23/2021 1:51 PM

## 2021-11-23 NOTE — ED NOTES
Pt son states he believes onset of confusion was around 1230, pt lives w/ son and his daughter in-law    Pt has hx of stoke about a month ago per son, pt placed in hospital gown - ERP at the

## 2021-11-23 NOTE — ED NOTES
Med rec complete per Pt's pharmacy, [facility] ([phone]).  Pt unable to participate in interview. Spoke to family (daughter then son) at bedside; daughter and son unsure of Pt's medications; per son, Pt handles his own medications, and son has been out of town until today; unable to verify times of last doses or over-the-counter medication usage. Pt's son did state that Pt's ASPIRIN was DISCONTINUED and Pt now takes only PLAVIX. Allergies reviewed with Pt's son.  Per [facility], no oral antibiotics dispensed in last 30 days.

## 2021-11-23 NOTE — ED NOTES
Pt back from CT, pt resting on gurney.  Pt unable to recall birthday, able to stand and tranfers to CT gurney, but unsteady  Daughter to bedside

## 2021-11-23 NOTE — ED TRIAGE NOTES
Chief Complaint   Patient presents with   • ALOC   P[place] up at the airport at 12:30, when his son got into the car he found his father altered. He is confused now.

## 2021-11-24 ENCOUNTER — APPOINTMENT (OUTPATIENT)
Dept: RADIOLOGY | Facility: MEDICAL CENTER | Age: 86
End: 2021-11-24
Attending: STUDENT IN AN ORGANIZED HEALTH CARE EDUCATION/TRAINING PROGRAM
Payer: MEDICARE

## 2021-11-24 ENCOUNTER — PATIENT OUTREACH (OUTPATIENT)
Dept: HEALTH INFORMATION MANAGEMENT | Facility: OTHER | Age: 86
End: 2021-11-24

## 2021-11-24 ENCOUNTER — APPOINTMENT (OUTPATIENT)
Dept: CARDIOLOGY | Facility: MEDICAL CENTER | Age: 86
End: 2021-11-24
Attending: STUDENT IN AN ORGANIZED HEALTH CARE EDUCATION/TRAINING PROGRAM
Payer: MEDICARE

## 2021-11-24 VITALS
TEMPERATURE: 97.2 F | HEIGHT: 70 IN | HEART RATE: 73 BPM | OXYGEN SATURATION: 95 % | RESPIRATION RATE: 18 BRPM | BODY MASS INDEX: 23.67 KG/M2 | SYSTOLIC BLOOD PRESSURE: 150 MMHG | DIASTOLIC BLOOD PRESSURE: 76 MMHG | WEIGHT: 165.34 LBS

## 2021-11-24 LAB
ANION GAP SERPL CALC-SCNC: 14 MMOL/L (ref 7–16)
BUN SERPL-MCNC: 14 MG/DL (ref 8–22)
CALCIUM SERPL-MCNC: 9.6 MG/DL (ref 8.4–10.2)
CHLORIDE SERPL-SCNC: 103 MMOL/L (ref 96–112)
CHOLEST SERPL-MCNC: 127 MG/DL (ref 100–199)
CO2 SERPL-SCNC: 22 MMOL/L (ref 20–33)
CREAT SERPL-MCNC: 1.02 MG/DL (ref 0.5–1.4)
EKG IMPRESSION: NORMAL
ERYTHROCYTE [DISTWIDTH] IN BLOOD BY AUTOMATED COUNT: 47.3 FL (ref 35.9–50)
EST. AVERAGE GLUCOSE BLD GHB EST-MCNC: 166 MG/DL
GLUCOSE BLD-MCNC: 133 MG/DL (ref 65–99)
GLUCOSE BLD-MCNC: 140 MG/DL (ref 65–99)
GLUCOSE BLD-MCNC: 197 MG/DL (ref 65–99)
GLUCOSE SERPL-MCNC: 132 MG/DL (ref 65–99)
HBA1C MFR BLD: 7.4 % (ref 4–5.6)
HCT VFR BLD AUTO: 38.1 % (ref 42–52)
HDLC SERPL-MCNC: 39 MG/DL
HGB BLD-MCNC: 12.5 G/DL (ref 14–18)
LDLC SERPL CALC-MCNC: 53 MG/DL
LV EJECT FRACT  99904: 60
LV EJECT FRACT MOD 2C 99903: 58.53
LV EJECT FRACT MOD 4C 99902: 56.03
LV EJECT FRACT MOD BP 99901: 57.84
MAGNESIUM SERPL-MCNC: 1.5 MG/DL (ref 1.5–2.5)
MCH RBC QN AUTO: 31.4 PG (ref 27–33)
MCHC RBC AUTO-ENTMCNC: 32.8 G/DL (ref 33.7–35.3)
MCV RBC AUTO: 95.7 FL (ref 81.4–97.8)
PLATELET # BLD AUTO: 204 K/UL (ref 164–446)
PMV BLD AUTO: 10.4 FL (ref 9–12.9)
POTASSIUM SERPL-SCNC: 4.1 MMOL/L (ref 3.6–5.5)
RBC # BLD AUTO: 3.98 M/UL (ref 4.7–6.1)
SODIUM SERPL-SCNC: 139 MMOL/L (ref 135–145)
TRIGL SERPL-MCNC: 173 MG/DL (ref 0–149)
TROPONIN T SERPL-MCNC: 38 NG/L (ref 6–19)
WBC # BLD AUTO: 7.9 K/UL (ref 4.8–10.8)

## 2021-11-24 PROCEDURE — A9270 NON-COVERED ITEM OR SERVICE: HCPCS | Performed by: STUDENT IN AN ORGANIZED HEALTH CARE EDUCATION/TRAINING PROGRAM

## 2021-11-24 PROCEDURE — A9270 NON-COVERED ITEM OR SERVICE: HCPCS | Performed by: FAMILY MEDICINE

## 2021-11-24 PROCEDURE — 700102 HCHG RX REV CODE 250 W/ 637 OVERRIDE(OP): Performed by: FAMILY MEDICINE

## 2021-11-24 PROCEDURE — 93306 TTE W/DOPPLER COMPLETE: CPT

## 2021-11-24 PROCEDURE — 93306 TTE W/DOPPLER COMPLETE: CPT | Mod: 26 | Performed by: INTERNAL MEDICINE

## 2021-11-24 PROCEDURE — 85027 COMPLETE CBC AUTOMATED: CPT

## 2021-11-24 PROCEDURE — 97161 PT EVAL LOW COMPLEX 20 MIN: CPT

## 2021-11-24 PROCEDURE — 92610 EVALUATE SWALLOWING FUNCTION: CPT

## 2021-11-24 PROCEDURE — 80048 BASIC METABOLIC PNL TOTAL CA: CPT

## 2021-11-24 PROCEDURE — 96365 THER/PROPH/DIAG IV INF INIT: CPT | Mod: XU

## 2021-11-24 PROCEDURE — 700111 HCHG RX REV CODE 636 W/ 250 OVERRIDE (IP): Performed by: FAMILY MEDICINE

## 2021-11-24 PROCEDURE — 96366 THER/PROPH/DIAG IV INF ADDON: CPT

## 2021-11-24 PROCEDURE — 83036 HEMOGLOBIN GLYCOSYLATED A1C: CPT

## 2021-11-24 PROCEDURE — 99217 PR OBSERVATION CARE DISCHARGE: CPT | Performed by: FAMILY MEDICINE

## 2021-11-24 PROCEDURE — 93005 ELECTROCARDIOGRAM TRACING: CPT | Performed by: FAMILY MEDICINE

## 2021-11-24 PROCEDURE — 93010 ELECTROCARDIOGRAM REPORT: CPT | Performed by: INTERNAL MEDICINE

## 2021-11-24 PROCEDURE — 700102 HCHG RX REV CODE 250 W/ 637 OVERRIDE(OP): Performed by: STUDENT IN AN ORGANIZED HEALTH CARE EDUCATION/TRAINING PROGRAM

## 2021-11-24 PROCEDURE — G0378 HOSPITAL OBSERVATION PER HR: HCPCS

## 2021-11-24 PROCEDURE — 83735 ASSAY OF MAGNESIUM: CPT

## 2021-11-24 PROCEDURE — 80061 LIPID PANEL: CPT

## 2021-11-24 PROCEDURE — 70551 MRI BRAIN STEM W/O DYE: CPT | Mod: MG

## 2021-11-24 PROCEDURE — 82962 GLUCOSE BLOOD TEST: CPT

## 2021-11-24 PROCEDURE — 84484 ASSAY OF TROPONIN QUANT: CPT

## 2021-11-24 RX ORDER — ASPIRIN 81 MG/1
81 TABLET, CHEWABLE ORAL DAILY
Qty: 21 TABLET | Refills: 0 | COMMUNITY
Start: 2021-11-25 | End: 2022-03-23

## 2021-11-24 RX ORDER — LOSARTAN POTASSIUM 25 MG/1
25 TABLET ORAL DAILY
Qty: 30 TABLET | Refills: 0 | Status: SHIPPED | OUTPATIENT
Start: 2021-11-24 | End: 2021-12-27

## 2021-11-24 RX ORDER — MAGNESIUM SULFATE HEPTAHYDRATE 40 MG/ML
2 INJECTION, SOLUTION INTRAVENOUS ONCE
Status: COMPLETED | OUTPATIENT
Start: 2021-11-24 | End: 2021-11-24

## 2021-11-24 RX ORDER — ASPIRIN 81 MG/1
81 TABLET, CHEWABLE ORAL DAILY
Status: DISCONTINUED | OUTPATIENT
Start: 2021-11-24 | End: 2021-11-24 | Stop reason: HOSPADM

## 2021-11-24 RX ADMIN — MAGNESIUM SULFATE 2 G: 2 INJECTION INTRAVENOUS at 14:27

## 2021-11-24 RX ADMIN — TAMSULOSIN HYDROCHLORIDE 0.4 MG: 0.4 CAPSULE ORAL at 09:28

## 2021-11-24 RX ADMIN — ATORVASTATIN CALCIUM 80 MG: 40 TABLET, FILM COATED ORAL at 06:01

## 2021-11-24 RX ADMIN — ASPIRIN 81 MG CHEWABLE TABLET 81 MG: 81 TABLET CHEWABLE at 13:19

## 2021-11-24 RX ADMIN — VENLAFAXINE HYDROCHLORIDE 37.5 MG: 37.5 CAPSULE, EXTENDED RELEASE ORAL at 09:28

## 2021-11-24 RX ADMIN — CLOPIDOGREL BISULFATE 75 MG: 75 TABLET ORAL at 06:01

## 2021-11-24 RX ADMIN — FINASTERIDE 5 MG: 5 TABLET, FILM COATED ORAL at 06:00

## 2021-11-24 RX ADMIN — LEVOTHYROXINE SODIUM 75 MCG: 0.07 TABLET ORAL at 06:01

## 2021-11-24 RX ADMIN — OMEPRAZOLE 20 MG: 20 CAPSULE, DELAYED RELEASE ORAL at 06:00

## 2021-11-24 RX ADMIN — LIOTHYRONINE SODIUM 10 MCG: 5 TABLET ORAL at 06:01

## 2021-11-24 ASSESSMENT — COGNITIVE AND FUNCTIONAL STATUS - GENERAL
MOVING TO AND FROM BED TO CHAIR: A LITTLE
SUGGESTED CMS G CODE MODIFIER DAILY ACTIVITY: CH
DAILY ACTIVITIY SCORE: 24
MOBILITY SCORE: 21
SUGGESTED CMS G CODE MODIFIER MOBILITY: CH
CLIMB 3 TO 5 STEPS WITH RAILING: A LITTLE
WALKING IN HOSPITAL ROOM: A LITTLE
MOBILITY SCORE: 24
SUGGESTED CMS G CODE MODIFIER MOBILITY: CJ

## 2021-11-24 ASSESSMENT — LIFESTYLE VARIABLES
HAVE YOU EVER FELT YOU SHOULD CUT DOWN ON YOUR DRINKING: NO
TOTAL SCORE: 0
CONSUMPTION TOTAL: NEGATIVE
HOW MANY TIMES IN THE PAST YEAR HAVE YOU HAD 5 OR MORE DRINKS IN A DAY: 0
HAVE PEOPLE ANNOYED YOU BY CRITICIZING YOUR DRINKING: NO
EVER HAD A DRINK FIRST THING IN THE MORNING TO STEADY YOUR NERVES TO GET RID OF A HANGOVER: NO
ALCOHOL_USE: NO
AVERAGE NUMBER OF DAYS PER WEEK YOU HAVE A DRINK CONTAINING ALCOHOL: 0
TOTAL SCORE: 0
EVER FELT BAD OR GUILTY ABOUT YOUR DRINKING: NO
TOTAL SCORE: 0
ON A TYPICAL DAY WHEN YOU DRINK ALCOHOL HOW MANY DRINKS DO YOU HAVE: 0

## 2021-11-24 ASSESSMENT — PATIENT HEALTH QUESTIONNAIRE - PHQ9
1. LITTLE INTEREST OR PLEASURE IN DOING THINGS: NOT AT ALL
SUM OF ALL RESPONSES TO PHQ9 QUESTIONS 1 AND 2: 0
2. FEELING DOWN, DEPRESSED, IRRITABLE, OR HOPELESS: NOT AT ALL

## 2021-11-24 ASSESSMENT — PAIN DESCRIPTION - PAIN TYPE: TYPE: ACUTE PAIN

## 2021-11-24 ASSESSMENT — GAIT ASSESSMENTS
GAIT LEVEL OF ASSIST: SUPERVISED
DEVIATION: STEP TO
DISTANCE (FEET): 200

## 2021-11-24 NOTE — ED NOTES
Pt passed bedside swallow evaluation.   Pt very [place].    Son took one hearing aid home to see if he could take it in.    I typed questions into notes and showed it to patient, like son demonstrated and it worked very well.

## 2021-11-24 NOTE — PROGRESS NOTES
Patient has a follow up appointment with Wilma Guillermo M.D. on 11/30/21 at 3:30 PM.   Patient has a follow up appointment with Martin Lobo MD on 1/13/22 at 9:20 AM.

## 2021-11-24 NOTE — PROGRESS NOTES
4 Eyes Skin Assessment Completed by Dennis RN and Tata RN.    Head WDL  Ears Redness left ear  Nose WDL  Mouth WDL  Neck WDL  Breast/Chest WDL  Shoulder Blades WDL  Spine WDL  (R) Arm/Elbow/Hand WDL  (L) Arm/Elbow/Hand WDL  Abdomen WDL  Groin WDL  Scrotum/Coccyx/Buttocks WDL  (R) Leg WDL  (L) Leg WDL  (R) Heel/Foot/Toe WDL  (L) Heel/Foot/Toe WDL          Devices In Places Tele Box      Interventions In Place Pressure Redistribution Mattress    Possible Skin Injury No    Pictures Uploaded Into Epic N/A  Wound Consult Placed N/A  RN Wound Prevention Protocol Ordered No

## 2021-11-24 NOTE — ED NOTES
Pt A & 0 x 1 when all questions typed out.  abd xray completed prior to transfer  .  [place] with R hearing aid in place.  Pt ambulated from gurney to bed with SBA    No belongings left in ER room

## 2021-11-24 NOTE — THERAPY
Missed Therapy     Patient Name: Alfred Erazo  Age:  86 y.o., Sex:  male  Medical Record #: [id number]  Today's Date: 11/24/2021    OT eval order received and acknowledged, chart reviewed. Per RN, pt has no acute OT needs at this time, and has been given clearance to dc home. Will cancel OT eval order at this time.    Juany Delgado, OT  [phone]

## 2021-11-24 NOTE — THERAPY
"Speech Language Pathology   Clinical Swallow Evaluation     Patient Name: Alfred Erazo  AGE:  86 y.o., SEX:  male  Medical Record #: [id number]  Today's Date: 11/24/2021          Assessment    87 y/o admitted on 11/23 for ALOC/stroke rule out. PMH includes TIA, hypothyroidism, type 2 diabetes, BPH, and GERD. Last seen by SLP on 8/29/21 and a regular/thin liquid diet was recommended. CT of head found \"No acute intracranial findings. No evidence of acute intracranial hemorrhage or mass lesion.\" CTA of neck found \"No evidence of flow-limiting stenosis in the cervical carotid or cervical vertebral arteries.\" CTA of head found \"No hemodynamically significant narrowing of the major intracranial vessels.\" MRI results pending.    Pt seen on this date for a clinical swallow evaluation. Pt currently NPO pending evaluation. He is hard of hearing but had hearing aids donned for session. No gross asymmetry appreciated during the oral motor evaluation and he denied any hx of dysphagia. No overt s/sx of aspiration appreciated with trials of thin liquids via cup sip and consecutive cup sip, soft solids, solids, and purees. Prolonged but functional mastication noted with solids, otherwise no other difficulty. Vocal quality clear following all trials and he denied difficulty. At this time, recommend pt begin a regular/thin liquid diet. Dysphagia and role of SLP education provided to pt and he verbalized understanding. SLP following.    Plan     recommend pt begin a regular/thin liquid diet    Recommend Speech Therapy 2 times per week until therapy goals are met for the following treatments:  Dysphagia Training and Patient / Family / Caregiver Education.    Discharge Recommendations: (P) Anticipate that the patient will have no further speech therapy needs after discharge from the hospital       Objective       11/24/21 1207   Vitals   O2 (LPM) 0   O2 Delivery Device None - Room Air   Pain 0 - 10 Group   Therapist Pain Assessment Post " Activity Pain Same as Prior to Activity;Nurse Notified;0   Prior Living Situation   Lives with - Patient's Self Care Capacity Adult Children   Prior Level Of Function   Communication Within Functional Limits   Swallow Within Functional Limits   Dentition Poor Quality    Dentures Partials   Hearing Impaired Both Ears   Hearing Aid Left   Vision Wears Corrective Lenses   Patient's Primary Language English   Occupation (Pre-Hospital Vocational) Not Employed   Oral Motor Eval    Is Patient Able to Complete Oral Motor Eval Yes, Within Normal Limits   Laryngeal Function   Voice Quality Within Functional Limits   Volutional Cough Not Tested   Excursion Upon Swallow Complete   Oral Food Presentation   Single Swallow Thin (0) Within Functional Limits   Serial Swallow Thin (0) Within Functional Limits   Pureed (4) Within Functional Limits   Soft & Bite-Sized (6) - (Dysphagia III) Within Functional Limits   Regular (7) Minimal  (slightly prolonged mastication)   Tracheostomy   Tracheostomy  No   Dysphagia Strategies / Recommendations   Strategies / Interventions Recommended (Yes / No) Yes   Compensatory Strategies Head of Bed 90 Degrees During Eating / Drinking   Diet / Liquid Recommendation Thin (0);Regular (7)   Medication Administration  Whole with Liquid Wash   Therapy Interventions No Swallowing Intervention Required   Dysphagia Rating   Nutritional Liquid Intake Rating Scale Non thickened beverages   Nutritional Food Intake Rating Scale Total oral diet with no restrictions   Short Term Goals   Short Term Goal # 1 Pt will consume a regular/thin liquid diet with no overt s/sx of aspiration

## 2021-11-24 NOTE — PROGRESS NOTES
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

## 2021-11-24 NOTE — PROGRESS NOTES
Brief conversation with hospice.      86-year-old male presenting with occultly speaking now resolved.  Had a infarct back in August of this year very tiny high left frontal area.   CTA head and neck is unremarkable twice this year.  On Plavix.  MRI back in August look suspicious for cardioembolic phenomenon especially with clear vessels.  Current MRI is unremarkable for acute processes.  Recommend  a Zio patch.  Continue with secondary stroke risk modifications with blood pressure modification presented with over 200 systolic on this admission.  Recommend normotensive.  Continue with statin LDL is well controlled at 53.  Follow-up in neurology clinic.

## 2021-11-24 NOTE — ASSESSMENT & PLAN NOTE
Hold home meds for now  Diabetic diet when appropriate  Insulin sliding scale  Frequent Accu-Cheks  A1c

## 2021-11-24 NOTE — ASSESSMENT & PLAN NOTE
Present with expressive aphasia  NIH at the ED 2  Not a candidate for  TPN or thrombectomy  Admit to telemetry  Keep n.p.o. for now, pending swallow evaluation  Aspiration/fall precautions  Continue aspirin and high-dose statin  A1c and lipid panel  Pending TSH, B12, PVR and KUB  Pending brain MRI  Pending echocardiogram  Labs on AM

## 2021-11-24 NOTE — DISCHARGE PLANNING
Anticipated Discharge Disposition: Home    Action: Per RN, pt is hard of hearing and utilizes written communication.     LSW met with pt at bedside and utilized written communication. Pt verified address, PCP, and insurance on face sheet. Pt states he lives with his son and his son will pick him up. Pt has concerns that he has not eaten. LSW notified bedside RN.     Barriers to Discharge: None    Plan: LSW to follow and assist as needed.    Care Transition Team Assessment    Information Source  Orientation Level: Oriented to place,Oriented to situation,Oriented to person,Disoriented to time  Information Given By: Patient  Informant's Name: Alfred  Who is responsible for making decisions for patient? : Patient    Readmission Evaluation  Is this a readmission?: No    Elopement Risk  Legal Hold: No  Ambulatory or Self Mobile in Wheelchair: Yes  Disoriented: No  Psychiatric Symptoms: None  History of Wandering: No  Elopement this Admit: No  Vocalizing Wanting to Leave: No  Displays Behaviors, Body Language Wanting to Leave: No-Not at Risk for Elopement  Elopement Risk: Not at Risk for Elopement    Interdisciplinary Discharge Planning  Primary Care Physician: Wilma Guillermo MD  Lives with - Patient's Self Care Capacity: Adult Children  Patient or legal guardian wants to designate a caregiver: No  Support Systems: Children    Discharge Preparedness  What is your plan after discharge?: Home with help  What are your discharge supports?: Child  Prior Functional Level: Ambulatory    Functional Assesment  Prior Functional Level: Ambulatory    Finances  Financial Barriers to Discharge: No    Vision / Hearing Impairment  Vision Impairment : Yes  Right Eye Vision: Wears Glasses  Left Eye Vision: Wears Glasses  Hearing Impairment : Yes  Hearing Impairment: Right Ear,Hearing Device(s) Available (does not work well, write to patient to communicate)  Does Pt Need Special Equipment for the Hearing Impaired?: No         Advance  Directive  Advance Directive?: POLST    Domestic Abuse  Have you ever been the victim of abuse or violence?: No  Physical Abuse or Sexual Abuse: No  Verbal Abuse or Emotional Abuse: No  Possible Abuse/Neglect Reported to:: Not Applicable    Psychological Assessment  History of Substance Abuse: None  History of Psychiatric Problems: No    Discharge Risks or Barriers  Discharge risks or barriers?: No    Anticipated Discharge Information  Discharge Disposition: Discharged to home/self care (01)  Discharge Address: [address] [place], [place] [address] [address]

## 2021-11-24 NOTE — DISCHARGE SUMMARY
Discharge Summary    CHIEF COMPLAINT ON ADMISSION  Chief Complaint   Patient presents with   • ALOC     stroke rule out       Reason for Admission  CONFUSION     Admission Date  11/23/2021    CODE STATUS  DNAR/DNI    HPI & HOSPITAL COURSE  This is a 86 y.o. male here with a past medical history of multiple vascular risk factors, TIA, hypothyroidism, diabetes type 2, BPH, and GERD presents emergency department on 11/23/2021 as he was noted by son around 1230 to have difficulty finding words. At the emergency department, the pt was noted to have SBPs in the 200s - patient states that he believes this is abnormal for him, and he is not on antihypertensive meds at home.  He had negative CTA head and neck, and MRI demonstrated known chronic lacunar infarcts, but no new acute ischemic events.  Echo was unrevealing as well. He is back to his baseline.    The etiology of pt's confusion and speech issues could be of three possibilities - he may have had a TIA, hypertensive emergency, or a cardiac etiology. With regards to the TIA, pt is taking daily Plavix, but is no longer on DAPT from previous CVA - I advised the pt and his son for him to take both ASA 81mg as well as Plavix for the next three weeks until he can see his Neurologist, Dr Mathis. His lipid panel is adequate on current statin. With regards to the hypertensive emergency, I have started him on low dose Losartan given his history of DM, and advised him to check his Bps twice a day, and see his PCP in one week for follow up.     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  It would be quite rare to have symptoms from a Wenkebach block, but I have ordered a Zio patch and sent in a referral to Cardiology to follow that as an outpatient.  His mag is slightly low and I have given him a jump, he has no chest pain, and troponin is consistent with prior - TSH is normal as well. The patient's symptoms have  completely resolved, but he will need close follow up as an outpatient with regards to Neurology follow up, BP management, and cardiac monitoring with Zio patch and Cardiology. The patient and his son were both apprised of the same.      Therefore, he is discharged in good and stable condition to home with close outpatient follow-up.    The patient recovered much more quickly than anticipated on admission.    Discharge Date  11/24/21    FOLLOW UP ITEMS POST DISCHARGE  Zio patch monitor     DISCHARGE DIAGNOSES  Principal Problem:    Suspected cerebrovascular accident (CVA) POA: Unknown  Active Problems:    Hyperlipidemia POA: Yes      Overview: Tolerating statin w/o side effects    Hypothyroidism POA: Yes      Overview: Has been taking cytomel and levothryoxine    Vitamin D deficiency POA: Yes      Overview: Currently taking vit d3    Type 2 diabetes mellitus without complication, without long-term current use of insulin (HCC) POA: Yes      Overview: Denies c/o    Benign prostatic hyperplasia POA: Yes      Overview: Dr JAMES      4/28    GERD (gastroesophageal reflux disease) POA: Yes  Resolved Problems:    * No resolved hospital problems. *      FOLLOW UP  No future appointments.  Wilma Guillermo M.D.  [address] [facility] [address]  [address]  [facility] [address]  [phone]            MEDICATIONS ON DISCHARGE     Medication List      START taking these medications      Instructions   aspirin 81 MG Chew chewable tablet  Start taking on: November 25, 2021  Commonly known as: ASA   Doctor's comments: Take daily for three weeks - please see your Neurologist prior to stopping ASA  Chew 1 Tablet every day.  Dose: 81 mg     losartan 25 MG Tabs  Commonly known as: COZAAR   Take 1 Tablet by mouth every day.  Dose: 25 mg        CONTINUE taking these medications      Instructions   atorvastatin 20 MG Tabs  Commonly known as: LIPITOR   Take 1 Tab by mouth every day.  Dose: 20 mg     Blood Glucose Monitoring Suppl Lyndsey   Meter: Dispense  "glucose meter preferred on patient's insurance.  Sig. Use as directed for blood sugar monitoring.     clopidogrel 75 MG Tabs  Commonly known as: PLAVIX   Take 1 Tablet by mouth every day.  Dose: 75 mg     finasteride 5 MG Tabs  Commonly known as: PROSCAR   Take 1 Tab by mouth every day.  Dose: 5 mg     glimepiride 4 MG Tabs  Commonly known as: AMARYL   Take 1 tablet by mouth every morning.  Dose: 4 mg     levothyroxine 75 MCG Tabs  Commonly known as: SYNTHROID   Take 1 Tab by mouth every day.  Dose: 75 mcg     liothyronine 5 MCG Tabs  Commonly known as: CYTOMEL   TAKE 2 TABLETS BY MOUTH EVERY DAY     meloxicam 15 MG tablet  Commonly known as: MOBIC   Take 15 mg by mouth every day.  Dose: 15 mg     omeprazole 20 MG delayed-release capsule  Commonly known as: PRILOSEC   Take 20 mg by mouth every day.  Dose: 20 mg     tamsulosin 0.4 MG capsule  Commonly known as: FLOMAX   TAKE 1 CAPSULE BY MOUTH 0.5 HOUR AFTER BREAKFAST     venlafaxine XR 37.5 MG Cp24  Commonly known as: EFFEXOR XR   Take 37.5 mg by mouth 1/2 hour after breakfast.  Dose: 37.5 mg            Allergies  Allergies   Allergen Reactions   • Pcn [Penicillins]      \"my joints start to bubble up\"   • Sulfa Drugs      \"my joints start to bubble up\"  \"Its been 40 yrs since I've had either of those\"       DIET  Orders Placed This Encounter   Procedures   • Diet Order Diet: Consistent CHO (Diabetic)     Standing Status:   Standing     Number of Occurrences:   1     Order Specific Question:   Diet:     Answer:   Consistent CHO (Diabetic) [4]       ACTIVITY  As tolerated.  Weight bearing as tolerated    CONSULTATIONS  None    PROCEDURES  None    LABORATORY  Lab Results   Component Value Date    SODIUM 139 11/24/2021    POTASSIUM 4.1 11/24/2021    CHLORIDE 103 11/24/2021    CO2 22 11/24/2021    GLUCOSE 132 (H) 11/24/2021    BUN 14 11/24/2021    CREATININE 1.02 11/24/2021        Lab Results   Component Value Date    WBC 7.9 11/24/2021    HEMOGLOBIN 12.5 (L) 11/24/2021 "    HEMATOCRIT 38.1 (L) 11/24/2021    PLATELETCT 204 11/24/2021        Total time of the discharge process exceeds 37 minutes.

## 2021-11-24 NOTE — THERAPY
Physical Therapy   Initial Evaluation     Patient Name: Alfred Erazo  Age:  86 y.o., Sex:  male  Medical Record #: [id number]  Today's Date: 11/24/2021          Assessment  Patient is 86 y.o. male with a diagnosis of stroke like symptoms Pt lives at home with son and is active.He is very hard of hearing.Pt is safe with bed mob,transfers and ambulation.He appears to be at base line level of function and safe for home.No equipment needs    Plan    Recommend Physical Therapy for Evaluation only      11/24/21 1100   Total Time Spent   Total Time Spent (Mins) 30   Charge Group   PT Evaluation PT Evaluation Low   Initial Contact Note    Initial Contact Note Order Received and Verified, Physical Therapy Evaluation in Progress with Full Report to Follow.   Pain 0 - 10 Group   Therapist Pain Assessment 0   Prior Living Situation   Prior Services None   Housing / Facility 1 Story House   Steps Into Home 0   Steps In Home 0   Equipment Owned None   Lives with - Patient's Self Care Capacity Adult Children   Prior Level of Functional Mobility   Bed Mobility Independent   Transfer Status Independent   Ambulation Independent   Distance Ambulation (Feet)   (limited community)   Assistive Devices Used None   Stairs Independent   Cognition    Cognition / Consciousness WDL   Level of Consciousness Alert   Comments [place]   Passive ROM Lower Body   Passive ROM Lower Body WDL   Active ROM Lower Body    Active ROM Lower Body  WDL   Strength Lower Body   Lower Body Strength  WDL   Coordination Lower Body    Coordination Lower Body  WDL   Balance Assessment   Sitting Balance (Static) Good   Sitting Balance (Dynamic) Good   Standing Balance (Static) Fair +   Standing Balance (Dynamic) Fair +   Weight Shift Sitting Good   Weight Shift Standing Good   Gait Analysis   Gait Level Of Assist Supervised   Assistive Device None   Distance (Feet) 200   # of Times Distance was Traveled 2   Deviation Step To   # of Stairs Climbed 2   Level of Assist with  Stairs Supervised   Weight Bearing Status full   Bed Mobility    Supine to Sit Modified Independent   Sit to Supine Modified Independent   Scooting Modified Independent   Functional Mobility   Sit to Stand Supervised   Bed, Chair, Wheelchair Transfer Supervised   Transfer Method Stand Step   How much difficulty does the patient currently have...   Turning over in bed (including adjusting bedclothes, sheets and blankets)? 4   Sitting down on and standing up from a chair with arms (e.g., wheelchair, bedside commode, etc.) 4   Moving from lying on back to sitting on the side of the bed? 4   How much help from another person does the patient currently need...   Moving to and from a bed to a chair (including a wheelchair)? 4   Need to walk in a hospital room? 4   Climbing 3-5 steps with a railing? 4   6 clicks Mobility Score 24   Activity Tolerance   Sitting Edge of Bed 5   Standing 10   Patient / Family Goals    Patient / Family Goal #1 Home   Anticipated Discharge Equipment and Recommendations   DC Equipment Recommendations None   Discharge Recommendations Anticipate that the patient will have no further physical therapy needs after discharge from the hospital   Interdisciplinary Plan of Care Collaboration   IDT Collaboration with  Nursing   Session Information   Date / Session Number  11/24    Priority 0       DC Equipment Recommendations: (P) None  Discharge Recommendations: (P) Anticipate that the patient will have no further physical therapy needs after discharge from the hospital

## 2021-11-24 NOTE — DISCHARGE INSTRUCTIONS
Discharge Instructions    Discharged to home by car with relative. Discharged via wheelchair, hospital escort: Yes.  Special equipment needed: Wheelchair    Be sure to schedule a follow-up appointment with your primary care doctor or any specialists as instructed.     Discharge Plan:   Influenza Vaccine Indication: Not indicated: Previously immunized this influenza season and > 8 years of age    I understand that a diet low in cholesterol, fat, and sodium is recommended for good health. Unless I have been given specific instructions below for another diet, I accept this instruction as my diet prescription.   Other diet: Diabetic diet      Special Instructions:    · Is patient discharged on Warfarin / Coumadin?   No     Depression / Suicide Risk    As you are discharged from this [facility] facility, it is important to learn how to keep safe from harming yourself.    Recognize the warning signs:  · Abrupt changes in personality, positive or negative- including increase in energy   · Giving away possessions  · Change in eating patterns- significant weight changes-  positive or negative  · Change in sleeping patterns- unable to sleep or sleeping all the time   · Unwillingness or inability to communicate  · Depression  · Unusual sadness, discouragement and loneliness  · Talk of wanting to die  · Neglect of personal appearance   · Rebelliousness- reckless behavior  · Withdrawal from people/activities they love  · Confusion- inability to concentrate     If you or a loved one observes any of these behaviors or has concerns about self-harm, here's what you can do:  · Talk about it- your feelings and reasons for harming yourself  · Remove any means that you might use to hurt yourself (examples: pills, rope, extension cords, firearm)  · Get professional help from the community (Mental Health, Substance Abuse, psychological counseling)  · Do not be alone:Call your Safe Contact- someone whom you trust who will be there for  you.  · Call your local CRISIS HOTLINE 096-4393 or 355-170-4197  · Call your local Children's Mobile Crisis Response Team [place] (503) 765-6535 or www.Notegraphy  · Call the toll free National Suicide Prevention Hotlines   · National Suicide Prevention Lifeline 398-158-DVCQ (9715)  · National Hope Line Network 800-SUICIDE (373-1996)    Diet: Diet Order Diet: Consistent CHO (Diabetic)  Activity: As tolerated  Follow Up: I have placed a referral to our [facility] group for Wenkebach second degree heart block. If you do not hear from them within a week, please call their office.  Please take your blood pressure twice daily and keep a log book to present to your PCP in one week - if your blood pressure if >180/110, please seek medical attention.  Please make an appointment with your Neurologist at the first available appointment for assessment for TIA. Take a baby aspirin and plavix daily until you see your Neurologist.   Disposition:Home   Diagnosis: Suspected cerebrovascular accident (CVA), second degree AV block (Wenkebach)     Follow up with your Primary Care Provider Wilma Guillermo M.D. as scheduled or sooner if your symptoms persist or worsen.  Return to Emergency Room for severe chest pain, shortness of breath, signs of a stroke, or any other emergencies.

## 2021-11-24 NOTE — PROGRESS NOTES
COVID-19 surge in effect.    Bedside report received from July VICENTE. Patient is in bed and stable. Patient [place] and requires writing for communication despite having hearing aide. Bed alarm is on. Bed locked and in lowest position, upper side rails up, call light in reach, whiteboard updated. POC discussed and pt verbalizes understanding.

## 2021-11-24 NOTE — PROGRESS NOTES
Telemetry Shift Summary    Rhythm SB with 2nd degree type 2 with BBB  HR Range 53-66  Ectopy rPVC  Measurements variable/0.12/0.42        Normal Values  Rhythm SR  HR Range    Measurements 0.12-0.20 / 0.06-0.10  / 0.30-0.52

## 2021-11-25 LAB — 25(OH)D3 SERPL-MCNC: 38 NG/ML (ref 30–80)

## 2021-11-25 NOTE — PROGRESS NOTES
Patient discharged in wheelchair with son and CNA at bedside. Zio monitor on and education given to patient regarding monitor, medications. Discharge packet provided and all questions answered. Patient encouraged to purchase BP cuff and check blood pressures at home. Patient has no complaints at this time.

## 2021-11-30 ENCOUNTER — OFFICE VISIT (OUTPATIENT)
Dept: INTERNAL MEDICINE | Facility: IMAGING CENTER | Age: 86
End: 2021-11-30
Payer: MEDICARE

## 2021-11-30 VITALS
WEIGHT: 172 LBS | SYSTOLIC BLOOD PRESSURE: 138 MMHG | RESPIRATION RATE: 17 BRPM | DIASTOLIC BLOOD PRESSURE: 75 MMHG | HEART RATE: 47 BPM | BODY MASS INDEX: 24.62 KG/M2 | HEIGHT: 70 IN | OXYGEN SATURATION: 97 % | TEMPERATURE: 98.9 F

## 2021-11-30 DIAGNOSIS — Z09 HOSPITAL DISCHARGE FOLLOW-UP: Primary | ICD-10-CM

## 2021-11-30 DIAGNOSIS — R00.1 BRADYCARDIA, SINUS: ICD-10-CM

## 2021-11-30 DIAGNOSIS — I63.9 CEREBROVASCULAR ACCIDENT (CVA), UNSPECIFIED MECHANISM (HCC): ICD-10-CM

## 2021-11-30 DIAGNOSIS — E03.9 HYPOTHYROIDISM, UNSPECIFIED TYPE: ICD-10-CM

## 2021-11-30 PROCEDURE — 99214 OFFICE O/P EST MOD 30 MIN: CPT | Mod: 25 | Performed by: FAMILY MEDICINE

## 2021-11-30 RX ORDER — OMEPRAZOLE 20 MG/1
20 CAPSULE, DELAYED RELEASE ORAL DAILY
Qty: 90 CAPSULE | Refills: 3 | Status: SHIPPED | OUTPATIENT
Start: 2021-11-30 | End: 2022-11-09

## 2021-11-30 RX ORDER — TAMSULOSIN HYDROCHLORIDE 0.4 MG/1
CAPSULE ORAL
Qty: 90 CAPSULE | Refills: 3 | Status: SHIPPED | OUTPATIENT
Start: 2021-11-30 | End: 2021-12-17

## 2021-11-30 ASSESSMENT — FIBROSIS 4 INDEX: FIB4 SCORE: 1.63

## 2021-11-30 NOTE — PROGRESS NOTES
Subjective:     Alfred Erazo is a 86 y.o. male who comes in for hospital followup    Date of admission was 11/23/2021  Date of discharge was  11*/24/2021  Admitting physician     Date of 1st contact:  *11*/*25*/2021      Last August patient arrived to the hospital via [place] [facility] with acute loss of consciousness  He was confused and was having difficulties patient was found to have.  A tiny acute lacunar infarct in the left posterior frontal lobe  Chronic infarcts were noted at that time on scans  Neurology started him on dual antiplatelet therapy for 21 days followed by monotherapy alone  He was referred to neurology but appointment was never made  Then on November 23 patient arrived in the ER because he was having difficulty finding words  Blood pressure was noted to be in the 200s at that time  No new events were found on MRI  He had a normal echo  Ultimately it was not clear whether he had had a TIA, hypertensive emergency or another cardiac cause  Patient was advised to take both aspirin and Plavix for the next 3 weeks until his appointment with neurologist, Dr. Mathis  He has a good appetite and has had no further symptoms since discharge             Current Outpatient Medications   Medication Sig Dispense Refill   • losartan (COZAAR) 25 MG Tab Take 1 Tablet by mouth every day. 30 Tablet 0   • aspirin (ASA) 81 MG Chew Tab chewable tablet Chew 1 Tablet every day. 21 Tablet 0   • clopidogrel (PLAVIX) 75 MG Tab Take 1 Tablet by mouth every day. 90 Tablet 3   • omeprazole (PRILOSEC) 20 MG delayed-release capsule Take 20 mg by mouth every day. (Patient not taking: Reported on 11/30/2021)     • meloxicam (MOBIC) 15 MG tablet Take 15 mg by mouth every day. (Patient not taking: Reported on 11/30/2021)     • glimepiride (AMARYL) 4 MG Tab Take 1 tablet by mouth every morning. 100 tablet 3   • levothyroxine (SYNTHROID) 75 MCG Tab Take 1 Tab by mouth every day. 90 Tab 3   • finasteride (PROSCAR) 5 MG Tab  Take 1 Tab by mouth every day. 90 Tab 3   • liothyronine (CYTOMEL) 5 MCG Tab TAKE 2 TABLETS BY MOUTH EVERY  Tab 3   • atorvastatin (LIPITOR) 20 MG Tab Take 1 Tab by mouth every day. 100 Tab 3   • venlafaxine XR (EFFEXOR XR) 37.5 MG CAPSULE SR 24 HR Take 37.5 mg by mouth 1/2 hour after breakfast. (Patient not taking: Reported on 11/30/2021)     • tamsulosin (FLOMAX) 0.4 MG capsule TAKE 1 CAPSULE BY MOUTH 0.5 HOUR AFTER BREAKFAST (Patient not taking: Reported on 11/30/2021) 90 Cap 1   • Blood Glucose Monitoring Suppl Device Meter: Dispense glucose meter preferred on patient's insurance.  Sig. Use as directed for blood sugar monitoring. 1 Device 0     No current facility-administered medications for this visit.         Past Medical History:   Diagnosis Date   • Asthma    • BPH (benign prostatic hyperplasia)    • Diabetes (HCC)    • Diverticulitis    • Esophageal stricture     dilated spring 2016   • Hiatal hernia    • History of shingles 8/27/2015   • Hyperlipidemia 7/8/2015   • Hypertension    • Hypothyroid    • PHN (postherpetic neuralgia) 8/27/2015   • Psoriasis 6/14/2017   • TIA (transient ischemic attack)    • Vitamin D deficiency 8/27/2015       Past Surgical History:   Procedure Laterality Date   • CATARACT EXTRACTION WITH IOL     • TONSILLECTOMY     • VASECTOMY           Family History   Problem Relation Age of Onset   • Stroke Brother    • Stroke Father        Social History     Socioeconomic History   • Marital status:      Spouse name: Not on file   • Number of children: Not on file   • Years of education: Not on file   • Highest education level: Not on file   Occupational History   • Not on file   Tobacco Use   • Smoking status: Never Smoker   • Smokeless tobacco: Never Used   Vaping Use   • Vaping Use: Never used   Substance and Sexual Activity   • Alcohol use: No     Comment: occ   • Drug use: No   • Sexual activity: Not on file     Comment:  , 1 child, retired    Other  "Topics Concern   • Not on file   Social History Narrative    Alyson Erazo is spouse             1 child, retired      Social Determinants of Health     Financial Resource Strain:    • Difficulty of Paying Living Expenses: Not on file   Food Insecurity:    • Worried About Running Out of Food in the Last Year: Not on file   • Ran Out of Food in the Last Year: Not on file   Transportation Needs:    • Lack of Transportation (Medical): Not on file   • Lack of Transportation (Non-Medical): Not on file   Physical Activity:    • Days of Exercise per Week: Not on file   • Minutes of Exercise per Session: Not on file   Stress:    • Feeling of Stress : Not on file   Social Connections:    • Frequency of Communication with Friends and Family: Not on file   • Frequency of Social Gatherings with Friends and Family: Not on file   • Attends Sikhism Services: Not on file   • Active Member of Clubs or Organizations: Not on file   • Attends Club or Organization Meetings: Not on file   • Marital Status: Not on file   Intimate Partner Violence:    • Fear of Current or Ex-Partner: Not on file   • Emotionally Abused: Not on file   • Physically Abused: Not on file   • Sexually Abused: Not on file   Housing Stability:    • Unable to Pay for Housing in the Last Year: Not on file   • Number of Places Lived in the Last Year: Not on file   • Unstable Housing in the Last Year: Not on file                    Objective:     Vitals:    11/30/21 1500   BP: 138/75   Pulse: (!) 47   Resp: 17   Temp: 37.2 °C (98.9 °F)   TempSrc: Temporal   SpO2: 97%   Weight: 78 kg (172 lb)   Height: 1.778 m (5' 10\")     Body mass index is 24.68 kg/m².    Physical Exam: Patient is hard of hearing    Physical Exam  Constitutional:       General: He is not in acute distress.     Appearance: Normal appearance.   HENT:      Head: Normocephalic and atraumatic.      Nose: Nose normal.      Mouth/Throat:      Mouth: Mucous membranes are moist.   Eyes:      " Conjunctiva/sclera: Conjunctivae normal.   Cardiovascular:      Rate and Rhythm: Normal rate and regular rhythm.   Pulmonary:      Effort: Pulmonary effort is normal.      Breath sounds: Normal breath sounds. No wheezing.   Abdominal:      General: Abdomen is flat.      Palpations: Abdomen is soft.   Musculoskeletal:      Cervical back: No rigidity.      Right lower leg: No edema.      Left lower leg: No edema.   Lymphadenopathy:      Cervical: No cervical adenopathy.   Skin:     Coloration: Skin is not jaundiced.      Findings: No erythema.   Neurological:      General: No focal deficit present.      Mental Status: He is alert and oriented to person, place, and time. Mental status is at baseline.   Psychiatric:         Mood and Affect: Mood normal.         Behavior: Behavior normal.         Thought Content: Thought content normal.         Judgment: Judgment normal.         Assessment and Plan:     Hospital records reviewed from current hospitalization and previous one in August 1. Cerebrovascular accident (CVA), unspecified mechanism (HCC)-patient to continue with Plavix and aspirin until cleared by neurology    - Referral to Neurology    2. Bradycardia, sinus-plan will be close observation if he begins to have symptoms of dizziness, chest pain or shortness of breath he will go to the ER      3. Hypothyroidism, unspecified type-euthyroid last   thyroid lab done on November 23    4. Hospital discharge follow-up    Other orders  - omeprazole (PRILOSEC) 20 MG delayed-release capsule; Take 1 Capsule by mouth every day.  Dispense: 90 Capsule; Refill: 3  - tamsulosin (FLOMAX) 0.4 MG capsule; TAKE 1 CAPSULE BY MOUTH 0.5 HOUR nightly  Dispense: 90 Capsule; Refill: 3      - Chart and discharge summary were reviewed.   - Hospitalization and results reviewed with patient.   - Medications reviewed including instructions regarding high risk medications, dosing and side effects.  - Recommended Services: No services needed at  this time  - Advance directive/POLST on file?  Yes    Follow-up:No follow-ups on file.    Face-to-face transitional care management services with HIGH (today's visit is at least 7 days post discharge & LACE+ score 59+) medical decision complexity were provided.     LACE+ Historical Score Over Time (0-28: Low, 29-58: Medium, 59+: High): 77

## 2021-12-06 RX ORDER — ATORVASTATIN CALCIUM 20 MG/1
20 TABLET, FILM COATED ORAL
Qty: 100 TABLET | Refills: 3 | Status: SHIPPED | OUTPATIENT
Start: 2021-12-06 | End: 2023-01-05

## 2021-12-07 ENCOUNTER — TELEPHONE (OUTPATIENT)
Dept: SCHEDULING | Facility: IMAGING CENTER | Age: 86
End: 2021-12-07

## 2021-12-07 ENCOUNTER — PATIENT OUTREACH (OUTPATIENT)
Dept: HEALTH INFORMATION MANAGEMENT | Facility: OTHER | Age: 86
End: 2021-12-07

## 2021-12-07 DIAGNOSIS — R09.89 SUSPECTED CEREBROVASCULAR ACCIDENT (CVA): ICD-10-CM

## 2021-12-07 SDOH — ECONOMIC STABILITY: FOOD INSECURITY: WITHIN THE PAST 12 MONTHS, THE FOOD YOU BOUGHT JUST DIDN'T LAST AND YOU DIDN'T HAVE MONEY TO GET MORE.: NEVER TRUE

## 2021-12-07 SDOH — ECONOMIC STABILITY: TRANSPORTATION INSECURITY
IN THE PAST 12 MONTHS, HAS LACK OF TRANSPORTATION KEPT YOU FROM MEETINGS, WORK, OR FROM GETTING THINGS NEEDED FOR DAILY LIVING?: NO

## 2021-12-07 SDOH — ECONOMIC STABILITY: FOOD INSECURITY: WITHIN THE PAST 12 MONTHS, YOU WORRIED THAT YOUR FOOD WOULD RUN OUT BEFORE YOU GOT MONEY TO BUY MORE.: NEVER TRUE

## 2021-12-07 SDOH — ECONOMIC STABILITY: TRANSPORTATION INSECURITY
IN THE PAST 12 MONTHS, HAS THE LACK OF TRANSPORTATION KEPT YOU FROM MEDICAL APPOINTMENTS OR FROM GETTING MEDICATIONS?: NO

## 2021-12-07 SDOH — ECONOMIC STABILITY: INCOME INSECURITY: HOW HARD IS IT FOR YOU TO PAY FOR THE VERY BASICS LIKE FOOD, HOUSING, MEDICAL CARE, AND HEATING?: NOT HARD AT ALL

## 2021-12-07 NOTE — TELEPHONE ENCOUNTER
Carlee from [employer] called about an abnormal ekg reading for this pt's zio patch  Ref # [id number]. Ph# [phone]      Thank you.

## 2021-12-07 NOTE — TELEPHONE ENCOUNTER
CARLOS Bejarano from [employer] called with an abnormal EKG.   Ph# [phone]  Ref# [id number]    Thank you

## 2021-12-07 NOTE — TELEPHONE ENCOUNTER
You  Martin Lobo M.D. 1 hour ago (2:22 PM)       Zio ordered while pt was in the hospital, he's scheduled to see you on 1/13. Can you review the urgent Zio report from today?   Thanks!       Martin Lobo M.D.  You 24 minutes ago (3:17 PM)       Yea, first strip looks like mobitz 2 type 1 but second strip looks like complete heart block. Lets refer to EP urgently. If symptomatic should go to ED. Thanks Adeline!      Called pt and scheduled for next available EP appt on 12/17. Pt has been feeling well, but instructed to call 911 if he ever develops dizziness, faintness, ect.

## 2021-12-07 NOTE — TELEPHONE ENCOUNTER
Received report for possible complete heart block today 12/7 at 11:51 and 11:53. Called pt to discuss. He reports that he has been feeling well and did not have any symptoms at the time of the episode. He does think that he took a nap at some point today, but he can't remember when it was.   He also says that he doesn't have a pre-addressed envelope to mail the Zio back in. Advised that he call the [facility] customer care number to see how they would like him to handle it.

## 2021-12-08 NOTE — PROGRESS NOTES
12/7/21- CHW Jayce contacted pt via TC post d/c to introduce CCM services. Completed SDOH screening and outpatient assessment. Pt had follow up visit on 11/30 and has additional follow up with cardiologist on 12/17. Pt declined assistance with scheduling, CCM services and referral to [facility]. Pt mentions good support from family. Pt is confident in ability to manage care post d/c. No issues keeping appointments or financial barriers to care. Completed AVS review/ medication/ questions. Pt denies need for resources such as food, transportation or housing. CCM contact info left with pt. Encouraged pt to contact if needed.     Community Health Worker Intake  • Social determinates of health intake completed.   • Identified barriers to none.   • Contact information provided to Alfred Erazo. Yes   • Has PCP appointment scheduled for 11/30  • Scheduled Food Delivery/Home Visit/Outpatient Visit: No   • Accepted/Declined Meds-To-Beds. No   • Inpatient/Outpatient assessment completed. Outpatient   • Did the patient receive medications post discharge: Yes/ Picked up at local pharmacy.     Plan: Additional outreach call confirming no other needs.     12/9/21- CHW follow up call to pt confirming no other needs. Pt states he has all follow up care appointments at this time. Will d/c from caseload as all needs met.

## 2021-12-09 ENCOUNTER — OFFICE VISIT (OUTPATIENT)
Dept: INTERNAL MEDICINE | Facility: IMAGING CENTER | Age: 86
End: 2021-12-09
Payer: MEDICARE

## 2021-12-09 VITALS
TEMPERATURE: 98.3 F | RESPIRATION RATE: 14 BRPM | SYSTOLIC BLOOD PRESSURE: 148 MMHG | DIASTOLIC BLOOD PRESSURE: 80 MMHG | HEART RATE: 84 BPM | OXYGEN SATURATION: 93 %

## 2021-12-09 DIAGNOSIS — R19.7 DIARRHEA, UNSPECIFIED TYPE: ICD-10-CM

## 2021-12-09 DIAGNOSIS — I10 HYPERTENSION, UNSPECIFIED TYPE: ICD-10-CM

## 2021-12-09 PROCEDURE — 99214 OFFICE O/P EST MOD 30 MIN: CPT | Performed by: FAMILY MEDICINE

## 2021-12-09 RX ORDER — CHOLESTYRAMINE 4 G/9G
.5-1 POWDER, FOR SUSPENSION ORAL DAILY
Qty: 30 EACH | Refills: 3 | Status: SHIPPED | OUTPATIENT
Start: 2021-12-09 | End: 2021-12-17

## 2021-12-09 NOTE — PROGRESS NOTES
Zio patch alert received stating pt had a 3rd degree heart block seen on monitor.  On reviewing the strips, appears to be Wenkebach.  Discussed with Dr Quintero who agrees no third degree block. Pt has appt with Cardiology in one week, can continue follow up at that appointment.

## 2021-12-10 ENCOUNTER — TELEPHONE (OUTPATIENT)
Dept: NEUROLOGY | Facility: MEDICAL CENTER | Age: 86
End: 2021-12-10

## 2021-12-10 ENCOUNTER — NON-PROVIDER VISIT (OUTPATIENT)
Dept: NEUROLOGY | Facility: MEDICAL CENTER | Age: 86
End: 2021-12-10
Attending: PSYCHIATRY & NEUROLOGY
Payer: MEDICARE

## 2021-12-10 ENCOUNTER — NON-PROVIDER VISIT (OUTPATIENT)
Dept: CARDIOLOGY | Facility: MEDICAL CENTER | Age: 86
End: 2021-12-10
Payer: MEDICARE

## 2021-12-10 DIAGNOSIS — I44.2 HEART BLOCK AV THIRD DEGREE (HCC): ICD-10-CM

## 2021-12-10 DIAGNOSIS — I44.1 HEART BLOCK AV SECOND DEGREE: ICD-10-CM

## 2021-12-10 DIAGNOSIS — I63.9 CEREBROVASCULAR ACCIDENT (CVA), UNSPECIFIED MECHANISM (HCC): ICD-10-CM

## 2021-12-10 PROCEDURE — 99211 OFF/OP EST MAY X REQ PHY/QHP: CPT | Performed by: NURSE PRACTITIONER

## 2021-12-10 NOTE — NON-PROVIDER
Patient came in for second ziopatch. Order in by Rosalva Portillo. Kiley Metcalf patient. Ziopatch successfully placed. Online registration complete. Patient instructed on care and when to return ziopatch. Pt understood.

## 2021-12-10 NOTE — PROGRESS NOTES
Chief Complaint   Patient presents with   • Hypertension       Subjective:     HPI:   Alfred Erazo is a 86 y.o. male with history of stroke hypothyroidism,, diabetes, here  to discuss the evaluation and management of: Elevated blood pressure  He had a stroke earlier this year in August but then returned to the hospital 3 weeks ago with confusion and speech difficulty    Scans were negative for acute events but did demonstrate chronic lacunar infarcts  He also had a normal echo    The patient was placed on a Zio patch monitor and has an upcoming cardiology appointment pending    He was contacted by those monitoring his Zio patch live for concerns about third-degree heart block seen on the monitor  When reviewed by the cardiologist third-degree block was ruled out and it appears to be wenkebach which was what was seen at the ER    He has been monitoring his blood pressure and is getting readings from 140s to 160s over 70- 80s    He denies weakness, fatigue, headache, chest pain or shortness of breath    No problems updated.     Objective:     /80   Pulse 84   Temp 36.8 °C (98.3 °F) (Temporal)   Resp 14   SpO2 93%  There is no height or weight on file to calculate BMI.    Physical Exam:  Physical Exam  Constitutional: Well-developed and well-nourished. Not diaphoretic. No distress.   Skin: Skin is warm and dry. No rash noted.  Head: Atraumatic without lesions.  Eyes: Conjunctivae and extraocular motions are normal.   Ears:  External ears unremarkable.    Nose: Nares patent. Mucosa without edema or erythema. No discharge. No facial tenderness.     Neck: Supple, No thyromegaly present. No JVD  Cardiovascular: Regular rate and rhythm.   Chest: Effort normal. Clear to auscultation throughout. No adventitious sounds.   Abdomen:  without distention.  .  Extremities: No cyanosis, clubbing, erythema, nor edema.   Neurological: Alert and oriented x 3.    Psychiatric:  Behavior, mood, and affect are appropriate      Assessment and Plan:   Hospital records reviewed  Zio patch correspondence reviewed    The following treatment plan was discussed:     No problem-specific Assessment & Plan notes found for this encounter.    1. Hypertension, unspecified type-after reading note that cardiologist reviewed Zio patch abnormality and feels it is not heart block I reassured the patient that it is okay to wait until next week for his cardiology appointment  At that time complete results can be reviewed as well as his blood pressure and see if a change in medication is necessary    2. Diarrhea, unspecified type  - cholestyramine (QUESTRAN) 4 g packet; Take 2-4 g by mouth every day.  Dispense: 30 Each; Refill: 3           Any change or worsening of signs or symptoms, patient encouraged to follow-up or report to emergency room for further evaluation. Patient verbalizes understanding and agrees.    Follow-Up: No follow-ups on file.      PLEASE NOTE: This dictation was created using voice recognition software. I have made every reasonable attempt to correct obvious errors, but I expect that there are errors of grammar and possibly content that I did not discover before finalizing the note.    My total time spent caring for the patient on the day of the encounter was  greater than 30 minutes.   This includes obtaining history, reviewing chart, physical exam, patient education, reviewing outside records, placing orders, interpreting tests and coordinating care.

## 2021-12-10 NOTE — TELEPHONE ENCOUNTER
patient's MRN is [id number], if someone can ask him to come in. If not today any day next week. Please make sure he doesn't place the monitor without an order. I might not be by my laptop, but any provider can put in order. The order is CAR23. Dx is TIA or 2nd degree heart block, whichever works for him. Thank you so much for your help. Have a great weekend    Pt scheduled for 2:30 pm today for ROBERT

## 2021-12-10 NOTE — TELEPHONE ENCOUNTER
Left VM for pt correcting date of ziopatch return. Told pt he could take it off and mail it on 12/25/21. Left callback number incase pt had any questions.

## 2021-12-17 ENCOUNTER — OFFICE VISIT (OUTPATIENT)
Dept: CARDIOLOGY | Facility: MEDICAL CENTER | Age: 86
End: 2021-12-17
Payer: MEDICARE

## 2021-12-17 VITALS
DIASTOLIC BLOOD PRESSURE: 70 MMHG | OXYGEN SATURATION: 99 % | HEIGHT: 70 IN | HEART RATE: 59 BPM | WEIGHT: 180 LBS | BODY MASS INDEX: 25.77 KG/M2 | RESPIRATION RATE: 12 BRPM | SYSTOLIC BLOOD PRESSURE: 118 MMHG

## 2021-12-17 DIAGNOSIS — I44.2 CHB (COMPLETE HEART BLOCK) (HCC): ICD-10-CM

## 2021-12-17 PROCEDURE — 99205 OFFICE O/P NEW HI 60 MIN: CPT | Performed by: INTERNAL MEDICINE

## 2021-12-17 PROCEDURE — 93000 ELECTROCARDIOGRAM COMPLETE: CPT | Performed by: INTERNAL MEDICINE

## 2021-12-17 RX ORDER — CHOLESTYRAMINE LIGHT 4 G/5.7G
POWDER, FOR SUSPENSION ORAL
COMMUNITY
Start: 2021-12-10 | End: 2021-12-17

## 2021-12-17 ASSESSMENT — FIBROSIS 4 INDEX: FIB4 SCORE: 1.63

## 2021-12-18 NOTE — PROGRESS NOTES
Arrhythmia Clinic Note (New patient)     DOS: 2021    Referring physician: Dr Lobo    Chief complaint/Reason for consult: Heart block    HPI: 87 y/o M with episode of altered mental status and no evidence of CVA, prescribed zio patch on hospital discharge. This showed evidence of high degree AV block while awake. Pt denies syncope. He is worried about his heart slowing down and stopping. Wife  a few months ago. Son is in town the next couple weeks.    ROS (+ highlighted in bold):  Constitutional: Fevers/chills/fatigue/weightloss  HEENT: Blurry vision/eye pain/sore throat/hearing loss  Respiratory: Shortness of breath/cough  Cardiovascular: Chest pain/palpitations/edema/orthopnea/syncope  GI: Nausea/vomitting/diarrhea  MSK: Arthralgias/myagias/muscle weakness  Skin: Rash/sores  Neurological: Numbness/tremors/vertigo  Endocrine: Excessive thirst/polyuria/cold intolerance/heat intolerance  Psych: Depression/anxiety    Past Medical History:   Diagnosis Date   • Asthma    • BPH (benign prostatic hyperplasia)    • Diabetes (HCC)    • Diverticulitis    • Esophageal stricture     dilated spring 2016   • Hiatal hernia    • History of shingles 2015   • Hyperlipidemia 2015   • Hypertension    • Hypothyroid    • PHN (postherpetic neuralgia) 2015   • Psoriasis 2017   • TIA (transient ischemic attack)    • Vitamin D deficiency 2015       Past Surgical History:   Procedure Laterality Date   • CATARACT EXTRACTION WITH IOL     • TONSILLECTOMY     • VASECTOMY         Social History     Socioeconomic History   • Marital status:      Spouse name: Not on file   • Number of children: Not on file   • Years of education: Not on file   • Highest education level: Not on file   Occupational History   • Not on file   Tobacco Use   • Smoking status: Never Smoker   • Smokeless tobacco: Never Used   Vaping Use   • Vaping Use: Never used   Substance and Sexual Activity   • Alcohol use: No     Comment:  "occ   • Drug use: No   • Sexual activity: Not on file     Comment:  , 1 child, retired    Other Topics Concern   • Not on file   Social History Narrative    Alyson Erazo is spouse             1 child, retired      Social Determinants of Health     Financial Resource Strain: Low Risk    • Difficulty of Paying Living Expenses: Not hard at all   Food Insecurity: No Food Insecurity   • Worried About Running Out of Food in the Last Year: Never true   • Ran Out of Food in the Last Year: Never true   Transportation Needs: No Transportation Needs   • Lack of Transportation (Medical): No   • Lack of Transportation (Non-Medical): No   Physical Activity:    • Days of Exercise per Week: Not on file   • Minutes of Exercise per Session: Not on file   Stress:    • Feeling of Stress : Not on file   Social Connections:    • Frequency of Communication with Friends and Family: Not on file   • Frequency of Social Gatherings with Friends and Family: Not on file   • Attends [employer] Services: Not on file   • Active Member of Clubs or Organizations: Not on file   • Attends Club or Organization Meetings: Not on file   • Marital Status: Not on file   Intimate Partner Violence:    • Fear of Current or Ex-Partner: Not on file   • Emotionally Abused: Not on file   • Physically Abused: Not on file   • Sexually Abused: Not on file   Housing Stability:    • Unable to Pay for Housing in the Last Year: Not on file   • Number of Places Lived in the Last Year: Not on file   • Unstable Housing in the Last Year: Not on file       Family History   Problem Relation Age of Onset   • Stroke Brother    • Stroke Father        Allergies   Allergen Reactions   • Pcn [Penicillins]      \"my joints start to bubble up\"   • Sulfa Drugs      \"my joints start to bubble up\"  \"Its been 40 yrs since I've had either of those\"       Current Outpatient Medications   Medication Sig Dispense Refill   • atorvastatin (LIPITOR) 20 MG Tab Take 1 " "Tablet by mouth every day. 100 Tablet 3   • omeprazole (PRILOSEC) 20 MG delayed-release capsule Take 1 Capsule by mouth every day. 90 Capsule 3   • losartan (COZAAR) 25 MG Tab Take 1 Tablet by mouth every day. 30 Tablet 0   • aspirin (ASA) 81 MG Chew Tab chewable tablet Chew 1 Tablet every day. 21 Tablet 0   • clopidogrel (PLAVIX) 75 MG Tab Take 1 Tablet by mouth every day. 90 Tablet 3   • glimepiride (AMARYL) 4 MG Tab Take 1 tablet by mouth every morning. 100 tablet 3   • levothyroxine (SYNTHROID) 75 MCG Tab Take 1 Tab by mouth every day. 90 Tab 3   • finasteride (PROSCAR) 5 MG Tab Take 1 Tab by mouth every day. 90 Tab 3   • liothyronine (CYTOMEL) 5 MCG Tab TAKE 2 TABLETS BY MOUTH EVERY  Tab 3   • Blood Glucose Monitoring Suppl Device Meter: Dispense glucose meter preferred on patient's insurance.  Sig. Use as directed for blood sugar monitoring. 1 Device 0   • cholestyramine (QUESTRAN,PREVALITE) 4 GM Pack TAKE 2-4 BY MOUTH EVERY DAY (Patient not taking: Reported on 12/17/2021)     • cholestyramine (QUESTRAN) 4 g packet Take 2-4 g by mouth every day. (Patient not taking: Reported on 12/17/2021) 30 Each 3   • tamsulosin (FLOMAX) 0.4 MG capsule TAKE 1 CAPSULE BY MOUTH 0.5 HOUR nightly (Patient not taking: Reported on 12/17/2021) 90 Capsule 3     No current facility-administered medications for this visit.       Physical Exam:  Vitals:    12/17/21 1614   BP: 118/70   BP Location: Left arm   Patient Position: Sitting   BP Cuff Size: Adult   Pulse: (!) 59   Resp: 12   SpO2: 99%   Weight: 81.6 kg (180 lb)   Height: 1.778 m (5' 10\")     General appearance: NAD, conversant   Eyes: anicteric sclerae, moist conjunctivae; no lid-lag; PERRLA  HENT: Atraumatic; oropharynx clear with moist mucous membranes and no mucosal ulcerations; normal hard and soft palate  Neck: Trachea midline; FROM, supple, no thyromegaly or lymphadenopathy  Lungs: CTA, with normal respiratory effort and no intercostal retractions  CV: RRR, no " MRGs, no JVD   Abdomen: Soft, non-tender; no masses or HSM  Extremities: No peripheral edema or extremity lymphadenopathy  Skin: Normal temperature, turgor and texture; no rash, ulcers or subcutaneous nodules  Psych: Appropriate affect, alert and oriented to person, place and time    Data:  Lipids:   Lab Results   Component Value Date/Time    CHOLSTRLTOT 127 11/24/2021 12:33 AM    TRIGLYCERIDE 173 (H) 11/24/2021 12:33 AM    HDL 39 (A) 11/24/2021 12:33 AM    LDL 53 11/24/2021 12:33 AM        BMP:  Lab Results   Component Value Date/Time    SODIUM 139 11/24/2021 0033    POTASSIUM 4.1 11/24/2021 0033    CHLORIDE 103 11/24/2021 0033    CO2 22 11/24/2021 0033    GLUCOSE 132 (H) 11/24/2021 0033    BUN 14 11/24/2021 0033    CREATININE 1.02 11/24/2021 0033    CALCIUM 9.6 11/24/2021 0033    ANION 14.0 11/24/2021 0033        TSH:   Lab Results   Component Value Date/Time    TSHULTRASEN 2.430 11/23/2021 1320        THYROXINE (T4):   No results found for: CHANTEIR     CBC:   Lab Results   Component Value Date/Time    WBC 7.9 11/24/2021 12:33 AM    RBC 3.98 (L) 11/24/2021 12:33 AM    HEMOGLOBIN 12.5 (L) 11/24/2021 12:33 AM    HEMATOCRIT 38.1 (L) 11/24/2021 12:33 AM    MCV 95.7 11/24/2021 12:33 AM    MCH 31.4 11/24/2021 12:33 AM    MCHC 32.8 (L) 11/24/2021 12:33 AM    RDW 47.3 11/24/2021 12:33 AM    PLATELETCT 204 11/24/2021 12:33 AM    MPV 10.4 11/24/2021 12:33 AM    NEUTSPOLYS 72.60 (H) 11/23/2021 01:20 PM    LYMPHOCYTES 17.70 (L) 11/23/2021 01:20 PM    MONOCYTES 7.30 11/23/2021 01:20 PM    EOSINOPHILS 1.60 11/23/2021 01:20 PM    BASOPHILS 0.30 11/23/2021 01:20 PM    IMMGRAN 0.50 11/23/2021 01:20 PM    NRBC 0.00 11/23/2021 01:20 PM    NEUTS 5.51 11/23/2021 01:20 PM    LYMPHS 1.34 11/23/2021 01:20 PM    MONOS 0.55 11/23/2021 01:20 PM    EOS 0.12 11/23/2021 01:20 PM    BASO 0.02 11/23/2021 01:20 PM    IMMGRANAB 0.04 11/23/2021 01:20 PM    NRBCAB 0.00 11/23/2021 01:20 PM        CBC w/o DIFF  Lab Results   Component Value Date/Time     WBC 7.9 11/24/2021 12:33 AM    RBC 3.98 (L) 11/24/2021 12:33 AM    HEMOGLOBIN 12.5 (L) 11/24/2021 12:33 AM    MCV 95.7 11/24/2021 12:33 AM    MCH 31.4 11/24/2021 12:33 AM    MCHC 32.8 (L) 11/24/2021 12:33 AM    RDW 47.3 11/24/2021 12:33 AM    MPV 10.4 11/24/2021 12:33 AM       Prior echo/stress results reviewed: Normal EF    EKG interpreted by me: 2nd degree AV block    Impression/Plan:  1. CHB (complete heart block) (HCC)  EKG     1. 2nd degree AV block    - Advanced 2nd degree heart block with bradycardia. PPM is recommended. The risk, benefits, and alternatives to pacemaker placement were discussed in great detail, specific risks mentioned including bleeding, infection, cardiac perforation with possible tamponade requiring pericardiocentesis or open heart surgery.  In addition the possibility of lead dislodgment, pneumothorax, hemothorax were discussed. Also mentioned were the possibility of death, stroke, and myocardial infarction. The patient verbalized understanding of these potential complications and wishes to proceed with this procedure.     Plan PPM implant in the next week or two.    Eduin Buckner MD  Cardiac Electrophysiology

## 2021-12-19 LAB — EKG IMPRESSION: NORMAL

## 2021-12-20 ENCOUNTER — TELEPHONE (OUTPATIENT)
Dept: CARDIOLOGY | Facility: MEDICAL CENTER | Age: 86
End: 2021-12-20

## 2021-12-20 DIAGNOSIS — I44.2 CHB (COMPLETE HEART BLOCK) (HCC): ICD-10-CM

## 2021-12-20 NOTE — TELEPHONE ENCOUNTER
----- Message from Eduin Buckner M.D. sent at 12/17/2021  4:42 PM PST -----  Please schedule PPM implant with agnes ordoñez meds to hold. He needs it done while his Son is still in town for logistic purposes so if I don't have availability then DS can do it if he is available. Thanks

## 2021-12-20 NOTE — TELEPHONE ENCOUNTER
Patient scheduled for PM insert on 12-29-21 with Dr. Buckner. Patient has been instructed to check in at 6:00 for 8:00 case time. Hold Amaryl am of. Message sent to kimberly Negrete with Dante notified.

## 2021-12-22 ENCOUNTER — TELEPHONE (OUTPATIENT)
Dept: CARDIOLOGY | Facility: MEDICAL CENTER | Age: 86
End: 2021-12-22

## 2021-12-27 DIAGNOSIS — E11.9 TYPE 2 DIABETES MELLITUS WITHOUT COMPLICATION, WITHOUT LONG-TERM CURRENT USE OF INSULIN (HCC): ICD-10-CM

## 2021-12-27 RX ORDER — LOSARTAN POTASSIUM 25 MG/1
25 TABLET ORAL DAILY
Qty: 90 TABLET | Refills: 3 | Status: SHIPPED | OUTPATIENT
Start: 2021-12-27 | End: 2022-12-08

## 2021-12-28 ENCOUNTER — PRE-ADMISSION TESTING (OUTPATIENT)
Dept: ADMISSIONS | Facility: MEDICAL CENTER | Age: 86
End: 2021-12-28
Attending: INTERNAL MEDICINE
Payer: MEDICARE

## 2021-12-28 DIAGNOSIS — Z01.812 PRE-OPERATIVE LABORATORY EXAMINATION: ICD-10-CM

## 2021-12-28 DIAGNOSIS — Z01.810 PRE-OPERATIVE CARDIOVASCULAR EXAMINATION: ICD-10-CM

## 2021-12-28 LAB
ALBUMIN SERPL BCP-MCNC: 4.4 G/DL (ref 3.2–4.9)
ALBUMIN/GLOB SERPL: 1.6 G/DL
ALP SERPL-CCNC: 81 U/L (ref 30–99)
ALT SERPL-CCNC: 23 U/L (ref 2–50)
ANION GAP SERPL CALC-SCNC: 15 MMOL/L (ref 7–16)
AST SERPL-CCNC: 28 U/L (ref 12–45)
BILIRUB SERPL-MCNC: 0.7 MG/DL (ref 0.1–1.5)
BUN SERPL-MCNC: 17 MG/DL (ref 8–22)
CALCIUM SERPL-MCNC: 9.4 MG/DL (ref 8.5–10.5)
CHLORIDE SERPL-SCNC: 105 MMOL/L (ref 96–112)
CO2 SERPL-SCNC: 22 MMOL/L (ref 20–33)
CREAT SERPL-MCNC: 1 MG/DL (ref 0.5–1.4)
EKG IMPRESSION: NORMAL
ERYTHROCYTE [DISTWIDTH] IN BLOOD BY AUTOMATED COUNT: 49.1 FL (ref 35.9–50)
GLOBULIN SER CALC-MCNC: 2.7 G/DL (ref 1.9–3.5)
GLUCOSE SERPL-MCNC: 150 MG/DL (ref 65–99)
HCT VFR BLD AUTO: 41.2 % (ref 42–52)
HGB BLD-MCNC: 13.4 G/DL (ref 14–18)
INR PPP: 1.05 (ref 0.87–1.13)
MCH RBC QN AUTO: 31.6 PG (ref 27–33)
MCHC RBC AUTO-ENTMCNC: 32.5 G/DL (ref 33.7–35.3)
MCV RBC AUTO: 97.2 FL (ref 81.4–97.8)
PLATELET # BLD AUTO: 215 K/UL (ref 164–446)
PMV BLD AUTO: 10.9 FL (ref 9–12.9)
POTASSIUM SERPL-SCNC: 4.1 MMOL/L (ref 3.6–5.5)
PROT SERPL-MCNC: 7.1 G/DL (ref 6–8.2)
PROTHROMBIN TIME: 13.4 SEC (ref 12–14.6)
RBC # BLD AUTO: 4.24 M/UL (ref 4.7–6.1)
SARS-COV+SARS-COV-2 AG RESP QL IA.RAPID: NOTDETECTED
SODIUM SERPL-SCNC: 142 MMOL/L (ref 135–145)
SPECIMEN SOURCE: NORMAL
WBC # BLD AUTO: 5.9 K/UL (ref 4.8–10.8)

## 2021-12-28 PROCEDURE — 93005 ELECTROCARDIOGRAM TRACING: CPT

## 2021-12-28 PROCEDURE — 87426 SARSCOV CORONAVIRUS AG IA: CPT

## 2021-12-28 PROCEDURE — 80053 COMPREHEN METABOLIC PANEL: CPT

## 2021-12-28 PROCEDURE — 36415 COLL VENOUS BLD VENIPUNCTURE: CPT

## 2021-12-28 PROCEDURE — 85027 COMPLETE CBC AUTOMATED: CPT

## 2021-12-28 PROCEDURE — 85610 PROTHROMBIN TIME: CPT

## 2021-12-28 PROCEDURE — 93010 ELECTROCARDIOGRAM REPORT: CPT | Performed by: INTERNAL MEDICINE

## 2021-12-28 RX ORDER — TAMSULOSIN HYDROCHLORIDE 0.4 MG/1
0.4 CAPSULE ORAL
COMMUNITY
End: 2022-03-03

## 2021-12-28 ASSESSMENT — FIBROSIS 4 INDEX: FIB4 SCORE: 1.63

## 2021-12-29 ENCOUNTER — HOSPITAL ENCOUNTER (OUTPATIENT)
Facility: MEDICAL CENTER | Age: 86
End: 2021-12-29
Attending: INTERNAL MEDICINE | Admitting: INTERNAL MEDICINE
Payer: MEDICARE

## 2021-12-29 ENCOUNTER — APPOINTMENT (OUTPATIENT)
Dept: RADIOLOGY | Facility: MEDICAL CENTER | Age: 86
End: 2021-12-29
Attending: INTERNAL MEDICINE
Payer: MEDICARE

## 2021-12-29 ENCOUNTER — APPOINTMENT (OUTPATIENT)
Dept: CARDIOLOGY | Facility: MEDICAL CENTER | Age: 86
End: 2021-12-29
Attending: INTERNAL MEDICINE
Payer: MEDICARE

## 2021-12-29 VITALS
WEIGHT: 177.69 LBS | DIASTOLIC BLOOD PRESSURE: 94 MMHG | TEMPERATURE: 97.3 F | HEART RATE: 77 BPM | OXYGEN SATURATION: 95 % | BODY MASS INDEX: 25.44 KG/M2 | SYSTOLIC BLOOD PRESSURE: 159 MMHG | HEIGHT: 70 IN | RESPIRATION RATE: 10 BRPM

## 2021-12-29 DIAGNOSIS — I44.1 HEART BLOCK AV SECOND DEGREE: ICD-10-CM

## 2021-12-29 DIAGNOSIS — I44.2 CHB (COMPLETE HEART BLOCK) (HCC): ICD-10-CM

## 2021-12-29 LAB
EKG IMPRESSION: NORMAL
GLUCOSE BLD-MCNC: 167 MG/DL (ref 65–99)

## 2021-12-29 PROCEDURE — 160002 HCHG RECOVERY MINUTES (STAT)

## 2021-12-29 PROCEDURE — 93010 ELECTROCARDIOGRAM REPORT: CPT | Performed by: INTERNAL MEDICINE

## 2021-12-29 PROCEDURE — 700117 HCHG RX CONTRAST REV CODE 255: Performed by: INTERNAL MEDICINE

## 2021-12-29 PROCEDURE — 700111 HCHG RX REV CODE 636 W/ 250 OVERRIDE (IP)

## 2021-12-29 PROCEDURE — 33208 INSRT HEART PM ATRIAL & VENT: CPT

## 2021-12-29 PROCEDURE — 99152 MOD SED SAME PHYS/QHP 5/>YRS: CPT | Performed by: INTERNAL MEDICINE

## 2021-12-29 PROCEDURE — 33208 INSRT HEART PM ATRIAL & VENT: CPT | Mod: KX | Performed by: INTERNAL MEDICINE

## 2021-12-29 PROCEDURE — 71045 X-RAY EXAM CHEST 1 VIEW: CPT

## 2021-12-29 PROCEDURE — 700101 HCHG RX REV CODE 250

## 2021-12-29 PROCEDURE — 93005 ELECTROCARDIOGRAM TRACING: CPT | Performed by: INTERNAL MEDICINE

## 2021-12-29 PROCEDURE — 82962 GLUCOSE BLOOD TEST: CPT

## 2021-12-29 RX ORDER — CEPHALEXIN 500 MG/1
500 CAPSULE ORAL 3 TIMES DAILY
Qty: 15 CAPSULE | Refills: 0 | Status: SHIPPED | OUTPATIENT
Start: 2021-12-29 | End: 2022-02-03

## 2021-12-29 RX ORDER — CEFAZOLIN SODIUM 1 G/3ML
INJECTION, POWDER, FOR SOLUTION INTRAMUSCULAR; INTRAVENOUS
Status: COMPLETED
Start: 2021-12-29 | End: 2021-12-29

## 2021-12-29 RX ORDER — MIDAZOLAM HYDROCHLORIDE 1 MG/ML
INJECTION INTRAMUSCULAR; INTRAVENOUS
Status: COMPLETED
Start: 2021-12-29 | End: 2021-12-29

## 2021-12-29 RX ORDER — BUPIVACAINE HYDROCHLORIDE 2.5 MG/ML
INJECTION, SOLUTION EPIDURAL; INFILTRATION; INTRACAUDAL
Status: COMPLETED
Start: 2021-12-29 | End: 2021-12-29

## 2021-12-29 RX ORDER — LIDOCAINE HYDROCHLORIDE 20 MG/ML
INJECTION, SOLUTION INFILTRATION; PERINEURAL
Status: COMPLETED
Start: 2021-12-29 | End: 2021-12-29

## 2021-12-29 RX ADMIN — MIDAZOLAM HYDROCHLORIDE 2 MG: 1 INJECTION, SOLUTION INTRAMUSCULAR; INTRAVENOUS at 08:25

## 2021-12-29 RX ADMIN — BUPIVACAINE HYDROCHLORIDE: 2.5 INJECTION, SOLUTION EPIDURAL; INFILTRATION; INTRACAUDAL; PERINEURAL at 08:17

## 2021-12-29 RX ADMIN — LIDOCAINE HYDROCHLORIDE: 20 INJECTION, SOLUTION INFILTRATION; PERINEURAL at 08:17

## 2021-12-29 RX ADMIN — CEFAZOLIN 2000 MG: 330 INJECTION, POWDER, FOR SOLUTION INTRAMUSCULAR; INTRAVENOUS at 08:18

## 2021-12-29 RX ADMIN — FENTANYL CITRATE 100 MCG: 50 INJECTION, SOLUTION INTRAMUSCULAR; INTRAVENOUS at 08:32

## 2021-12-29 RX ADMIN — IOHEXOL 10 ML: 350 INJECTION, SOLUTION INTRAVENOUS at 08:26

## 2021-12-29 RX ADMIN — CEFAZOLIN 1000 MG: 330 INJECTION, POWDER, FOR SOLUTION INTRAMUSCULAR; INTRAVENOUS at 08:17

## 2021-12-29 ASSESSMENT — FIBROSIS 4 INDEX: FIB4 SCORE: 2.28

## 2021-12-29 NOTE — OR NURSING
1122- Received pt from Recovery. No complaints of pain or N/V at this time. VSS. Dressing to L upper  CDI     1200 - Discharge orders received. IV taken out. All belongings returned to pt. Pt changed into clothing with assistance. Pt up and ambulated to BR and voided adequately. Discharge instructions given and discussed as well as pain management handout. Pt verbalizes understandings and all questions answered at this time. Pt states they are ready to be D/C home. Prescriptions given to pt and verbalizes understanding of medications. Pt D/C via wheelchair with all belongings with RN

## 2021-12-29 NOTE — OR NURSING
0908: received to PACU via gurney. Awake. Gauze and tegaderm dressing to left upper chest CDI. Arm sling in place.  Denies pain or nausea.    0917: voided using the urinal. 150 ml. Clear yellow urine.    0925: portable chest x'ray completed. Sips of water given. Tolerated well.0935: 12 Leads EKG completed.    1000: sleeping on and off. Stable.    1026: voided using the urinal.    1043: [employer] Rep here to interrogate Pacemaker.    1114: report called to Evan VICENTE.    1118: transported via [facility] to PACU 2. Stable. Patient wearing face mask.

## 2021-12-29 NOTE — OP REPORT
PROCEDURE PERFORMED: Permanent Pacemaker Implantation    DATE OF SERVICE: 12/29/2021    : Eduin Buckner MD    ASSISTANT: None    ANESTHESIA:   Moderate Sedation was provided, start time 0810 stop time 0850  The moderate sedation document has been reviewed, signed and scanned into media.    MEDICATIONS:  2mg Versed and 100mcg Fentanyl  2g Ancef    EBL: 20 cc    SPECIMENS: None    STATEMENT OF MEDICAL NECESSITY:  Symptomatic 2nd degree AV block    DESCRIPTION OF PROCEDURE:  After informed written consent, the patient was brought to the electrophysiology lab in the fasting, unsedated state. The patient was prepped and draped in the usual sterile fashion. The procedure was performed under moderate sedation with local anesthetic. A left upper extremity venogram was performed, demonstrating a patent subclavian vein. A left infraclavicular incision was made with a scalpel and the pectoral device pocket was created using a combination of blunt dissection and electrocautery. The modified Seldinger technique was used to gain access to the left axillary vein. Two peel-away hemostasis sheaths were placed in the vein. Under fluoroscopic guidance, the pacemaker leads were introduced into the heart. The ventricular lead was advanced to the RVOT and then lowered into position at the RV apical septum. The atrial lead was positioned on R atrial appendage. The leads were tested and had satisfactory sensing and pacing parameters. High output ventricular pacing did not produce extracardiac stimulation. The leads were sutured to the underlying pectoral muscle with interrupted silk over a silastic suture sleeve. The device pocket was irrigated with antibiotic solution, inspected, and no bleeding was seen. The leads were connected to the pacemaker pulse generator and the device was inserted into the pocket. The wound was closed with three layers of absorbable sutures. Following recovery from sedation, the patient was transferred to  a monitored bed in good condition.     IMPLANTED DEVICE INFORMATION:  Pulse generator is a [employer] model L311  Serial # [id number]    LEAD INFORMATION:  1)Right atrial lead is a [employer] model #7841 , serial #[id number] ,P wave 4.2 millivolts, threshold 1.2 Volts at 0.5 milliseconds, pacing impedance 575 Ohms.    2)Right ventricular lead is a [employer]  model #7842  , serial #[id number] ,R wave 5.3 millivolts, threshold 0.5 Volts at 0.5 milliseconds, pacing impedance 820 Ohms.    DEVICE PROGRAMMING:  DDD 60 -120 ppm    FLUOROSCOPY TIME: 1.9 min    IMPRESSIONS:  1. Successful dual chamber pacemaker implantation    RECOMMENDATIONS:  1. Transfer to monitored bed  2. PA and lateral chest x-ray  3. Device interrogation prior to hospital discharge  4. PO antibiotics for 5 days  5. Followup in device clinic

## 2021-12-29 NOTE — DISCHARGE INSTRUCTIONS
ACTIVITY: Rest and take it easy for the first 24 hours.  A responsible adult is recommended to remain with you during that time.  It is normal to feel sleepy.  We encourage you to not do anything that requires balance, judgment or coordination.    MILD FLU-LIKE SYMPTOMS ARE NORMAL. YOU MAY EXPERIENCE GENERALIZED MUSCLE ACHES, THROAT IRRITATION, HEADACHE AND/OR SOME NAUSEA.    FOR 24 HOURS DO NOT:  Drive, operate machinery or run household appliances.  Drink beer or alcoholic beverages.   Make important decisions or sign legal documents.    SPECIAL INSTRUCTIONS:     Pacemaker Discharge Instructions/[facility]    1.  No showers until seen in follow up; may take sponge bath.  Keep dressing dry & in place until seen at for you follow up visit at the cardiology office.     2.  No lifting over 10 lbs with affected arm for six weeks.  3.  Do not raise affected arm above shoulder level or behind head for six weeks.  4.  Avoid excessive pushing, pulling, or twisting for six weeks.  5.  No driving for the first week.  6.  Call our office ([phone]) if you notice any increased swelling, redness, warmth, or drainage at the implant site.  7.  Needs to be seen in emergency if you develop fever > 101F or uncontrolled pain.  8.  Always check with device clinic before any planned MRI to see if device is MRI compatible.  9.  No routine dental work or cleanings for 3 months.  10.  May remove arm sling after one day, but please wear if you have trouble remembering to keep your arm down.  Please wear at night as a reminder.   11. Do not place cell phones or mobile devices directly over implanted device.       DIET: To avoid nausea, slowly advance diet as tolerated, avoiding spicy or greasy foods for the first day.  Add more substantial food to your diet according to your physician's instructions.  Babies can be fed formula or breast milk as soon as they are hungry.  INCREASE FLUIDS AND FIBER TO AVOID CONSTIPATION.    SURGICAL  DRESSING/BATHING: Maintain surgical dressing x 7 days, No shower. Sponge bath only x 7 days    FOLLOW-UP APPOINTMENT:  A follow-up appointment should be arranged with your doctor; call to schedule.    You should CALL YOUR PHYSICIAN if you develop:  Fever greater than 101 degrees F.  Pain not relieved by medication, or persistent nausea or vomiting.  Excessive bleeding (blood soaking through dressing) or unexpected drainage from the wound.  Extreme redness or swelling around the incision site, drainage of pus or foul smelling drainage.  Inability to urinate or empty your bladder within 8 hours.  Problems with breathing or chest pain.    You should call 911 if you develop problems with breathing or chest pain.  If you are unable to contact your doctor or surgical center, you should go to the nearest emergency room or urgent care center.  Physician's telephone #: Dr. Buckner [phone]    If any questions arise, call your doctor.  If your doctor is not available, please feel free to call the Surgical Center at [phone].     A registered nurse may call you a few days after your surgery to see how you are doing after your procedure.    MEDICATIONS: Resume taking daily medication.  Take prescribed pain medication with food.  If no medication is prescribed, you may take non-aspirin pain medication if needed.  PAIN MEDICATION CAN BE VERY CONSTIPATING.  Take a stool softener or laxative such as senokot, pericolace, or milk of magnesia if needed.    Prescription given for Keflex .  Last pain medication given at None given in PACU.    If your physician has prescribed pain medication that includes Acetaminophen (Tylenol), do not take additional Acetaminophen (Tylenol) while taking the prescribed medication.    Depression / Suicide Risk    As you are discharged from this [facility] facility, it is important to learn how to keep safe from harming yourself.    Recognize the warning signs:  · Abrupt changes in personality,  positive or negative- including increase in energy   · Giving away possessions  · Change in eating patterns- significant weight changes-  positive or negative  · Change in sleeping patterns- unable to sleep or sleeping all the time   · Unwillingness or inability to communicate  · Depression  · Unusual sadness, discouragement and loneliness  · Talk of wanting to die  · Neglect of personal appearance   · Rebelliousness- reckless behavior  · Withdrawal from people/activities they love  · Confusion- inability to concentrate     If you or a loved one observes any of these behaviors or has concerns about self-harm, here's what you can do:  · Talk about it- your feelings and reasons for harming yourself  · Remove any means that you might use to hurt yourself (examples: pills, rope, extension cords, firearm)  · Get professional help from the community (Mental Health, Substance Abuse, psychological counseling)  · Do not be alone:Call your Safe Contact- someone whom you trust who will be there for you.  · Call your local CRISIS HOTLINE 345-9890 or 341-585-0642  · Call your local Children's Mobile Crisis Response Team [place] (229) 740-5950 or www.[place] [employer]  · Call the toll free National Suicide Prevention Hotlines   · National Suicide Prevention Lifeline 313-327-UULA (5419)  · National Hope Line Network 800-SUICIDE (259-7082)

## 2022-01-05 ENCOUNTER — TELEPHONE (OUTPATIENT)
Dept: CARDIOLOGY | Facility: MEDICAL CENTER | Age: 87
End: 2022-01-05

## 2022-01-05 ENCOUNTER — NON-PROVIDER VISIT (OUTPATIENT)
Dept: CARDIOLOGY | Facility: MEDICAL CENTER | Age: 87
End: 2022-01-05
Payer: MEDICARE

## 2022-01-05 DIAGNOSIS — I44.1 AV BLOCK, 2ND DEGREE: ICD-10-CM

## 2022-01-05 DIAGNOSIS — Z95.0 CARDIAC PACEMAKER IN SITU: ICD-10-CM

## 2022-01-05 NOTE — TELEPHONE ENCOUNTER
Pt seen in clinic today for 1-wk wound check.    Pt had 1 episode of AF 12/30/2021@3:15am lasting 4 mins and 9 secs.     Pt not on OAC.    Full report scanned into media.

## 2022-01-06 ENCOUNTER — TELEPHONE (OUTPATIENT)
Dept: CARDIOLOGY | Facility: MEDICAL CENTER | Age: 87
End: 2022-01-06

## 2022-01-06 DIAGNOSIS — I63.9 CEREBROVASCULAR ACCIDENT (CVA), UNSPECIFIED MECHANISM (HCC): ICD-10-CM

## 2022-01-06 PROCEDURE — 93268 ECG RECORD/REVIEW: CPT | Performed by: INTERNAL MEDICINE

## 2022-01-06 NOTE — TELEPHONE ENCOUNTER
Morning,    Phillip from [employer] called to report critical results for this patients zio patch.       [employer]  PH. [phone]  Ref# [id number]      Thank you,    LISANDRO

## 2022-01-06 NOTE — TELEPHONE ENCOUNTER
Message  Received: Today  Eduin Buckner M.D.  Kaylyn Herr, Med Ass't; Nohemi Henning R.N.; Kasey Cortes  Caller: Unspecified (Today,  1:51 PM)    Thanks - no changes for incidentally discovered AF <6 min in duration   ------------------------------------------------------    Noted.

## 2022-01-11 ENCOUNTER — TELEPHONE (OUTPATIENT)
Dept: CARDIOLOGY | Facility: MEDICAL CENTER | Age: 87
End: 2022-01-11

## 2022-01-11 NOTE — TELEPHONE ENCOUNTER
Phillip with [employer] with some abnormal EKG results.     Reference Number: [id number]    Best Contact Number: [phone]    Thank you,    Gretta MARES

## 2022-01-17 ENCOUNTER — PATIENT MESSAGE (OUTPATIENT)
Dept: HEALTH INFORMATION MANAGEMENT | Facility: OTHER | Age: 87
End: 2022-01-17
Payer: MEDICARE

## 2022-01-17 DIAGNOSIS — E03.8 OTHER SPECIFIED HYPOTHYROIDISM: ICD-10-CM

## 2022-01-17 DIAGNOSIS — E03.9 HYPOTHYROIDISM, UNSPECIFIED TYPE: ICD-10-CM

## 2022-01-17 RX ORDER — LIOTHYRONINE SODIUM 5 UG/1
TABLET ORAL
Qty: 180 TABLET | Refills: 3 | Status: SHIPPED | OUTPATIENT
Start: 2022-01-17 | End: 2023-02-13

## 2022-01-17 RX ORDER — LEVOTHYROXINE SODIUM 0.07 MG/1
75 TABLET ORAL
Qty: 90 TABLET | Refills: 3 | Status: SHIPPED | OUTPATIENT
Start: 2022-01-17 | End: 2023-02-13

## 2022-01-17 RX ORDER — FINASTERIDE 5 MG/1
5 TABLET, FILM COATED ORAL DAILY
Qty: 90 TABLET | Refills: 3 | Status: SHIPPED | OUTPATIENT
Start: 2022-01-17 | End: 2023-02-27

## 2022-02-03 ENCOUNTER — NON-PROVIDER VISIT (OUTPATIENT)
Dept: CARDIOLOGY | Facility: MEDICAL CENTER | Age: 87
End: 2022-02-03
Payer: MEDICARE

## 2022-02-03 ENCOUNTER — OFFICE VISIT (OUTPATIENT)
Dept: CARDIOLOGY | Facility: MEDICAL CENTER | Age: 87
End: 2022-02-03
Payer: MEDICARE

## 2022-02-03 VITALS
HEIGHT: 70 IN | BODY MASS INDEX: 25.34 KG/M2 | RESPIRATION RATE: 14 BRPM | WEIGHT: 177 LBS | HEART RATE: 66 BPM | SYSTOLIC BLOOD PRESSURE: 120 MMHG | OXYGEN SATURATION: 96 % | DIASTOLIC BLOOD PRESSURE: 74 MMHG

## 2022-02-03 DIAGNOSIS — I10 ESSENTIAL HYPERTENSION: ICD-10-CM

## 2022-02-03 DIAGNOSIS — E78.5 HYPERLIPIDEMIA, UNSPECIFIED HYPERLIPIDEMIA TYPE: ICD-10-CM

## 2022-02-03 DIAGNOSIS — I44.1 AV BLOCK, 2ND DEGREE: ICD-10-CM

## 2022-02-03 DIAGNOSIS — I63.9 CEREBROVASCULAR ACCIDENT (CVA), UNSPECIFIED MECHANISM (HCC): ICD-10-CM

## 2022-02-03 DIAGNOSIS — Z95.0 CARDIAC PACEMAKER IN SITU: ICD-10-CM

## 2022-02-03 DIAGNOSIS — E03.9 HYPOTHYROIDISM, UNSPECIFIED TYPE: ICD-10-CM

## 2022-02-03 DIAGNOSIS — Z95.0 PACEMAKER: ICD-10-CM

## 2022-02-03 PROCEDURE — 93280 PM DEVICE PROGR EVAL DUAL: CPT | Performed by: INTERNAL MEDICINE

## 2022-02-03 PROCEDURE — 99214 OFFICE O/P EST MOD 30 MIN: CPT | Performed by: NURSE PRACTITIONER

## 2022-02-03 ASSESSMENT — FIBROSIS 4 INDEX: FIB4 SCORE: 2.28

## 2022-02-03 NOTE — PROGRESS NOTES
Chief Complaint   Patient presents with   • Hyperlipidemia       Subjective     Cam Erazo Jr. is a 86 y.o. male who presents today for follow up after undergoing successful BSI dual chamber PPM implantation with Dr. Buckner on 12/29/2021 due to symptomatic 2nd degree AV block.     Other past medical history significant for HTN, HLD, hypothyroid, multiple CVA's, DM and asthma.     Today patient states that he is overall doing well. Denies having and concerns or complaints to discuss today.     Past Medical History:   Diagnosis Date   • Asthma    • BPH (benign prostatic hyperplasia)    • Cataract     Surgically removed bilat   • Dental disorder     Upper   • Diabetes (HCC)    • Diverticulitis    • Esophageal stricture     dilated spring 2016   • Hiatal hernia    • High cholesterol    • History of shingles 8/27/2015   • Hyperlipidemia 7/8/2015   • Hypertension    • Hypothyroid    • PHN (postherpetic neuralgia) 8/27/2015   • Psoriasis 6/14/2017   • Stroke (HCC)     Last one was around 11/2021.  Has had several strokes including one in 9/2021.   • TIA (transient ischemic attack)    • Urinary incontinence    • Vitamin D deficiency 8/27/2015     Past Surgical History:   Procedure Laterality Date   • CATARACT EXTRACTION WITH IOL     • TONSILLECTOMY     • VASECTOMY       Family History   Problem Relation Age of Onset   • Stroke Brother    • Stroke Father      Social History     Socioeconomic History   • Marital status:      Spouse name: Not on file   • Number of children: Not on file   • Years of education: Not on file   • Highest education level: Not on file   Occupational History   • Not on file   Tobacco Use   • Smoking status: Never Smoker   • Smokeless tobacco: Never Used   Vaping Use   • Vaping Use: Never used   Substance and Sexual Activity   • Alcohol use: Yes     Comment: occ. 12/28/21: 1 drink a  week.    • Drug use: No   • Sexual activity: Not on file     Comment:  , 1 child, retired   "  Other Topics Concern   • Not on file   Social History Narrative    Alyson Erazo is spouse             1 child, retired      Social Determinants of Health     Financial Resource Strain: Low Risk    • Difficulty of Paying Living Expenses: Not hard at all   Food Insecurity: No Food Insecurity   • Worried About Running Out of Food in the Last Year: Never true   • Ran Out of Food in the Last Year: Never true   Transportation Needs: No Transportation Needs   • Lack of Transportation (Medical): No   • Lack of Transportation (Non-Medical): No   Physical Activity:    • Days of Exercise per Week: Not on file   • Minutes of Exercise per Session: Not on file   Stress:    • Feeling of Stress : Not on file   Social Connections:    • Frequency of Communication with Friends and Family: Not on file   • Frequency of Social Gatherings with Friends and Family: Not on file   • Attends Sikh Services: Not on file   • Active Member of Clubs or Organizations: Not on file   • Attends Club or Organization Meetings: Not on file   • Marital Status: Not on file   Intimate Partner Violence:    • Fear of Current or Ex-Partner: Not on file   • Emotionally Abused: Not on file   • Physically Abused: Not on file   • Sexually Abused: Not on file   Housing Stability:    • Unable to Pay for Housing in the Last Year: Not on file   • Number of Places Lived in the Last Year: Not on file   • Unstable Housing in the Last Year: Not on file     Allergies   Allergen Reactions   • Pcn [Penicillins]      \"my joints start to bubble up\"   • Sulfa Drugs      \"my joints start to bubble up\"  \"Its been 40 yrs since I've had either of those\"     Outpatient Encounter Medications as of 2/3/2022   Medication Sig Dispense Refill   • liothyronine (CYTOMEL) 5 MCG Tab TAKE 2 TABLETS BY MOUTH EVERY  Tablet 3   • finasteride (PROSCAR) 5 MG Tab Take 1 Tablet by mouth every day. 90 Tablet 3   • levothyroxine (SYNTHROID) 75 MCG Tab Take 1 Tablet by mouth " "every day. 90 Tablet 3   • Ascorbic Acid (VITAMIN C PO) Take 500 mg by mouth every day.     • Cholecalciferol (VITAMIN D3 PO) Take 50 mcg by mouth every day.     • Cyanocobalamin (VITAMIN B 12 PO) Take 500 mcg by mouth every day.     • Multiple Vitamins-Minerals (PRESERVISION AREDS PO) Take  by mouth every day.     • tamsulosin (FLOMAX) 0.4 MG capsule Take 0.4 mg by mouth 1/2 hour after breakfast.     • losartan (COZAAR) 25 MG Tab TAKE 1 TABLET BY MOUTH EVERY DAY 90 Tablet 3   • atorvastatin (LIPITOR) 20 MG Tab Take 1 Tablet by mouth every day. 100 Tablet 3   • omeprazole (PRILOSEC) 20 MG delayed-release capsule Take 1 Capsule by mouth every day. 90 Capsule 3   • aspirin (ASA) 81 MG Chew Tab chewable tablet Chew 1 Tablet every day. 21 Tablet 0   • clopidogrel (PLAVIX) 75 MG Tab Take 1 Tablet by mouth every day. 90 Tablet 3   • glimepiride (AMARYL) 4 MG Tab Take 1 tablet by mouth every morning. 100 tablet 3   • Blood Glucose Monitoring Suppl Device Meter: Dispense glucose meter preferred on patient's insurance.  Sig. Use as directed for blood sugar monitoring. 1 Device 0   • [DISCONTINUED] cephALEXin (KEFLEX) 500 MG Cap Take 1 Capsule by mouth 3 times a day. 15 Capsule 0     No facility-administered encounter medications on file as of 2/3/2022.     Review of Systems   Constitutional: Negative for malaise/fatigue and weight loss.   Respiratory: Negative for shortness of breath.    Cardiovascular: Negative for chest pain, palpitations, orthopnea, claudication, leg swelling and PND.   Neurological: Negative for dizziness and weakness.   All other systems reviewed and are negative.             Objective     /74 (BP Location: Left arm, Patient Position: Sitting, BP Cuff Size: Adult)   Pulse 66   Resp 14   Ht 1.778 m (5' 10\")   Wt 80.3 kg (177 lb)   SpO2 96%   BMI 25.40 kg/m²     Physical Exam  Constitutional:       General: He is not in acute distress.     Appearance: He is well-developed.   HENT:      Head: " Normocephalic.   Eyes:      Extraocular Movements: Extraocular movements intact.   Neck:      Vascular: No carotid bruit or JVD.   Cardiovascular:      Rate and Rhythm: Normal rate and regular rhythm.      Heart sounds: Normal heart sounds. No murmur heard.      Pulmonary:      Effort: Pulmonary effort is normal.      Breath sounds: Normal breath sounds.   Abdominal:      General: There is no distension.      Palpations: Abdomen is soft.   Musculoskeletal:         General: Normal range of motion.      Cervical back: Normal range of motion.      Right lower leg: No edema.      Left lower leg: No edema.   Skin:     General: Skin is warm and dry.   Neurological:      Mental Status: He is alert and oriented to person, place, and time.   Psychiatric:         Mood and Affect: Mood normal.         Behavior: Behavior normal.         Thought Content: Thought content normal.         EP OP Report 12/29/2021:  IMPLANTED DEVICE INFORMATION:  Pulse generator is a [employer] model L311  Serial # [id number]     LEAD INFORMATION:  1)Right atrial lead is a [employer] model #7841 , serial #[id number] ,P wave 4.2 millivolts, threshold 1.2 Volts at 0.5 milliseconds, pacing impedance 575 Ohms.     2)Right ventricular lead is a [employer]  model #7842  , serial #[id number] ,R wave 5.3 millivolts, threshold 0.5 Volts at 0.5 milliseconds, pacing impedance 820 Ohms.     DEVICE PROGRAMMING:  DDD 60 -120 ppm     FLUOROSCOPY TIME: 1.9 min     IMPRESSIONS:  1. Successful dual chamber pacemaker implantation         Assessment & Plan     1. Cardiac pacemaker in situ     2. AV block, 2nd degree     3. Hypothyroidism, unspecified type     4. Essential hypertension     5. Hyperlipidemia, unspecified hyperlipidemia type     6. Cerebrovascular accident (CVA), unspecified mechanism (HCC)         Medical Decision Making: Today's Assessment/Status/Plan:     1. S/P Permanent Pacemaker:  - S/P successful BSI dual-chamber pacemaker implantation  with Dr. Buckner on 12/29/2021 due to symptomatic second degree heart block.   - Device interrogation today showed normal sensing and function.  - Left upper pacemaker site CDI, uncomplicated.     2. HTN:  - BP remains well controlled.   - Continue losartan 25 mg daily and      3. Hypothyroid:  - TSH in November was WNL.     4. HLD:  - Well controlled.     5. H/O CVA:  - On ASA/Plavix and statin therapy.     6. PAF:  - [place]gett now <1%, will CTM burden closely. Patient would like to avoid OAC if possible. Discussed need if burden increases.     Patient will follow up with myself in 3 months or earlier if needed. Encouraged patient to contact our office should any questions or concerns arise in the mean time.     Future Appointments   Date Time Provider Department Center   5/24/2022 11:00 AM SAMINA Belle. [facility] None

## 2022-02-04 ASSESSMENT — ENCOUNTER SYMPTOMS
WEIGHT LOSS: 0
WEAKNESS: 0
PND: 0
PALPITATIONS: 0
DIZZINESS: 0
ORTHOPNEA: 0
SHORTNESS OF BREATH: 0
CLAUDICATION: 0

## 2022-02-09 ENCOUNTER — OFFICE VISIT (OUTPATIENT)
Dept: INTERNAL MEDICINE | Facility: IMAGING CENTER | Age: 87
End: 2022-02-09
Payer: MEDICARE

## 2022-02-09 VITALS
RESPIRATION RATE: 14 BRPM | WEIGHT: 171.96 LBS | DIASTOLIC BLOOD PRESSURE: 70 MMHG | BODY MASS INDEX: 24.67 KG/M2 | TEMPERATURE: 97.8 F | OXYGEN SATURATION: 95 % | HEART RATE: 62 BPM | SYSTOLIC BLOOD PRESSURE: 132 MMHG

## 2022-02-09 DIAGNOSIS — R15.9 INCONTINENCE OF FECES WITH FECAL URGENCY: ICD-10-CM

## 2022-02-09 DIAGNOSIS — I44.2 CHB (COMPLETE HEART BLOCK) (HCC): ICD-10-CM

## 2022-02-09 DIAGNOSIS — E11.69 DYSLIPIDEMIA ASSOCIATED WITH TYPE 2 DIABETES MELLITUS (HCC): ICD-10-CM

## 2022-02-09 DIAGNOSIS — F32.A MILD DEPRESSION: ICD-10-CM

## 2022-02-09 DIAGNOSIS — E78.5 DYSLIPIDEMIA ASSOCIATED WITH TYPE 2 DIABETES MELLITUS (HCC): ICD-10-CM

## 2022-02-09 DIAGNOSIS — R15.2 INCONTINENCE OF FECES WITH FECAL URGENCY: ICD-10-CM

## 2022-02-09 DIAGNOSIS — I77.9 BILATERAL CAROTID ARTERY DISEASE, UNSPECIFIED TYPE (HCC): ICD-10-CM

## 2022-02-09 DIAGNOSIS — E11.9 TYPE 2 DIABETES MELLITUS WITHOUT COMPLICATION, WITHOUT LONG-TERM CURRENT USE OF INSULIN (HCC): ICD-10-CM

## 2022-02-09 PROCEDURE — 99214 OFFICE O/P EST MOD 30 MIN: CPT | Performed by: FAMILY MEDICINE

## 2022-02-09 ASSESSMENT — FIBROSIS 4 INDEX: FIB4 SCORE: 2.28

## 2022-02-11 ENCOUNTER — APPOINTMENT (RX ONLY)
Dept: URBAN - METROPOLITAN AREA CLINIC 20 | Facility: CLINIC | Age: 87
Setting detail: DERMATOLOGY
End: 2022-02-11

## 2022-02-11 DIAGNOSIS — L82.1 OTHER SEBORRHEIC KERATOSIS: ICD-10-CM

## 2022-02-11 DIAGNOSIS — D22 MELANOCYTIC NEVI: ICD-10-CM

## 2022-02-11 DIAGNOSIS — Z85.828 PERSONAL HISTORY OF OTHER MALIGNANT NEOPLASM OF SKIN: ICD-10-CM

## 2022-02-11 DIAGNOSIS — D18.0 HEMANGIOMA: ICD-10-CM

## 2022-02-11 DIAGNOSIS — L81.4 OTHER MELANIN HYPERPIGMENTATION: ICD-10-CM

## 2022-02-11 DIAGNOSIS — L57.0 ACTINIC KERATOSIS: ICD-10-CM | Status: RESOLVED

## 2022-02-11 DIAGNOSIS — Z71.89 OTHER SPECIFIED COUNSELING: ICD-10-CM

## 2022-02-11 PROBLEM — D22.39 MELANOCYTIC NEVI OF OTHER PARTS OF FACE: Status: ACTIVE | Noted: 2022-02-11

## 2022-02-11 PROBLEM — D22.61 MELANOCYTIC NEVI OF RIGHT UPPER LIMB, INCLUDING SHOULDER: Status: ACTIVE | Noted: 2022-02-11

## 2022-02-11 PROBLEM — D18.01 HEMANGIOMA OF SKIN AND SUBCUTANEOUS TISSUE: Status: ACTIVE | Noted: 2022-02-11

## 2022-02-11 PROBLEM — D22.62 MELANOCYTIC NEVI OF LEFT UPPER LIMB, INCLUDING SHOULDER: Status: ACTIVE | Noted: 2022-02-11

## 2022-02-11 PROBLEM — D22.5 MELANOCYTIC NEVI OF TRUNK: Status: ACTIVE | Noted: 2022-02-11

## 2022-02-11 PROBLEM — D22.71 MELANOCYTIC NEVI OF RIGHT LOWER LIMB, INCLUDING HIP: Status: ACTIVE | Noted: 2022-02-11

## 2022-02-11 PROBLEM — D22.72 MELANOCYTIC NEVI OF LEFT LOWER LIMB, INCLUDING HIP: Status: ACTIVE | Noted: 2022-02-11

## 2022-02-11 PROCEDURE — ? LIQUID NITROGEN

## 2022-02-11 PROCEDURE — ? SUNSCREEN RECOMMENDATIONS

## 2022-02-11 PROCEDURE — ? ADDITIONAL NOTES

## 2022-02-11 PROCEDURE — 17000 DESTRUCT PREMALG LESION: CPT

## 2022-02-11 PROCEDURE — ? COUNSELING

## 2022-02-11 PROCEDURE — 99213 OFFICE O/P EST LOW 20 MIN: CPT | Mod: 25

## 2022-02-11 PROCEDURE — ? OBSERVATION

## 2022-02-11 ASSESSMENT — LOCATION DETAILED DESCRIPTION DERM
LOCATION DETAILED: LEFT INFERIOR FOREHEAD
LOCATION DETAILED: LEFT CENTRAL TEMPLE
LOCATION DETAILED: RIGHT INFERIOR FOREHEAD
LOCATION DETAILED: RIGHT DISTAL POSTERIOR THIGH
LOCATION DETAILED: RIGHT INFERIOR MEDIAL MIDBACK
LOCATION DETAILED: RIGHT SUPERIOR UPPER BACK
LOCATION DETAILED: POSTERIOR MID-PARIETAL SCALP
LOCATION DETAILED: LEFT VENTRAL PROXIMAL FOREARM
LOCATION DETAILED: LEFT SUPERIOR HELIX
LOCATION DETAILED: RIGHT PROXIMAL PRETIBIAL REGION
LOCATION DETAILED: LEFT ANTERIOR EARLOBE
LOCATION DETAILED: LEFT LATERAL PROXIMAL PRETIBIAL REGION
LOCATION DETAILED: LEFT PROXIMAL POSTERIOR UPPER ARM
LOCATION DETAILED: RIGHT INFERIOR CENTRAL MALAR CHEEK
LOCATION DETAILED: LEFT INFERIOR LATERAL FOREHEAD
LOCATION DETAILED: RIGHT DISTAL POSTERIOR UPPER ARM
LOCATION DETAILED: RIGHT INFERIOR MEDIAL UPPER BACK
LOCATION DETAILED: LEFT DISTAL POSTERIOR THIGH
LOCATION DETAILED: RIGHT VENTRAL PROXIMAL FOREARM
LOCATION DETAILED: INFERIOR THORACIC SPINE
LOCATION DETAILED: RIGHT CENTRAL LATERAL NECK
LOCATION DETAILED: LEFT INFERIOR VERMILION LIP

## 2022-02-11 ASSESSMENT — LOCATION SIMPLE DESCRIPTION DERM
LOCATION SIMPLE: RIGHT POSTERIOR UPPER ARM
LOCATION SIMPLE: LEFT PRETIBIAL REGION
LOCATION SIMPLE: RIGHT FOREHEAD
LOCATION SIMPLE: RIGHT POSTERIOR THIGH
LOCATION SIMPLE: RIGHT FOREARM
LOCATION SIMPLE: LEFT POSTERIOR THIGH
LOCATION SIMPLE: LEFT FOREARM
LOCATION SIMPLE: LEFT LIP
LOCATION SIMPLE: RIGHT UPPER BACK
LOCATION SIMPLE: NECK
LOCATION SIMPLE: LEFT FOREHEAD
LOCATION SIMPLE: RIGHT CHEEK
LOCATION SIMPLE: RIGHT LOWER BACK
LOCATION SIMPLE: RIGHT PRETIBIAL REGION
LOCATION SIMPLE: LEFT TEMPLE
LOCATION SIMPLE: POSTERIOR SCALP
LOCATION SIMPLE: UPPER BACK
LOCATION SIMPLE: LEFT EAR
LOCATION SIMPLE: LEFT POSTERIOR UPPER ARM

## 2022-02-11 ASSESSMENT — LOCATION ZONE DERM
LOCATION ZONE: NECK
LOCATION ZONE: LIP
LOCATION ZONE: ARM
LOCATION ZONE: SCALP
LOCATION ZONE: TRUNK
LOCATION ZONE: EAR
LOCATION ZONE: FACE
LOCATION ZONE: LEG

## 2022-02-11 NOTE — PROCEDURE: ADDITIONAL NOTES
Additional Notes: Biopsy follow up on todays visit: lesion appears resolved.
Detail Level: Detailed
Render Risk Assessment In Note?: no

## 2022-02-11 NOTE — PROCEDURE: LIQUID NITROGEN
Render Note In Bullet Format When Appropriate: No
Post-Care Instructions: I reviewed with the patient in detail post-care instructions. Patient is to wear sunprotection, and avoid picking at any of the treated lesions. Pt may apply Vaseline to crusted or scabbing areas.
Duration Of Freeze Thaw-Cycle (Seconds): 10
Number Of Freeze-Thaw Cycles: 2 freeze-thaw cycles
Render Post-Care Instructions In Note?: yes
Consent: The patient's consent was obtained including but not limited to risks of crusting, scabbing, blistering, scarring, darker or lighter pigmentary change, recurrence, incomplete removal and infection. RTC in 2 months if lesion(s) persistent.
Detail Level: Detailed

## 2022-02-14 PROCEDURE — 93280 PM DEVICE PROGR EVAL DUAL: CPT | Performed by: INTERNAL MEDICINE

## 2022-02-15 NOTE — PROGRESS NOTES
Chief Complaint   Patient presents with   • Enuresis     stool       Subjective:     HPI:   Alfred Erazo Jr. is a 86 y.o. male with history of hypertension, type 2 diabetes and hyperlipidemia here  to discuss the evaluation and management of:  He is reporting urge incontinence of his bowels for the last couple of months  There has been no change in his diet or medications    He sees cardiology as he has a pacemaker due to complete heart block , history of CVA , bilateral carotid artery disease  Last visit with cardiology was last week on February 3      No problems updated.     Objective:     /70   Pulse 62   Temp 36.6 °C (97.8 °F) (Temporal)   Resp 14   Wt 78 kg (171 lb 15.3 oz)   SpO2 95%  Body mass index is 24.67 kg/m².    Physical Exam:  Physical Exam  Constitutional: Well-developed and well-nourished. Not diaphoretic. No distress.   Skin: Skin is warm and dry. No rash noted.  Head: Atraumatic without lesions.  Eyes: Conjunctivae and extraocular motions are normal.   Ears:  External ears unremarkable.    Nose: Nares patent. Mucosa without edema or erythema. No discharge. No facial tenderness.     Neck: Supple      Chest: Effort normal.    Abdomen:  without distention.  .  Extremities: No cyanosis, clubbing, erythema, nor edema.   Neurological: Alert and oriented x 3.    Psychiatric:  Behavior, mood, and affect are appropriate     Assessment and Plan:     The following treatment plan was discussed:     No problem-specific Assessment & Plan notes found for this encounter.    1. Incontinence of feces with fecal urgency  - Referral to Colorectal Surgery    2. Type 2 diabetes mellitus without complication, without long-term current use of insulin (HCC)    3. Dyslipidemia associated with type 2 diabetes mellitus (HCC)-well-controlled on statin     4. Mild depression (HCC)-stable off meds since November    5. Bilateral carotid artery disease, unspecified type (HCC)--stable on aspirin, Plavix and  statin therapy    6. CHB (complete heart block) (HCC) status post permanent pacemaker-recently checked last week    7.  Hypertension-well-controlled on losartan       Any change or worsening of signs or symptoms, patient encouraged to follow-up or report to emergency room for further evaluation. Patient verbalizes understanding and agrees.    Follow-Up: No follow-ups on file.      PLEASE NOTE: This dictation was created using voice recognition software. I have made every reasonable attempt to correct obvious errors, but I expect that there are errors of grammar and possibly content that I did not discover before finalizing the note.    My total time spent caring for the patient on the day of the encounter was  greater than 20 minutes.   This includes obtaining history, reviewing chart, physical exam, patient education, reviewing outside records, placing orders, interpreting tests and coordinating care.

## 2022-02-28 NOTE — PROGRESS NOTES
Chief Complaint   Patient presents with   • New Patient     Stroke       Problem List Items Addressed This Visit     Type 2 diabetes mellitus without complication, without long-term current use of insulin (HCC)      Other Visit Diagnoses     History of stroke        Hospital discharge follow-up        Essential hypertension        Dyslipidemia              History of present illness:  Alfred Erazo Jr. 86 y.o. male presents today with Son, Domo, for stroke bridge clinic f/u. PMHx: TIA, BPH, DMII, hypothyroidism, HTN, HLD, GERD, hx of AV block s/p pacemaker placement.     Pt was seen in the ER in Nov 2021 for AMS. SBP upon ED arrival was in the 200's. Stroke work up then was negative. He did have a stroke in the L frontal area in Aug 2021 which was suspicious for cardioembolic phenomenon. Per son, he had almost the same presentation but he also had speech difficulties with the stroke. He was not taking asa prior to the August event. He was put on DAPT for 21 days but apparently he continued taking this up to now. They were not aware of other strokes in the past. They are also not aware of PAF. He had seen cardiology s/p pacemaker placement d/t AV block that was seen during his heart monitoring recently. They deny any residual deficit from the stroke.     There is family hx of stroke.     He never smoked cigarettes. Drinks alcohol occasionally. No recreational drug use.     He is seeing his PCP regularly.     No other concerns.     Past medical history:   Past Medical History:   Diagnosis Date   • Asthma    • BPH (benign prostatic hyperplasia)    • Cataract     Surgically removed bilat   • Dental disorder     Upper   • Diabetes (HCC)    • Diverticulitis    • Esophageal stricture     dilated spring 2016   • Hiatal hernia    • High cholesterol    • History of shingles 8/27/2015   • Hyperlipidemia 7/8/2015   • Hypertension    • Hypothyroid    • PHN (postherpetic neuralgia) 8/27/2015   • Psoriasis 6/14/2017   •  Stroke (HCC)     Last one was around 11/2021.  Has had several strokes including one in 9/2021.   • TIA (transient ischemic attack)    • Urinary incontinence    • Vitamin D deficiency 8/27/2015       Past surgical history:   Past Surgical History:   Procedure Laterality Date   • CATARACT EXTRACTION WITH IOL     • TONSILLECTOMY     • VASECTOMY         Family history:   Family History   Problem Relation Age of Onset   • Stroke Brother    • Stroke Father        Social history:   Social History     Socioeconomic History   • Marital status:      Spouse name: Not on file   • Number of children: Not on file   • Years of education: Not on file   • Highest education level: Not on file   Occupational History   • Not on file   Tobacco Use   • Smoking status: Never Smoker   • Smokeless tobacco: Never Used   Vaping Use   • Vaping Use: Never used   Substance and Sexual Activity   • Alcohol use: Yes     Comment: occ. 12/28/21: 1 drink a  week.    • Drug use: No   • Sexual activity: Not on file     Comment:  , 1 child, retired    Other Topics Concern   • Not on file   Social History Narrative    Alyson Erazo is spouse             1 child, retired      Social Determinants of Health     Financial Resource Strain: Low Risk    • Difficulty of Paying Living Expenses: Not hard at all   Food Insecurity: No Food Insecurity   • Worried About Running Out of Food in the Last Year: Never true   • Ran Out of Food in the Last Year: Never true   Transportation Needs: No Transportation Needs   • Lack of Transportation (Medical): No   • Lack of Transportation (Non-Medical): No   Physical Activity: Not on file   Stress: Not on file   Social Connections: Not on file   Intimate Partner Violence: Not on file   Housing Stability: Not on file       Current medications:   Current Outpatient Medications   Medication   • liothyronine (CYTOMEL) 5 MCG Tab   • finasteride (PROSCAR) 5 MG Tab   • levothyroxine (SYNTHROID) 75  "MCG Tab   • Ascorbic Acid (VITAMIN C PO)   • Cholecalciferol (VITAMIN D3 PO)   • Cyanocobalamin (VITAMIN B 12 PO)   • Multiple Vitamins-Minerals (PRESERVISION AREDS PO)   • tamsulosin (FLOMAX) 0.4 MG capsule   • losartan (COZAAR) 25 MG Tab   • atorvastatin (LIPITOR) 20 MG Tab   • omeprazole (PRILOSEC) 20 MG delayed-release capsule   • aspirin (ASA) 81 MG Chew Tab chewable tablet   • clopidogrel (PLAVIX) 75 MG Tab   • glimepiride (AMARYL) 4 MG Tab   • Blood Glucose Monitoring Suppl Device     No current facility-administered medications for this visit.       Medication Allergy:  Allergies   Allergen Reactions   • Pcn [Penicillins]      \"my joints start to bubble up\"   • Sulfa Drugs      \"my joints start to bubble up\"  \"Its been 40 yrs since I've had either of those\"       Review of systems:   General: Denies fevers or chills, or nightsweats, or generalized fatigue.    Head: Denies headaches or dizziness or lightheadedness  EENT: Denies vision changes, vision loss or pain, nasal secretion, nasal bleeding, difficulty swallowing, hearing loss, tinnitus, vertigo, ear pain  Respiratory: Denies shortness of breath, cough, sputum, or wheezing  Cardiac: Denies chest pain, palpitations, edema or syncope  Gastrointestinal: Denies nausea, vomiting, no abdominal pain or change in bowel habits, no melena or hematochezia  Urinary: Denies dysuria, frequency, hesitancy, or incontinence.  Dermatologic:  Denies new rash  Musculoskeletal: Denies muscle pain or swelling, no atrophy, no neck and back pain or stiffness.   Neurologic: Denies facial droopiness, muscle weakness (focal or generalized), paresthesias, ataxia,  memory loss, abnormal movements, seizures, loss of consciousness. + episodes of confusion and speech difficulties.   Psychiatric: Denies anxiety, depression, mood swings, suicidal or homicidal thoughts       Physical examination:   Vitals:    03/09/22 0756   BP: 138/72   BP Location: Right arm   Patient Position: Sitting " "  BP Cuff Size: Adult   Pulse: 92   Temp: 36.6 °C (97.8 °F)   TempSrc: Temporal   SpO2: 93%   Weight: 80.4 kg (177 lb 4 oz)   Height: 1.778 m (5' 10\")     General: Patient in no acute distress, pleasant and cooperative.  HEENT: Normocephalic, no signs of acute trauma.   moist conjunctivae. Nares are patent. Oropharynx clear without lesions and normal  hard and soft palates.   Neck: Supple, No carotid bruits. There is normal range of motion.   Resp: clear to auscultation bilaterally. No wheezes or crackles.   CV: RRR, no murmurs.   Skin: no signs of acute rashes or trauma.   Musculoskeletal: joints exhibit full range of motion  Psychiatric: No hallucinatory behavior. No symptoms of depression or suicidal ideation. Mood and affect appear normal on exam.     NEUROLOGICAL EXAM:   Mental status, orientation: Awake, alert and fully oriented.   Speech and language: speech is clear and fluent. The patient is able to name, repeat and comprehend.   Memory: There is intact recollection of recent and remote events.   Cranial nerve exam:   CN I: Not examined   CN II: PERRL.   CN III, IV, VI: EOMI; no nystagmus   CN V: Facial sensation intact bilaterally   CN VII: face symmetric   CN VIII: hearing intact to finger rub bilaterally   CN IX, X: palate elevates symmetrically   CN XI: Symmetric shoulder shrug  CN XII: tongue midline.   Motor exam: Strength is 5/5 in all extremities. Tone is normal. No abnormal movements were seen on exam.   Sensory exam reveals normal sense of light touchin all extremities.   Deep tendon reflexes: absent ankle jerks, 1+ patellars, 2+ throughout.    Coordination: shows a normal finger-nose-finger. Normal rapidly alternating movements.   Gait: The patient was able to get up from seated position on first attempt without requiring assistance. Found to be steady when walking. Movements were fluid with normal arm swing. The patient was able to turn without difficulties or tendency to fall. Romberg exam " mildly swaying      ANCILLARY DATA REVIEWED:     Lab Data Review:  Reviewed in chart. CBC, CMP, A1c, lipid    Records reviewed:  Reviewed in chart.    Imaging:  MRI brain 11/24/21  1. Age-related cerebral atrophy.     2. Extensive periventricular white matter changes consistent with chronic microvascular ischemic gliosis. Additionally, there are multiple small chronic areas of lacunar type infarction in the periventricular white matter.     3. No evidence of acute infarction in the brain parenchyma.    ECHO 11/24/21  Normal right and left ventricular size and function.   The left ventricular ejection fraction is visually estimated to be 60%.  Indeterminate diastolic function.  Aortic sclerosis without stenosis.  Normal estimated right atrial pressure.   Unable to estimate pulmonary artery pressure due to an inadequate   tricuspid regurgitant jet.     Compared to the prior study 8-: There has been no significant   change.     CTA neck 11/23/21  1. No evidence of flow-limiting stenosis in the cervical carotid or cervical vertebral arteries.  2. Diminutive left vertebral artery, similar to prior.    CTA head 11/23/21  1. No hemodynamically significant narrowing of the major intracranial vessels.    CT head 11/23/22  1. No acute intracranial findings. No evidence of acute intracranial hemorrhage or mass lesion.     2. White matter lucencies most consistent with chronic small vessel ischemic change.    MRI brain 8/28/21  1.  Punctate acute infarct in the left posterior frontal lobe.  2.  Moderate diffuse cerebral substance loss.  3.  Advanced microangiopathic ischemic change versus demyelination or gliosis.  4.  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  5.  Chronic pansinusitis with postsurgical changes of the bilateral maxillary sinuses.        ASSESSMENT AND PLAN:    1. History of stroke    2. Essential hypertension    3. Dyslipidemia    4. Type 2 diabetes mellitus without complication,  "without long-term current use of insulin (HCC)    5. Hospital discharge follow-up      NIH Stroke Scale:     1a. Level of Consciousness (Alert, drowsy, etc): 0= Alert     1b. LOC Questions (Month, age): 0= Answers both correctly     1c. LOC Commands (Open/close eyes make fist/let go): 0= Obeys both correctly     2.   Best Gaze (Eyes open - patient follows examiner's finger on face): 0= Normal     3.   Visual Fields (introduce visual stimulus/threat to patient's field quadrants): 0= No visual loss    4.   Facial Paresis (Show teeth, raise eyebrows and squeeze eyes shut): 0= Normal            5a. Motor Arm - Left (Elevate arm to 90 degrees if patient is sitting, 45 degrees if  supine): 0= No drift     5b. Motor Arm - Right (Elevate arm to 90 degrees if patient is sitting, 45 degrees if supine): 0= No drift     6a. Motor Leg - Left (Elevate leg 30 degrees with patient supine): 0= No drift     6b. Motor Leg - Right  (Elevate leg 30 degrees with patient supine): 0= No drift     7.   Limb Ataxia (Finger-nose, heel down shin): 0= No ataxia     8.   Sensory (Pin prick to face, arm, trunk and leg - compare side to side): 0= Normal     9.  Best Language (Name item, describe a picture and read sentences): 0= No aphasia     10. Dysarthria (Evaluate speech clarity by patient repeating listed words): 0= Normal articulation     11. Extinction and Inattention (Use information from prior testing to identify neglect or  double simultaneous stimuli testing): 0= No neglect     Total NIH Score: 0          Current MRS 0        CLINICAL DECISION:  Recent event may possibly be related to TIA vs cardiac etiology (Pt had AV block s/p pacemaker placement).  MRI brain negative and vessel imaging showed no significant stenosis. Has hx of strokes with suspicion for cardioembolic phenomena. Reviewed notes from cardiology and it noted \"PAF but patient would like to avoid OAC if possible\". They are unsure when he was diagnosed with PAF. Discussed " stroke risk factors including HTN, DMII (A1c 7.4), HLD  (LDL 53).       Plan:  -Continue asa 81mg daily and low dose statin for secondary stroke prevention for now. It is highly recommended to switch this to OAC given PAF but will defer this to cardiology.     -He does not need to be on DAPT. D/c plavix.     -Education given to help control risk factors including:  /80, LDL 70 or less, HBA1C 7 or less.     -Needs close fu with PCP for optimal control of risk factors.     -Discussed lifestyle modification including diet, exercise and weight loss.  Limiting intake of sugar and carbohydrates.        FOLLOW-UP:   Return No further f/u..        EDUCATION AND COUNSELING:  -Usefulness of anti-platelets in secondary stroke prevention was discussed. The side effects, including increased risk for bleeding (both traumatic and spontaneous) were reviewed with the patient at length.   -Recommended moderate intensity aerobic activity for at least 10 minutes 4 times per week for vigorous intensity aerobic activity for at least 20 minutes twice per week   -Recommended goal for LDL is 70 or less. Side effects of statin, including idiosyncratic reactions as well as more common myalgias, and liver injury  were discussed. Monitoring of LFT’s will be deferred to the patient’s primary care physician.   -Secondary stroke prevention education was provided, including; lifestyle modification with healthy diet, and exercise, as well as recommendations for optimal control of risk factors.   -Close follow up with PCP is recommended.   -Compliance with medications was discussed in detail.   -Smoking cessation education was provided for greater than 3 minutes.   -The patient/family was educated on recognition of stroke symptoms. The patient/family instructed to call 911 EMERGENTLY upon presentation of any of these symptoms/signs, to be taken to nearest emergency department for evaluation and acute care.           Rosalva Portillo, MSN, APRN,  SOPHIE  [facility]  Office: [phone]  Fax: [phone]      BILLING DOCUMENTATION:   Total time spent including chart review before and after visit was 66min. Over 50% of the time of the visit today was spent on counseling and or coordination of care wtih the patient and/or family, with greater than 50% of the total discussing my assessment and plan as stated above.

## 2022-03-03 RX ORDER — TAMSULOSIN HYDROCHLORIDE 0.4 MG/1
CAPSULE ORAL
Qty: 90 CAPSULE | Refills: 3 | Status: SHIPPED | OUTPATIENT
Start: 2022-03-03 | End: 2023-02-14

## 2022-03-04 PROBLEM — Z95.0 PACEMAKER: Status: ACTIVE | Noted: 2022-03-04

## 2022-03-09 ENCOUNTER — OFFICE VISIT (OUTPATIENT)
Dept: NEUROLOGY | Facility: MEDICAL CENTER | Age: 87
End: 2022-03-09
Attending: NURSE PRACTITIONER
Payer: MEDICARE

## 2022-03-09 VITALS
OXYGEN SATURATION: 93 % | TEMPERATURE: 97.8 F | HEIGHT: 70 IN | BODY MASS INDEX: 25.38 KG/M2 | DIASTOLIC BLOOD PRESSURE: 72 MMHG | SYSTOLIC BLOOD PRESSURE: 138 MMHG | HEART RATE: 92 BPM | WEIGHT: 177.25 LBS

## 2022-03-09 DIAGNOSIS — I10 ESSENTIAL HYPERTENSION: ICD-10-CM

## 2022-03-09 DIAGNOSIS — Z86.73 HISTORY OF STROKE: ICD-10-CM

## 2022-03-09 DIAGNOSIS — E11.9 TYPE 2 DIABETES MELLITUS WITHOUT COMPLICATION, WITHOUT LONG-TERM CURRENT USE OF INSULIN (HCC): ICD-10-CM

## 2022-03-09 DIAGNOSIS — E78.5 DYSLIPIDEMIA: ICD-10-CM

## 2022-03-09 DIAGNOSIS — Z09 HOSPITAL DISCHARGE FOLLOW-UP: ICD-10-CM

## 2022-03-09 PROCEDURE — 99212 OFFICE O/P EST SF 10 MIN: CPT | Performed by: NURSE PRACTITIONER

## 2022-03-09 PROCEDURE — 99215 OFFICE O/P EST HI 40 MIN: CPT | Performed by: NURSE PRACTITIONER

## 2022-03-09 ASSESSMENT — FIBROSIS 4 INDEX: FIB4 SCORE: 2.28

## 2022-03-09 ASSESSMENT — PATIENT HEALTH QUESTIONNAIRE - PHQ9: CLINICAL INTERPRETATION OF PHQ2 SCORE: 0

## 2022-03-14 DIAGNOSIS — H91.93 BILATERAL HEARING LOSS, UNSPECIFIED HEARING LOSS TYPE: ICD-10-CM

## 2022-03-23 ENCOUNTER — OFFICE VISIT (OUTPATIENT)
Dept: CARDIOLOGY | Facility: MEDICAL CENTER | Age: 87
End: 2022-03-23
Payer: MEDICARE

## 2022-03-23 VITALS
SYSTOLIC BLOOD PRESSURE: 124 MMHG | HEIGHT: 70 IN | WEIGHT: 173 LBS | BODY MASS INDEX: 24.77 KG/M2 | OXYGEN SATURATION: 94 % | DIASTOLIC BLOOD PRESSURE: 72 MMHG | HEART RATE: 72 BPM

## 2022-03-23 DIAGNOSIS — I44.2 CHB (COMPLETE HEART BLOCK) (HCC): ICD-10-CM

## 2022-03-23 PROCEDURE — 93280 PM DEVICE PROGR EVAL DUAL: CPT | Performed by: INTERNAL MEDICINE

## 2022-03-23 PROCEDURE — 99024 POSTOP FOLLOW-UP VISIT: CPT | Performed by: INTERNAL MEDICINE

## 2022-03-23 PROCEDURE — 93000 ELECTROCARDIOGRAM COMPLETE: CPT | Mod: 59 | Performed by: INTERNAL MEDICINE

## 2022-03-23 RX ORDER — FUROSEMIDE 20 MG/1
20 TABLET ORAL
Qty: 30 TABLET | Refills: 3 | Status: SHIPPED | OUTPATIENT
Start: 2022-03-23 | End: 2022-06-27

## 2022-03-23 ASSESSMENT — FIBROSIS 4 INDEX: FIB4 SCORE: 2.28

## 2022-03-23 NOTE — PROGRESS NOTES
Arrhythmia Clinic Note (Established patient)    DOS: 3/23/2022    Chief complaint/Reason for consult: Afib    Interval History: 87 y/o M with AV block 2nd degree s/p PPM implant in 2021. Prior h/o CVA. Noted to have Afib sustained on PPM. Referred to evaluate need for OAC. Denies palpitations. Feeling well.     ROS (+ highlighted in bold):  Constitutional: Fevers/chills/fatigue/weightloss  HEENT: Blurry vision/eye pain/sore throat/hearing loss  Respiratory: Shortness of breath/cough  Cardiovascular: Chest pain/palpitations/edema/orthopnea/syncope  GI: Nausea/vomitting/diarrhea  MSK: Arthralgias/myagias/muscle weakness  Skin: Rash/sores  Neurological: Numbness/tremors/vertigo  Endocrine: Excessive thirst/polyuria/cold intolerance/heat intolerance  Psych: Depression/anxiety    Past Medical History:   Diagnosis Date   • Asthma    • BPH (benign prostatic hyperplasia)    • Cataract     Surgically removed bilat   • Dental disorder     Upper   • Diabetes (HCC)    • Diverticulitis    • Esophageal stricture     dilated spring 2016   • Hiatal hernia    • High cholesterol    • History of shingles 8/27/2015   • Hyperlipidemia 7/8/2015   • Hypertension    • Hypothyroid    • PHN (postherpetic neuralgia) 8/27/2015   • Psoriasis 6/14/2017   • Stroke (HCC)     Last one was around 11/2021.  Has had several strokes including one in 9/2021.   • TIA (transient ischemic attack)    • Urinary incontinence    • Vitamin D deficiency 8/27/2015       Past Surgical History:   Procedure Laterality Date   • CATARACT EXTRACTION WITH IOL     • TONSILLECTOMY     • VASECTOMY         Social History     Socioeconomic History   • Marital status:      Spouse name: Not on file   • Number of children: Not on file   • Years of education: Not on file   • Highest education level: Not on file   Occupational History   • Not on file   Tobacco Use   • Smoking status: Never Smoker   • Smokeless tobacco: Never Used   Vaping Use   • Vaping Use: Never used  "  Substance and Sexual Activity   • Alcohol use: Yes     Comment: occ. 12/28/21: 1 drink a  week.    • Drug use: No   • Sexual activity: Not on file     Comment:  , 1 child, retired    Other Topics Concern   • Not on file   Social History Narrative    Alyson Erazo is spouse             1 child, retired      Social Determinants of Health     Financial Resource Strain: Low Risk    • Difficulty of Paying Living Expenses: Not hard at all   Food Insecurity: No Food Insecurity   • Worried About Running Out of Food in the Last Year: Never true   • Ran Out of Food in the Last Year: Never true   Transportation Needs: No Transportation Needs   • Lack of Transportation (Medical): No   • Lack of Transportation (Non-Medical): No   Physical Activity: Not on file   Stress: Not on file   Social Connections: Not on file   Intimate Partner Violence: Not on file   Housing Stability: Not on file       Family History   Problem Relation Age of Onset   • Stroke Brother    • Stroke Father        Allergies   Allergen Reactions   • Pcn [Penicillins]      \"my joints start to bubble up\"   • Sulfa Drugs      \"my joints start to bubble up\"  \"Its been 40 yrs since I've had either of those\"       Current Outpatient Medications   Medication Sig Dispense Refill   • apixaban (ELIQUIS) 5mg Tab Take 1 Tablet by mouth 2 times a day. 180 Tablet 3   • furosemide (LASIX) 20 MG Tab Take 1 Tablet by mouth 1 time a day as needed. 30 Tablet 3   • tamsulosin (FLOMAX) 0.4 MG capsule TAKE 1 CAPSULE BY MOUTH 0.5 HOUR AFTER BREAKFAST 90 Capsule 3   • liothyronine (CYTOMEL) 5 MCG Tab TAKE 2 TABLETS BY MOUTH EVERY  Tablet 3   • finasteride (PROSCAR) 5 MG Tab Take 1 Tablet by mouth every day. 90 Tablet 3   • levothyroxine (SYNTHROID) 75 MCG Tab Take 1 Tablet by mouth every day. 90 Tablet 3   • Ascorbic Acid (VITAMIN C PO) Take 500 mg by mouth every day.     • Cholecalciferol (VITAMIN D3 PO) Take 50 mcg by mouth every day.     • " "Cyanocobalamin (VITAMIN B 12 PO) Take 500 mcg by mouth every day.     • Multiple Vitamins-Minerals (PRESERVISION AREDS PO) Take  by mouth every day.     • losartan (COZAAR) 25 MG Tab TAKE 1 TABLET BY MOUTH EVERY DAY 90 Tablet 3   • atorvastatin (LIPITOR) 20 MG Tab Take 1 Tablet by mouth every day. 100 Tablet 3   • omeprazole (PRILOSEC) 20 MG delayed-release capsule Take 1 Capsule by mouth every day. 90 Capsule 3   • glimepiride (AMARYL) 4 MG Tab Take 1 tablet by mouth every morning. 100 tablet 3   • Blood Glucose Monitoring Suppl Device Meter: Dispense glucose meter preferred on patient's insurance.  Sig. Use as directed for blood sugar monitoring. 1 Device 0     No current facility-administered medications for this visit.       Physical Exam:  Vitals:    03/23/22 1329   BP: 124/72   BP Location: Left arm   Patient Position: Sitting   BP Cuff Size: Adult   Pulse: 72   SpO2: 94%   Weight: 78.5 kg (173 lb)   Height: 1.778 m (5' 10\")     General appearance: NAD, conversant   Eyes: anicteric sclerae, moist conjunctivae; no lid-lag; PERRLA  HENT: Atraumatic; oropharynx clear with moist mucous membranes and no mucosal ulcerations; normal hard and soft palate  Neck: Trachea midline; FROM, supple, no thyromegaly or lymphadenopathy  Lungs: CTA, with normal respiratory effort and no intercostal retractions  CV: RRR, no MRGs, no JVD  Abdomen: Soft, non-tender; no masses or HSM  Extremities: No peripheral edema or extremity lymphadenopathy  Skin: Normal temperature, turgor and texture; no rash, ulcers or subcutaneous nodules  Psych: Appropriate affect, alert and oriented to person, place and time    Data:  Lipids:   Lab Results   Component Value Date/Time    CHOLSTRLTOT 127 11/24/2021 12:33 AM    TRIGLYCERIDE 173 (H) 11/24/2021 12:33 AM    HDL 39 (A) 11/24/2021 12:33 AM    LDL 53 11/24/2021 12:33 AM        BMP:  Lab Results   Component Value Date/Time    SODIUM 142 12/28/2021 1039    POTASSIUM 4.1 12/28/2021 1039    CHLORIDE " 105 12/28/2021 1039    CO2 22 12/28/2021 1039    GLUCOSE 150 (H) 12/28/2021 1039    BUN 17 12/28/2021 1039    CREATININE 1.00 12/28/2021 1039    CALCIUM 9.4 12/28/2021 1039    ANION 15.0 12/28/2021 1039        TSH:   Lab Results   Component Value Date/Time    TSHULTRASEN 2.430 11/23/2021 1320        THYROXINE (T4):   No results found for: CHANTEIR     CBC:   Lab Results   Component Value Date/Time    WBC 5.9 12/28/2021 10:39 AM    RBC 4.24 (L) 12/28/2021 10:39 AM    HEMOGLOBIN 13.4 (L) 12/28/2021 10:39 AM    HEMATOCRIT 41.2 (L) 12/28/2021 10:39 AM    MCV 97.2 12/28/2021 10:39 AM    MCH 31.6 12/28/2021 10:39 AM    MCHC 32.5 (L) 12/28/2021 10:39 AM    RDW 49.1 12/28/2021 10:39 AM    PLATELETCT 215 12/28/2021 10:39 AM    MPV 10.9 12/28/2021 10:39 AM    NEUTSPOLYS 72.60 (H) 11/23/2021 01:20 PM    LYMPHOCYTES 17.70 (L) 11/23/2021 01:20 PM    MONOCYTES 7.30 11/23/2021 01:20 PM    EOSINOPHILS 1.60 11/23/2021 01:20 PM    BASOPHILS 0.30 11/23/2021 01:20 PM    IMMGRAN 0.50 11/23/2021 01:20 PM    NRBC 0.00 11/23/2021 01:20 PM    NEUTS 5.51 11/23/2021 01:20 PM    LYMPHS 1.34 11/23/2021 01:20 PM    MONOS 0.55 11/23/2021 01:20 PM    EOS 0.12 11/23/2021 01:20 PM    BASO 0.02 11/23/2021 01:20 PM    IMMGRANAB 0.04 11/23/2021 01:20 PM    NRBCAB 0.00 11/23/2021 01:20 PM        CBC w/o DIFF  Lab Results   Component Value Date/Time    WBC 5.9 12/28/2021 10:39 AM    RBC 4.24 (L) 12/28/2021 10:39 AM    HEMOGLOBIN 13.4 (L) 12/28/2021 10:39 AM    MCV 97.2 12/28/2021 10:39 AM    MCH 31.6 12/28/2021 10:39 AM    MCHC 32.5 (L) 12/28/2021 10:39 AM    RDW 49.1 12/28/2021 10:39 AM    MPV 10.9 12/28/2021 10:39 AM       Prior echo/stress reviewed: Normal EF    EKG interpreted by me: NSR    Impression/Plan:  1. CHB (complete heart block) (HCC)  EKG     1. pAF  2. Anticoagulation management  3. Prior CVA    - Stop Plavix, ASA. Start Eliquis 5mg PO BID.   - For SOB if persistent un 2 month, will request TTE  - FV with DEMETRIO in 2 months, FV with general  cardiology longitudinally    This visit was for the purpose of addressing a new diagnosis of atrial fibrillation. Prior diagnoses pertaining to implanted device were not addressed.     Eduin Buckner MD  Cardiac Electrophysiology

## 2022-03-25 ENCOUNTER — OFFICE VISIT (OUTPATIENT)
Dept: INTERNAL MEDICINE | Facility: IMAGING CENTER | Age: 87
End: 2022-03-25
Payer: MEDICARE

## 2022-03-25 VITALS
TEMPERATURE: 98.7 F | WEIGHT: 171 LBS | OXYGEN SATURATION: 97 % | RESPIRATION RATE: 14 BRPM | SYSTOLIC BLOOD PRESSURE: 135 MMHG | HEART RATE: 85 BPM | BODY MASS INDEX: 24.54 KG/M2 | DIASTOLIC BLOOD PRESSURE: 70 MMHG

## 2022-03-25 DIAGNOSIS — E53.8 B12 DEFICIENCY: ICD-10-CM

## 2022-03-25 DIAGNOSIS — E11.9 TYPE 2 DIABETES MELLITUS WITHOUT COMPLICATION, WITHOUT LONG-TERM CURRENT USE OF INSULIN (HCC): ICD-10-CM

## 2022-03-25 DIAGNOSIS — E03.9 HYPOTHYROIDISM, UNSPECIFIED TYPE: ICD-10-CM

## 2022-03-25 DIAGNOSIS — E11.69 DYSLIPIDEMIA ASSOCIATED WITH TYPE 2 DIABETES MELLITUS (HCC): ICD-10-CM

## 2022-03-25 DIAGNOSIS — E78.5 DYSLIPIDEMIA ASSOCIATED WITH TYPE 2 DIABETES MELLITUS (HCC): ICD-10-CM

## 2022-03-25 LAB
HBA1C MFR BLD: 7.5 % (ref 0–5.6)
INT CON NEG: ABNORMAL
INT CON POS: ABNORMAL

## 2022-03-25 PROCEDURE — 83036 HEMOGLOBIN GLYCOSYLATED A1C: CPT | Performed by: FAMILY MEDICINE

## 2022-03-25 PROCEDURE — 99214 OFFICE O/P EST MOD 30 MIN: CPT | Performed by: FAMILY MEDICINE

## 2022-03-25 ASSESSMENT — FIBROSIS 4 INDEX: FIB4 SCORE: 2.28

## 2022-03-25 NOTE — PROGRESS NOTES
Chief Complaint   Patient presents with   • Follow-Up       Subjective:     HPI:   Alfred Erazo Jr. is a 86 y.o. male with history of CVA, diabetes and A. fib here  to discuss the evaluation and management of:  Diabetes    His last A1c is 7.4  He reports he has no symptoms of hypoglycemia, he is on glimepiride  Patient had a left frontal area stroke in August 2021  In November 2021 developed altered mental status and went to the ER, stroke work-up then was negative  He saw cardiology after pacemaker placement for AV block    paroxysmal A. fib was seen on PPM/ heart monitor  He was switched from Plavix and aspirin to Eliquis, placed on a statin  [facility] and was changed over to Eliquis and Lasix     He has an upcoming fishing trip that he is looking forward to  No problems updated.  Current Outpatient Medications on File Prior to Visit   Medication Sig Dispense Refill   • apixaban (ELIQUIS) 5mg Tab Take 1 Tablet by mouth 2 times a day. 180 Tablet 3   • furosemide (LASIX) 20 MG Tab Take 1 Tablet by mouth 1 time a day as needed. 30 Tablet 3   • tamsulosin (FLOMAX) 0.4 MG capsule TAKE 1 CAPSULE BY MOUTH 0.5 HOUR AFTER BREAKFAST 90 Capsule 3   • liothyronine (CYTOMEL) 5 MCG Tab TAKE 2 TABLETS BY MOUTH EVERY  Tablet 3   • finasteride (PROSCAR) 5 MG Tab Take 1 Tablet by mouth every day. 90 Tablet 3   • levothyroxine (SYNTHROID) 75 MCG Tab Take 1 Tablet by mouth every day. 90 Tablet 3   • Ascorbic Acid (VITAMIN C PO) Take 500 mg by mouth every day.     • Cholecalciferol (VITAMIN D3 PO) Take 50 mcg by mouth every day.     • Cyanocobalamin (VITAMIN B 12 PO) Take 500 mcg by mouth every day.     • Multiple Vitamins-Minerals (PRESERVISION AREDS PO) Take  by mouth every day.     • losartan (COZAAR) 25 MG Tab TAKE 1 TABLET BY MOUTH EVERY DAY 90 Tablet 3   • atorvastatin (LIPITOR) 20 MG Tab Take 1 Tablet by mouth every day. 100 Tablet 3   • omeprazole (PRILOSEC) 20 MG delayed-release capsule  Take 1 Capsule by mouth every day. 90 Capsule 3   • glimepiride (AMARYL) 4 MG Tab Take 1 tablet by mouth every morning. 100 tablet 3   • Blood Glucose Monitoring Suppl Device Meter: Dispense glucose meter preferred on patient's insurance.  Sig. Use as directed for blood sugar monitoring. 1 Device 0     No current facility-administered medications on file prior to visit.      Objective:     /70   Pulse 85   Temp 37.1 °C (98.7 °F) (Temporal)   Resp 14   Wt 77.6 kg (171 lb)   SpO2 97%  Body mass index is 24.54 kg/m².    Physical Exam:  Physical Exam  Constitutional: Well-developed and well-nourished. Not diaphoretic. No distress.   Skin: Skin is warm and dry. No rash noted.  Head: Atraumatic without lesions.  Eyes: Conjunctivae and extraocular motions are normal.   Ears:  External ears unremarkable.    Nose: Nares patent. Mucosa without edema or erythema. No discharge. No facial tenderness.     Neck: Supple, No thyromegaly present. No JVD  Cardiovascular: Regular rate and rhythm.   Chest: Effort normal. Clear to auscultation throughout. No adventitious sounds.   Abdomen:  without distention.  .  Extremities: No cyanosis, clubbing, erythema, nor edema.   Neurological: Alert and oriented x 3.    Psychiatric:  Behavior, mood, and affect are appropriate       Office Visit on 2022   Component Date Value Ref Range Status   • Glycohemoglobin 2022 7.5 (A) 0.0 - 5.6 % Final   • Internal Control Negative 2022 Valid   Final   • Internal Control Positive 2022 Valid   Final   Office Visit on 2022   Component Date Value Ref Range Status   • Report 2022    Preliminary                    Value:[facility]    Test Date:  2022  Pt Name:    FRANKY TEMPLE                  Department: [facility]  MRN:        [id number]                      Room:  Gender:     Male                         Technician: CGP  :        [id number]                   Requested By:FELIBERTO BERRIOS  Order #:     [id number]                    Reading MD:    Measurements  Intervals                                Axis  Rate:       77                           P:          48  WY:         253                          QRS:        -40  QRSD:       142                          T:          -35  QT:         418  QTc:        475    Interpretive Statements  Sinus rhythm  Prolonged WY interval  RBBB and LAFB  Probable left ventricular hypertrophy  Compared to ECG 2021 09:32:36  Left anterior fascicular block now present  Right bundle-branch block now present  Left bundle-branch block no longer present     Admission on 2021, Discharged on 2021   Component Date Value Ref Range Status   • Glucose - Accu-Ck 2021 167 (A) 65 - 99 mg/dL Final   • Report 2021    Final                    Value:[facility]    Test Date:  2021  Pt Name:    FRANKY TEMPLE                  Department: [facility]  MRN:        [id number]                      Room:       [facility]  Gender:     Male                         Technician: [facility]  :        [id number]                   Requested By:FELIBERTO BERRIOS  Order #:    [id number]                    Reading MD: Jeana Loo MD    Measurements  Intervals                                Axis  Rate:       69                           P:          0  WY:         528                          QRS:        -44  QRSD:       144                          T:          -13  QT:         444  QTc:        476    Interpretive Statements  SINUS RHYTHM  FIRST DEGREE AV BLOCK  CONSIDER LEFT ATRIAL ABNORMALITY  LEFT BUNDLE BRANCH BLOCK  LEFT VENTRICULAR HYPERTROPHY  Electronically Signed On 2021 17:47:48 PST by Jeana Loo MD     Pre-Admission Testing on 2021   Component Date Value Ref Range Status   • Sodium 2021 142  135 - 145 mmol/L Final   • Potassium 2021 4.1  3.6 - 5.5 mmol/L Final   • Chloride 2021 105  96 - 112 mmol/L Final   • Co2 2021 22  20 - 33 mmol/L  Final   • Anion Gap 2021 15.0  7.0 - 16.0 Final   • Glucose 2021 150 (A) 65 - 99 mg/dL Final   • Bun 2021 17  8 - 22 mg/dL Final   • Creatinine 2021 1.00  0.50 - 1.40 mg/dL Final   • Calcium 2021 9.4  8.5 - 10.5 mg/dL Final   • AST(SGOT) 2021 28  12 - 45 U/L Final   • ALT(SGPT) 2021 23  2 - 50 U/L Final   • Alkaline Phosphatase 2021 81  30 - 99 U/L Final   • Total Bilirubin 2021 0.7  0.1 - 1.5 mg/dL Final   • Albumin 2021 4.4  3.2 - 4.9 g/dL Final   • Total Protein 2021 7.1  6.0 - 8.2 g/dL Final   • Globulin 2021 2.7  1.9 - 3.5 g/dL Final   • A-G Ratio 2021 1.6  g/dL Final   • WBC 2021 5.9  4.8 - 10.8 K/uL Final   • RBC 2021 4.24 (A) 4.70 - 6.10 M/uL Final   • Hemoglobin 2021 13.4 (A) 14.0 - 18.0 g/dL Final   • Hematocrit 2021 41.2 (A) 42.0 - 52.0 % Final   • MCV 2021 97.2  81.4 - 97.8 fL Final   • MCH 2021 31.6  27.0 - 33.0 pg Final   • MCHC 2021 32.5 (A) 33.7 - 35.3 g/dL Final   • RDW 2021 49.1  35.9 - 50.0 fL Final   • Platelet Count 2021 215  164 - 446 K/uL Final   • MPV 2021 10.9  9.0 - 12.9 fL Final   • PT 2021 13.4  12.0 - 14.6 sec Final   • INR 2021 1.05  0.87 - 1.13 Final    Comment: INR - Non-therapeutic Reference Range: 0.87-1.13  INR - Therapeutic Reference Range: 2.0-4.0     • Report 2021    Final                    Value:[facility]    Test Date:  2021  Pt Name:    FRANKY TEMPLE                  Department: [facility]  MRN:        [id number]                      Room:  Gender:     Male                         Technician: GENIA  :        [id number]                   Requested By:FELIBERTO BERRIOS  Order #:    [id number]                    Reading MD: Sam Rosario MD    Measurements  Intervals                                Axis  Rate:       63                           P:          52  IA:         392                          QRS:        -46  QRSD:        144                          T:          6  QT:         460  QTc:        471    Interpretive Statements  SINUS RHYTHM  MOBITZ I AV BLOCK (WENCKEBACH  RIGHT BUNDLE BRANCH BLOCK  Electronically Signed On 2021 17:40:03 PST by Sam Rosario MD     • SARS-CoV-2 Source 2021 Nasal Swab   Final   • SARS-COV ANTIGEN PARK 2021 NotDetected  NotDetected Final    Comment: A result of Not-Detected is presumptive and should be confirmed with  a molecular assay, if necessary for patient management.    The [employer] test has been authorized by FDA under an  Emergency Use Authorization (EUA).     • GFR If  2021 >60  >60 mL/min/1.73 m 2 Final   • GFR If Non  2021 >60  >60 mL/min/1.73 m 2 Final   Office Visit on 2021   Component Date Value Ref Range Status   • Report 2021    Final                    [facility]    Test Date:  2021  Pt Name:    FRANKY TEMPLE                  Department: [facility]  MRN:        [id number]                      Room:  Gender:     Male                         Technician: MILLER  :        [id number]                   Requested By:FELIBERTO BUCKNER  Order #:    [id number]                    Reading MD: Feliberto Buckner MD    Measurements  Intervals                                Axis  Rate:       59                           P:  ME:                                      QRS:        -38  QRSD:       138                          T:          2  QT:         416  QTc:        413    Interpretive Statements  Sinus rhythm  MOBITZ I AV BLOCK (WENCKEBACH)  Right bundle branch block  Left ventricular hypertrophy  Compared to ECG 2021 10:40:16  NO CHANGES COMPARED TO PRIOR ECG  Electronically Signed On 2021 17:47:20 PST by Feliberto Buckner MD     Admission on 2021, Discharged on 2021   Component Date Value Ref Range Status   • WBC 2021 7.6  4.8 - 10.8 K/uL Final   • RBC 2021 4.27 (A) 4.70 - 6.10  M/uL Final   • Hemoglobin 11/23/2021 13.6 (A) 14.0 - 18.0 g/dL Final   • Hematocrit 11/23/2021 41.0 (A) 42.0 - 52.0 % Final   • MCV 11/23/2021 96.0  81.4 - 97.8 fL Final   • MCH 11/23/2021 31.9  27.0 - 33.0 pg Final   • MCHC 11/23/2021 33.2 (A) 33.7 - 35.3 g/dL Final   • RDW 11/23/2021 48.2  35.9 - 50.0 fL Final   • Platelet Count 11/23/2021 191  164 - 446 K/uL Final   • MPV 11/23/2021 10.1  9.0 - 12.9 fL Final   • Neutrophils-Polys 11/23/2021 72.60 (A) 44.00 - 72.00 % Final   • Lymphocytes 11/23/2021 17.70 (A) 22.00 - 41.00 % Final   • Monocytes 11/23/2021 7.30  0.00 - 13.40 % Final   • Eosinophils 11/23/2021 1.60  0.00 - 6.90 % Final   • Basophils 11/23/2021 0.30  0.00 - 1.80 % Final   • Immature Granulocytes 11/23/2021 0.50  0.00 - 0.90 % Final   • Nucleated RBC 11/23/2021 0.00  /100 WBC Final   • Neutrophils (Absolute) 11/23/2021 5.51  1.82 - 7.42 K/uL Final    Includes immature neutrophils, if present.   • Lymphs (Absolute) 11/23/2021 1.34  1.00 - 4.80 K/uL Final   • Monos (Absolute) 11/23/2021 0.55  0.00 - 0.85 K/uL Final   • Eos (Absolute) 11/23/2021 0.12  0.00 - 0.51 K/uL Final   • Baso (Absolute) 11/23/2021 0.02  0.00 - 0.12 K/uL Final   • Immature Granulocytes (abs) 11/23/2021 0.04  0.00 - 0.11 K/uL Final   • NRBC (Absolute) 11/23/2021 0.00  K/uL Final   • Sodium 11/23/2021 140  135 - 145 mmol/L Final   • Potassium 11/23/2021 4.1  3.6 - 5.5 mmol/L Final   • Chloride 11/23/2021 105  96 - 112 mmol/L Final   • Co2 11/23/2021 23  20 - 33 mmol/L Final   • Anion Gap 11/23/2021 12.0  7.0 - 16.0 Final   • Glucose 11/23/2021 248 (A) 65 - 99 mg/dL Final   • Bun 11/23/2021 17  8 - 22 mg/dL Final   • Creatinine 11/23/2021 1.04  0.50 - 1.40 mg/dL Final   • Calcium 11/23/2021 9.7  8.4 - 10.2 mg/dL Final   • AST(SGOT) 11/23/2021 21  12 - 45 U/L Final   • ALT(SGPT) 11/23/2021 28  2 - 50 U/L Final   • Alkaline Phosphatase 11/23/2021 82  30 - 99 U/L Final   • Total Bilirubin 11/23/2021 0.5  0.1 - 1.5 mg/dL Final   • Albumin  11/23/2021 4.4  3.2 - 4.9 g/dL Final   • Total Protein 11/23/2021 6.9  6.0 - 8.2 g/dL Final   • Globulin 11/23/2021 2.5  1.9 - 3.5 g/dL Final   • A-G Ratio 11/23/2021 1.8  g/dL Final   • PT 11/23/2021 13.1  12.0 - 14.6 sec Final    Please note new reference range as of 10/30/2014 10:00 AM   • INR 11/23/2021 1.07  0.87 - 1.13 Final    Comment: Reference range:  INR - Non-therapeutic Reference Range: 0.87-1.13  INR - Therapeutic Reference Range: 2.0-4.0     • APTT 11/23/2021 28.2  24.7 - 36.0 sec Final   • ABO Grouping Only 11/23/2021 O   Final   • Rh Grouping Only 11/23/2021 POS   Final   • Antibody Screen-Cod 11/23/2021 NEG   Final   • Troponin T 11/23/2021 30 (A) 6 - 19 ng/L Final    Comment: As of July 9th 2019 at 0900, the Troponin I test has been replaced with the  Ultra High Sensitivity (Gen 5) Troponin T test.  Please note new analyte,  methodology, and reference range.    The Ultra High Sensitivity Troponin T test has a reference range for  positive troponins that follows the recommendation of the American College  of Cardiology (ACC) committee in conjunction with the 99th percentile  reference population. Normal range: 6-19 ng/L     • Glucose - Accu-Ck 11/23/2021 225 (A) 65 - 99 mg/dL Final   • ABO Rh Confirm 11/23/2021 O POS   Final   • GFR If  11/23/2021 >60  >60 mL/min/1.73 m 2 Final   • GFR If Non  11/23/2021 >60  >60 mL/min/1.73 m 2 Final   • TSH 11/23/2021 2.430  0.380 - 5.330 uIU/mL Final    Comment: Please note new reference ranges effective 12/19/2017 12:30 PM    Pregnant Females, 1st Trimester  0.050-3.700  Pregnant Females, 2nd Trimester  0.310-4.350  Pregnant Females, 3rd Trimester  0.410-5.180     • 25-Hydroxy   Vitamin D 25 11/23/2021 38  30 - 80 ng/mL Final    Comment: INTERPRETIVE INFORMATION: Vitamin D, 25-Hydroxy  This assay accurately quantifies the sum of vitamin D3, 25-hydroxy  and vitamin D2, 25-hydroxy.  0-17 years:  Deficiency: less than 20  ng/mL  Optimum level: greater than or equal to 20 ng/mL*  *(Maximiliano CL et al. Pediatrics 2008; 122: 1142-52.)  18 years and older:  Deficiency: Less than 20 ng/mL  Insufficiency: 20-29 ng/mL  Optimum Level: 30-80 ng/mL  Possible Toxicity: Greater than 150 ng/mL  Performed By: [facility]  [address] [place] [id number]  : Lis Interiano MD     • Eject.Frac. MOD BP 11/24/2021 57.84   Final   • Eject.Frac. MOD 4C 11/24/2021 56.03   Final   • Eject.Frac. MOD 2C 11/24/2021 58.53   Final   • Left Ventrical Ejection Fraction 11/24/2021 60   Final   • Glucose - Accu-Ck 11/23/2021 177 (A) 65 - 99 mg/dL Final   • Glucose - Accu-Ck 11/23/2021 188 (A) 65 - 99 mg/dL Final   • WBC 11/24/2021 7.9  4.8 - 10.8 K/uL Final   • RBC 11/24/2021 3.98 (A) 4.70 - 6.10 M/uL Final   • Hemoglobin 11/24/2021 12.5 (A) 14.0 - 18.0 g/dL Final   • Hematocrit 11/24/2021 38.1 (A) 42.0 - 52.0 % Final   • MCV 11/24/2021 95.7  81.4 - 97.8 fL Final   • MCH 11/24/2021 31.4  27.0 - 33.0 pg Final   • MCHC 11/24/2021 32.8 (A) 33.7 - 35.3 g/dL Final   • RDW 11/24/2021 47.3  35.9 - 50.0 fL Final   • Platelet Count 11/24/2021 204  164 - 446 K/uL Final   • MPV 11/24/2021 10.4  9.0 - 12.9 fL Final   • Sodium 11/24/2021 139  135 - 145 mmol/L Final   • Potassium 11/24/2021 4.1  3.6 - 5.5 mmol/L Final   • Chloride 11/24/2021 103  96 - 112 mmol/L Final   • Co2 11/24/2021 22  20 - 33 mmol/L Final   • Glucose 11/24/2021 132 (A) 65 - 99 mg/dL Final   • Bun 11/24/2021 14  8 - 22 mg/dL Final   • Creatinine 11/24/2021 1.02  0.50 - 1.40 mg/dL Final   • Calcium 11/24/2021 9.6  8.4 - 10.2 mg/dL Final   • Anion Gap 11/24/2021 14.0  7.0 - 16.0 Final   • Cholesterol,Tot 11/24/2021 127  100 - 199 mg/dL Final   • Triglycerides 11/24/2021 173 (A) 0 - 149 mg/dL Final   • HDL 11/24/2021 39 (A) >=40 mg/dL Final   • LDL 11/24/2021 53  <100 mg/dL Final   • Glycohemoglobin 11/24/2021 7.4 (A) 4.0 - 5.6 % Final    Comment: Increased risk for diabetes:   5.7 -6.4%  Diabetes:  >6.4%  Glycemic control for adults with diabetes:  <7.0%    The above interpretations are per ADA guidelines.  Diagnosis  of diabetes mellitus on the basis of elevated Hemoglobin A1c  should be confirmed by repeating the Hb A1c test.     • Est Avg Glucose 2021 166  mg/dL Final    Comment: The eAG calculation is based on the A1c-Derived Daily Glucose  (ADAG) study.  See the ADA's website for additional information.     • Glucose - Accu-Ck 2021 133 (A) 65 - 99 mg/dL Final   • GFR If  2021 >60  >60 mL/min/1.73 m 2 Final   • GFR If Non  2021 >60  >60 mL/min/1.73 m 2 Final   • Glucose - Accu-Ck 2021 140 (A) 65 - 99 mg/dL Final   • Troponin T 2021 38 (A) 6 - 19 ng/L Final    Comment: As of 2019 at 0900, the Troponin I test has been replaced with the  Ultra High Sensitivity (Gen 5) Troponin T test.  Please note new analyte,  methodology, and reference range.    The Ultra High Sensitivity Troponin T test has a reference range for  positive troponins that follows the recommendation of the American College  of Cardiology (ACC) committee in conjunction with the 99th percentile  reference population. Normal range: 6-19 ng/L     • Magnesium 2021 1.5  1.5 - 2.5 mg/dL Final   • Report 2021    Final                    Value:[facility]    Test Date:  2021  Pt Name:    FRANKY TEMPLE                  Department: MED  MRN:        [id number]                      Room:       3316  Gender:     Male                         Technician: KATIA  :        [id number]                   Requested By:ADAM MIMS  Order #:    [id number]                    Reading MD: Martin Lobo MD    Measurements  Intervals                                Axis  Rate:       61                           P:          61  RI:         425                          QRS:        -46  QRSD:       150                          T:          -15  QT:          463  QTc:        467    Interpretive Statements  Sinus bradycardia  MOBITZ I AV BLOCK (WENCKEBACH  RBBB and LAFB  Left ventricular hypertrophy  Electronically Signed On 11- 17:19:33 PST by Martin Lobo MD     • Glucose - Accu-Ck 11/24/2021 197 (A) 65 - 99 mg/dL Final   ]  Assessment and Plan:     The following treatment plan was discussed:  1. Type 2 diabetes mellitus without complication, without long-term current use of insulin (HCC)  - POCT A1C 7.5 today  When patient returns from his fishing trip we will consider adding Jardiance    2. Hypothyroidism, unspecified type-   Labs are up-to-date, continue current regimen    3. B12 deficiency-check B12    4. Dyslipidemia associated with type 2 diabetes mellitus (HCC) stable on statin-no med changes indicated       No problem-specific Assessment & Plan notes found for this encounter.           Any change or worsening of signs or symptoms, patient encouraged to follow-up or report to emergency room for further evaluation. Patient verbalizes understanding and agrees.    Follow-Up: No follow-ups on file.      PLEASE NOTE: This dictation was created using voice recognition software. I have made every reasonable attempt to correct obvious errors, but I expect that there are errors of grammar and possibly content that I did not discover before finalizing the note.    My total time spent caring for the patient on the day of the encounter was  greater than 20 minutes.   This includes obtaining history, reviewing chart, physical exam, patient education, reviewing outside records, placing orders, interpreting tests and coordinating care.

## 2022-04-13 ENCOUNTER — TELEPHONE (OUTPATIENT)
Dept: HEALTH INFORMATION MANAGEMENT | Facility: OTHER | Age: 87
End: 2022-04-13

## 2022-04-13 LAB — EKG IMPRESSION: NORMAL

## 2022-05-09 ENCOUNTER — OFFICE VISIT (OUTPATIENT)
Dept: INTERNAL MEDICINE | Facility: IMAGING CENTER | Age: 87
End: 2022-05-09
Payer: MEDICARE

## 2022-05-09 VITALS
TEMPERATURE: 97.5 F | HEIGHT: 70 IN | SYSTOLIC BLOOD PRESSURE: 148 MMHG | HEART RATE: 84 BPM | OXYGEN SATURATION: 91 % | RESPIRATION RATE: 14 BRPM | WEIGHT: 169.75 LBS | BODY MASS INDEX: 24.3 KG/M2 | DIASTOLIC BLOOD PRESSURE: 82 MMHG

## 2022-05-09 DIAGNOSIS — J01.10 ACUTE NON-RECURRENT FRONTAL SINUSITIS: ICD-10-CM

## 2022-05-09 PROCEDURE — 99213 OFFICE O/P EST LOW 20 MIN: CPT | Performed by: FAMILY MEDICINE

## 2022-05-09 RX ORDER — DOXYCYCLINE HYCLATE 100 MG
100 TABLET ORAL 2 TIMES DAILY
Qty: 20 TABLET | Refills: 0 | Status: SHIPPED | OUTPATIENT
Start: 2022-05-09 | End: 2022-10-18

## 2022-05-09 ASSESSMENT — FIBROSIS 4 INDEX: FIB4 SCORE: 2.28

## 2022-05-15 NOTE — PROGRESS NOTES
"Chief Complaint   Patient presents with   • URI     Congestion, cough       Subjective:     HPI:   Alfred Erazo Jr. is a 86 y.o. male here  to discuss the evaluation and management of: 1 wk h/o uri sx including congestion  Both in chest and sinuses  Neg. covid test  No fever  No GI sx         No problems updated.     Objective:     /82   Pulse 84   Temp 36.4 °C (97.5 °F) (Temporal)   Resp 14   Ht 1.778 m (5' 10\")   Wt 77 kg (169 lb 12.1 oz)   SpO2 91%  Body mass index is 24.36 kg/m².    Physical Exam:  Physical Exam  Constitutional: Well-developed and well-nourished. Not diaphoretic. No distress.   Skin: Skin is warm and dry. No rash noted.  Head: Atraumatic without lesions.  Eyes: Conjunctivae and extraocular motions are normal.   Ears:  External ears unremarkable.    Nose: Nares patent. Mucosa without edema or erythema. No discharge. No facial tenderness.     Neck: Supple, No thyromegaly present. No JVD  Cardiovascular: Regular rate and rhythm.   Chest: Effort normal. Clear to auscultation throughout. No adventitious sounds.   Abdomen:  without distention.  .  Extremities: No cyanosis, clubbing, erythema, nor edema.   Neurological: Alert and oriented x 3.    Psychiatric:  Behavior, mood, and affect are appropriate     Assessment and Plan:     The following treatment plan was discussed:     No problem-specific Assessment & Plan notes found for this encounter.    1. Acute non-recurrent frontal sinusitis       Orders Placed This Encounter   • doxycycline (VIBRAMYCIN) 100 MG Tab          Any change or worsening of signs or symptoms, patient encouraged to follow-up or report to emergency room for further evaluation. Patient verbalizes understanding and agrees.    Follow-Up: Return if symptoms worsen or fail to improve.      PLEASE NOTE: This dictation was created using voice recognition software. I have made every reasonable attempt to correct obvious errors, but I expect that there are errors of " grammar and possibly content that I did not discover before finalizing the note.    My total time spent caring for the patient on the day of the encounter was  greater than 20 minutes.   This includes obtaining history, reviewing chart, physical exam, patient education, reviewing outside records, placing orders, interpreting tests and coordinating care.

## 2022-05-24 ENCOUNTER — NON-PROVIDER VISIT (OUTPATIENT)
Dept: CARDIOLOGY | Facility: MEDICAL CENTER | Age: 87
End: 2022-05-24
Payer: MEDICARE

## 2022-05-24 ENCOUNTER — OFFICE VISIT (OUTPATIENT)
Dept: CARDIOLOGY | Facility: MEDICAL CENTER | Age: 87
End: 2022-05-24
Payer: MEDICARE

## 2022-05-24 VITALS
BODY MASS INDEX: 24.91 KG/M2 | HEART RATE: 70 BPM | SYSTOLIC BLOOD PRESSURE: 124 MMHG | DIASTOLIC BLOOD PRESSURE: 80 MMHG | WEIGHT: 174 LBS | HEIGHT: 70 IN | RESPIRATION RATE: 14 BRPM | OXYGEN SATURATION: 98 %

## 2022-05-24 DIAGNOSIS — Z95.0 PACEMAKER: ICD-10-CM

## 2022-05-24 DIAGNOSIS — Z95.0 CARDIAC PACEMAKER IN SITU: ICD-10-CM

## 2022-05-24 DIAGNOSIS — I44.1 AV BLOCK, 2ND DEGREE: ICD-10-CM

## 2022-05-24 DIAGNOSIS — I48.0 PAF (PAROXYSMAL ATRIAL FIBRILLATION) (HCC): ICD-10-CM

## 2022-05-24 DIAGNOSIS — E03.9 HYPOTHYROIDISM, UNSPECIFIED TYPE: ICD-10-CM

## 2022-05-24 DIAGNOSIS — I10 ESSENTIAL HYPERTENSION: ICD-10-CM

## 2022-05-24 DIAGNOSIS — Z79.01 ANTICOAGULATED: ICD-10-CM

## 2022-05-24 PROCEDURE — 99214 OFFICE O/P EST MOD 30 MIN: CPT | Performed by: NURSE PRACTITIONER

## 2022-05-24 PROCEDURE — 93280 PM DEVICE PROGR EVAL DUAL: CPT | Performed by: INTERNAL MEDICINE

## 2022-05-24 PROCEDURE — 93000 ELECTROCARDIOGRAM COMPLETE: CPT | Performed by: INTERNAL MEDICINE

## 2022-05-24 ASSESSMENT — ENCOUNTER SYMPTOMS
WEAKNESS: 0
DIZZINESS: 0
WEIGHT LOSS: 0
CLAUDICATION: 0
SHORTNESS OF BREATH: 0
ORTHOPNEA: 0
PALPITATIONS: 0
PND: 0

## 2022-05-24 ASSESSMENT — FIBROSIS 4 INDEX: FIB4 SCORE: 2.28

## 2022-05-24 NOTE — PROGRESS NOTES
Chief Complaint   Patient presents with   • Pacemaker Check/Dysfunction     F/V Dx: Cardiac pacemaker in situ       Subjective     Cam Erazo Jr. is a 86 y.o. male patient of Dr. Buckner who presents today for follow up.    Patient was last seen by Dr. Buckner on March regarding new diagnosis of AF. His ASA and Plavix was stopped, patient was started on Eliquis.     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     Today patient states that he is doing well overall. Denies having any significant concerns or complaints to discuss today. States that he is tolerating OAC well without any bleeding problems. He is looking forward to going with his son to [place] next month to fish.     Past Medical History:   Diagnosis Date   • Asthma    • BPH (benign prostatic hyperplasia)    • Cataract     Surgically removed bilat   • Dental disorder     Upper   • Diabetes (HCC)    • Diverticulitis    • Esophageal stricture     dilated spring 2016   • Hiatal hernia    • High cholesterol    • History of shingles 8/27/2015   • Hyperlipidemia 7/8/2015   • Hypertension    • Hypothyroid    • PHN (postherpetic neuralgia) 8/27/2015   • Psoriasis 6/14/2017   • Stroke (HCC)     Last one was around 11/2021.  Has had several strokes including one in 9/2021.   • TIA (transient ischemic attack)    • Urinary incontinence    • Vitamin D deficiency 8/27/2015     Past Surgical History:   Procedure Laterality Date   • CATARACT EXTRACTION WITH IOL     • TONSILLECTOMY     • VASECTOMY       Family History   Problem Relation Age of Onset   • Stroke Brother    • Stroke Father      Social History     Socioeconomic History   • Marital status:      Spouse name: Not on file   • Number of children: Not on file   • Years of education: Not on file   • Highest education level: Not on file   Occupational History   • Not on file   Tobacco Use   •  "Smoking status: Never Smoker   • Smokeless tobacco: Never Used   Vaping Use   • Vaping Use: Never used   Substance and Sexual Activity   • Alcohol use: Yes     Comment: occ. 12/28/21: 1 drink a  week.    • Drug use: No   • Sexual activity: Not on file     Comment:  , 1 child, retired    Other Topics Concern   • Not on file   Social History Narrative    Alyson Erazo is spouse             1 child, retired      Social Determinants of Health     Financial Resource Strain: Low Risk    • Difficulty of Paying Living Expenses: Not hard at all   Food Insecurity: No Food Insecurity   • Worried About Running Out of Food in the Last Year: Never true   • Ran Out of Food in the Last Year: Never true   Transportation Needs: No Transportation Needs   • Lack of Transportation (Medical): No   • Lack of Transportation (Non-Medical): No   Physical Activity: Not on file   Stress: Not on file   Social Connections: Not on file   Intimate Partner Violence: Not on file   Housing Stability: Not on file     Allergies   Allergen Reactions   • Pcn [Penicillins]      \"my joints start to bubble up\"   • Sulfa Drugs      \"my joints start to bubble up\"  \"Its been 40 yrs since I've had either of those\"     Outpatient Encounter Medications as of 5/24/2022   Medication Sig Dispense Refill   • doxycycline (VIBRAMYCIN) 100 MG Tab Take 1 Tablet by mouth 2 times a day. 20 Tablet 0   • apixaban (ELIQUIS) 5mg Tab Take 1 Tablet by mouth 2 times a day. 180 Tablet 3   • furosemide (LASIX) 20 MG Tab Take 1 Tablet by mouth 1 time a day as needed. 30 Tablet 3   • tamsulosin (FLOMAX) 0.4 MG capsule TAKE 1 CAPSULE BY MOUTH 0.5 HOUR AFTER BREAKFAST 90 Capsule 3   • liothyronine (CYTOMEL) 5 MCG Tab TAKE 2 TABLETS BY MOUTH EVERY  Tablet 3   • finasteride (PROSCAR) 5 MG Tab Take 1 Tablet by mouth every day. 90 Tablet 3   • levothyroxine (SYNTHROID) 75 MCG Tab Take 1 Tablet by mouth every day. 90 Tablet 3   • Ascorbic Acid (VITAMIN C " "PO) Take 500 mg by mouth every day.     • Cholecalciferol (VITAMIN D3 PO) Take 50 mcg by mouth every day.     • Cyanocobalamin (VITAMIN B 12 PO) Take 500 mcg by mouth every day.     • Multiple Vitamins-Minerals (PRESERVISION AREDS PO) Take  by mouth every day.     • losartan (COZAAR) 25 MG Tab TAKE 1 TABLET BY MOUTH EVERY DAY 90 Tablet 3   • atorvastatin (LIPITOR) 20 MG Tab Take 1 Tablet by mouth every day. 100 Tablet 3   • omeprazole (PRILOSEC) 20 MG delayed-release capsule Take 1 Capsule by mouth every day. 90 Capsule 3   • glimepiride (AMARYL) 4 MG Tab Take 1 tablet by mouth every morning. 100 tablet 3   • Blood Glucose Monitoring Suppl Device Meter: Dispense glucose meter preferred on patient's insurance.  Sig. Use as directed for blood sugar monitoring. 1 Device 0     No facility-administered encounter medications on file as of 5/24/2022.     Review of Systems   Constitutional: Negative for malaise/fatigue and weight loss.   Respiratory: Negative for shortness of breath.    Cardiovascular: Negative for chest pain, palpitations, orthopnea, claudication, leg swelling and PND.   Neurological: Negative for dizziness and weakness.   All other systems reviewed and are negative.             Objective     /80 (BP Location: Left arm, Patient Position: Sitting, BP Cuff Size: Adult)   Pulse 70   Resp 14   Ht 1.778 m (5' 10\")   Wt 78.9 kg (174 lb)   SpO2 98%   BMI 24.97 kg/m²     Physical Exam  Constitutional:       General: He is not in acute distress.     Appearance: He is well-developed.   HENT:      Head: Normocephalic.   Neck:      Vascular: No carotid bruit or JVD.   Cardiovascular:      Rate and Rhythm: Normal rate and regular rhythm.   Pulmonary:      Effort: Pulmonary effort is normal.      Breath sounds: Normal breath sounds.   Abdominal:      General: There is no distension.      Palpations: Abdomen is soft.   Musculoskeletal:      Cervical back: Normal range of motion.      Right lower leg: No " edema.      Left lower leg: No edema.   Skin:     General: Skin is warm and dry.   Neurological:      Mental Status: He is alert and oriented to person, place, and time.   Psychiatric:         Mood and Affect: Mood normal.         Behavior: Behavior normal.         Thought Content: Thought content normal.         EP OP Report 12/29/2021:  IMPLANTED DEVICE INFORMATION:  Pulse generator is a [employer] model L311  Serial # [id number]     LEAD INFORMATION:  1)Right atrial lead is a [employer] model #7841 , serial #[id number] ,P wave 4.2 millivolts, threshold 1.2 Volts at 0.5 milliseconds, pacing impedance 575 Ohms.     2)Right ventricular lead is a [employer]  model #7842  , serial #[id number] ,R wave 5.3 millivolts, threshold 0.5 Volts at 0.5 milliseconds, pacing impedance 820 Ohms.     DEVICE PROGRAMMING:  DDD 60 -120 ppm     FLUOROSCOPY TIME: 1.9 min     IMPRESSIONS:  1. Successful dual chamber pacemaker implantation         Assessment & Plan     1. Pacemaker     2. Cardiac pacemaker in situ  EKG   3. Anticoagulated  CBC WITHOUT DIFFERENTIAL    Comp Metabolic Panel   4. PAF (paroxysmal atrial fibrillation) (HCC)     5. Essential hypertension     6. Hypothyroidism, unspecified type     7. AV block, 2nd degree         Medical Decision Making: Today's Assessment/Status/Plan:     1. S/P Permanent Pacemaker:  - S/P successful BSI dual-chamber pacemaker implantation with Dr. Buckner on 12/29/2021 due to symptomatic second degree heart block.   - Device interrogation today showed normal sensing and function.  - Left upper pacemaker site remains uncomplicated without swelling, erosion or erythema.      2. HTN:  - Remains well controlled.  - Continue losartan 25 mg daily and      3. Hypothyroid:  - TSH in November 2021 was WNL.     4. HLD:  - LDL at goal (53).  - Continue Atorvastatin 20 mg daily.     5. H/O CVA:  - On statin as above.  - No ASA due to OAC.     6. PAF:  - [place] remains <1%.  - OAC with Eliquis 5 mg  BID, tolerating well with no bleeding issues.     Repeat 6 month CBC and CMP ordered.     Patient will follow up with myself in 6 months with PPM interrogation or earlier if needed. Encouraged patient to contact our office should any questions or concerns arise in the mean time.     Future Appointments   Date Time Provider Department Center   6/3/2022  2:40 PM Martin Lobo M.D. [facility] None   10/12/2022 11:00 AM [facility] None

## 2022-05-25 LAB — EKG IMPRESSION: NORMAL

## 2022-06-03 ENCOUNTER — OFFICE VISIT (OUTPATIENT)
Dept: CARDIOLOGY | Facility: MEDICAL CENTER | Age: 87
End: 2022-06-03
Payer: MEDICARE

## 2022-06-03 ENCOUNTER — TELEPHONE (OUTPATIENT)
Dept: CARDIOLOGY | Facility: MEDICAL CENTER | Age: 87
End: 2022-06-03

## 2022-06-03 VITALS
HEART RATE: 68 BPM | OXYGEN SATURATION: 94 % | DIASTOLIC BLOOD PRESSURE: 76 MMHG | RESPIRATION RATE: 14 BRPM | BODY MASS INDEX: 25.37 KG/M2 | WEIGHT: 177.2 LBS | HEIGHT: 70 IN | SYSTOLIC BLOOD PRESSURE: 118 MMHG

## 2022-06-03 DIAGNOSIS — E78.5 HYPERLIPIDEMIA, UNSPECIFIED HYPERLIPIDEMIA TYPE: ICD-10-CM

## 2022-06-03 DIAGNOSIS — I65.23 BILATERAL CAROTID ARTERY STENOSIS: ICD-10-CM

## 2022-06-03 DIAGNOSIS — Z95.0 PACEMAKER: ICD-10-CM

## 2022-06-03 DIAGNOSIS — I10 HYPERTENSION, UNSPECIFIED TYPE: ICD-10-CM

## 2022-06-03 DIAGNOSIS — E11.9 TYPE 2 DIABETES MELLITUS WITHOUT COMPLICATION, WITHOUT LONG-TERM CURRENT USE OF INSULIN (HCC): ICD-10-CM

## 2022-06-03 DIAGNOSIS — R09.89 SUSPECTED CEREBROVASCULAR ACCIDENT (CVA): ICD-10-CM

## 2022-06-03 PROCEDURE — 99215 OFFICE O/P EST HI 40 MIN: CPT | Performed by: INTERNAL MEDICINE

## 2022-06-03 RX ORDER — CHOLESTYRAMINE 4 G/9G
POWDER, FOR SUSPENSION ORAL
COMMUNITY
Start: 2022-05-31 | End: 2022-07-27

## 2022-06-03 ASSESSMENT — ENCOUNTER SYMPTOMS
HEARTBURN: 0
SHORTNESS OF BREATH: 0
DIZZINESS: 0
COUGH: 0
ORTHOPNEA: 0
CONSTIPATION: 0
DIARRHEA: 0
IRREGULAR HEARTBEAT: 0
NEAR-SYNCOPE: 0
SYNCOPE: 0
FLANK PAIN: 0
ALTERED MENTAL STATUS: 0
CLAUDICATION: 0
ABDOMINAL PAIN: 0
NAUSEA: 0
DEPRESSION: 0
PND: 0
WEIGHT LOSS: 0
DYSPNEA ON EXERTION: 0
DECREASED APPETITE: 0
BACK PAIN: 0
PALPITATIONS: 0
FEVER: 0
WEIGHT GAIN: 0
VOMITING: 0
BLURRED VISION: 0

## 2022-06-03 ASSESSMENT — FIBROSIS 4 INDEX: FIB4 SCORE: 2.28

## 2022-06-03 NOTE — PROGRESS NOTES
Cardiology Note    Chief Complaint   Patient presents with   • Coronary Artery Disease     F/V Dx: Bilateral carotid artery disease (HCC)   • Hyperlipidemia       History of Present Illness: Alfred Erazo Jr. is a 86 y.o. 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    Doing well this visit. No active cardiac complaints. Walks about a half mile daily. Stops there because that's as far as his dog will go; he is 17 years old. Compliant with medications and denies adverse effects. Pending cochlear implant in [place] 7/6.     Review of Systems   Constitutional: Negative for decreased appetite, fever, malaise/fatigue, weight gain and weight loss.   HENT: Negative for congestion and nosebleeds.    Eyes: Negative for blurred vision.   Cardiovascular: Negative for chest pain, claudication, dyspnea on exertion, irregular heartbeat, leg swelling, near-syncope, orthopnea, palpitations, paroxysmal nocturnal dyspnea and syncope.   Respiratory: Negative for cough and shortness of breath.    Endocrine: Negative for cold intolerance and heat intolerance.   Skin: Negative for rash.   Musculoskeletal: Negative for back pain.   Gastrointestinal: Negative for abdominal pain, constipation, diarrhea, heartburn, melena, nausea and vomiting.   Genitourinary: Negative for dysuria, flank pain and hematuria.   Neurological: Negative for dizziness.   Psychiatric/Behavioral: Negative for altered mental status and depression.         Past Medical History:   Diagnosis Date   • Asthma    • BPH (benign prostatic hyperplasia)    • Cataract     Surgically removed bilat   • Dental disorder     Upper   • Diabetes (HCC)    • Diverticulitis    • Esophageal stricture     dilated spring 2016   • Hiatal hernia    • High cholesterol    • History of shingles 8/27/2015   • Hyperlipidemia 7/8/2015   • Hypertension    • Hypothyroid    • PHN (postherpetic neuralgia) 8/27/2015   • Psoriasis 6/14/2017   • Stroke (HCC)      "Last one was around 11/2021.  Has had several strokes including one in 9/2021.   • TIA (transient ischemic attack)    • Urinary incontinence    • Vitamin D deficiency 8/27/2015         Past Surgical History:   Procedure Laterality Date   • CATARACT EXTRACTION WITH IOL     • TONSILLECTOMY     • VASECTOMY           Current Outpatient Medications   Medication Sig Dispense Refill   • cholestyramine (QUESTRAN) 4 g packet      • doxycycline (VIBRAMYCIN) 100 MG Tab Take 1 Tablet by mouth 2 times a day. 20 Tablet 0   • apixaban (ELIQUIS) 5mg Tab Take 1 Tablet by mouth 2 times a day. 180 Tablet 3   • furosemide (LASIX) 20 MG Tab Take 1 Tablet by mouth 1 time a day as needed. 30 Tablet 3   • tamsulosin (FLOMAX) 0.4 MG capsule TAKE 1 CAPSULE BY MOUTH 0.5 HOUR AFTER BREAKFAST 90 Capsule 3   • liothyronine (CYTOMEL) 5 MCG Tab TAKE 2 TABLETS BY MOUTH EVERY  Tablet 3   • finasteride (PROSCAR) 5 MG Tab Take 1 Tablet by mouth every day. 90 Tablet 3   • levothyroxine (SYNTHROID) 75 MCG Tab Take 1 Tablet by mouth every day. 90 Tablet 3   • Ascorbic Acid (VITAMIN C PO) Take 500 mg by mouth every day.     • Cholecalciferol (VITAMIN D3 PO) Take 50 mcg by mouth every day.     • Cyanocobalamin (VITAMIN B 12 PO) Take 500 mcg by mouth every day.     • losartan (COZAAR) 25 MG Tab TAKE 1 TABLET BY MOUTH EVERY DAY 90 Tablet 3   • atorvastatin (LIPITOR) 20 MG Tab Take 1 Tablet by mouth every day. 100 Tablet 3   • omeprazole (PRILOSEC) 20 MG delayed-release capsule Take 1 Capsule by mouth every day. 90 Capsule 3   • glimepiride (AMARYL) 4 MG Tab Take 1 tablet by mouth every morning. 100 tablet 3   • Blood Glucose Monitoring Suppl Device Meter: Dispense glucose meter preferred on patient's insurance.  Sig. Use as directed for blood sugar monitoring. 1 Device 0     No current facility-administered medications for this visit.         Allergies   Allergen Reactions   • Pcn [Penicillins]      \"my joints start to bubble up\"   • Sulfa Drugs      " "\"my joints start to bubble up\"  \"Its been 40 yrs since I've had either of those\"         Family History   Problem Relation Age of Onset   • Stroke Brother    • Stroke Father          Social History     Socioeconomic History   • Marital status:      Spouse name: Not on file   • Number of children: Not on file   • Years of education: Not on file   • Highest education level: Not on file   Occupational History   • Not on file   Tobacco Use   • Smoking status: Never Smoker   • Smokeless tobacco: Never Used   Vaping Use   • Vaping Use: Never used   Substance and Sexual Activity   • Alcohol use: Yes     Comment: occ. 12/28/21: 1 drink a  week.    • Drug use: No   • Sexual activity: Not on file     Comment:  , 1 child, retired    Other Topics Concern   • Not on file   Social History Narrative    Alyosn Erazo is spouse             1 child, retired      Social Determinants of Health     Financial Resource Strain: Low Risk    • Difficulty of Paying Living Expenses: Not hard at all   Food Insecurity: No Food Insecurity   • Worried About Running Out of Food in the Last Year: Never true   • Ran Out of Food in the Last Year: Never true   Transportation Needs: No Transportation Needs   • Lack of Transportation (Medical): No   • Lack of Transportation (Non-Medical): No   Physical Activity: Not on file   Stress: Not on file   Social Connections: Not on file   Intimate Partner Violence: Not on file   Housing Stability: Not on file         Physical Exam:  Ambulatory Vitals  /76 (BP Location: Left arm, Patient Position: Sitting, BP Cuff Size: Adult)   Pulse 68   Resp 14   Ht 1.778 m (5' 10\")   Wt 80.4 kg (177 lb 3.2 oz)   SpO2 94%    BP Readings from Last 4 Encounters:   06/03/22 118/76   05/24/22 124/80   05/09/22 148/82   03/25/22 135/70     Weight/BMI:   Vitals:    06/03/22 1422   BP: 118/76   Weight: 80.4 kg (177 lb 3.2 oz)   Height: 1.778 m (5' 10\")    Body mass index is 25.43 " kg/m².  Wt Readings from Last 4 Encounters:   06/03/22 80.4 kg (177 lb 3.2 oz)   05/24/22 78.9 kg (174 lb)   05/09/22 77 kg (169 lb 12.1 oz)   03/25/22 77.6 kg (171 lb)       Physical Exam  Constitutional:       General: He is not in acute distress.  HENT:      Head: Normocephalic and atraumatic.   Eyes:      Conjunctiva/sclera: Conjunctivae normal.      Pupils: Pupils are equal, round, and reactive to light.   Neck:      Vascular: No JVD.   Cardiovascular:      Rate and Rhythm: Normal rate and regular rhythm.      Heart sounds: Normal heart sounds. No murmur heard.    No friction rub. No gallop.   Pulmonary:      Effort: Pulmonary effort is normal. No respiratory distress.      Breath sounds: Normal breath sounds. No wheezing or rales.   Chest:      Chest wall: No tenderness.   Abdominal:      General: Bowel sounds are normal. There is no distension.      Palpations: Abdomen is soft.   Musculoskeletal:      Cervical back: Normal range of motion and neck supple.   Skin:     General: Skin is warm and dry.   Neurological:      Mental Status: He is alert and oriented to person, place, and time.   Psychiatric:         Mood and Affect: Affect normal.         Judgment: Judgment normal.         Lab Data Review:  Lab Results   Component Value Date/Time    CHOLSTRLTOT 127 11/24/2021 12:33 AM    LDL 53 11/24/2021 12:33 AM    HDL 39 (A) 11/24/2021 12:33 AM    TRIGLYCERIDE 173 (H) 11/24/2021 12:33 AM       Lab Results   Component Value Date/Time    SODIUM 142 12/28/2021 10:39 AM    POTASSIUM 4.1 12/28/2021 10:39 AM    CHLORIDE 105 12/28/2021 10:39 AM    CO2 22 12/28/2021 10:39 AM    GLUCOSE 150 (H) 12/28/2021 10:39 AM    BUN 17 12/28/2021 10:39 AM    CREATININE 1.00 12/28/2021 10:39 AM     CrCl cannot be calculated (Patient's most recent lab result is older than the maximum 7 days allowed.).  Lab Results   Component Value Date/Time    ALKPHOSPHAT 81 12/28/2021 10:39 AM    ASTSGOT 28 12/28/2021 10:39 AM    ALTSGPT 23 12/28/2021  10:39 AM    TBILIRUBIN 0.7 12/28/2021 10:39 AM      Lab Results   Component Value Date/Time    WBC 5.9 12/28/2021 10:39 AM     Lab Results   Component Value Date/Time    HBA1C 7.5 (A) 03/25/2022 01:29 PM     No components found for: TROP      Cardiac Imaging and Procedures Review:      EKG 5/24/22 AP, RBBB, LVH w repol, lafb    MRI brain 11/2021  1. Age-related cerebral atrophy.  2. Extensive periventricular white matter changes consistent with chronic microvascular ischemic gliosis. Additionally, there are multiple small chronic areas of lacunar type infarction in the periventricular white matter.  3. No evidence of acute infarction in the brain parenchyma.    TTE 11/2021  CONCLUSIONS  Normal right and left ventricular size and function.   The left ventricular ejection fraction is visually estimated to be 60%.  Indeterminate diastolic function.  Aortic sclerosis without stenosis.  Normal estimated right atrial pressure.   Unable to estimate pulmonary artery pressure due to an inadequate   tricuspid regurgitant jet.  Compared to the prior study 8-: There has been no significant   change.     CTA neck 11/2021  IMPRESSION:  1. No evidence of flow-limiting stenosis in the cervical carotid or cervical vertebral arteries.  2. Diminutive left vertebral artery, similar to prior.    Medical Decision Making:  Problem List Items Addressed This Visit     Hyperlipidemia    Relevant Medications    cholestyramine (QUESTRAN) 4 g packet    Type 2 diabetes mellitus without complication, without long-term current use of insulin (HCC)    Bilateral carotid artery disease (HCC)    Relevant Medications    cholestyramine (QUESTRAN) 4 g packet    Suspected cerebrovascular accident (CVA)    Pacemaker        Paroxysmal AF - chadsvasc 5. <1% burden on ppm interrogation. doac for cva prevention. Doesn't appear to require rate control.    Preoperative cardiac exam - low AF burden. RCRI 0. No active cardiac complaints. METS >4. Stable  EKG. No further testing will modify perioperative risk intermediate risk procedure. Hold doac 48h prior to procedure and resume when hemostasis achieved.    DM2 / HLD / CVA - continue statin. LDL threshold <70 given CVA history. No additional aniplatelet needed setting of doac.    HTN - goal 120/80. Controlled. Continue regimen.     PPM - regular interrogations every 6 months.     It was my pleasure to meet with Bonifacio Castro.    A total of 42 minutes of time was spent on day of encounter reviewing medical record, performing history and examination, counseling, ordering medication/test/consults and documentation.

## 2022-06-04 NOTE — TELEPHONE ENCOUNTER
Preoperative cardiac exam - low AF burden. RCRI 0. No active cardiac complaints. METS >4. Stable EKG. No further testing will modify perioperative risk intermediate risk procedure. Hold doac 48h prior to procedure and resume when hemostasis achieved.    Hold eliquis 48h prior to procedure. Resume when surgeon believes ok from bleeding standpoint.    ------------------------------------------------------  Letter drafted and faxed to [phone]  Confirmation status received  Scan to [facility]

## 2022-06-04 NOTE — TELEPHONE ENCOUNTER
----- Message from Martin oLbo M.D. sent at 6/3/2022  3:25 PM PDT -----  Jarod Tyler,  Can I get your help faxing preoperative exam to ENT in [place]? Dr Chiu, phone [phone], fax [phone].  Thank you!

## 2022-06-06 ENCOUNTER — HOSPITAL ENCOUNTER (OUTPATIENT)
Dept: LAB | Facility: MEDICAL CENTER | Age: 87
End: 2022-06-06
Attending: NURSE PRACTITIONER
Payer: MEDICARE

## 2022-06-06 ENCOUNTER — HOSPITAL ENCOUNTER (OUTPATIENT)
Dept: LAB | Facility: MEDICAL CENTER | Age: 87
End: 2022-06-06
Attending: OTOLARYNGOLOGY
Payer: MEDICARE

## 2022-06-06 DIAGNOSIS — Z79.01 ANTICOAGULATED: ICD-10-CM

## 2022-06-06 LAB
ALBUMIN SERPL BCP-MCNC: 4.2 G/DL (ref 3.2–4.9)
ALBUMIN/GLOB SERPL: 1.6 G/DL
ALP SERPL-CCNC: 85 U/L (ref 30–99)
ALT SERPL-CCNC: 24 U/L (ref 2–50)
ANION GAP SERPL CALC-SCNC: 11 MMOL/L (ref 7–16)
ANION GAP SERPL CALC-SCNC: 14 MMOL/L (ref 7–16)
AST SERPL-CCNC: 18 U/L (ref 12–45)
BASOPHILS # BLD AUTO: 0.3 % (ref 0–1.8)
BASOPHILS # BLD: 0.02 K/UL (ref 0–0.12)
BILIRUB SERPL-MCNC: 0.5 MG/DL (ref 0.1–1.5)
BUN SERPL-MCNC: 17 MG/DL (ref 8–22)
BUN SERPL-MCNC: 17 MG/DL (ref 8–22)
CALCIUM SERPL-MCNC: 9.2 MG/DL (ref 8.5–10.5)
CALCIUM SERPL-MCNC: 9.3 MG/DL (ref 8.5–10.5)
CHLORIDE SERPL-SCNC: 105 MMOL/L (ref 96–112)
CHLORIDE SERPL-SCNC: 106 MMOL/L (ref 96–112)
CO2 SERPL-SCNC: 22 MMOL/L (ref 20–33)
CO2 SERPL-SCNC: 23 MMOL/L (ref 20–33)
CREAT SERPL-MCNC: 1.04 MG/DL (ref 0.5–1.4)
CREAT SERPL-MCNC: 1.13 MG/DL (ref 0.5–1.4)
EOSINOPHIL # BLD AUTO: 0.25 K/UL (ref 0–0.51)
EOSINOPHIL NFR BLD: 3.5 % (ref 0–6.9)
ERYTHROCYTE [DISTWIDTH] IN BLOOD BY AUTOMATED COUNT: 51.3 FL (ref 35.9–50)
ERYTHROCYTE [DISTWIDTH] IN BLOOD BY AUTOMATED COUNT: 52.5 FL (ref 35.9–50)
GFR SERPLBLD CREATININE-BSD FMLA CKD-EPI: 64 ML/MIN/1.73 M 2
GFR SERPLBLD CREATININE-BSD FMLA CKD-EPI: 70 ML/MIN/1.73 M 2
GLOBULIN SER CALC-MCNC: 2.7 G/DL (ref 1.9–3.5)
GLUCOSE SERPL-MCNC: 216 MG/DL (ref 65–99)
GLUCOSE SERPL-MCNC: 217 MG/DL (ref 65–99)
HCT VFR BLD AUTO: 42.1 % (ref 42–52)
HCT VFR BLD AUTO: 43 % (ref 42–52)
HGB BLD-MCNC: 13.7 G/DL (ref 14–18)
HGB BLD-MCNC: 14.1 G/DL (ref 14–18)
IMM GRANULOCYTES # BLD AUTO: 0.04 K/UL (ref 0–0.11)
IMM GRANULOCYTES NFR BLD AUTO: 0.6 % (ref 0–0.9)
LYMPHOCYTES # BLD AUTO: 1.61 K/UL (ref 1–4.8)
LYMPHOCYTES NFR BLD: 22.8 % (ref 22–41)
MCH RBC QN AUTO: 30.2 PG (ref 27–33)
MCH RBC QN AUTO: 30.7 PG (ref 27–33)
MCHC RBC AUTO-ENTMCNC: 32.5 G/DL (ref 33.7–35.3)
MCHC RBC AUTO-ENTMCNC: 32.8 G/DL (ref 33.7–35.3)
MCV RBC AUTO: 92.7 FL (ref 81.4–97.8)
MCV RBC AUTO: 93.5 FL (ref 81.4–97.8)
MONOCYTES # BLD AUTO: 0.65 K/UL (ref 0–0.85)
MONOCYTES NFR BLD AUTO: 9.2 % (ref 0–13.4)
NEUTROPHILS # BLD AUTO: 4.49 K/UL (ref 1.82–7.42)
NEUTROPHILS NFR BLD: 63.6 % (ref 44–72)
NRBC # BLD AUTO: 0 K/UL
NRBC BLD-RTO: 0 /100 WBC
PLATELET # BLD AUTO: 185 K/UL (ref 164–446)
PLATELET # BLD AUTO: 191 K/UL (ref 164–446)
PMV BLD AUTO: 11 FL (ref 9–12.9)
PMV BLD AUTO: 11.1 FL (ref 9–12.9)
POTASSIUM SERPL-SCNC: 4.6 MMOL/L (ref 3.6–5.5)
POTASSIUM SERPL-SCNC: 4.6 MMOL/L (ref 3.6–5.5)
PROT SERPL-MCNC: 6.9 G/DL (ref 6–8.2)
RBC # BLD AUTO: 4.54 M/UL (ref 4.7–6.1)
RBC # BLD AUTO: 4.6 M/UL (ref 4.7–6.1)
SODIUM SERPL-SCNC: 140 MMOL/L (ref 135–145)
SODIUM SERPL-SCNC: 141 MMOL/L (ref 135–145)
WBC # BLD AUTO: 7.1 K/UL (ref 4.8–10.8)
WBC # BLD AUTO: 7.1 K/UL (ref 4.8–10.8)

## 2022-06-06 PROCEDURE — 85025 COMPLETE CBC W/AUTO DIFF WBC: CPT

## 2022-06-06 PROCEDURE — 85027 COMPLETE CBC AUTOMATED: CPT

## 2022-06-06 PROCEDURE — 80048 BASIC METABOLIC PNL TOTAL CA: CPT

## 2022-06-06 PROCEDURE — 80053 COMPREHEN METABOLIC PANEL: CPT

## 2022-06-26 DIAGNOSIS — I65.23 BILATERAL CAROTID ARTERY STENOSIS: ICD-10-CM

## 2022-06-29 RX ORDER — FUROSEMIDE 20 MG/1
20 TABLET ORAL
Qty: 100 TABLET | Refills: 3 | Status: SHIPPED | OUTPATIENT
Start: 2022-06-29 | End: 2023-05-03

## 2022-07-18 ENCOUNTER — HOSPITAL ENCOUNTER (OUTPATIENT)
Dept: LAB | Facility: MEDICAL CENTER | Age: 87
End: 2022-07-18
Payer: MEDICARE

## 2022-07-18 LAB
ANION GAP SERPL CALC-SCNC: 13 MMOL/L (ref 7–16)
BASOPHILS # BLD AUTO: 0.4 % (ref 0–1.8)
BASOPHILS # BLD: 0.03 K/UL (ref 0–0.12)
BUN SERPL-MCNC: 16 MG/DL (ref 8–22)
CALCIUM SERPL-MCNC: 9.2 MG/DL (ref 8.5–10.5)
CHLORIDE SERPL-SCNC: 103 MMOL/L (ref 96–112)
CO2 SERPL-SCNC: 23 MMOL/L (ref 20–33)
CREAT SERPL-MCNC: 1.02 MG/DL (ref 0.5–1.4)
EOSINOPHIL # BLD AUTO: 0.35 K/UL (ref 0–0.51)
EOSINOPHIL NFR BLD: 4.9 % (ref 0–6.9)
ERYTHROCYTE [DISTWIDTH] IN BLOOD BY AUTOMATED COUNT: 51.3 FL (ref 35.9–50)
GFR SERPLBLD CREATININE-BSD FMLA CKD-EPI: 72 ML/MIN/1.73 M 2
GLUCOSE SERPL-MCNC: 186 MG/DL (ref 65–99)
HCT VFR BLD AUTO: 41.3 % (ref 42–52)
HGB BLD-MCNC: 13.6 G/DL (ref 14–18)
IMM GRANULOCYTES # BLD AUTO: 0.04 K/UL (ref 0–0.11)
IMM GRANULOCYTES NFR BLD AUTO: 0.6 % (ref 0–0.9)
LYMPHOCYTES # BLD AUTO: 1.73 K/UL (ref 1–4.8)
LYMPHOCYTES NFR BLD: 24.3 % (ref 22–41)
MCH RBC QN AUTO: 30.8 PG (ref 27–33)
MCHC RBC AUTO-ENTMCNC: 32.9 G/DL (ref 33.7–35.3)
MCV RBC AUTO: 93.7 FL (ref 81.4–97.8)
MONOCYTES # BLD AUTO: 0.68 K/UL (ref 0–0.85)
MONOCYTES NFR BLD AUTO: 9.6 % (ref 0–13.4)
NEUTROPHILS # BLD AUTO: 4.28 K/UL (ref 1.82–7.42)
NEUTROPHILS NFR BLD: 60.2 % (ref 44–72)
NRBC # BLD AUTO: 0 K/UL
NRBC BLD-RTO: 0 /100 WBC
PLATELET # BLD AUTO: 209 K/UL (ref 164–446)
PMV BLD AUTO: 10.9 FL (ref 9–12.9)
POTASSIUM SERPL-SCNC: 4 MMOL/L (ref 3.6–5.5)
RBC # BLD AUTO: 4.41 M/UL (ref 4.7–6.1)
SODIUM SERPL-SCNC: 139 MMOL/L (ref 135–145)
WBC # BLD AUTO: 7.1 K/UL (ref 4.8–10.8)

## 2022-07-18 PROCEDURE — 85025 COMPLETE CBC W/AUTO DIFF WBC: CPT

## 2022-07-18 PROCEDURE — 80048 BASIC METABOLIC PNL TOTAL CA: CPT

## 2022-07-18 PROCEDURE — 36415 COLL VENOUS BLD VENIPUNCTURE: CPT

## 2022-07-21 ENCOUNTER — NON-PROVIDER VISIT (OUTPATIENT)
Dept: INTERNAL MEDICINE | Facility: IMAGING CENTER | Age: 87
End: 2022-07-21
Payer: MEDICARE

## 2022-07-21 DIAGNOSIS — Z01.818 PRE-OP EXAM: ICD-10-CM

## 2022-07-27 RX ORDER — CHOLESTYRAMINE 4 G/9G
POWDER, FOR SUSPENSION ORAL
Qty: 90 EACH | Refills: 3 | Status: SHIPPED | OUTPATIENT
Start: 2022-07-27 | End: 2022-10-19

## 2022-08-04 DIAGNOSIS — E11.9 TYPE 2 DIABETES MELLITUS WITHOUT COMPLICATION, WITHOUT LONG-TERM CURRENT USE OF INSULIN (HCC): ICD-10-CM

## 2022-08-04 RX ORDER — GLIMEPIRIDE 4 MG/1
4 TABLET ORAL EVERY MORNING
Qty: 100 TABLET | Refills: 3 | Status: SHIPPED | OUTPATIENT
Start: 2022-08-04 | End: 2023-04-20

## 2022-08-12 ENCOUNTER — APPOINTMENT (RX ONLY)
Dept: URBAN - METROPOLITAN AREA CLINIC 20 | Facility: CLINIC | Age: 87
Setting detail: DERMATOLOGY
End: 2022-08-12

## 2022-08-12 DIAGNOSIS — Z71.89 OTHER SPECIFIED COUNSELING: ICD-10-CM

## 2022-08-12 DIAGNOSIS — D22 MELANOCYTIC NEVI: ICD-10-CM

## 2022-08-12 DIAGNOSIS — Z85.828 PERSONAL HISTORY OF OTHER MALIGNANT NEOPLASM OF SKIN: ICD-10-CM

## 2022-08-12 DIAGNOSIS — L81.4 OTHER MELANIN HYPERPIGMENTATION: ICD-10-CM

## 2022-08-12 DIAGNOSIS — L82.1 OTHER SEBORRHEIC KERATOSIS: ICD-10-CM

## 2022-08-12 DIAGNOSIS — D18.0 HEMANGIOMA: ICD-10-CM

## 2022-08-12 PROBLEM — D18.01 HEMANGIOMA OF SKIN AND SUBCUTANEOUS TISSUE: Status: ACTIVE | Noted: 2022-08-12

## 2022-08-12 PROBLEM — D22.71 MELANOCYTIC NEVI OF RIGHT LOWER LIMB, INCLUDING HIP: Status: ACTIVE | Noted: 2022-08-12

## 2022-08-12 PROBLEM — D48.5 NEOPLASM OF UNCERTAIN BEHAVIOR OF SKIN: Status: ACTIVE | Noted: 2022-08-12

## 2022-08-12 PROBLEM — D22.39 MELANOCYTIC NEVI OF OTHER PARTS OF FACE: Status: ACTIVE | Noted: 2022-08-12

## 2022-08-12 PROBLEM — D22.62 MELANOCYTIC NEVI OF LEFT UPPER LIMB, INCLUDING SHOULDER: Status: ACTIVE | Noted: 2022-08-12

## 2022-08-12 PROBLEM — D22.61 MELANOCYTIC NEVI OF RIGHT UPPER LIMB, INCLUDING SHOULDER: Status: ACTIVE | Noted: 2022-08-12

## 2022-08-12 PROBLEM — D22.72 MELANOCYTIC NEVI OF LEFT LOWER LIMB, INCLUDING HIP: Status: ACTIVE | Noted: 2022-08-12

## 2022-08-12 PROBLEM — D22.5 MELANOCYTIC NEVI OF TRUNK: Status: ACTIVE | Noted: 2022-08-12

## 2022-08-12 PROCEDURE — ? COUNSELING

## 2022-08-12 PROCEDURE — 99213 OFFICE O/P EST LOW 20 MIN: CPT | Mod: 25

## 2022-08-12 PROCEDURE — ? OBSERVATION

## 2022-08-12 PROCEDURE — ? SUNSCREEN RECOMMENDATIONS

## 2022-08-12 PROCEDURE — 11102 TANGNTL BX SKIN SINGLE LES: CPT

## 2022-08-12 PROCEDURE — ? BIOPSY BY SHAVE METHOD

## 2022-08-12 ASSESSMENT — LOCATION SIMPLE DESCRIPTION DERM
LOCATION SIMPLE: NECK
LOCATION SIMPLE: POSTERIOR SCALP
LOCATION SIMPLE: RIGHT CHEEK
LOCATION SIMPLE: LEFT POSTERIOR THIGH
LOCATION SIMPLE: LEFT POSTERIOR UPPER ARM
LOCATION SIMPLE: RIGHT UPPER BACK
LOCATION SIMPLE: LEFT EAR
LOCATION SIMPLE: RIGHT POSTERIOR UPPER ARM
LOCATION SIMPLE: RIGHT FOREHEAD
LOCATION SIMPLE: RIGHT LOWER BACK
LOCATION SIMPLE: UPPER BACK
LOCATION SIMPLE: RIGHT POSTERIOR THIGH
LOCATION SIMPLE: LEFT PRETIBIAL REGION
LOCATION SIMPLE: RIGHT FOREARM
LOCATION SIMPLE: LEFT LIP
LOCATION SIMPLE: RIGHT PRETIBIAL REGION
LOCATION SIMPLE: LEFT FOREARM

## 2022-08-12 ASSESSMENT — LOCATION ZONE DERM
LOCATION ZONE: LEG
LOCATION ZONE: TRUNK
LOCATION ZONE: LIP
LOCATION ZONE: FACE
LOCATION ZONE: NECK
LOCATION ZONE: SCALP
LOCATION ZONE: EAR
LOCATION ZONE: ARM

## 2022-08-12 ASSESSMENT — LOCATION DETAILED DESCRIPTION DERM
LOCATION DETAILED: RIGHT DISTAL POSTERIOR UPPER ARM
LOCATION DETAILED: LEFT LATERAL PROXIMAL PRETIBIAL REGION
LOCATION DETAILED: POSTERIOR MID-PARIETAL SCALP
LOCATION DETAILED: LEFT INFERIOR VERMILION LIP
LOCATION DETAILED: LEFT DISTAL POSTERIOR THIGH
LOCATION DETAILED: LEFT VENTRAL PROXIMAL FOREARM
LOCATION DETAILED: RIGHT INFERIOR MEDIAL UPPER BACK
LOCATION DETAILED: RIGHT INFERIOR FOREHEAD
LOCATION DETAILED: INFERIOR THORACIC SPINE
LOCATION DETAILED: RIGHT PROXIMAL PRETIBIAL REGION
LOCATION DETAILED: LEFT PROXIMAL POSTERIOR UPPER ARM
LOCATION DETAILED: RIGHT SUPERIOR UPPER BACK
LOCATION DETAILED: RIGHT DISTAL POSTERIOR THIGH
LOCATION DETAILED: RIGHT INFERIOR CENTRAL MALAR CHEEK
LOCATION DETAILED: RIGHT CENTRAL LATERAL NECK
LOCATION DETAILED: RIGHT INFERIOR MEDIAL MIDBACK
LOCATION DETAILED: LEFT SUPERIOR HELIX
LOCATION DETAILED: LEFT ANTERIOR EARLOBE
LOCATION DETAILED: RIGHT VENTRAL PROXIMAL FOREARM

## 2022-08-12 NOTE — PROCEDURE: MIPS QUALITY
Quality 226: Preventive Care And Screening: Tobacco Use: Screening And Cessation Intervention: Patient screened for tobacco use and is an ex/non-smoker
Detail Level: Detailed
Quality 130: Documentation Of Current Medications In The Medical Record: Current Medications Documented
Quality 111:Pneumonia Vaccination Status For Older Adults: Pneumococcal vaccine administered on or after patient’s 60th birthday and before the end of the measurement period
Quality 431: Preventive Care And Screening: Unhealthy Alcohol Use - Screening: Patient not identified as an unhealthy alcohol user when screened for unhealthy alcohol use using a systematic screening method

## 2022-08-29 PROBLEM — I73.9 PERIPHERAL ARTERIAL DISEASE (HCC): Status: ACTIVE | Noted: 2022-08-29

## 2022-08-29 PROBLEM — I48.0 PAROXYSMAL ATRIAL FIBRILLATION (HCC): Status: ACTIVE | Noted: 2022-08-29

## 2022-09-13 ENCOUNTER — APPOINTMENT (RX ONLY)
Dept: URBAN - METROPOLITAN AREA CLINIC 20 | Facility: CLINIC | Age: 87
Setting detail: DERMATOLOGY
End: 2022-09-13

## 2022-09-13 PROBLEM — C44.629 SQUAMOUS CELL CARCINOMA OF SKIN OF LEFT UPPER LIMB, INCLUDING SHOULDER: Status: ACTIVE | Noted: 2022-09-13

## 2022-09-13 PROCEDURE — 17262 DSTRJ MAL LES T/A/L 1.1-2.0: CPT

## 2022-09-13 PROCEDURE — ? CURETTAGE AND DESTRUCTION

## 2022-09-13 NOTE — HPI: BIOPSY PROVEN SCC
How Severe Is Your Scc?: mild
When Was The Scc Biopsied? (Optional): 8/12/22
Additional History: Accession # [id number]

## 2022-09-13 NOTE — PROCEDURE: CURETTAGE AND DESTRUCTION
Detail Level: Detailed
Biopsy Photograph Reviewed: Yes
Number Of Curettages: 3
Size Of Lesion In Cm: 1.2
Size Of Lesion After Curettage: 1.3
Add Intralesional Injection: No
Concentration (Mg/Ml Or Millions Of Plaque Forming Units/Cc): 0.01
Total Volume (Ccs): 1
Anesthesia Type: 1% lidocaine with 1:100,000 epinephrine and a 1:12 solution of 8.4% sodium bicarbonate
Anesthesia Volume In Cc: 2
Cautery Type: electrodesiccation
What Was Performed First?: Curettage
Final Size Statement: The size of the lesion after curettage was
Additional Information: (Optional): The wound was cleaned, and a pressure dressing was applied.  The patient received detailed post-op instructions.
Consent was obtained from the patient. The risks, benefits and alternatives to therapy were discussed in detail. Specifically, the risks of infection, scarring, bleeding, prolonged wound healing, nerve injury, incomplete removal, allergy to anesthesia and recurrence were addressed. Alternatives to ED&C, such as: surgical removal was also discussed.  Prior to the procedure, the treatment site was clearly identified and confirmed by the patient. All components of Universal Protocol/PAUSE Rule completed.
Post-Care Instructions: I reviewed with the patient in detail post-care instructions. Patient is to keep the area dry for 48 hours, and not to engage in any swimming until the area is healed. Should the patient develop any fevers, chills, bleeding, severe pain patient will contact the office immediately.
Bill As A Line Item Or As Units: Line Item

## 2022-09-15 RX ORDER — CLOPIDOGREL BISULFATE 75 MG/1
TABLET ORAL
Qty: 90 TABLET | Refills: 1 | Status: SHIPPED | OUTPATIENT
Start: 2022-09-15 | End: 2022-11-28

## 2022-10-03 ENCOUNTER — APPOINTMENT (OUTPATIENT)
Dept: CARDIOLOGY | Facility: MEDICAL CENTER | Age: 87
End: 2022-10-03
Payer: MEDICARE

## 2022-10-07 NOTE — PROGRESS NOTES
Chief Complaint   Patient presents with    Follow-Up     F/V Dx: Cardiac pacemaker in situ       Subjective     Cam Erazo Jr. is a 86 y.o. male patient of Dr. Buckner who presents today for follow up.    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     Today patient states that he is doing overall well. Denies having any significant cardiac concerns or complaints to discuss. When asked, states that he will occasionally feel his heart rate. Reports that it is not bothersome and last only a few seconds. States that he takes his lasix daily unless he has somewhere to go during the day, this keeps his leg swelling well controlled. Complains or urinary frequency, he is going to talk to his PCP about that at his apt tomorrow.     Past Medical History:   Diagnosis Date    Asthma     BPH (benign prostatic hyperplasia)     Cataract     Surgically removed bilat    Dental disorder     Upper    Diabetes (HCC)     Diverticulitis     Esophageal stricture     dilated spring 2016    Hiatal hernia     High cholesterol     History of shingles 8/27/2015    Hyperlipidemia 7/8/2015    Hypertension     Hypothyroid     PHN (postherpetic neuralgia) 8/27/2015    Psoriasis 6/14/2017    Stroke (HCC)     Last one was around 11/2021.  Has had several strokes including one in 9/2021.    TIA (transient ischemic attack)     Urinary incontinence     Vitamin D deficiency 8/27/2015     Past Surgical History:   Procedure Laterality Date    CATARACT EXTRACTION WITH IOL      TONSILLECTOMY      VASECTOMY       Family History   Problem Relation Age of Onset    Stroke Brother     Stroke Father      Social History     Socioeconomic History    Marital status:      Spouse name: Not on file    Number of children: Not on file    Years of education: Not on file    Highest education level: Not on file   Occupational History    Not on  "file   Tobacco Use    Smoking status: Never    Smokeless tobacco: Never   Vaping Use    Vaping Use: Never used   Substance and Sexual Activity    Alcohol use: Yes     Comment: occ. 12/28/21: 1 drink a  week.     Drug use: No    Sexual activity: Not on file     Comment:  , 1 child, retired    Other Topics Concern    Not on file   Social History Narrative    Alyson Erazo is spouse             1 child, retired      Social Determinants of Health     Financial Resource Strain: Low Risk     Difficulty of Paying Living Expenses: Not hard at all   Food Insecurity: No Food Insecurity    Worried About Running Out of Food in the Last Year: Never true    Ran Out of Food in the Last Year: Never true   Transportation Needs: No Transportation Needs    Lack of Transportation (Medical): No    Lack of Transportation (Non-Medical): No   Physical Activity: Not on file   Stress: Not on file   Social Connections: Not on file   Intimate Partner Violence: Not on file   Housing Stability: Not on file     Allergies   Allergen Reactions    Pcn [Penicillins]      \"my joints start to bubble up\"    Sulfa Drugs      \"my joints start to bubble up\"  \"Its been 40 yrs since I've had either of those\"     Outpatient Encounter Medications as of 10/18/2022   Medication Sig Dispense Refill    clopidogrel (PLAVIX) 75 MG Tab TAKE 1 TABLET BY MOUTH EVERY DAY 90 Tablet 1    ibuprofen (MOTRIN) 600 MG Tab Take 600 mg by mouth every 6 hours as needed.      glimepiride (AMARYL) 4 MG Tab TAKE 1 TABLET BY MOUTH EVERY MORNING 100 Tablet 3    cholestyramine (QUESTRAN) 4 g packet TAKE 2 TO 4 GRAMS BY MOUTH DAILY 90 Each 3    furosemide (LASIX) 20 MG Tab TAKE 1 TABLET BY MOUTH 1 TIME A DAY AS NEEDED. 100 Tablet 3    apixaban (ELIQUIS) 5mg Tab Take 1 Tablet by mouth 2 times a day. 180 Tablet 3    tamsulosin (FLOMAX) 0.4 MG capsule TAKE 1 CAPSULE BY MOUTH 0.5 HOUR AFTER BREAKFAST 90 Capsule 3    liothyronine (CYTOMEL) 5 MCG Tab TAKE 2 TABLETS " "BY MOUTH EVERY  Tablet 3    finasteride (PROSCAR) 5 MG Tab Take 1 Tablet by mouth every day. 90 Tablet 3    levothyroxine (SYNTHROID) 75 MCG Tab Take 1 Tablet by mouth every day. 90 Tablet 3    Ascorbic Acid (VITAMIN C PO) Take 500 mg by mouth every day.      Cholecalciferol (VITAMIN D3 PO) Take 50 mcg by mouth every day.      Cyanocobalamin (VITAMIN B 12 PO) Take 500 mcg by mouth every day.      losartan (COZAAR) 25 MG Tab TAKE 1 TABLET BY MOUTH EVERY DAY 90 Tablet 3    atorvastatin (LIPITOR) 20 MG Tab Take 1 Tablet by mouth every day. 100 Tablet 3    omeprazole (PRILOSEC) 20 MG delayed-release capsule Take 1 Capsule by mouth every day. 90 Capsule 3    Blood Glucose Monitoring Suppl Device Meter: Dispense glucose meter preferred on patient's insurance.  Sig. Use as directed for blood sugar monitoring. 1 Device 0    [DISCONTINUED] doxycycline (VIBRAMYCIN) 100 MG Tab Take 1 Tablet by mouth 2 times a day. (Patient not taking: Reported on 10/18/2022) 20 Tablet 0     No facility-administered encounter medications on file as of 10/18/2022.     Review of Systems   Constitutional:  Negative for malaise/fatigue and weight loss.   Respiratory:  Negative for shortness of breath.    Cardiovascular:  Positive for palpitations (Rare, mild). Negative for chest pain, orthopnea, claudication, leg swelling and PND.   Neurological:  Negative for dizziness and weakness.   All other systems reviewed and are negative.           Objective     /82 (BP Location: Left arm, Patient Position: Sitting, BP Cuff Size: Adult)   Pulse 82   Resp 14   Ht 1.753 m (5' 9\")   Wt 82.1 kg (181 lb)   SpO2 97%   BMI 26.73 kg/m²     Physical Exam  Constitutional:       General: He is not in acute distress.     Appearance: He is well-developed.   HENT:      Head: Normocephalic.   Eyes:      Extraocular Movements: Extraocular movements intact.   Neck:      Vascular: No carotid bruit or JVD.   Cardiovascular:      Rate and Rhythm: Normal rate " and regular rhythm.      Heart sounds: Normal heart sounds. No murmur heard.  Pulmonary:      Effort: Pulmonary effort is normal.      Breath sounds: Normal breath sounds.   Chest:      Comments: PPM site is uncomplicated with out erosion, swelling or tenderness.   Abdominal:      General: There is no distension.   Musculoskeletal:      Right lower leg: No edema.      Left lower leg: No edema.   Skin:     General: Skin is warm and dry.   Neurological:      Mental Status: He is alert and oriented to person, place, and time.   Psychiatric:         Mood and Affect: Mood normal.         Behavior: Behavior normal.       EP OP Report 12/29/2021:  IMPLANTED DEVICE INFORMATION:  Pulse generator is a [employer] model L311  Serial # [id number]     LEAD INFORMATION:  1)Right atrial lead is a [employer] model #7841 , serial #[id number] ,P wave 4.2 millivolts, threshold 1.2 Volts at 0.5 milliseconds, pacing impedance 575 Ohms.     2)Right ventricular lead is a [employer]  model #7842  , serial #[id number] ,R wave 5.3 millivolts, threshold 0.5 Volts at 0.5 milliseconds, pacing impedance 820 Ohms.     DEVICE PROGRAMMING:  DDD 60 -120 ppm     FLUOROSCOPY TIME: 1.9 min     IMPRESSIONS:  1. Successful dual chamber pacemaker implantation         Assessment & Plan     1. Hyperlipidemia, unspecified hyperlipidemia type        2. Cardiac pacemaker in situ        3. Anticoagulated        4. Essential hypertension        5. AV block, 2nd degree        6. Paroxysmal atrial fibrillation (HCC)              Medical Decision Making: Today's Assessment/Status/Plan:     1. S/P Permanent Pacemaker:  - S/P successful BSI dual-chamber pacemaker implantation with Dr. Buckner on 12/29/2021 due to symptomatic second degree heart block.   - Device interrogation today showed normal sensing and function.  - Left upper pacemaker site remains uncomplicated without swelling, erosion or erythema.      2. HTN:  - Well controlled.   - Continue losartan  25 mg daily and lasix 20 mg daily.      3. Hypothyroid:  - TSH in November 2021 was WNL.   - On Synthroid 75 mcg daily.   - Followed by PCP.     4. HLD:  - Last LDL in 2021 was at goal (53).  - Continue Atorvastatin 20 mg daily.     5. H/O CVA:  - On statin as above.  - No ASA due to chronic OAC.     6. PAF:  - [place] remains <1%, however burden has increased from 3.3 hours total to 19.3 hours total.  - Discussed possibly adding some metoprolol to try and reduce his AF burden, patient declines at this time.   - Continue OAC with Eliquis 5 mg BID, tolerating well with no bleeding issues.     Patient will follow up with Dr. Buckner in 6 months with PPM interrogation or earlier if needed. Encouraged patient to contact our office should any questions or concerns arise in the mean time.     Future Appointments   Date Time Provider Department Center   6/3/2022  2:40 PM Martin Lobo M.D. [facility] None   10/12/2022 11:00 AM [facility] None

## 2022-10-10 ENCOUNTER — NON-PROVIDER VISIT (OUTPATIENT)
Dept: CARDIOLOGY | Facility: MEDICAL CENTER | Age: 87
End: 2022-10-10
Payer: MEDICARE

## 2022-10-10 PROCEDURE — 93294 REM INTERROG EVL PM/LDLS PM: CPT | Performed by: INTERNAL MEDICINE

## 2022-10-11 NOTE — CARDIAC REMOTE MONITOR - SCAN
Device transmission reviewed. Device demonstrated appropriate function.       Electronically Signed by: Nancy Vasquez M.D.    10/12/2022  10:46 AM

## 2022-10-18 ENCOUNTER — NON-PROVIDER VISIT (OUTPATIENT)
Dept: CARDIOLOGY | Facility: MEDICAL CENTER | Age: 87
End: 2022-10-18

## 2022-10-18 ENCOUNTER — OFFICE VISIT (OUTPATIENT)
Dept: CARDIOLOGY | Facility: MEDICAL CENTER | Age: 87
End: 2022-10-18
Payer: MEDICARE

## 2022-10-18 VITALS
DIASTOLIC BLOOD PRESSURE: 82 MMHG | BODY MASS INDEX: 26.81 KG/M2 | OXYGEN SATURATION: 97 % | HEART RATE: 82 BPM | SYSTOLIC BLOOD PRESSURE: 130 MMHG | WEIGHT: 181 LBS | HEIGHT: 69 IN | RESPIRATION RATE: 14 BRPM

## 2022-10-18 DIAGNOSIS — Z95.0 CARDIAC PACEMAKER IN SITU: ICD-10-CM

## 2022-10-18 DIAGNOSIS — Z79.01 ANTICOAGULATED: ICD-10-CM

## 2022-10-18 DIAGNOSIS — E78.5 HYPERLIPIDEMIA, UNSPECIFIED HYPERLIPIDEMIA TYPE: ICD-10-CM

## 2022-10-18 DIAGNOSIS — I44.1 AV BLOCK, 2ND DEGREE: ICD-10-CM

## 2022-10-18 DIAGNOSIS — I10 ESSENTIAL HYPERTENSION: ICD-10-CM

## 2022-10-18 DIAGNOSIS — I48.0 PAROXYSMAL ATRIAL FIBRILLATION (HCC): ICD-10-CM

## 2022-10-18 DIAGNOSIS — Z95.0 PACEMAKER: ICD-10-CM

## 2022-10-18 DIAGNOSIS — I49.5 SSS (SICK SINUS SYNDROME) (HCC): ICD-10-CM

## 2022-10-18 PROCEDURE — 93280 PM DEVICE PROGR EVAL DUAL: CPT | Performed by: INTERNAL MEDICINE

## 2022-10-18 PROCEDURE — 99214 OFFICE O/P EST MOD 30 MIN: CPT | Performed by: NURSE PRACTITIONER

## 2022-10-18 ASSESSMENT — ENCOUNTER SYMPTOMS
WEAKNESS: 0
ORTHOPNEA: 0
CLAUDICATION: 0
PND: 0
SHORTNESS OF BREATH: 0
WEIGHT LOSS: 0
PALPITATIONS: 1
DIZZINESS: 0

## 2022-10-18 ASSESSMENT — FIBROSIS 4 INDEX: FIB4 SCORE: 1.49

## 2022-10-19 ENCOUNTER — HOSPITAL ENCOUNTER (OUTPATIENT)
Facility: MEDICAL CENTER | Age: 87
End: 2022-10-19
Attending: FAMILY MEDICINE
Payer: MEDICARE

## 2022-10-19 ENCOUNTER — OFFICE VISIT (OUTPATIENT)
Dept: INTERNAL MEDICINE | Facility: IMAGING CENTER | Age: 87
End: 2022-10-19
Payer: MEDICARE

## 2022-10-19 VITALS
TEMPERATURE: 96.8 F | OXYGEN SATURATION: 98 % | DIASTOLIC BLOOD PRESSURE: 70 MMHG | HEIGHT: 70 IN | BODY MASS INDEX: 26.05 KG/M2 | HEART RATE: 99 BPM | WEIGHT: 182 LBS | SYSTOLIC BLOOD PRESSURE: 132 MMHG | RESPIRATION RATE: 14 BRPM

## 2022-10-19 DIAGNOSIS — Z23 NEED FOR VACCINATION: ICD-10-CM

## 2022-10-19 DIAGNOSIS — H91.93 HEARING DIFFICULTY OF BOTH EARS: ICD-10-CM

## 2022-10-19 DIAGNOSIS — I48.0 PAROXYSMAL ATRIAL FIBRILLATION (HCC): ICD-10-CM

## 2022-10-19 DIAGNOSIS — R19.7 DIARRHEA, UNSPECIFIED TYPE: ICD-10-CM

## 2022-10-19 DIAGNOSIS — E11.9 TYPE 2 DIABETES MELLITUS WITHOUT COMPLICATION, WITHOUT LONG-TERM CURRENT USE OF INSULIN (HCC): ICD-10-CM

## 2022-10-19 DIAGNOSIS — I10 HYPERTENSION, UNSPECIFIED TYPE: ICD-10-CM

## 2022-10-19 LAB
ALBUMIN SERPL BCP-MCNC: 5 G/DL (ref 3.2–4.9)
ALBUMIN/GLOB SERPL: 1.9 G/DL
ALP SERPL-CCNC: 97 U/L (ref 30–99)
ALT SERPL-CCNC: 30 U/L (ref 2–50)
ANION GAP SERPL CALC-SCNC: 11 MMOL/L (ref 7–16)
AST SERPL-CCNC: 21 U/L (ref 12–45)
BILIRUB SERPL-MCNC: 0.5 MG/DL (ref 0.1–1.5)
BUN SERPL-MCNC: 24 MG/DL (ref 8–22)
CALCIUM SERPL-MCNC: 9.6 MG/DL (ref 8.5–10.5)
CHLORIDE SERPL-SCNC: 103 MMOL/L (ref 96–112)
CHOLEST SERPL-MCNC: 143 MG/DL (ref 100–199)
CO2 SERPL-SCNC: 25 MMOL/L (ref 20–33)
CREAT SERPL-MCNC: 1.03 MG/DL (ref 0.5–1.4)
CREAT UR-MCNC: 30.23 MG/DL
EST. AVERAGE GLUCOSE BLD GHB EST-MCNC: 235 MG/DL
GFR SERPLBLD CREATININE-BSD FMLA CKD-EPI: 71 ML/MIN/1.73 M 2
GLOBULIN SER CALC-MCNC: 2.7 G/DL (ref 1.9–3.5)
GLUCOSE SERPL-MCNC: 275 MG/DL (ref 65–99)
HBA1C MFR BLD: 9.8 % (ref 4–5.6)
HDLC SERPL-MCNC: 40 MG/DL
LDLC SERPL CALC-MCNC: 61 MG/DL
MICROALBUMIN UR-MCNC: <1.2 MG/DL
MICROALBUMIN/CREAT UR: NORMAL MG/G (ref 0–30)
POTASSIUM SERPL-SCNC: 4.3 MMOL/L (ref 3.6–5.5)
PROT SERPL-MCNC: 7.7 G/DL (ref 6–8.2)
SODIUM SERPL-SCNC: 139 MMOL/L (ref 135–145)
TRIGL SERPL-MCNC: 212 MG/DL (ref 0–149)
TSH SERPL DL<=0.005 MIU/L-ACNC: 4.51 UIU/ML (ref 0.38–5.33)

## 2022-10-19 PROCEDURE — 90662 IIV NO PRSV INCREASED AG IM: CPT | Performed by: FAMILY MEDICINE

## 2022-10-19 PROCEDURE — 82570 ASSAY OF URINE CREATININE: CPT

## 2022-10-19 PROCEDURE — G0008 ADMIN INFLUENZA VIRUS VAC: HCPCS | Performed by: FAMILY MEDICINE

## 2022-10-19 PROCEDURE — 84443 ASSAY THYROID STIM HORMONE: CPT

## 2022-10-19 PROCEDURE — 80061 LIPID PANEL: CPT

## 2022-10-19 PROCEDURE — 82043 UR ALBUMIN QUANTITATIVE: CPT

## 2022-10-19 PROCEDURE — 83036 HEMOGLOBIN GLYCOSYLATED A1C: CPT

## 2022-10-19 PROCEDURE — 80053 COMPREHEN METABOLIC PANEL: CPT

## 2022-10-19 PROCEDURE — 99215 OFFICE O/P EST HI 40 MIN: CPT | Mod: 25 | Performed by: FAMILY MEDICINE

## 2022-10-19 RX ORDER — DIPHENOXYLATE HYDROCHLORIDE AND ATROPINE SULFATE 2.5; .025 MG/1; MG/1
1 TABLET ORAL 4 TIMES DAILY PRN
Qty: 30 TABLET | Refills: 0 | Status: SHIPPED | OUTPATIENT
Start: 2022-10-19 | End: 2022-11-18

## 2022-10-19 ASSESSMENT — FIBROSIS 4 INDEX: FIB4 SCORE: 1.49

## 2022-10-19 NOTE — PROGRESS NOTES
"Chief Complaint   Patient presents with    Hypertension    Diarrhea    Urinary Frequency       Subjective:     HPI:   Alfred Erazo Jr. is a 87 y.o. male with history of A. fib, hypertension, type 2 diabetes here  to discuss the evaluation and management of:     1.  Concerns about his blood pressure-a friend of his stopped going out because his blood pressure was running high  He got concerned and started checking his blood pressures  His blood pressures at home range from 1 18-1 50  He was at the cardiologist yesterday and blood pressure was 130/82    Today his blood pressure is 132/70 here        2.  Recently had cochlear implants in [place] for hearing loss  He would like a referral to a local ENT      3.  He has chronic history of diarrhea and urgency-he does not like cholestyramine because of the powder form he would like to try something different    4.  BPH-he is on 2 medications started by his urologist: Flomax and finasteride  Despite this he has a lot of urinary incontinence  He is not interested in any procedures he would like to try Myrbetriq    5.  He is diabetic-does not check his blood sugars at home      No problems updated.          Objective:     /70   Pulse 99   Temp 36 °C (96.8 °F)   Resp 14   Ht 1.778 m (5' 10\")   Wt 82.6 kg (182 lb)   SpO2 98%  Body mass index is 26.11 kg/m².    Physical Exam:  Physical Exam  Constitutional: Well-developed and well-nourished. Not diaphoretic. No distress.   Skin: Skin is warm and dry. No rash noted.  Head: Atraumatic without lesions.  Eyes: Conjunctivae and extraocular motions are normal.   Ears:  External ears unremarkable.    Nose: Nares patent. Mucosa without edema or erythema. No discharge. No facial tenderness.     Neck: Supple,       Chest: Effort normal.   Abdomen:  without distention.  .  Extremities: No cyanosis, clubbing, erythema, nor edema.   Neurological: Alert and oriented x 3.    Psychiatric:  Behavior, mood, and affect are " appropriate     Assessment and Plan:     The following treatment plan was discussed/researched:  No problem-specific Assessment & Plan notes found for this encounter.            1. Hypertension, unspecified type-well-controlled reassurance given no med changes are indicated    2. Type 2 diabetes mellitus without complication, without long-term current use of insulin (HCC)  - TSH; Future  - Lipid Profile; Future  - MICROALBUMIN CREAT RATIO URINE; Future  - HEMOGLOBIN A1C; Future  - Comp Metabolic Panel; Future    3. Need for vaccination  - INFLUENZA VACCINE, HIGH DOSE (65+ ONLY)    4. 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 Diarrhea, unspecified type-prefers not to use cholestyramine because of the inconvenience of the powder    - diphenoxylate-atropine (LOMOTIL) 2.5-0.025 MG Tab; Take 1 Tablet by mouth 4 times a day as needed for Diarrhea for up to 30 days.  Dispense: 30 Tablet; Refill: 0    6. Paroxysmal atrial fibrillation (HCC)-followed by cardiology, last visit was this week  Continue with current meds    Other orders  - Mirabegron ER 50 MG TABLET SR 24 HR; Take 50 mg by mouth every evening.  Dispense: 30 Tablet; Refill: 6                                                                                                                                                                                  Any change or worsening of signs or symptoms, patient encouraged to follow-up or report to emergency room for further evaluation. Patient verbalizes understanding and agrees.    Follow-Up: No follow-ups on file.      PLEASE NOTE: This dictation was created using voice recognition software. I have made every reasonable attempt to correct obvious errors, but I expect that there are errors of grammar and possibly content that I did not discover before finalizing the note.      My total time spent caring for the patient on the day of  the encounter was  greater than 40 minutes.   This includes obtaining history, reviewing chart, physical exam, patient education, reviewing outside records, placing orders, interpreting tests and coordinating care.

## 2022-10-25 ENCOUNTER — OFFICE VISIT (OUTPATIENT)
Dept: INTERNAL MEDICINE | Facility: IMAGING CENTER | Age: 87
End: 2022-10-25
Payer: MEDICARE

## 2022-10-25 VITALS
RESPIRATION RATE: 14 BRPM | TEMPERATURE: 96.6 F | DIASTOLIC BLOOD PRESSURE: 80 MMHG | HEART RATE: 87 BPM | OXYGEN SATURATION: 93 % | SYSTOLIC BLOOD PRESSURE: 136 MMHG

## 2022-10-25 DIAGNOSIS — E78.5 DYSLIPIDEMIA ASSOCIATED WITH TYPE 2 DIABETES MELLITUS (HCC): ICD-10-CM

## 2022-10-25 DIAGNOSIS — I10 HYPERTENSION, UNSPECIFIED TYPE: ICD-10-CM

## 2022-10-25 DIAGNOSIS — E11.65 UNCONTROLLED TYPE 2 DIABETES MELLITUS WITH HYPERGLYCEMIA (HCC): ICD-10-CM

## 2022-10-25 DIAGNOSIS — E11.69 DYSLIPIDEMIA ASSOCIATED WITH TYPE 2 DIABETES MELLITUS (HCC): ICD-10-CM

## 2022-10-25 DIAGNOSIS — E03.9 HYPOTHYROIDISM, UNSPECIFIED TYPE: ICD-10-CM

## 2022-10-25 PROBLEM — R62.7 FAILURE TO THRIVE IN ADULT: Status: RESOLVED | Noted: 2021-08-28 | Resolved: 2022-10-25

## 2022-10-25 PROBLEM — Z71.89 ACP (ADVANCE CARE PLANNING): Status: RESOLVED | Noted: 2021-08-28 | Resolved: 2022-10-25

## 2022-10-25 PROCEDURE — 99214 OFFICE O/P EST MOD 30 MIN: CPT | Performed by: FAMILY MEDICINE

## 2022-10-25 NOTE — PROGRESS NOTES
Subjective:   Alfred Erazo Jr. is a 87 y.o. male with DM, hyperlipidemia,hypothyroidism    And hypertension.    Denies complaints, reporting that the Myrbetriq is helping with his nocturia    He also has questions regarding how to use the Lomotil      For DM he has been on Amaryl for a long time, could not tolerate metformin due to diarrhea he reports there have been no changes in his diet          Current Outpatient Medications   Medication Sig Dispense Refill    Mirabegron ER 50 MG TABLET SR 24 HR Take 50 mg by mouth every evening. 30 Tablet 6    diphenoxylate-atropine (LOMOTIL) 2.5-0.025 MG Tab Take 1 Tablet by mouth 4 times a day as needed for Diarrhea for up to 30 days. 30 Tablet 0    clopidogrel (PLAVIX) 75 MG Tab TAKE 1 TABLET BY MOUTH EVERY DAY 90 Tablet 1    ibuprofen (MOTRIN) 600 MG Tab Take 600 mg by mouth every 6 hours as needed.      glimepiride (AMARYL) 4 MG Tab TAKE 1 TABLET BY MOUTH EVERY MORNING 100 Tablet 3    furosemide (LASIX) 20 MG Tab TAKE 1 TABLET BY MOUTH 1 TIME A DAY AS NEEDED. 100 Tablet 3    apixaban (ELIQUIS) 5mg Tab Take 1 Tablet by mouth 2 times a day. 180 Tablet 3    tamsulosin (FLOMAX) 0.4 MG capsule TAKE 1 CAPSULE BY MOUTH 0.5 HOUR AFTER BREAKFAST 90 Capsule 3    liothyronine (CYTOMEL) 5 MCG Tab TAKE 2 TABLETS BY MOUTH EVERY  Tablet 3    finasteride (PROSCAR) 5 MG Tab Take 1 Tablet by mouth every day. 90 Tablet 3    levothyroxine (SYNTHROID) 75 MCG Tab Take 1 Tablet by mouth every day. 90 Tablet 3    Ascorbic Acid (VITAMIN C PO) Take 500 mg by mouth every day.      Cholecalciferol (VITAMIN D3 PO) Take 50 mcg by mouth every day.      Cyanocobalamin (VITAMIN B 12 PO) Take 500 mcg by mouth every day.      losartan (COZAAR) 25 MG Tab TAKE 1 TABLET BY MOUTH EVERY DAY 90 Tablet 3    atorvastatin (LIPITOR) 20 MG Tab Take 1 Tablet by mouth every day. 100 Tablet 3    omeprazole (PRILOSEC) 20 MG delayed-release capsule Take 1 Capsule by mouth every day. 90 Capsule 3    Blood  Glucose Monitoring Suppl Device Meter: Dispense glucose meter preferred on patient's insurance.  Sig. Use as directed for blood sugar monitoring. 1 Device 0     No current facility-administered medications for this visit.              Cardiovascular risk analysis - 87 y.o. male LDL goal is under 100  diabetic  hypertension  hyperlipidemia.                  Objective:     BP Readings from Last 3 Encounters:   10/19/22 132/70   10/18/22 130/82   08/29/22 120/82     There were no vitals taken for this visit.   Appearance alert, well appearing, and in no distress, normal appearing weight, and acyanotic, in no respiratory distress.  General exam BP noted to be well controlled today in office.       No components found for: CHOL  Lab Results   Component Value Date/Time    HDL 40 10/19/2022 0930    HDL 39 (A) 11/24/2021 0033    HDL 35 (A) 08/29/2021 0246     No results found for: LDLCALC  No components found for: TRIG  No components found for: CHOLHDL  Recent Results (from the past 2352 hour(s))   TSH    Collection Time: 10/19/22  9:30 AM   Result Value Ref Range    TSH 4.510 0.380 - 5.330 uIU/mL   Lipid Profile    Collection Time: 10/19/22  9:30 AM   Result Value Ref Range    Cholesterol,Tot 143 100 - 199 mg/dL    Triglycerides 212 (H) 0 - 149 mg/dL    HDL 40 >=40 mg/dL    LDL 61 <100 mg/dL   MICROALBUMIN CREAT RATIO URINE    Collection Time: 10/19/22  9:30 AM   Result Value Ref Range    Creatinine, Urine 30.23 mg/dL    Microalbumin, Urine Random <1.2 mg/dL    Micro Alb Creat Ratio see below 0 - 30 mg/g   HEMOGLOBIN A1C    Collection Time: 10/19/22  9:30 AM   Result Value Ref Range    Glycohemoglobin 9.8 (H) 4.0 - 5.6 %    Est Avg Glucose 235 mg/dL   Comp Metabolic Panel    Collection Time: 10/19/22  9:30 AM   Result Value Ref Range    Sodium 139 135 - 145 mmol/L    Potassium 4.3 3.6 - 5.5 mmol/L    Chloride 103 96 - 112 mmol/L    Co2 25 20 - 33 mmol/L    Anion Gap 11.0 7.0 - 16.0    Glucose 275 (H) 65 - 99 mg/dL    Bun  24 (H) 8 - 22 mg/dL    Creatinine 1.03 0.50 - 1.40 mg/dL    Calcium 9.6 8.5 - 10.5 mg/dL    AST(SGOT) 21 12 - 45 U/L    ALT(SGPT) 30 2 - 50 U/L    Alkaline Phosphatase 97 30 - 99 U/L    Total Bilirubin 0.5 0.1 - 1.5 mg/dL    Albumin 5.0 (H) 3.2 - 4.9 g/dL    Total Protein 7.7 6.0 - 8.2 g/dL    Globulin 2.7 1.9 - 3.5 g/dL    A-G Ratio 1.9 g/dL   ESTIMATED GFR    Collection Time: 10/19/22  9:30 AM   Result Value Ref Range    GFR (CKD-EPI) 71 >60 mL/min/1.73 m 2         Hospital Outpatient Visit on 10/19/2022   Component Date Value Ref Range Status    TSH 10/19/2022 4.510  0.380 - 5.330 uIU/mL Final    Comment: The 2011 American Thyroid Association (MICHELLE) guidelines  recommended that the interpretation of thyroid function in  pregnancy be based on trimester specific reference ranges.    1st Trimester  0.100-2.500 mIU/L  2nd Trimester  0.200-3.000 mIU/L  3rd Trimester  0.300-3.500 mIU/L    These established reference ranges have not been validated  at [facility].      Cholesterol,Tot 10/19/2022 143  100 - 199 mg/dL Final    Triglycerides 10/19/2022 212 (A)  0 - 149 mg/dL Final    HDL 10/19/2022 40  >=40 mg/dL Final    LDL 10/19/2022 61  <100 mg/dL Final    Creatinine, Urine 10/19/2022 30.23  mg/dL Final    Microalbumin, Urine Random 10/19/2022 <1.2  mg/dL Final    Micro Alb Creat Ratio 10/19/2022 see below  0 - 30 mg/g Final    Comment: Unable to calculate the microalbumin/creatinine ratio due to  the microalbumin result or the urine creatinine result being  outside the measurement range of the analyzer.      Glycohemoglobin 10/19/2022 9.8 (A)  4.0 - 5.6 % Final    Comment: Increased risk for diabetes:  5.7 -6.4%  Diabetes:  >6.4%  Glycemic control for adults with diabetes:  <7.0%    The above interpretations are per ADA guidelines.  Diagnosis  of diabetes mellitus on the basis of elevated Hemoglobin A1c  should be confirmed by repeating the Hb A1c test.      Est Avg Glucose 10/19/2022 235  mg/dL Final     Comment: The eAG calculation is based on the A1c-Derived Daily Glucose  (ADAG) study.  See the ADA's website for additional information.      Sodium 10/19/2022 139  135 - 145 mmol/L Final    Potassium 10/19/2022 4.3  3.6 - 5.5 mmol/L Final    Chloride 10/19/2022 103  96 - 112 mmol/L Final    Co2 10/19/2022 25  20 - 33 mmol/L Final    Anion Gap 10/19/2022 11.0  7.0 - 16.0 Final    Glucose 10/19/2022 275 (A)  65 - 99 mg/dL Final    Bun 10/19/2022 24 (A)  8 - 22 mg/dL Final    Creatinine 10/19/2022 1.03  0.50 - 1.40 mg/dL Final    Calcium 10/19/2022 9.6  8.5 - 10.5 mg/dL Final    AST(SGOT) 10/19/2022 21  12 - 45 U/L Final    ALT(SGPT) 10/19/2022 30  2 - 50 U/L Final    Alkaline Phosphatase 10/19/2022 97  30 - 99 U/L Final    Total Bilirubin 10/19/2022 0.5  0.1 - 1.5 mg/dL Final    Albumin 10/19/2022 5.0 (A)  3.2 - 4.9 g/dL Final    Total Protein 10/19/2022 7.7  6.0 - 8.2 g/dL Final    Globulin 10/19/2022 2.7  1.9 - 3.5 g/dL Final    A-G Ratio 10/19/2022 1.9  g/dL Final    GFR (CKD-EPI) 10/19/2022 71  >60 mL/min/1.73 m 2 Final    Comment: Estimated Glomerular Filtration Rate is calculated using  race neutral CKD-EPI 2021 equation per NKF-ASN recommendations.     Hospital Outpatient Visit on 07/18/2022   Component Date Value Ref Range Status    WBC 07/18/2022 7.1  4.8 - 10.8 K/uL Final    RBC 07/18/2022 4.41 (A)  4.70 - 6.10 M/uL Final    Hemoglobin 07/18/2022 13.6 (A)  14.0 - 18.0 g/dL Final    Hematocrit 07/18/2022 41.3 (A)  42.0 - 52.0 % Final    MCV 07/18/2022 93.7  81.4 - 97.8 fL Final    MCH 07/18/2022 30.8  27.0 - 33.0 pg Final    MCHC 07/18/2022 32.9 (A)  33.7 - 35.3 g/dL Final    RDW 07/18/2022 51.3 (A)  35.9 - 50.0 fL Final    Platelet Count 07/18/2022 209  164 - 446 K/uL Final    MPV 07/18/2022 10.9  9.0 - 12.9 fL Final    Neutrophils-Polys 07/18/2022 60.20  44.00 - 72.00 % Final    Lymphocytes 07/18/2022 24.30  22.00 - 41.00 % Final    Monocytes 07/18/2022 9.60  0.00 - 13.40 % Final    Eosinophils 07/18/2022  4.90  0.00 - 6.90 % Final    Basophils 07/18/2022 0.40  0.00 - 1.80 % Final    Immature Granulocytes 07/18/2022 0.60  0.00 - 0.90 % Final    Nucleated RBC 07/18/2022 0.00  /100 WBC Final    Neutrophils (Absolute) 07/18/2022 4.28  1.82 - 7.42 K/uL Final    Includes immature neutrophils, if present.    Lymphs (Absolute) 07/18/2022 1.73  1.00 - 4.80 K/uL Final    Monos (Absolute) 07/18/2022 0.68  0.00 - 0.85 K/uL Final    Eos (Absolute) 07/18/2022 0.35  0.00 - 0.51 K/uL Final    Baso (Absolute) 07/18/2022 0.03  0.00 - 0.12 K/uL Final    Immature Granulocytes (abs) 07/18/2022 0.04  0.00 - 0.11 K/uL Final    NRBC (Absolute) 07/18/2022 0.00  K/uL Final    Sodium 07/18/2022 139  135 - 145 mmol/L Final    Potassium 07/18/2022 4.0  3.6 - 5.5 mmol/L Final    Chloride 07/18/2022 103  96 - 112 mmol/L Final    Co2 07/18/2022 23  20 - 33 mmol/L Final    Glucose 07/18/2022 186 (A)  65 - 99 mg/dL Final    Bun 07/18/2022 16  8 - 22 mg/dL Final    Creatinine 07/18/2022 1.02  0.50 - 1.40 mg/dL Final    Calcium 07/18/2022 9.2  8.5 - 10.5 mg/dL Final    Anion Gap 07/18/2022 13.0  7.0 - 16.0 Final    GFR (CKD-EPI) 07/18/2022 72  >60 mL/min/1.73 m 2 Final    Comment: Effective 3/15/2022, estimated Glomerular Filtration Rate  is calculated using race neutral CKD-EPI 2021 equation  per NKF-ASN recommendations.     Hospital Outpatient Visit on 06/06/2022   Component Date Value Ref Range Status    Sodium 06/06/2022 141  135 - 145 mmol/L Final    Potassium 06/06/2022 4.6  3.6 - 5.5 mmol/L Final    Chloride 06/06/2022 105  96 - 112 mmol/L Final    Co2 06/06/2022 22  20 - 33 mmol/L Final    Glucose 06/06/2022 217 (A)  65 - 99 mg/dL Final    Bun 06/06/2022 17  8 - 22 mg/dL Final    Creatinine 06/06/2022 1.04  0.50 - 1.40 mg/dL Final    Calcium 06/06/2022 9.2  8.5 - 10.5 mg/dL Final    Anion Gap 06/06/2022 14.0  7.0 - 16.0 Final    WBC 06/06/2022 7.1  4.8 - 10.8 K/uL Final    RBC 06/06/2022 4.60 (A)  4.70 - 6.10 M/uL Final    Hemoglobin 06/06/2022  14.1  14.0 - 18.0 g/dL Final    Hematocrit 06/06/2022 43.0  42.0 - 52.0 % Final    MCV 06/06/2022 93.5  81.4 - 97.8 fL Final    MCH 06/06/2022 30.7  27.0 - 33.0 pg Final    MCHC 06/06/2022 32.8 (A)  33.7 - 35.3 g/dL Final    RDW 06/06/2022 52.5 (A)  35.9 - 50.0 fL Final    Platelet Count 06/06/2022 191  164 - 446 K/uL Final    MPV 06/06/2022 11.0  9.0 - 12.9 fL Final    Neutrophils-Polys 06/06/2022 63.60  44.00 - 72.00 % Final    Lymphocytes 06/06/2022 22.80  22.00 - 41.00 % Final    Monocytes 06/06/2022 9.20  0.00 - 13.40 % Final    Eosinophils 06/06/2022 3.50  0.00 - 6.90 % Final    Basophils 06/06/2022 0.30  0.00 - 1.80 % Final    Immature Granulocytes 06/06/2022 0.60  0.00 - 0.90 % Final    Nucleated RBC 06/06/2022 0.00  /100 WBC Final    Neutrophils (Absolute) 06/06/2022 4.49  1.82 - 7.42 K/uL Final    Includes immature neutrophils, if present.    Lymphs (Absolute) 06/06/2022 1.61  1.00 - 4.80 K/uL Final    Monos (Absolute) 06/06/2022 0.65  0.00 - 0.85 K/uL Final    Eos (Absolute) 06/06/2022 0.25  0.00 - 0.51 K/uL Final    Baso (Absolute) 06/06/2022 0.02  0.00 - 0.12 K/uL Final    Immature Granulocytes (abs) 06/06/2022 0.04  0.00 - 0.11 K/uL Final    NRBC (Absolute) 06/06/2022 0.00  K/uL Final    GFR (CKD-EPI) 06/06/2022 70  >60 mL/min/1.73 m 2 Final    Comment: Effective 3/15/2022, estimated Glomerular Filtration Rate  is calculated using race neutral CKD-EPI 2021 equation  per NKF-ASN recommendations.     Hospital Outpatient Visit on 06/06/2022   Component Date Value Ref Range Status    Sodium 06/06/2022 140  135 - 145 mmol/L Final    Potassium 06/06/2022 4.6  3.6 - 5.5 mmol/L Final    Chloride 06/06/2022 106  96 - 112 mmol/L Final    Co2 06/06/2022 23  20 - 33 mmol/L Final    Anion Gap 06/06/2022 11.0  7.0 - 16.0 Final    Glucose 06/06/2022 216 (A)  65 - 99 mg/dL Final    Bun 06/06/2022 17  8 - 22 mg/dL Final    Creatinine 06/06/2022 1.13  0.50 - 1.40 mg/dL Final    Calcium 06/06/2022 9.3  8.5 - 10.5 mg/dL  Final    AST(SGOT) 2022 18  12 - 45 U/L Final    ALT(SGPT) 2022 24  2 - 50 U/L Final    Alkaline Phosphatase 2022 85  30 - 99 U/L Final    Total Bilirubin 2022 0.5  0.1 - 1.5 mg/dL Final    Albumin 2022 4.2  3.2 - 4.9 g/dL Final    Total Protein 2022 6.9  6.0 - 8.2 g/dL Final    Globulin 2022 2.7  1.9 - 3.5 g/dL Final    A-G Ratio 2022 1.6  g/dL Final    WBC 2022 7.1  4.8 - 10.8 K/uL Final    RBC 2022 4.54 (A)  4.70 - 6.10 M/uL Final    Hemoglobin 2022 13.7 (A)  14.0 - 18.0 g/dL Final    Hematocrit 2022 42.1  42.0 - 52.0 % Final    MCV 2022 92.7  81.4 - 97.8 fL Final    MCH 2022 30.2  27.0 - 33.0 pg Final    MCHC 2022 32.5 (A)  33.7 - 35.3 g/dL Final    RDW 2022 51.3 (A)  35.9 - 50.0 fL Final    Platelet Count 2022 185  164 - 446 K/uL Final    MPV 2022 11.1  9.0 - 12.9 fL Final    GFR (CKD-EPI) 2022 64  >60 mL/min/1.73 m 2 Final    Comment: Effective 3/15/2022, estimated Glomerular Filtration Rate  is calculated using race neutral CKD-EPI  equation  per NKF-ASN recommendations.     Office Visit on 2022   Component Date Value Ref Range Status    Report 2022    Final                    Value:[facility]    Test Date:  2022  Pt Name:    FRANKY TEMPLE                  Department: [facility]  MRN:        [id number]                      Room:  Gender:     Male                         Technician: PAPO  :        [id number]                   Requested By:CARLOTTA BELLAMY  Order #:    [id number]                    Reading MD: Eduin Buckner MD    Measurements  Intervals                                Axis  Rate:       70                           P:          176  DC:         250                          QRS:        -50  QRSD:       135                          T:          33  QT:         413  QTc:        446    Interpretive Statements  Atrial-paced complexes  Prolonged DC  interval  Right bundle branch block  LVH with IVCD and secondary repol abnrm  Compared to ECG 03/23/2022 13:38:59  NO CHANGES COMPARED TO PRIOR ECG  Electronically Signed On 5- 17:09:47 PDT by Eduin Buckner MD     ]     Assessment:        1. Hyperlipidemia stable.  Labs up-to-date  Avoiding saturated fats. Taking statin, with no obvious side effects; no muscle aches      current treatment plan is effective, no change in therapy.    2. HTN-labs up-to-date    Hypertension reasonably well controlled.       current treatment plan is effective, no change in therapy.    3. DM-poorly controlled  Continue with Amaryl  Rx written for Ozempic with RN  He will return in 1 to 2 days to inject learn how to use Ozempic  Recheck A1c in 3 months    Procedures      . 4.  Hypothyroidism-euthyroid, continue current regimen     Note:  This report may contain errors in syntax, word selection, grammar and/o

## 2022-10-27 ENCOUNTER — NON-PROVIDER VISIT (OUTPATIENT)
Dept: INTERNAL MEDICINE | Facility: IMAGING CENTER | Age: 87
End: 2022-10-27
Payer: MEDICARE

## 2022-11-09 RX ORDER — OMEPRAZOLE 20 MG/1
20 CAPSULE, DELAYED RELEASE ORAL DAILY
Qty: 90 CAPSULE | Refills: 2 | Status: SHIPPED | OUTPATIENT
Start: 2022-11-09 | End: 2023-02-07 | Stop reason: SINTOL

## 2022-11-19 NOTE — PROCEDURE: POST-OP WOUND CHECK
Add 79890 Cpt? (Important Note: In 2017 The Use Of 97140 Is Being Tracked By Cms To Determine Future Global Period Reimbursement For Global Periods): no
Wound Evaluated By: Raciel
Additional Comments: NER
Detail Level: Detailed
[[id number]]

## 2022-11-28 ENCOUNTER — OFFICE VISIT (OUTPATIENT)
Dept: INTERNAL MEDICINE | Facility: IMAGING CENTER | Age: 87
End: 2022-11-28
Payer: MEDICARE

## 2022-11-28 DIAGNOSIS — R19.7 DIARRHEA, UNSPECIFIED TYPE: ICD-10-CM

## 2022-11-28 DIAGNOSIS — Z79.899 ENCOUNTER FOR MEDICATION REVIEW: ICD-10-CM

## 2022-11-28 DIAGNOSIS — I48.0 PAROXYSMAL ATRIAL FIBRILLATION (HCC): ICD-10-CM

## 2022-11-28 PROCEDURE — 99214 OFFICE O/P EST MOD 30 MIN: CPT | Performed by: FAMILY MEDICINE

## 2022-11-28 RX ORDER — FAMOTIDINE 40 MG/1
40 TABLET, FILM COATED ORAL NIGHTLY
Qty: 90 TABLET | Refills: 1 | Status: SHIPPED | OUTPATIENT
Start: 2022-11-28 | End: 2023-05-25

## 2022-11-29 NOTE — PROGRESS NOTES
No chief complaint on file.      Subjective:     HPI:   Alfred Erazo Jr. is a 87 y.o. male with history of A. fib and esophageal stricture here to discuss the evaluation and management of:    1.  Diarrhea-he was reading the side effects on Prilosec and got concerned this was connected to his intermittent diarrhea      2.  Medication review showed that early this year in March his cardiologist stopped the Plavix and aspirin and placed him on Eliquis  It appears that Plavix was requested and refilled in September         No problems updated.     Objective:     There were no vitals taken for this visit. There is no height or weight on file to calculate BMI.    Physical Exam:  Physical Exam  Constitutional: Well-developed and well-nourished. Not diaphoretic. No distress.   Skin: Skin is warm and dry. No rash noted.  Head: Atraumatic without lesions.  Eyes: Conjunctivae and extraocular motions are normal.   Ears:  External ears unremarkable.    Nose: Nares patent.       Neck: Supple, No thyromegaly present. No JVD  Cardiovascular: Regular rate and rhythm.   Chest: Effort normal.       Neurological: Alert and oriented x 3.    Psychiatric:  Behavior, mood, and affect are appropriate     Assessment and Plan:     The following treatment plan was discussed:     No problem-specific Assessment & Plan notes found for this encounter.  1. Diarrhea, unspecified type May be a side effect from-omeprazole,  We will stop omeprazole and start a trial of Pepcid    2. Paroxysmal atrial fibrillation (HCC)-stable on Eliquis    3. Encounter for medication review-reviewed with patient that Plavix is no longer necessary.  Patient will stop Plavix now.  Instructions written for him      Other orders  - famotidine (PEPCID) 40 MG Tab; Take 1 Tablet by mouth every evening.  Dispense: 90 Tablet; Refill: 1          Any change or worsening of signs or symptoms, patient encouraged to follow-up or report to emergency room for further evaluation.  Patient verbalizes understanding and agrees.    Follow-Up: No follow-ups on file.      PLEASE NOTE: This dictation was created using voice recognition software. I have made every reasonable attempt to correct obvious errors, but I expect that there are errors of grammar and possibly content that I did not discover before finalizing the note.    My total time spent caring for the patient on the day of the encounter was  greater than 30 minutes.   This includes obtaining history, reviewing chart, physical exam, patient education, reviewing outside records, placing orders, interpreting tests and coordinating care.

## 2022-12-08 DIAGNOSIS — E11.9 TYPE 2 DIABETES MELLITUS WITHOUT COMPLICATION, WITHOUT LONG-TERM CURRENT USE OF INSULIN (HCC): ICD-10-CM

## 2022-12-08 RX ORDER — LOSARTAN POTASSIUM 25 MG/1
TABLET ORAL
Qty: 90 TABLET | Refills: 3 | Status: SHIPPED | OUTPATIENT
Start: 2022-12-08 | End: 2023-02-16 | Stop reason: SDUPTHER

## 2022-12-29 PROBLEM — E11.649 TYPE 2 DIABETES MELLITUS WITH HYPOGLYCEMIA, WITHOUT LONG-TERM CURRENT USE OF INSULIN (HCC): Status: ACTIVE | Noted: 2017-10-18

## 2023-01-05 RX ORDER — ATORVASTATIN CALCIUM 20 MG/1
20 TABLET, FILM COATED ORAL
Qty: 100 TABLET | Refills: 3 | Status: SHIPPED | OUTPATIENT
Start: 2023-01-05 | End: 2024-02-13

## 2023-01-09 ENCOUNTER — NON-PROVIDER VISIT (OUTPATIENT)
Dept: CARDIOLOGY | Facility: MEDICAL CENTER | Age: 88
End: 2023-01-09

## 2023-01-09 NOTE — CARDIAC REMOTE MONITOR - SCAN
Device transmission reviewed. Device demonstrated appropriate function.       Electronically Signed by: Eduin Buckner M.D.    1/13/2023  10:30 AM

## 2023-01-24 ENCOUNTER — OFFICE VISIT (OUTPATIENT)
Dept: INTERNAL MEDICINE | Facility: IMAGING CENTER | Age: 88
End: 2023-01-24
Payer: MEDICARE

## 2023-01-24 VITALS
HEIGHT: 70 IN | WEIGHT: 178 LBS | SYSTOLIC BLOOD PRESSURE: 128 MMHG | RESPIRATION RATE: 14 BRPM | DIASTOLIC BLOOD PRESSURE: 62 MMHG | BODY MASS INDEX: 25.48 KG/M2 | OXYGEN SATURATION: 96 % | TEMPERATURE: 97.4 F | HEART RATE: 64 BPM

## 2023-01-24 DIAGNOSIS — I48.0 PAROXYSMAL ATRIAL FIBRILLATION (HCC): ICD-10-CM

## 2023-01-24 DIAGNOSIS — I73.9 PERIPHERAL ARTERIAL DISEASE (HCC): ICD-10-CM

## 2023-01-24 DIAGNOSIS — E11.9 TYPE 2 DIABETES MELLITUS WITHOUT COMPLICATION, WITHOUT LONG-TERM CURRENT USE OF INSULIN (HCC): ICD-10-CM

## 2023-01-24 DIAGNOSIS — I65.23 CAROTID ARTERY CALCIFICATION, BILATERAL: ICD-10-CM

## 2023-01-24 LAB
HBA1C MFR BLD: 10.3 % (ref 0–5.6)
INT CON NEG: ABNORMAL
INT CON POS: ABNORMAL

## 2023-01-24 PROCEDURE — 99214 OFFICE O/P EST MOD 30 MIN: CPT | Performed by: FAMILY MEDICINE

## 2023-01-24 PROCEDURE — 83036 HEMOGLOBIN GLYCOSYLATED A1C: CPT | Performed by: FAMILY MEDICINE

## 2023-01-24 ASSESSMENT — FIBROSIS 4 INDEX: FIB4 SCORE: 1.56

## 2023-01-24 ASSESSMENT — PATIENT HEALTH QUESTIONNAIRE - PHQ9: CLINICAL INTERPRETATION OF PHQ2 SCORE: 0

## 2023-01-24 NOTE — LETTER
January 24, 2023        Alfred Erazo .  [address] [facility] [address]    Current Outpatient Medications Ordered in Epic   Medication Sig Dispense Refill   • losartan (COZAAR) 25 MG Tab TAKE 1 TABLET BY MOUTH EVERY DAY NEED INS INFORMATION 90 Tablet 3   • atorvastatin (LIPITOR) 20 MG Tab TAKE 1 TABLET BY MOUTH EVERY  Tablet 3   • famotidine (PEPCID) 40 MG Tab Take 1 Tablet by mouth every evening. 90 Tablet 1   • omeprazole (PRILOSEC) 20 MG delayed-release capsule TAKE 1 CAPSULE BY MOUTH EVERY DAY 90 Capsule 2   • Semaglutide,0.25 or 0.5MG/DOS, 2 MG/1.5ML Solution Pen-injector Inject 0.25 mg under the skin every 7 days. 4 Each 3   • Mirabegron ER 50 MG TABLET SR 24 HR Take 50 mg by mouth every evening. 30 Tablet 6   • glimepiride (AMARYL) 4 MG Tab TAKE 1 TABLET BY MOUTH EVERY MORNING 100 Tablet 3   • furosemide (LASIX) 20 MG Tab TAKE 1 TABLET BY MOUTH 1 TIME A DAY AS NEEDED. 100 Tablet 3   • apixaban (ELIQUIS) 5mg Tab Take 1 Tablet by mouth 2 times a day. 180 Tablet 3   • tamsulosin (FLOMAX) 0.4 MG capsule TAKE 1 CAPSULE BY MOUTH 0.5 HOUR AFTER BREAKFAST 90 Capsule 3   • liothyronine (CYTOMEL) 5 MCG Tab TAKE 2 TABLETS BY MOUTH EVERY  Tablet 3   • finasteride (PROSCAR) 5 MG Tab Take 1 Tablet by mouth every day. 90 Tablet 3   • levothyroxine (SYNTHROID) 75 MCG Tab Take 1 Tablet by mouth every day. 90 Tablet 3   • Ascorbic Acid (VITAMIN C PO) Take 500 mg by mouth every day.     • Cholecalciferol (VITAMIN D3 PO) Take 50 mcg by mouth every day.     • Cyanocobalamin (VITAMIN B 12 PO) Take 500 mcg by mouth every day.     • Blood Glucose Monitoring Suppl Device Meter: Dispense glucose meter preferred on patient's insurance.  Sig. Use as directed for blood sugar monitoring. 1 Device 0     No current Epic-ordered facility-administered medications on file.       Electronically Signed

## 2023-02-08 NOTE — PROGRESS NOTES
"Chief Complaint   Patient presents with    Follow-Up       Subjective:     HPI:   Alfred Erazo Jr. is a 87 y.o. male here to discuss the evaluation and management of:     -Unfortunately he has not been taking his semaglutide as he has forgotten to take it on a weekly basis  He has had urinary frequency and nocturia  Appetite is normal    Denies any chest pain or shortness of breath  Denies dizziness    No problems updated.     Objective:     /62   Pulse 64   Temp 36.3 °C (97.4 °F)   Resp 14   Ht 1.778 m (5' 10\")   Wt 80.7 kg (178 lb)   SpO2 96%  Body mass index is 25.54 kg/m².    Physical Exam:  Physical Exam  Constitutional: Well-developed and well-nourished. Not diaphoretic. No distress.   Skin: Skin is warm and dry. No rash noted.  Head: Atraumatic without lesions.  Eyes: Conjunctivae and extraocular motions are normal.   Ears:  External ears unremarkable.    Nose: Nares patent. Mucosa without edema or erythema. No discharge. No facial tenderness.     Neck: Supple, No thyromegaly present. No JVD  Cardiovascular: Regular rate and rhythm.   Chest: Effort normal. Clear to auscultation throughout. No adventitious sounds.   Abdomen:  without distention.  .  Extremities: No cyanosis, clubbing, erythema, nor edema.   Neurological: Alert and oriented x 3.    Psychiatric:  Behavior, mood, and affect are appropriate     Assessment and Plan:     The following treatment plan was discussed:     No problem-specific Assessment & Plan notes found for this encounter.         HCC Gap Form    Diagnosis to address: I73.9 - Peripheral arterial disease (HCC)  Assessment and plan: Chronic, stable. Continue with current defined treatment plan: Asymptomatic, no med changes indicated. Follow-up at least annually.  Diagnosis: I65.23 - Carotid artery calcification, bilateral  Assessment and plan: Chronic, stable. Continue with current defined treatment plan: Continue with statin and Eliquis. Follow-up at least " annually.  Diagnosis: I48.0 - Paroxysmal atrial fibrillation (HCC)  Assessment and plan: Chronic, stable, as based on today's assessment and impact on other conditions evaluated today. Continue with current treatment plan: No med changes indicated follow-up with specialist as directed, but at least annually.  Diagnosis: E11.69 - Type 2 diabetes mellitus with other specified complication (HCC)  Assessment and plan: Chronic, exacerbated. Treatment and follow up: Patient has forgotten to take his weekly Ozempic  He is going to set a reminder  Recheck in 3 months  Last edited 02/07/23 17:53 PST by Wilma Guillermo M.D.         Type 2 diabetes mellitus without complication, without long-term current use of insulin (HCC)  - POCT A1C  - TSH; Future  - Lipid Profile; Future  - HEMOGLOBIN A1C; Future  - Comp Metabolic Panel; Future             Any change or worsening of signs or symptoms, patient encouraged to follow-up or report to emergency room for further evaluation. Patient verbalizes understanding and agrees.    Follow-Up: 3 months for recheck      PLEASE NOTE: This dictation was created using voice recognition software. I have made every reasonable attempt to correct obvious errors, but I expect that there are errors of grammar and possibly content that I did not discover before finalizing the note.    My total time spent caring for the patient on the day of the encounter was  greater than 30 minutes.   This includes obtaining history, reviewing chart, physical exam, patient education, reviewing outside records, placing orders, interpreting tests and coordinating care.

## 2023-02-11 DIAGNOSIS — E03.9 HYPOTHYROIDISM, UNSPECIFIED TYPE: ICD-10-CM

## 2023-02-11 DIAGNOSIS — E03.8 OTHER SPECIFIED HYPOTHYROIDISM: ICD-10-CM

## 2023-02-13 RX ORDER — LEVOTHYROXINE SODIUM 0.07 MG/1
75 TABLET ORAL
Qty: 90 TABLET | Refills: 3 | Status: SHIPPED | OUTPATIENT
Start: 2023-02-13 | End: 2024-02-02

## 2023-02-13 RX ORDER — LIOTHYRONINE SODIUM 5 UG/1
TABLET ORAL
Qty: 180 TABLET | Refills: 2 | Status: SHIPPED | OUTPATIENT
Start: 2023-02-13 | End: 2023-12-13

## 2023-02-14 RX ORDER — TAMSULOSIN HYDROCHLORIDE 0.4 MG/1
CAPSULE ORAL
Qty: 90 CAPSULE | Refills: 3 | Status: SHIPPED | OUTPATIENT
Start: 2023-02-14

## 2023-02-16 ENCOUNTER — OFFICE VISIT (OUTPATIENT)
Dept: CARDIOLOGY | Facility: MEDICAL CENTER | Age: 88
End: 2023-02-16
Payer: MEDICARE

## 2023-02-16 VITALS
HEART RATE: 63 BPM | SYSTOLIC BLOOD PRESSURE: 138 MMHG | DIASTOLIC BLOOD PRESSURE: 68 MMHG | BODY MASS INDEX: 26.92 KG/M2 | RESPIRATION RATE: 22 BRPM | WEIGHT: 188 LBS | HEIGHT: 70 IN | OXYGEN SATURATION: 96 %

## 2023-02-16 DIAGNOSIS — E78.5 HYPERLIPIDEMIA, UNSPECIFIED HYPERLIPIDEMIA TYPE: ICD-10-CM

## 2023-02-16 DIAGNOSIS — I10 HYPERTENSION, UNSPECIFIED TYPE: ICD-10-CM

## 2023-02-16 DIAGNOSIS — I65.23 BILATERAL CAROTID ARTERY STENOSIS: ICD-10-CM

## 2023-02-16 DIAGNOSIS — R09.89 SUSPECTED CEREBROVASCULAR ACCIDENT (CVA): ICD-10-CM

## 2023-02-16 DIAGNOSIS — E11.649 TYPE 2 DIABETES MELLITUS WITH HYPOGLYCEMIA WITHOUT COMA, WITHOUT LONG-TERM CURRENT USE OF INSULIN (HCC): ICD-10-CM

## 2023-02-16 DIAGNOSIS — I48.0 PAROXYSMAL ATRIAL FIBRILLATION (HCC): ICD-10-CM

## 2023-02-16 DIAGNOSIS — I73.9 PERIPHERAL ARTERIAL DISEASE (HCC): ICD-10-CM

## 2023-02-16 PROCEDURE — 99215 OFFICE O/P EST HI 40 MIN: CPT | Performed by: INTERNAL MEDICINE

## 2023-02-16 RX ORDER — LOSARTAN POTASSIUM 25 MG/1
25 TABLET ORAL
Qty: 180 TABLET | Refills: 4 | Status: SHIPPED | OUTPATIENT
Start: 2023-02-16 | End: 2023-07-26 | Stop reason: SDUPTHER

## 2023-02-16 ASSESSMENT — ENCOUNTER SYMPTOMS
WEIGHT GAIN: 0
WEIGHT LOSS: 0
CONSTIPATION: 0
PALPITATIONS: 0
DIZZINESS: 0
IRREGULAR HEARTBEAT: 0
BACK PAIN: 0
SYNCOPE: 0
NAUSEA: 0
VOMITING: 0
PND: 0
BLURRED VISION: 0
DIARRHEA: 0
ABDOMINAL PAIN: 0
COUGH: 0
HEARTBURN: 0
DEPRESSION: 0
DECREASED APPETITE: 0
SHORTNESS OF BREATH: 0
CLAUDICATION: 0
FLANK PAIN: 0
ORTHOPNEA: 0
ALTERED MENTAL STATUS: 0
DYSPNEA ON EXERTION: 0
FEVER: 0
NEAR-SYNCOPE: 0

## 2023-02-16 ASSESSMENT — FIBROSIS 4 INDEX: FIB4 SCORE: 1.56

## 2023-02-16 NOTE — PROGRESS NOTES
Cardiology Note    Chief Complaint   Patient presents with    Hyperlipidemia    AV Block Complete     F/V Dx: CHB (complete heart block) (HCC)       History of Present Illness: Alfred Erazo Jr. is a 87 y.o. 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    Doing well this visit and remains without cardiac symptoms. Admits he doesn't check his blood pressure at home. Also has been noncompliant with his ozempic. Recalls got it in October and left it in fridge. Forgot to use it weekly. Compliant with remaining medications.     Review of Systems   Constitutional: Negative for decreased appetite, fever, malaise/fatigue, weight gain and weight loss.   HENT:  Negative for congestion and nosebleeds.    Eyes:  Negative for blurred vision.   Cardiovascular:  Negative for chest pain, claudication, dyspnea on exertion, irregular heartbeat, leg swelling, near-syncope, orthopnea, palpitations, paroxysmal nocturnal dyspnea and syncope.   Respiratory:  Negative for cough and shortness of breath.    Endocrine: Negative for cold intolerance and heat intolerance.   Skin:  Negative for rash.   Musculoskeletal:  Negative for back pain.   Gastrointestinal:  Negative for abdominal pain, constipation, diarrhea, heartburn, melena, nausea and vomiting.   Genitourinary:  Negative for dysuria, flank pain and hematuria.   Neurological:  Negative for dizziness.   Psychiatric/Behavioral:  Negative for altered mental status and depression.        Past Medical History:   Diagnosis Date    Asthma     BPH (benign prostatic hyperplasia)     Cataract     Surgically removed bilat    Dental disorder     Upper    Diabetes (HCC)     Diverticulitis     Esophageal stricture     dilated spring 2016    Hiatal hernia     High cholesterol     History of shingles 8/27/2015    Hyperlipidemia 7/8/2015    Hypertension     Hypothyroid     PHN (postherpetic neuralgia) 8/27/2015    Psoriasis 6/14/2017    Stroke (HCC)     Last one  "was around 11/2021.  Has had several strokes including one in 9/2021.    TIA (transient ischemic attack)     Urinary incontinence     Vitamin D deficiency 8/27/2015         Past Surgical History:   Procedure Laterality Date    CATARACT EXTRACTION WITH IOL      TONSILLECTOMY      VASECTOMY           Current Outpatient Medications   Medication Sig Dispense Refill    losartan (COZAAR) 25 MG Tab Take 1 Tablet by mouth 2 (two) times a day. 180 Tablet 4    tamsulosin (FLOMAX) 0.4 MG capsule TAKE 1 CAPSULE BY MOUTH HALF HOUR AFTER BREAKFAST 90 Capsule 3    levothyroxine (SYNTHROID) 75 MCG Tab TAKE 1 TABLET BY MOUTH EVERY DAY 90 Tablet 3    liothyronine (CYTOMEL) 5 MCG Tab TAKE 2 TABLETS BY MOUTH EVERY  Tablet 2    atorvastatin (LIPITOR) 20 MG Tab TAKE 1 TABLET BY MOUTH EVERY  Tablet 3    famotidine (PEPCID) 40 MG Tab Take 1 Tablet by mouth every evening. 90 Tablet 1    Semaglutide,0.25 or 0.5MG/DOS, 2 MG/1.5ML Solution Pen-injector Inject 0.25 mg under the skin every 7 days. 4 Each 3    Mirabegron ER 50 MG TABLET SR 24 HR Take 50 mg by mouth every evening. 30 Tablet 6    glimepiride (AMARYL) 4 MG Tab TAKE 1 TABLET BY MOUTH EVERY MORNING 100 Tablet 3    furosemide (LASIX) 20 MG Tab TAKE 1 TABLET BY MOUTH 1 TIME A DAY AS NEEDED. 100 Tablet 3    apixaban (ELIQUIS) 5mg Tab Take 1 Tablet by mouth 2 times a day. 180 Tablet 3    finasteride (PROSCAR) 5 MG Tab Take 1 Tablet by mouth every day. 90 Tablet 3    Ascorbic Acid (VITAMIN C PO) Take 500 mg by mouth every day.      Cholecalciferol (VITAMIN D3 PO) Take 50 mcg by mouth every day.      Cyanocobalamin (VITAMIN B 12 PO) Take 500 mcg by mouth every day.      Blood Glucose Monitoring Suppl Device Meter: Dispense glucose meter preferred on patient's insurance.  Sig. Use as directed for blood sugar monitoring. 1 Device 0     No current facility-administered medications for this visit.         Allergies   Allergen Reactions    Pcn [Penicillins]      \"my joints start to " "bubble up\"    Sulfa Drugs      \"my joints start to bubble up\"  \"Its been 40 yrs since I've had either of those\"         Family History   Problem Relation Age of Onset    Stroke Brother     Stroke Father          Social History     Socioeconomic History    Marital status:      Spouse name: Not on file    Number of children: Not on file    Years of education: Not on file    Highest education level: Not on file   Occupational History    Not on file   Tobacco Use    Smoking status: Never    Smokeless tobacco: Never   Vaping Use    Vaping Use: Never used   Substance and Sexual Activity    Alcohol use: Yes     Alcohol/week: 2.4 oz     Types: 4 Standard drinks or equivalent per week     Comment: weekly    Drug use: No    Sexual activity: Not on file     Comment:  , 1 child, retired    Other Topics Concern    Not on file   Social History Narrative           1 child, retired      Social Determinants of Health     Financial Resource Strain: Not on file   Food Insecurity: Not on file   Transportation Needs: Not on file   Physical Activity: Not on file   Stress: Not on file   Social Connections: Not on file   Intimate Partner Violence: Not on file   Housing Stability: Not on file         Physical Exam:  Ambulatory Vitals  /68 (BP Location: Left arm, Patient Position: Sitting, BP Cuff Size: Adult)   Pulse 63   Resp (!) 22   Ht 1.778 m (5' 10\")   Wt 85.3 kg (188 lb)   SpO2 96%    BP Readings from Last 4 Encounters:   02/16/23 138/68   01/24/23 128/62   10/25/22 136/80   10/19/22 132/70     Weight/BMI:   Vitals:    02/16/23 1454   BP: 138/68   Weight: 85.3 kg (188 lb)   Height: 1.778 m (5' 10\")    Body mass index is 26.98 kg/m².  Wt Readings from Last 4 Encounters:   02/16/23 85.3 kg (188 lb)   01/24/23 80.7 kg (178 lb)   10/19/22 82.6 kg (182 lb)   10/18/22 82.1 kg (181 lb)       Physical Exam  Constitutional:       General: He is not in acute distress.  HENT:      Head: " Normocephalic and atraumatic.   Eyes:      Conjunctiva/sclera: Conjunctivae normal.      Pupils: Pupils are equal, round, and reactive to light.   Neck:      Vascular: No JVD.   Cardiovascular:      Rate and Rhythm: Normal rate and regular rhythm.      Heart sounds: Normal heart sounds. No murmur heard.    No friction rub. No gallop.   Pulmonary:      Effort: Pulmonary effort is normal. No respiratory distress.      Breath sounds: Normal breath sounds. No wheezing or rales.   Chest:      Chest wall: No tenderness.   Abdominal:      General: Bowel sounds are normal. There is no distension.      Palpations: Abdomen is soft.   Musculoskeletal:      Cervical back: Normal range of motion and neck supple.   Skin:     General: Skin is warm and dry.   Neurological:      Mental Status: He is alert and oriented to person, place, and time.   Psychiatric:         Mood and Affect: Affect normal.         Judgment: Judgment normal.       Lab Data Review:  Lab Results   Component Value Date/Time    CHOLSTRLTOT 143 10/19/2022 09:30 AM    LDL 61 10/19/2022 09:30 AM    HDL 40 10/19/2022 09:30 AM    TRIGLYCERIDE 212 (H) 10/19/2022 09:30 AM       Lab Results   Component Value Date/Time    SODIUM 139 10/19/2022 09:30 AM    POTASSIUM 4.3 10/19/2022 09:30 AM    CHLORIDE 103 10/19/2022 09:30 AM    CO2 25 10/19/2022 09:30 AM    GLUCOSE 275 (H) 10/19/2022 09:30 AM    BUN 24 (H) 10/19/2022 09:30 AM    CREATININE 1.03 10/19/2022 09:30 AM     CrCl cannot be calculated (Patient's most recent lab result is older than the maximum 7 days allowed.).  Lab Results   Component Value Date/Time    ALKPHOSPHAT 97 10/19/2022 09:30 AM    ASTSGOT 21 10/19/2022 09:30 AM    ALTSGPT 30 10/19/2022 09:30 AM    TBILIRUBIN 0.5 10/19/2022 09:30 AM      Lab Results   Component Value Date/Time    WBC 7.1 07/18/2022 08:22 AM     Lab Results   Component Value Date/Time    HBA1C 10.3 (A) 01/24/2023 11:08 AM     No components found for: TROP      Cardiac Imaging and  Procedures Review:      EKG 5/24/22 AP, RBBB, LVH w repol, lafb    MRI brain 11/2021  1. Age-related cerebral atrophy.  2. Extensive periventricular white matter changes consistent with chronic microvascular ischemic gliosis. Additionally, there are multiple small chronic areas of lacunar type infarction in the periventricular white matter.  3. No evidence of acute infarction in the brain parenchyma.    TTE 11/2021  CONCLUSIONS  Normal right and left ventricular size and function.   The left ventricular ejection fraction is visually estimated to be 60%.  Indeterminate diastolic function.  Aortic sclerosis without stenosis.  Normal estimated right atrial pressure.   Unable to estimate pulmonary artery pressure due to an inadequate   tricuspid regurgitant jet.  Compared to the prior study 8-: There has been no significant   change.     CTA neck 11/2021  IMPRESSION:  1. No evidence of flow-limiting stenosis in the cervical carotid or cervical vertebral arteries.  2. Diminutive left vertebral artery, similar to prior.    Medical Decision Making:  Problem List Items Addressed This Visit       Hyperlipidemia    Relevant Medications    losartan (COZAAR) 25 MG Tab    Type 2 diabetes mellitus with hypoglycemia, without long-term current use of insulin (HCC)    Relevant Orders    Referral to Pharmacotherapy Service    Bilateral carotid artery disease (HCC)    Relevant Medications    losartan (COZAAR) 25 MG Tab    Suspected cerebrovascular accident (CVA)    HTN (hypertension)    Relevant Medications    losartan (COZAAR) 25 MG Tab    Paroxysmal atrial fibrillation (HCC)    Relevant Medications    losartan (COZAAR) 25 MG Tab    Peripheral arterial disease (HCC)    Relevant Medications    losartan (COZAAR) 25 MG Tab     DM2 / HLD / CVA - resume diabetes medications. Urgently refer to pharmacotherapy. Continue statin. LDL threshold <70 given CVA history. No additional aniplatelet needed setting of doac.    Paroxysmal AF -  chadsvasc 5. <1% burden on ppm interrogation. doac for cva prevention. Doesn't appear to require rate control.    HTN - goal 120/80. Elevated. Increase losartan to twice daily.    PPM - regular interrogations every 6 months.     It was my pleasure to meet with  Castro.    A total of 55 minutes of time was spent on day of encounter reviewing medical record, performing history and examination, counseling, ordering medication/test/consults and documentation.

## 2023-02-17 ENCOUNTER — NON-PROVIDER VISIT (OUTPATIENT)
Dept: INTERNAL MEDICINE | Facility: IMAGING CENTER | Age: 88
End: 2023-02-17
Payer: MEDICARE

## 2023-02-17 ENCOUNTER — TELEPHONE (OUTPATIENT)
Dept: VASCULAR LAB | Facility: MEDICAL CENTER | Age: 88
End: 2023-02-17
Payer: MEDICARE

## 2023-02-17 NOTE — PROGRESS NOTES
Instructed Cam on injecting Ozempic. He agreed that he could do it at home without issues. Understood instruction. Will follow up in a couple of weeks to make sure he is giving his medication weekly. Also instructed patient to call office if he still has issues injecting it so that it could be changed to something simpler to understand.

## 2023-02-17 NOTE — TELEPHONE ENCOUNTER
[facility]    Received pharmacotherapy referral for DM from Dr. Lobo on 2/16/23    Per referal, Dr. Lobo would like us to resume semaglutide and add empagliflozin if needed.    Called pt to discuss - Scheduled pt for initial appt on 3/16.    Insurance: [facility]  PCP: [facility]  Locations to be seen: Any    [facility] at [phone], fax [phone]    Felipe Santiago, RamónD, BCACP

## 2023-02-22 RX ORDER — APIXABAN 5 MG/1
TABLET, FILM COATED ORAL
Qty: 180 TABLET | Refills: 3 | Status: SHIPPED | OUTPATIENT
Start: 2023-02-22

## 2023-02-27 RX ORDER — FINASTERIDE 5 MG/1
5 TABLET, FILM COATED ORAL DAILY
Qty: 90 TABLET | Refills: 2 | Status: SHIPPED | OUTPATIENT
Start: 2023-02-27 | End: 2023-11-06

## 2023-03-16 ENCOUNTER — NON-PROVIDER VISIT (OUTPATIENT)
Dept: CARDIOLOGY | Facility: MEDICAL CENTER | Age: 88
End: 2023-03-16
Payer: MEDICARE

## 2023-03-16 VITALS — DIASTOLIC BLOOD PRESSURE: 65 MMHG | HEART RATE: 64 BPM | SYSTOLIC BLOOD PRESSURE: 134 MMHG

## 2023-03-16 DIAGNOSIS — E11.649 TYPE 2 DIABETES MELLITUS WITH HYPOGLYCEMIA WITHOUT COMA, WITHOUT LONG-TERM CURRENT USE OF INSULIN (HCC): ICD-10-CM

## 2023-03-16 PROCEDURE — 99211 OFF/OP EST MAY X REQ PHY/QHP: CPT | Performed by: INTERNAL MEDICINE

## 2023-03-16 RX ORDER — GLUCOSAMINE HCL/CHONDROITIN SU 500-400 MG
CAPSULE ORAL
Qty: 100 EACH | Refills: 0 | Status: SHIPPED | OUTPATIENT
Start: 2023-03-16 | End: 2023-06-29 | Stop reason: SDUPTHER

## 2023-03-16 RX ORDER — LANCETS 30 GAUGE
EACH MISCELLANEOUS
Qty: 100 EACH | Refills: 0 | Status: SHIPPED | OUTPATIENT
Start: 2023-03-16 | End: 2023-06-29 | Stop reason: SDUPTHER

## 2023-03-16 NOTE — PROGRESS NOTES
Patient Consult Note    Primary care physician: Wilma Guillermo M.D.    Reason for consult: Management of Uncontrolled Type 2 Diabetes    HPI:  Alfred Erazo Jr. is a 87 y.o. old patient who comes in today for evaluation of above stated problem.    Most Recent HbA1c and POCT glucose:   Lab Results   Component Value Date/Time    HBA1C 10.3 (A) 01/24/2023 11:08 AM            Most Recent Scr:  Lab Results   Component Value Date/Time    CREATININE 1.03 10/19/2022 09:30 AM        Current Diabetes Medication Regimen  GLP-1 Agent: Ozempic 0.25 mg weekly - 2 weeks  Sulfonylurea: Glimepiride 4 mg daily    Previous Diabetes Medications and Reason for Discontinuation  None    Pt has home glucometer and proper testing technique - no - does not have meter  Discussed BG Goals: FBG 80 - 130, 2hPP < 180, A1c < 7%    Pt reports blood sugars: not checking    Hypoglycemia awareness - no, educated patient  Nocturnal hypoglycemia- none  Hypoglycemia:  None    Pt's treatment of Hypoglycemia - educated  - 15:15 Rule    Current Exercise - none - belong to a fitness program  Exercise Goal - 10 minutes daily    Dietary - avoids sugar - common adult, lives with son  Breakfast - oatmeal, prajapati and eggs toast  Lunch - leftovers, chinese food, eats out 50% of the time  Dinner - soup, eats out a lot if son is not home  Snacks - none  Drinks - coffee, water, sugar free soda every other day      Preventative Management  BP regimen (ACE/ARB) - losartan 25 mg   ASA - none  Statin - atorvastatin 20 mg  Last Retinal Scan - due  Last Foot Exam - due, will address at next visit  Last A1c -   Lab Results   Component Value Date/Time    HBA1C 10.3 (A) 01/24/2023 11:08 AM      Last Microalbuminuria -    Latest Reference Range & Units 10/19/22 09:30   Microalbumin, Urine Random mg/dL <1.2       updated caregaps    Past Medical History:  Patient Active Problem List    Diagnosis Date Noted    Paroxysmal atrial fibrillation (HCC) 08/29/2022    Peripheral  arterial disease (HCC) 08/29/2022    HTN (hypertension) 06/03/2022    Pacemaker 03/04/2022    Suspected cerebrovascular accident (CVA) 11/23/2021    DNR (do not resuscitate) 08/29/2021    COVID-19 08/28/2021    Age-related physical debility 08/28/2021    B12 deficiency 08/28/2021    GERD (gastroesophageal reflux disease) 08/28/2021    Elevated troponin 08/28/2021    Benign prostatic hyperplasia 01/22/2020    Type 2 diabetes mellitus with hypoglycemia, without long-term current use of insulin (HCC) 10/18/2017    Bilateral carotid artery disease (HCC) 10/18/2017    Psoriasis 06/14/2017    Vitamin D deficiency 08/27/2015    History of shingles 08/27/2015    Hyperlipidemia 07/08/2015    Hypothyroidism 07/08/2015    MCI (mild cognitive impairment) 07/08/2015       Past Surgical History:  Past Surgical History:   Procedure Laterality Date    CATARACT EXTRACTION WITH IOL      TONSILLECTOMY      VASECTOMY         Allergies:  Pcn [penicillins] and Sulfa drugs    Social History:  Social History     Socioeconomic History    Marital status:      Spouse name: Not on file    Number of children: Not on file    Years of education: Not on file    Highest education level: Not on file   Occupational History    Not on file   Tobacco Use    Smoking status: Never    Smokeless tobacco: Never   Vaping Use    Vaping Use: Never used   Substance and Sexual Activity    Alcohol use: Yes     Alcohol/week: 2.4 oz     Types: 4 Standard drinks or equivalent per week     Comment: weekly    Drug use: No    Sexual activity: Not on file     Comment:  , 1 child, retired    Other Topics Concern    Not on file   Social History Narrative           1 child, retired      Social Determinants of Health     Financial Resource Strain: Not on file   Food Insecurity: Not on file   Transportation Needs: Not on file   Physical Activity: Not on file   Stress: Not on file   Social Connections: Not on file   Intimate Partner  Violence: Not on file   Housing Stability: Not on file       Family History:  Family History   Problem Relation Age of Onset    Stroke Brother     Stroke Father        Medications:    Current Outpatient Medications:     finasteride (PROSCAR) 5 MG Tab, TAKE 1 TABLET BY MOUTH EVERY DAY, Disp: 90 Tablet, Rfl: 2    ELIQUIS 5 MG Tab, TAKE 1 TABLET BY MOUTH TWICE A DAY, Disp: 180 Tablet, Rfl: 3    losartan (COZAAR) 25 MG Tab, Take 1 Tablet by mouth 2 (two) times a day., Disp: 180 Tablet, Rfl: 4    tamsulosin (FLOMAX) 0.4 MG capsule, TAKE 1 CAPSULE BY MOUTH HALF HOUR AFTER BREAKFAST, Disp: 90 Capsule, Rfl: 3    levothyroxine (SYNTHROID) 75 MCG Tab, TAKE 1 TABLET BY MOUTH EVERY DAY, Disp: 90 Tablet, Rfl: 3    liothyronine (CYTOMEL) 5 MCG Tab, TAKE 2 TABLETS BY MOUTH EVERY DAY, Disp: 180 Tablet, Rfl: 2    atorvastatin (LIPITOR) 20 MG Tab, TAKE 1 TABLET BY MOUTH EVERY DAY, Disp: 100 Tablet, Rfl: 3    famotidine (PEPCID) 40 MG Tab, Take 1 Tablet by mouth every evening., Disp: 90 Tablet, Rfl: 1    Semaglutide,0.25 or 0.5MG/DOS, 2 MG/1.5ML Solution Pen-injector, Inject 0.25 mg under the skin every 7 days., Disp: 4 Each, Rfl: 3    Mirabegron ER 50 MG TABLET SR 24 HR, Take 50 mg by mouth every evening., Disp: 30 Tablet, Rfl: 6    glimepiride (AMARYL) 4 MG Tab, TAKE 1 TABLET BY MOUTH EVERY MORNING, Disp: 100 Tablet, Rfl: 3    furosemide (LASIX) 20 MG Tab, TAKE 1 TABLET BY MOUTH 1 TIME A DAY AS NEEDED., Disp: 100 Tablet, Rfl: 3    Ascorbic Acid (VITAMIN C PO), Take 500 mg by mouth every day., Disp: , Rfl:     Cholecalciferol (VITAMIN D3 PO), Take 50 mcg by mouth every day., Disp: , Rfl:     Cyanocobalamin (VITAMIN B 12 PO), Take 500 mcg by mouth every day., Disp: , Rfl:     Blood Glucose Monitoring Suppl Device, Meter: Dispense glucose meter preferred on patient's insurance.  Sig. Use as directed for blood sugar monitoring., Disp: 1 Device, Rfl: 0    Labs: Reviewed    Physical Examination:  Vital signs: There were no vitals taken  for this visit. There is no height or weight on file to calculate BMI.    Assessment and Plan:    1. DM2  Basic physiology of DMII was explained to patient as well as microvascular/macrovascular complications. The importance of increasing physical activity to improve diabetes control was discussed with the patient. Patient was also educated on changing diet and making better choices to help control blood sugar.   Discussed Goals: FBG 80 - 130, 2hPP < 180, a1c < 7.0%   Based on patient's age <8% reasonable  Patient to test FBG, called in testing supplies  Patient tolerating Ozempic without noticing any side effects - patient states his daughter in-law helps him with the injections.  Educated patient on MOA, administration and storage requirements for medication.   Ordered repeat labs to ensure that kidney function is adequate - may consider addition of Metformin and/or SGLT2 if labs remain stable.     - Medication changes:  Continue Ozempic 0.25 mg weekly x 2 weeks then increase to 0.5 mg weekly   Continue Glimepiride 4 mg daily    - Lifestyle changes:  Increase exercise to 10 minutes daily  Increase vegetables and protein in diet    Follow Up:  1 months    Ramón CordovaD    CC:   SAMIR Molina MD

## 2023-03-20 ENCOUNTER — OFFICE VISIT (OUTPATIENT)
Dept: INTERNAL MEDICINE | Facility: IMAGING CENTER | Age: 88
End: 2023-03-20
Payer: MEDICARE

## 2023-03-20 VITALS
HEART RATE: 79 BPM | OXYGEN SATURATION: 94 % | DIASTOLIC BLOOD PRESSURE: 70 MMHG | TEMPERATURE: 97.8 F | SYSTOLIC BLOOD PRESSURE: 124 MMHG | RESPIRATION RATE: 14 BRPM

## 2023-03-20 DIAGNOSIS — E11.9 TYPE 2 DIABETES MELLITUS WITHOUT COMPLICATION, WITHOUT LONG-TERM CURRENT USE OF INSULIN (HCC): ICD-10-CM

## 2023-03-20 DIAGNOSIS — F32.A MILD DEPRESSION: ICD-10-CM

## 2023-03-20 PROCEDURE — 99213 OFFICE O/P EST LOW 20 MIN: CPT | Performed by: FAMILY MEDICINE

## 2023-03-20 RX ORDER — BLOOD-GLUCOSE METER
KIT MISCELLANEOUS
COMMUNITY
Start: 2023-03-16 | End: 2023-05-15

## 2023-03-20 RX ORDER — BLOOD-GLUCOSE METER
KIT MISCELLANEOUS
COMMUNITY
Start: 2023-03-16

## 2023-03-21 ENCOUNTER — HOSPITAL ENCOUNTER (OUTPATIENT)
Dept: LAB | Facility: MEDICAL CENTER | Age: 88
End: 2023-03-21
Attending: INTERNAL MEDICINE
Payer: MEDICARE

## 2023-03-21 DIAGNOSIS — E11.649 TYPE 2 DIABETES MELLITUS WITH HYPOGLYCEMIA WITHOUT COMA, WITHOUT LONG-TERM CURRENT USE OF INSULIN (HCC): ICD-10-CM

## 2023-03-21 LAB
ANION GAP SERPL CALC-SCNC: 14 MMOL/L (ref 7–16)
BUN SERPL-MCNC: 27 MG/DL (ref 8–22)
CALCIUM SERPL-MCNC: 10.4 MG/DL (ref 8.5–10.5)
CHLORIDE SERPL-SCNC: 105 MMOL/L (ref 96–112)
CO2 SERPL-SCNC: 23 MMOL/L (ref 20–33)
CREAT SERPL-MCNC: 1.27 MG/DL (ref 0.5–1.4)
GFR SERPLBLD CREATININE-BSD FMLA CKD-EPI: 55 ML/MIN/1.73 M 2
GLUCOSE SERPL-MCNC: 205 MG/DL (ref 65–99)
POTASSIUM SERPL-SCNC: 4.9 MMOL/L (ref 3.6–5.5)
SODIUM SERPL-SCNC: 142 MMOL/L (ref 135–145)

## 2023-03-21 PROCEDURE — 36415 COLL VENOUS BLD VENIPUNCTURE: CPT

## 2023-03-21 PROCEDURE — 80048 BASIC METABOLIC PNL TOTAL CA: CPT

## 2023-03-22 NOTE — PROGRESS NOTES
Chief Complaint   Patient presents with    Medication Problem       Subjective:     HPI:   Alfred Erazo Jr. is a 87 y.o. male with history of diabetes here  to discuss the evaluation and management of:     -He had some concerns about administering the Ozempic  He is working with the pharmacy specialist    He has been feeling overwhelmed and somewhat depressed  But he thinks it might be improving    He would like to wait a couple of weeks before considering an antidepressant    No problems updated.     Objective:     /70   Pulse 79   Temp 36.6 °C (97.8 °F)   Resp 14   SpO2 94%  There is no height or weight on file to calculate BMI.    Physical Exam:  Physical Exam  Constitutional: Well-developed and well-nourished. Not diaphoretic. No distress.   Skin: Skin is warm and dry. No rash noted.  Head: Atraumatic without lesions.  Eyes: Conjunctivae and extraocular motions are normal.   Ears:  External ears unremarkable.    Nose: Nares patent. Mucosa without edema or erythema. No discharge. No facial tenderness.     Neck: Supple, No thyromegaly present. No JVD     Chest: Effort normal.    Abdomen:  without distention.  .  Extremities: No cyanosis, clubbing, erythema, nor edema.   Neurological: Alert and oriented x 3.    Psychiatric:  Behavior, mood, and affect are appropriate     Assessment and Plan:     The following treatment plan was discussed:     No problem-specific Assessment & Plan notes found for this encounter.    There are no diagnoses linked to this encounter.  1. Type 2 diabetes mellitus without complication, without long-term current use of insulin (HCC)-meds reviewed, administration techniques reviewed    2. Mild depression-he is stable, will consider an antidepressant if not improving    Other orders  - FREESTYLE LITE strip; USE ONE ABBOTT FREESTYLE LITE STRIP TO TEST BLOOD SUGAR ONCE DAILY EARLY MORNING BEFORE FIRST MEAL.  - Blood Glucose Monitoring Suppl (FREESTYLE FREEDOM LITE) w/Device  Kit; PLEASE SEE ATTACHED FOR DETAILED DIRECTIONS           Any change or worsening of signs or symptoms, patient encouraged to follow-up or report to emergency room for further evaluation. Patient verbalizes understanding and agrees.    Follow-Up: 3-6 m.      PLEASE NOTE: This dictation was created using voice recognition software. I have made every reasonable attempt to correct obvious errors, but I expect that there are errors of grammar and possibly content that I did not discover before finalizing the note.    My total time spent caring for the patient on the day of the encounter was  greater than 20 minutes.   This includes obtaining history, reviewing chart, physical exam, patient education, reviewing outside records, placing orders, interpreting tests and coordinating care.

## 2023-03-24 ENCOUNTER — APPOINTMENT (RX ONLY)
Dept: URBAN - METROPOLITAN AREA CLINIC 20 | Facility: CLINIC | Age: 88
Setting detail: DERMATOLOGY
End: 2023-03-24

## 2023-03-24 DIAGNOSIS — D22 MELANOCYTIC NEVI: ICD-10-CM

## 2023-03-24 DIAGNOSIS — L82.1 OTHER SEBORRHEIC KERATOSIS: ICD-10-CM

## 2023-03-24 DIAGNOSIS — Z71.89 OTHER SPECIFIED COUNSELING: ICD-10-CM

## 2023-03-24 DIAGNOSIS — L30.9 DERMATITIS, UNSPECIFIED: ICD-10-CM

## 2023-03-24 DIAGNOSIS — L57.0 ACTINIC KERATOSIS: ICD-10-CM

## 2023-03-24 DIAGNOSIS — Z85.828 PERSONAL HISTORY OF OTHER MALIGNANT NEOPLASM OF SKIN: ICD-10-CM

## 2023-03-24 DIAGNOSIS — D18.0 HEMANGIOMA: ICD-10-CM

## 2023-03-24 DIAGNOSIS — L81.4 OTHER MELANIN HYPERPIGMENTATION: ICD-10-CM

## 2023-03-24 PROBLEM — D22.72 MELANOCYTIC NEVI OF LEFT LOWER LIMB, INCLUDING HIP: Status: ACTIVE | Noted: 2023-03-24

## 2023-03-24 PROBLEM — D22.61 MELANOCYTIC NEVI OF RIGHT UPPER LIMB, INCLUDING SHOULDER: Status: ACTIVE | Noted: 2023-03-24

## 2023-03-24 PROBLEM — D22.71 MELANOCYTIC NEVI OF RIGHT LOWER LIMB, INCLUDING HIP: Status: ACTIVE | Noted: 2023-03-24

## 2023-03-24 PROBLEM — D22.62 MELANOCYTIC NEVI OF LEFT UPPER LIMB, INCLUDING SHOULDER: Status: ACTIVE | Noted: 2023-03-24

## 2023-03-24 PROBLEM — D22.5 MELANOCYTIC NEVI OF TRUNK: Status: ACTIVE | Noted: 2023-03-24

## 2023-03-24 PROBLEM — D18.01 HEMANGIOMA OF SKIN AND SUBCUTANEOUS TISSUE: Status: ACTIVE | Noted: 2023-03-24

## 2023-03-24 PROBLEM — D22.39 MELANOCYTIC NEVI OF OTHER PARTS OF FACE: Status: ACTIVE | Noted: 2023-03-24

## 2023-03-24 PROCEDURE — 99213 OFFICE O/P EST LOW 20 MIN: CPT | Mod: 25

## 2023-03-24 PROCEDURE — 17000 DESTRUCT PREMALG LESION: CPT

## 2023-03-24 PROCEDURE — 17003 DESTRUCT PREMALG LES 2-14: CPT

## 2023-03-24 PROCEDURE — ? SUNSCREEN RECOMMENDATIONS

## 2023-03-24 PROCEDURE — ? OBSERVATION

## 2023-03-24 PROCEDURE — ? ADDITIONAL NOTES

## 2023-03-24 PROCEDURE — ? COUNSELING

## 2023-03-24 PROCEDURE — ? LIQUID NITROGEN

## 2023-03-24 ASSESSMENT — LOCATION SIMPLE DESCRIPTION DERM
LOCATION SIMPLE: POSTERIOR SCALP
LOCATION SIMPLE: NECK
LOCATION SIMPLE: RIGHT FOREHEAD
LOCATION SIMPLE: RIGHT PRETIBIAL REGION
LOCATION SIMPLE: LEFT WRIST
LOCATION SIMPLE: RIGHT FOREARM
LOCATION SIMPLE: LEFT FOREARM
LOCATION SIMPLE: UPPER BACK
LOCATION SIMPLE: LEFT FOREHEAD
LOCATION SIMPLE: LEFT EAR
LOCATION SIMPLE: LEFT PRETIBIAL REGION
LOCATION SIMPLE: RIGHT POSTERIOR UPPER ARM
LOCATION SIMPLE: RIGHT CHEEK
LOCATION SIMPLE: LEFT POSTERIOR THIGH
LOCATION SIMPLE: LEFT POSTERIOR UPPER ARM
LOCATION SIMPLE: LEFT LOWER BACK
LOCATION SIMPLE: LEFT LIP
LOCATION SIMPLE: RIGHT UPPER BACK
LOCATION SIMPLE: RIGHT POSTERIOR THIGH
LOCATION SIMPLE: RIGHT LOWER BACK

## 2023-03-24 ASSESSMENT — LOCATION DETAILED DESCRIPTION DERM
LOCATION DETAILED: RIGHT INFERIOR MEDIAL MIDBACK
LOCATION DETAILED: LEFT INFERIOR MEDIAL LOWER BACK
LOCATION DETAILED: RIGHT INFERIOR MEDIAL UPPER BACK
LOCATION DETAILED: RIGHT DISTAL POSTERIOR UPPER ARM
LOCATION DETAILED: LEFT INFERIOR LATERAL FOREHEAD
LOCATION DETAILED: LEFT INFERIOR VERMILION LIP
LOCATION DETAILED: INFERIOR THORACIC SPINE
LOCATION DETAILED: LEFT SUPERIOR HELIX
LOCATION DETAILED: LEFT LATERAL PROXIMAL PRETIBIAL REGION
LOCATION DETAILED: LEFT DORSAL WRIST
LOCATION DETAILED: LEFT VENTRAL PROXIMAL FOREARM
LOCATION DETAILED: LEFT ANTERIOR EARLOBE
LOCATION DETAILED: RIGHT CENTRAL MALAR CHEEK
LOCATION DETAILED: RIGHT CENTRAL LATERAL NECK
LOCATION DETAILED: RIGHT VENTRAL PROXIMAL FOREARM
LOCATION DETAILED: RIGHT INFERIOR FOREHEAD
LOCATION DETAILED: RIGHT DISTAL POSTERIOR THIGH
LOCATION DETAILED: POSTERIOR MID-PARIETAL SCALP
LOCATION DETAILED: RIGHT PROXIMAL PRETIBIAL REGION
LOCATION DETAILED: RIGHT INFERIOR CENTRAL MALAR CHEEK
LOCATION DETAILED: RIGHT SUPERIOR CENTRAL MALAR CHEEK
LOCATION DETAILED: LEFT PROXIMAL POSTERIOR UPPER ARM
LOCATION DETAILED: LEFT DISTAL POSTERIOR THIGH
LOCATION DETAILED: RIGHT SUPERIOR UPPER BACK

## 2023-03-24 ASSESSMENT — LOCATION ZONE DERM
LOCATION ZONE: SCALP
LOCATION ZONE: FACE
LOCATION ZONE: ARM
LOCATION ZONE: EAR
LOCATION ZONE: LIP
LOCATION ZONE: NECK
LOCATION ZONE: TRUNK
LOCATION ZONE: LEG

## 2023-03-24 NOTE — PROCEDURE: ADDITIONAL NOTES
Additional Notes: Pt given a sample of Duobri, advised to use once a day as needed.
Detail Level: Detailed
Render Risk Assessment In Note?: no

## 2023-03-24 NOTE — HPI: FULL BODY SKIN EXAMINATION
Abdomen , soft, nontender, nondistended , no guarding or rigidity , no masses palpable , normal bowel sounds , Liver and Spleen , no hepatomegaly present , no hepatosplenomegaly , liver nontender , spleen not palpable
How Severe Are Your Spot(S)?: mild
What Type Of Note Output Would You Prefer (Optional)?: Standard Output
What Is The Reason For Today's Visit?: Full Body Skin Examination
What Is The Reason For Today's Visit? (Being Monitored For X): concerning skin lesions on an annual basis

## 2023-04-05 NOTE — DISCHARGE PLANNING
Meds-to-Beds: Discharge prescription orders listed below delivered to patient's bedside. CINTHIA Grant notified and accepted the prescription. Patient counseling was deferred due to COVID-19.      Current Outpatient Medications   Medication Sig Dispense Refill   • clopidogrel (PLAVIX) 75 MG Tab Take 1 Tablet by mouth every day. 20 Tablet 0        Zachary Gundersen, Pharmacy Intern  
Meds-to-Beds: Discharge prescription orders listed below delivered to patient's bedside. CINTHIA Ware notified and accepted the medications. Patient counseling was deferred due to COVID-19.      Current Outpatient Medications   Medication Sig Dispense Refill   • ciprofloxacin (CIPRO) 500 MG Tab Take 1 Tablet by mouth every 12 hours. 14 Tablet 0   • metroNIDAZOLE (FLAGYL) 500 MG Tab Take 1 Tablet by mouth every 8 hours. 21 Tablet 0        Zachary Gundersen, Pharmacy Intern    
There are no Wet Read(s) to document.

## 2023-04-10 ENCOUNTER — NON-PROVIDER VISIT (OUTPATIENT)
Dept: CARDIOLOGY | Facility: MEDICAL CENTER | Age: 88
End: 2023-04-10
Payer: MEDICARE

## 2023-04-10 PROCEDURE — 93294 REM INTERROG EVL PM/LDLS PM: CPT | Performed by: INTERNAL MEDICINE

## 2023-04-10 NOTE — CARDIAC REMOTE MONITOR - SCAN
Device transmission reviewed. Device demonstrated appropriate function.       Electronically Signed by: Eduin Buckner M.D.    4/10/2023  2:57 PM

## 2023-04-17 RX ORDER — MIRABEGRON 50 MG/1
TABLET, FILM COATED, EXTENDED RELEASE ORAL
Qty: 30 TABLET | Refills: 6 | Status: SHIPPED | OUTPATIENT
Start: 2023-04-17 | End: 2023-04-20

## 2023-04-20 ENCOUNTER — OFFICE VISIT (OUTPATIENT)
Dept: CARDIOLOGY | Facility: MEDICAL CENTER | Age: 88
End: 2023-04-20
Payer: MEDICARE

## 2023-04-20 ENCOUNTER — HOSPITAL ENCOUNTER (OUTPATIENT)
Dept: LAB | Facility: MEDICAL CENTER | Age: 88
End: 2023-04-20
Attending: FAMILY MEDICINE
Payer: MEDICARE

## 2023-04-20 ENCOUNTER — NON-PROVIDER VISIT (OUTPATIENT)
Dept: CARDIOLOGY | Facility: MEDICAL CENTER | Age: 88
End: 2023-04-20
Payer: MEDICARE

## 2023-04-20 VITALS
WEIGHT: 170.8 LBS | BODY MASS INDEX: 24.51 KG/M2 | DIASTOLIC BLOOD PRESSURE: 61 MMHG | SYSTOLIC BLOOD PRESSURE: 105 MMHG | HEART RATE: 60 BPM

## 2023-04-20 VITALS
RESPIRATION RATE: 18 BRPM | WEIGHT: 169 LBS | DIASTOLIC BLOOD PRESSURE: 64 MMHG | OXYGEN SATURATION: 95 % | HEART RATE: 57 BPM | SYSTOLIC BLOOD PRESSURE: 128 MMHG | HEIGHT: 70 IN | BODY MASS INDEX: 24.2 KG/M2

## 2023-04-20 DIAGNOSIS — N18.31 TYPE 2 DIABETES MELLITUS WITH STAGE 3A CHRONIC KIDNEY DISEASE, WITHOUT LONG-TERM CURRENT USE OF INSULIN (HCC): ICD-10-CM

## 2023-04-20 DIAGNOSIS — I48.0 PAF (PAROXYSMAL ATRIAL FIBRILLATION) (HCC): ICD-10-CM

## 2023-04-20 DIAGNOSIS — D68.69 SECONDARY HYPERCOAGULABLE STATE (HCC): ICD-10-CM

## 2023-04-20 DIAGNOSIS — R54 AGE-RELATED PHYSICAL DEBILITY: ICD-10-CM

## 2023-04-20 DIAGNOSIS — E11.22 TYPE 2 DIABETES MELLITUS WITH STAGE 3A CHRONIC KIDNEY DISEASE, WITHOUT LONG-TERM CURRENT USE OF INSULIN (HCC): ICD-10-CM

## 2023-04-20 DIAGNOSIS — N18.31 STAGE 3A CHRONIC KIDNEY DISEASE: ICD-10-CM

## 2023-04-20 DIAGNOSIS — E11.9 TYPE 2 DIABETES MELLITUS WITHOUT COMPLICATION, WITHOUT LONG-TERM CURRENT USE OF INSULIN (HCC): ICD-10-CM

## 2023-04-20 LAB
EST. AVERAGE GLUCOSE BLD GHB EST-MCNC: 194 MG/DL
HBA1C MFR BLD: 8.4 % (ref 4–5.6)

## 2023-04-20 PROCEDURE — 36415 COLL VENOUS BLD VENIPUNCTURE: CPT

## 2023-04-20 PROCEDURE — 99213 OFFICE O/P EST LOW 20 MIN: CPT | Performed by: PHYSICIAN ASSISTANT

## 2023-04-20 PROCEDURE — 83036 HEMOGLOBIN GLYCOSYLATED A1C: CPT

## 2023-04-20 ASSESSMENT — ENCOUNTER SYMPTOMS
SHORTNESS OF BREATH: 0
PALPITATIONS: 0
BLOOD IN STOOL: 0
FALLS: 0
DIZZINESS: 0
PND: 0
COUGH: 0

## 2023-04-20 ASSESSMENT — FIBROSIS 4 INDEX
FIB4 SCORE: 1.56
FIB4 SCORE: 1.56

## 2023-04-20 NOTE — PROGRESS NOTES
"Chief Complaint   Patient presents with    Hyperlipidemia    Other     F/V Dx: Peripheral arterial disease (HCC)    Atrial Fibrillation     F/V Dx: Paroxysmal atrial fibrillation (HCC)          Subjective:   Alfred Erazo Jr. is a 87 y.o. male who presents today for follow-up.    Patient of Dr. Lobo.  Current medical problems include paroxysmal AF, CHB s/p PPM, HTN, HLD. Last seen by Dr. Lobo in February, at that time denied cardiac complaints. He was referred to pharmacotherapy to assist with DM control.     Cam continues to feel well. Denies complaints today.  Denies bleeding complications and is tolerating Eliquis well.  He has had no falls at home.    He is following with the diabetic pharmacotherapy team.           Past Medical History:   Diagnosis Date    Asthma     BPH (benign prostatic hyperplasia)     Cataract     Surgically removed bilat    Dental disorder     Upper    Diabetes (HCC)     Diverticulitis     Esophageal stricture     dilated spring 2016    Hiatal hernia     High cholesterol     History of shingles 8/27/2015    Hyperlipidemia 7/8/2015    Hypertension     Hypothyroid     PHN (postherpetic neuralgia) 8/27/2015    Psoriasis 6/14/2017    Stroke (HCC)     Last one was around 11/2021.  Has had several strokes including one in 9/2021.    TIA (transient ischemic attack)     Urinary incontinence     Vitamin D deficiency 8/27/2015         Past Surgical History:   Procedure Laterality Date    CATARACT EXTRACTION WITH IOL      TONSILLECTOMY      VASECTOMY           Family History   Problem Relation Age of Onset    Stroke Brother     Stroke Father          Social History     Tobacco Use   Smoking Status Never   Smokeless Tobacco Never          Social History     Substance and Sexual Activity   Alcohol Use Yes    Alcohol/week: 2.4 oz    Types: 4 Standard drinks or equivalent per week    Comment: weekly         Allergies   Allergen Reactions    Pcn [Penicillins]      \"my joints start to " "bubble up\"    Sulfa Drugs      \"my joints start to bubble up\"  \"Its been 40 yrs since I've had either of those\"         Outpatient Encounter Medications as of 4/20/2023   Medication Sig Dispense Refill    FREESTYLE LITE strip USE ONE ABBOTT FREESTYLE LITE STRIP TO TEST BLOOD SUGAR ONCE DAILY EARLY MORNING BEFORE FIRST MEAL.      Blood Glucose Monitoring Suppl (FREESTYLE FREEDOM LITE) w/Device Kit PLEASE SEE ATTACHED FOR DETAILED DIRECTIONS      Blood Glucose Meter Kit Test blood sugar as recommended by provider. Abbott Freestyle Lite blood glucose monitoring kit. Or whatever meter is preferred by insurance 1 Kit 0    Blood Glucose Test Strips Use one Abbott Freestyle Lite strip to test blood sugar once daily early morning before first meal. 100 Strip 0    Lancets Use one Abbott Freestyle Lite lancet to test blood sugar once daily early morning before first meal. 100 Each 0    Alcohol Swabs Wipe site with prep pad prior to injection. 100 Each 0    finasteride (PROSCAR) 5 MG Tab TAKE 1 TABLET BY MOUTH EVERY DAY 90 Tablet 2    ELIQUIS 5 MG Tab TAKE 1 TABLET BY MOUTH TWICE A  Tablet 3    losartan (COZAAR) 25 MG Tab Take 1 Tablet by mouth 2 (two) times a day. 180 Tablet 4    tamsulosin (FLOMAX) 0.4 MG capsule TAKE 1 CAPSULE BY MOUTH HALF HOUR AFTER BREAKFAST 90 Capsule 3    levothyroxine (SYNTHROID) 75 MCG Tab TAKE 1 TABLET BY MOUTH EVERY DAY 90 Tablet 3    liothyronine (CYTOMEL) 5 MCG Tab TAKE 2 TABLETS BY MOUTH EVERY  Tablet 2    atorvastatin (LIPITOR) 20 MG Tab TAKE 1 TABLET BY MOUTH EVERY  Tablet 3    famotidine (PEPCID) 40 MG Tab Take 1 Tablet by mouth every evening. 90 Tablet 1    Semaglutide,0.25 or 0.5MG/DOS, 2 MG/1.5ML Solution Pen-injector Inject 0.25 mg under the skin every 7 days. 4 Each 3    furosemide (LASIX) 20 MG Tab TAKE 1 TABLET BY MOUTH 1 TIME A DAY AS NEEDED. 100 Tablet 3    Ascorbic Acid (VITAMIN C PO) Take 500 mg by mouth every day.      Cholecalciferol (VITAMIN D3 PO) Take 50 " "mcg by mouth every day.      Cyanocobalamin (VITAMIN B 12 PO) Take 500 mcg by mouth every day.      [DISCONTINUED] MYRBETRIQ 50 MG TABLET SR 24 HR TAKE 50 MG BY MOUTH EVERY EVENING. (Patient not taking: Reported on 4/20/2023) 30 Tablet 6    [DISCONTINUED] glimepiride (AMARYL) 4 MG Tab TAKE 1 TABLET BY MOUTH EVERY MORNING 100 Tablet 3     No facility-administered encounter medications on file as of 4/20/2023.         Review of Systems   Respiratory:  Negative for cough and shortness of breath.    Cardiovascular:  Negative for chest pain, palpitations, leg swelling and PND.   Gastrointestinal:  Negative for blood in stool and melena.   Genitourinary:  Negative for hematuria.   Musculoskeletal:  Negative for falls.   Neurological:  Negative for dizziness.        Objective:   /64 (BP Location: Left arm, Patient Position: Sitting, BP Cuff Size: Adult)   Pulse (!) 57   Resp 18   Ht 1.778 m (5' 10\")   Wt 76.7 kg (169 lb)   SpO2 95%   BMI 24.25 kg/m²        Physical Exam  Vitals reviewed.   Constitutional:       Comments: Elderly male with slow gait   HENT:      Head: Normocephalic and atraumatic.   Cardiovascular:      Rate and Rhythm: Normal rate and regular rhythm.      Pulses: Normal pulses.      Heart sounds: No murmur heard.  Pulmonary:      Effort: Pulmonary effort is normal.      Breath sounds: No wheezing or rales.   Musculoskeletal:      Right lower leg: No edema.      Left lower leg: No edema.   Skin:     General: Skin is warm and dry.   Neurological:      Mental Status: He is alert.   Psychiatric:         Judgment: Judgment normal.     Device check: 4/10/2023  Less than 1% mode switching. 92% , 40% AP.  Battery life 7 years     Assessment:     1. Age-related physical debility        2. PAF (paroxysmal atrial fibrillation) (HCC)        3. Secondary hypercoagulable state (HCC)        4. Stage 3a chronic kidney disease (HCC)        5. Type 2 diabetes mellitus with stage 3a chronic kidney disease, " without long-term current use of insulin (HCC)             Medical Decision Making:  Today's Assessment / Plan:   PAF  - continue apixaban, currently 5mg dose is appropriate.-Monitor BMP every 6 months    PPM  - cont remote device interrogations q 6mo    DM with hyperglycemia, CKD 3a  - med optimization with pharmacist team    HTN  - doing well with the losartan 25mg bid  - unsure if taking lasix daily      RTC in 6mo sooner if needed

## 2023-04-20 NOTE — PROGRESS NOTES
Patient Consult Note    Primary care physician: Wilma Guillermo M.D.    Reason for consult: Management of Uncontrolled Type 2 Diabetes    HPI:  Alfred Erazo Jr. is a 87 y.o. old patient who comes in today for evaluation of above stated problem.    Most Recent HbA1c and POCT glucose:   Lab Results   Component Value Date/Time    HBA1C 10.3 (A) 01/24/2023 11:08 AM            Most Recent Scr:  Lab Results   Component Value Date/Time    CREATININE 1.27 03/21/2023 09:13 AM        Current Diabetes Medication Regimen  GLP-1 Agent: Ozempic 0.25 mg weekly - 2 weeks  Sulfonylurea: Glimepiride 4 mg daily    Previous Diabetes Medications and Reason for Discontinuation  None    Pt has home glucometer and proper testing technique - no - does not have meter  Discussed BG Goals: FBG 80 - 130, 2hPP < 180, A1c < 7%    Pt reports blood sugars:   AM: 116, 111, 127, 77, 135, 108, 113, 93    Hypoglycemia awareness - no, educated patient  Nocturnal hypoglycemia- none  Hypoglycemia:  None    Pt's treatment of Hypoglycemia - educated  - 15:15 Rule    Current Exercise - none - belong to a fitness program  Exercise Goal - 10 minutes daily    Dietary - Patient states he is eating healthier now  Previously:  avoids sugar - common adult, lives with son  Breakfast - oatmeal, prajapati and eggs toast  Lunch - leftovers, chinese food, eats out 50% of the time  Dinner - soup, eats out a lot if son is not home  Snacks - none  Drinks - coffee, water, sugar free soda every other day      Preventative Management  BP regimen (ACE/ARB) - losartan 25 mg   ASA - none  Statin - atorvastatin 20 mg  Last Retinal Scan - due, will address at next visit  Last Foot Exam - due, will address at PCP visit  Last A1c -   Lab Results   Component Value Date/Time    HBA1C 10.3 (A) 01/24/2023 11:08 AM      Last Microalbuminuria -    Latest Reference Range & Units 10/19/22 09:30   Microalbumin, Urine Random mg/dL <1.2       updated caregaps    Past Medical  History:  Patient Active Problem List    Diagnosis Date Noted    Paroxysmal atrial fibrillation (HCC) 08/29/2022    Peripheral arterial disease (HCC) 08/29/2022    HTN (hypertension) 06/03/2022    Pacemaker 03/04/2022    Suspected cerebrovascular accident (CVA) 11/23/2021    DNR (do not resuscitate) 08/29/2021    COVID-19 08/28/2021    Age-related physical debility 08/28/2021    B12 deficiency 08/28/2021    GERD (gastroesophageal reflux disease) 08/28/2021    Elevated troponin 08/28/2021    Benign prostatic hyperplasia 01/22/2020    Type 2 diabetes mellitus with hypoglycemia, without long-term current use of insulin (HCC) 10/18/2017    Bilateral carotid artery disease (HCC) 10/18/2017    Psoriasis 06/14/2017    Vitamin D deficiency 08/27/2015    History of shingles 08/27/2015    Hyperlipidemia 07/08/2015    Hypothyroidism 07/08/2015    MCI (mild cognitive impairment) 07/08/2015       Past Surgical History:  Past Surgical History:   Procedure Laterality Date    CATARACT EXTRACTION WITH IOL      TONSILLECTOMY      VASECTOMY         Allergies:  Pcn [penicillins] and Sulfa drugs    Social History:  Social History     Socioeconomic History    Marital status:      Spouse name: Not on file    Number of children: Not on file    Years of education: Not on file    Highest education level: Not on file   Occupational History    Not on file   Tobacco Use    Smoking status: Never    Smokeless tobacco: Never   Vaping Use    Vaping Use: Never used   Substance and Sexual Activity    Alcohol use: Yes     Alcohol/week: 2.4 oz     Types: 4 Standard drinks or equivalent per week     Comment: weekly    Drug use: No    Sexual activity: Not on file     Comment:  , 1 child, retired    Other Topics Concern    Not on file   Social History Narrative           1 child, retired      Social Determinants of Health     Financial Resource Strain: Not on file   Food Insecurity: Not on file   Transportation  Needs: Not on file   Physical Activity: Not on file   Stress: Not on file   Social Connections: Not on file   Intimate Partner Violence: Not on file   Housing Stability: Not on file       Family History:  Family History   Problem Relation Age of Onset    Stroke Brother     Stroke Father        Medications:    Current Outpatient Medications:     MYRBETRIQ 50 MG TABLET SR 24 HR, TAKE 50 MG BY MOUTH EVERY EVENING. (Patient not taking: Reported on 4/20/2023), Disp: 30 Tablet, Rfl: 6    finasteride (PROSCAR) 5 MG Tab, TAKE 1 TABLET BY MOUTH EVERY DAY, Disp: 90 Tablet, Rfl: 2    ELIQUIS 5 MG Tab, TAKE 1 TABLET BY MOUTH TWICE A DAY, Disp: 180 Tablet, Rfl: 3    losartan (COZAAR) 25 MG Tab, Take 1 Tablet by mouth 2 (two) times a day., Disp: 180 Tablet, Rfl: 4    levothyroxine (SYNTHROID) 75 MCG Tab, TAKE 1 TABLET BY MOUTH EVERY DAY, Disp: 90 Tablet, Rfl: 3    liothyronine (CYTOMEL) 5 MCG Tab, TAKE 2 TABLETS BY MOUTH EVERY DAY, Disp: 180 Tablet, Rfl: 2    atorvastatin (LIPITOR) 20 MG Tab, TAKE 1 TABLET BY MOUTH EVERY DAY, Disp: 100 Tablet, Rfl: 3    famotidine (PEPCID) 40 MG Tab, Take 1 Tablet by mouth every evening., Disp: 90 Tablet, Rfl: 1    Semaglutide,0.25 or 0.5MG/DOS, 2 MG/1.5ML Solution Pen-injector, Inject 0.25 mg under the skin every 7 days., Disp: 4 Each, Rfl: 3    furosemide (LASIX) 20 MG Tab, TAKE 1 TABLET BY MOUTH 1 TIME A DAY AS NEEDED., Disp: 100 Tablet, Rfl: 3    FREESTYLE LITE strip, USE ONE ABBOTT FREESTYLE LITE STRIP TO TEST BLOOD SUGAR ONCE DAILY EARLY MORNING BEFORE FIRST MEAL., Disp: , Rfl:     Blood Glucose Monitoring Suppl (FREESTYLE FREEDOM LITE) w/Device Kit, PLEASE SEE ATTACHED FOR DETAILED DIRECTIONS, Disp: , Rfl:     Blood Glucose Meter Kit, Test blood sugar as recommended by provider. Abbott Freestyle Lite blood glucose monitoring kit. Or whatever meter is preferred by insurance, Disp: 1 Kit, Rfl: 0    Blood Glucose Test Strips, Use one Abbott Freestyle Lite strip to test blood sugar once daily  early morning before first meal., Disp: 100 Strip, Rfl: 0    Lancets, Use one Abbott Freestyle Lite lancet to test blood sugar once daily early morning before first meal., Disp: 100 Each, Rfl: 0    Alcohol Swabs, Wipe site with prep pad prior to injection., Disp: 100 Each, Rfl: 0    tamsulosin (FLOMAX) 0.4 MG capsule, TAKE 1 CAPSULE BY MOUTH HALF HOUR AFTER BREAKFAST (Patient not taking: Reported on 4/20/2023), Disp: 90 Capsule, Rfl: 3    Ascorbic Acid (VITAMIN C PO), Take 500 mg by mouth every day., Disp: , Rfl:     Cholecalciferol (VITAMIN D3 PO), Take 50 mcg by mouth every day., Disp: , Rfl:     Cyanocobalamin (VITAMIN B 12 PO), Take 500 mcg by mouth every day., Disp: , Rfl:     Labs: Reviewed    Physical Examination:  Vital signs: /61   Pulse 60   Wt 77.5 kg (170 lb 12.8 oz)   BMI 24.51 kg/m²  Body mass index is 24.51 kg/m².    Assessment and Plan:    1. DM2  Discussed Goals: FBG 80 - 130, 2hPP < 180, a1c < 7.0%   Based on patient's age <8% reasonable  FBG have mostly been in goal last several weeks - some BG in the 70's will stop glimepiride to reduce risk for hypoglycemic events.   Patient due for A1c, labs printed and patient instructed to obtain prior to next appt  Patient tolerating Ozempic without noticing any side effects  Will increase dose of Ozempic to maximize mealtime glucose spikes  Patient's son states that he has greatly modified his diet. Educated patient to focus on protein and vegetable intake. Continue to limit CHO.   Recent CrCl ~ 34 ml/min - will hold on starting metformin. SGLT2i may be considered for future therapies.     - Medication changes:  Increase Ozempic 0.5 mg weekly   STOP Glimepiride 4 mg daily    - Lifestyle changes:  Increase exercise to 10 minutes daily  Increase vegetables and protein in diet    Follow Up:  1.5 months    An Erazo, Cristian    CC:   SAMIR Molina MD

## 2023-04-25 ENCOUNTER — OFFICE VISIT (OUTPATIENT)
Dept: INTERNAL MEDICINE | Facility: IMAGING CENTER | Age: 88
End: 2023-04-25
Payer: MEDICARE

## 2023-04-25 VITALS
SYSTOLIC BLOOD PRESSURE: 122 MMHG | RESPIRATION RATE: 14 BRPM | HEIGHT: 70 IN | OXYGEN SATURATION: 95 % | HEART RATE: 67 BPM | DIASTOLIC BLOOD PRESSURE: 70 MMHG | TEMPERATURE: 97.3 F | BODY MASS INDEX: 24.25 KG/M2

## 2023-04-25 DIAGNOSIS — E11.9 TYPE 2 DIABETES MELLITUS WITHOUT COMPLICATION, WITHOUT LONG-TERM CURRENT USE OF INSULIN (HCC): ICD-10-CM

## 2023-04-25 DIAGNOSIS — I10 HYPERTENSION, UNSPECIFIED TYPE: ICD-10-CM

## 2023-04-25 PROCEDURE — 3078F DIAST BP <80 MM HG: CPT | Performed by: FAMILY MEDICINE

## 2023-04-25 PROCEDURE — 3074F SYST BP LT 130 MM HG: CPT | Performed by: FAMILY MEDICINE

## 2023-04-25 PROCEDURE — 99214 OFFICE O/P EST MOD 30 MIN: CPT | Performed by: FAMILY MEDICINE

## 2023-04-25 NOTE — PROCEDURE: OBSERVATION
Size Of Lesion In Cm (Optional): 0
Detail Level: Detailed
Opioid Counseling: I discussed with the patient the potential side effects of opioids including but not limited to addiction, altered mental status, and depression. I stressed avoiding alcohol, benzodiazepines, muscle relaxants and sleep aids unless specifically okayed by a physician. The patient verbalized understanding of the proper use and possible adverse effects of opioids. All of the patient's questions and concerns were addressed. They were instructed to flush the remaining pills down the toilet if they did not need them for pain.

## 2023-04-29 DIAGNOSIS — I65.23 BILATERAL CAROTID ARTERY STENOSIS: ICD-10-CM

## 2023-05-03 RX ORDER — FUROSEMIDE 20 MG/1
20 TABLET ORAL
Qty: 100 TABLET | Refills: 3 | Status: SHIPPED | OUTPATIENT
Start: 2023-05-03

## 2023-05-15 DIAGNOSIS — E11.9 TYPE 2 DIABETES MELLITUS WITHOUT COMPLICATION, WITHOUT LONG-TERM CURRENT USE OF INSULIN (HCC): ICD-10-CM

## 2023-05-15 RX ORDER — BLOOD-GLUCOSE METER
KIT MISCELLANEOUS
Qty: 100 STRIP | Refills: 3 | Status: SHIPPED | OUTPATIENT
Start: 2023-05-15

## 2023-05-25 RX ORDER — FAMOTIDINE 40 MG/1
TABLET, FILM COATED ORAL
Qty: 90 TABLET | Refills: 1 | Status: SHIPPED | OUTPATIENT
Start: 2023-05-25 | End: 2023-11-27

## 2023-06-07 NOTE — PROCEDURE: MIPS QUALITY
Quality 226: Preventive Care And Screening: Tobacco Use: Screening And Cessation Intervention: Patient screened for tobacco use and is an ex/non-smoker
Quality 130: Documentation Of Current Medications In The Medical Record: Current Medications Documented
Quality 111:Pneumonia Vaccination Status For Older Adults: Pneumococcal Vaccination Previously Received
Detail Level: Detailed
Quality 431: Preventive Care And Screening: Unhealthy Alcohol Use - Screening: Patient screened for unhealthy alcohol use using a single question and scores less than 2 times per year
See hpi

## 2023-06-08 ENCOUNTER — APPOINTMENT (OUTPATIENT)
Dept: CARDIOLOGY | Facility: MEDICAL CENTER | Age: 88
End: 2023-06-08
Attending: PHYSICIAN ASSISTANT
Payer: MEDICARE

## 2023-06-08 ENCOUNTER — TELEPHONE (OUTPATIENT)
Dept: VASCULAR LAB | Facility: MEDICAL CENTER | Age: 88
End: 2023-06-08
Payer: MEDICARE

## 2023-06-08 NOTE — TELEPHONE ENCOUNTER
Pt missed their pharmacotherapy appointment on 6/8/23.    Rescheduled on 6/29/23.    An Erazo, RamónD

## 2023-06-16 RX ORDER — SEMAGLUTIDE 0.68 MG/ML
0.5 INJECTION, SOLUTION SUBCUTANEOUS
COMMUNITY
Start: 2023-05-15 | End: 2023-08-24

## 2023-06-17 NOTE — PROGRESS NOTES
"Chief Complaint   Patient presents with    Follow-Up       Subjective:     HPI:   Alfred Erazo Jr. is a 87 y.o. male here to discuss the evaluation and management of:     1. DM- he has gotten help with injection , now compliant      2. HTN- taking meds, no dizziness    No problems updated.     Objective:     /70   Pulse 67   Temp 36.3 °C (97.3 °F)   Resp 14   Ht 1.778 m (5' 10\")   SpO2 95%  Body mass index is 24.25 kg/m².    Physical Exam:  Physical Exam  Constitutional: Well-developed and well-nourished. Not diaphoretic. No distress.   Skin: Skin is warm and dry. No rash noted.  Head: Atraumatic without lesions.  Eyes: Conjunctivae and extraocular motions are normal.   Ears:  External ears unremarkable.    Nose: Nares patent. Mucosa without edema or erythema. No discharge. No facial tenderness.     Neck: Supple, No thyromegaly present. No JVD  Cardiovascular: Regular rate and rhythm.   Chest: Effort normal. Clear to auscultation throughout. No adventitious sounds.   Abdomen:  without distention.  .  Extremities: No cyanosis, clubbing, erythema, nor edema.   Neurological: Alert and oriented x 3.    Psychiatric:  Behavior, mood, and affect are appropriate       Lab Results   Component Value Date/Time    HBA1C 8.4 (H) 04/20/2023 02:19 PM       Assessment and Plan:     The following treatment plan was discussed:     No problem-specific Assessment & Plan notes found for this encounter.    1. Type 2 diabetes mellitus without complication, without long-term current use of insulin (HCC)        2. Hypertension, unspecified type          Improved a1c, no med changes indicated    BP stable, no med changes     HCC Gap Form    Last edited 06/16/23 18:16 PDT by Wilma Guillermo M.D.        Any change or worsening of signs or symptoms, patient encouraged to follow-up or report to emergency room for further evaluation. Patient verbalizes understanding and agrees.    Follow-Up: No follow-ups on file.      PLEASE " NOTE: This dictation was created using voice recognition software. I have made every reasonable attempt to correct obvious errors, but I expect that there are errors of grammar and possibly content that I did not discover before finalizing the note.    My total time spent caring for the patient on the day of the encounter was  greater than 30 minutes.   This includes obtaining history, reviewing chart, physical exam, patient education, reviewing outside records, placing orders, interpreting tests and coordinating care.

## 2023-06-28 NOTE — PROGRESS NOTES
Patient Consult Note    Primary care physician: Wilma Guillermo M.D.    Reason for consult: Management of Uncontrolled Type 2 Diabetes    HPI:  Alfred Erazo Jr. is a 87 y.o. old patient who comes in today for evaluation of above stated problem.    Most Recent HbA1c and POCT glucose:   Lab Results   Component Value Date/Time    HBA1C 8.4 (H) 04/20/2023 02:19 PM            Most Recent Scr:  Lab Results   Component Value Date/Time    CREATININE 1.27 03/21/2023 09:13 AM        Current Diabetes Medication Regimen  GLP-1 Agent: Ozempic 0.5 mg weekly    Previous Diabetes Medications and Reason for Discontinuation  Glimeperide - hypoglycemia    Pt has home glucometer and proper testing technique - no - does not have meter  Discussed BG Goals: FBG 80 - 130, 2hPP < 180, A1c < 7%    Pt reports blood sugars:   AM: 130s    Hypoglycemia awareness - no, educated patient  Nocturnal hypoglycemia- none  Hypoglycemia:  None    Pt's treatment of Hypoglycemia - educated  - 15:15 Rule    Current Exercise - none - belong to a fitness program  Exercise Goal - 10 minutes daily    Dietary - Notes smaller portions since last visit. He skips lunch 3x/week    Breakfast - oatmeal, prajapati and eggs toast  Lunch -  anything he can grab, sometimes soup; has been skipping this 3x/week  Dinner - anything he can grab; avoids potatoes, rice, bread  Snacks - mixed nuts, strawberries  Drinks - coffee, water, sugar free soda every other day      Preventative Management  BP regimen (ACE/ARB) - losartan 25 mg   ASA - none  Statin - atorvastatin 20 mg  Last Retinal Scan - 05/2023  Last Foot Exam - pt to address w/ PCP next month  Last A1c -   Lab Results   Component Value Date/Time    HBA1C 8.4 (H) 04/20/2023 02:19 PM      Last Microalbuminuria -    Latest Reference Range & Units 10/19/22 09:30   Microalbumin, Urine Random mg/dL <1.2       updated caregaps    Past Medical History:  Patient Active Problem List    Diagnosis Date Noted    Secondary  hypercoagulable state (HCC) 04/20/2023    Stage 3a chronic kidney disease (HCC) 04/20/2023    Paroxysmal atrial fibrillation (HCC) 08/29/2022    Peripheral arterial disease (HCC) 08/29/2022    HTN (hypertension) 06/03/2022    Pacemaker 03/04/2022    Suspected cerebrovascular accident (CVA) 11/23/2021    DNR (do not resuscitate) 08/29/2021    COVID-19 08/28/2021    Age-related physical debility 08/28/2021    B12 deficiency 08/28/2021    GERD (gastroesophageal reflux disease) 08/28/2021    Elevated troponin 08/28/2021    Benign prostatic hyperplasia 01/22/2020    Type 2 diabetes mellitus with stage 3a chronic kidney disease, without long-term current use of insulin (HCC) 10/18/2017    Bilateral carotid artery disease (HCC) 10/18/2017    Psoriasis 06/14/2017    Vitamin D deficiency 08/27/2015    History of shingles 08/27/2015    Hyperlipidemia 07/08/2015    Hypothyroidism 07/08/2015    MCI (mild cognitive impairment) 07/08/2015       Past Surgical History:  Past Surgical History:   Procedure Laterality Date    CATARACT EXTRACTION WITH IOL      TONSILLECTOMY      VASECTOMY         Allergies:  Pcn [penicillins] and Sulfa drugs    Social History:  Social History     Socioeconomic History    Marital status:      Spouse name: Not on file    Number of children: Not on file    Years of education: Not on file    Highest education level: Not on file   Occupational History    Not on file   Tobacco Use    Smoking status: Never    Smokeless tobacco: Never   Vaping Use    Vaping Use: Never used   Substance and Sexual Activity    Alcohol use: Yes     Alcohol/week: 2.4 oz     Types: 4 Standard drinks or equivalent per week     Comment: weekly    Drug use: No    Sexual activity: Not on file     Comment:  , 1 child, retired    Other Topics Concern    Not on file   Social History Narrative           1 child, retired      Social Determinants of Health     Financial Resource Strain: Low Risk   (12/7/2021)    Overall Financial Resource Strain (CARDIA)     Difficulty of Paying Living Expenses: Not hard at all   Food Insecurity: No Food Insecurity (12/7/2021)    Hunger Vital Sign     Worried About Running Out of Food in the Last Year: Never true     Ran Out of Food in the Last Year: Never true   Transportation Needs: No Transportation Needs (12/7/2021)    PRAPARE - Transportation     Lack of Transportation (Medical): No     Lack of Transportation (Non-Medical): No   Physical Activity: Not on file   Stress: Not on file   Social Connections: Not on file   Intimate Partner Violence: Not on file   Housing Stability: Not on file       Family History:  Family History   Problem Relation Age of Onset    Stroke Brother     Stroke Father        Medications:    Current Outpatient Medications:     furosemide (LASIX) 20 MG Tab, TAKE 1 TABLET BY MOUTH 1 TIME A DAY AS NEEDED., Disp: 100 Tablet, Rfl: 3    OZEMPIC, 0.25 OR 0.5 MG/DOSE, 2 MG/3ML Solution Pen-injector, Inject 0.5 mg under the skin every 7 days., Disp: , Rfl:     famotidine (PEPCID) 40 MG Tab, TAKE 1 TABLET BY MOUTH EVERY DAY IN THE EVENING, Disp: 90 Tablet, Rfl: 1    FREESTYLE LITE strip, USE ONE ABBOTT FREESTYLE LITE STRIP TO TEST BLOOD SUGAR ONCE DAILY EARLY MORNING BEFORE FIRST MEAL., Disp: 100 Strip, Rfl: 3    Semaglutide,0.25 or 0.5MG/DOS, 2 MG/1.5ML Solution Pen-injector, Inject 0.5 mg under the skin every 7 days., Disp: 4 Each, Rfl: 3    Blood Glucose Monitoring Suppl (FREESTYLE FREEDOM LITE) w/Device Kit, PLEASE SEE ATTACHED FOR DETAILED DIRECTIONS, Disp: , Rfl:     Blood Glucose Meter Kit, Test blood sugar as recommended by provider. Abbott Freestyle Lite blood glucose monitoring kit. Or whatever meter is preferred by insurance, Disp: 1 Kit, Rfl: 0    Blood Glucose Test Strips, Use one Abbott Freestyle Lite strip to test blood sugar once daily early morning before first meal., Disp: 100 Strip, Rfl: 0    Lancets, Use one Abbott Freestyle Lite lancet to  test blood sugar once daily early morning before first meal., Disp: 100 Each, Rfl: 0    Alcohol Swabs, Wipe site with prep pad prior to injection., Disp: 100 Each, Rfl: 0    finasteride (PROSCAR) 5 MG Tab, TAKE 1 TABLET BY MOUTH EVERY DAY, Disp: 90 Tablet, Rfl: 2    ELIQUIS 5 MG Tab, TAKE 1 TABLET BY MOUTH TWICE A DAY, Disp: 180 Tablet, Rfl: 3    losartan (COZAAR) 25 MG Tab, Take 1 Tablet by mouth 2 (two) times a day., Disp: 180 Tablet, Rfl: 4    tamsulosin (FLOMAX) 0.4 MG capsule, TAKE 1 CAPSULE BY MOUTH HALF HOUR AFTER BREAKFAST, Disp: 90 Capsule, Rfl: 3    levothyroxine (SYNTHROID) 75 MCG Tab, TAKE 1 TABLET BY MOUTH EVERY DAY, Disp: 90 Tablet, Rfl: 3    liothyronine (CYTOMEL) 5 MCG Tab, TAKE 2 TABLETS BY MOUTH EVERY DAY, Disp: 180 Tablet, Rfl: 2    atorvastatin (LIPITOR) 20 MG Tab, TAKE 1 TABLET BY MOUTH EVERY DAY, Disp: 100 Tablet, Rfl: 3    Ascorbic Acid (VITAMIN C PO), Take 500 mg by mouth every day., Disp: , Rfl:     Cholecalciferol (VITAMIN D3 PO), Take 50 mcg by mouth every day., Disp: , Rfl:     Cyanocobalamin (VITAMIN B 12 PO), Take 500 mcg by mouth every day., Disp: , Rfl:     Labs: Reviewed    Physical Examination:  Vital signs: There were no vitals taken for this visit. There is no height or weight on file to calculate BMI.    Assessment and Plan:    1. DM2  Discussed Goals: FBG 80 - 130, 2hPP < 180, a1c < 7.0%   Based on patient's age <8% reasonable  A1c significantly improved from 10.3% to 8.4% (4/20/23)  Patient tolerating increased Ozempic dose  He has lost 10 lbs since last visit  Notes suppressed appetite    - Medication changes:  Continue Ozempic 0.5 mg weekly     - Lifestyle changes:  Increase exercise to 10 minutes daily  Increase vegetables and protein in diet; counseled pt that he can eat fruit  Get labs drawn (ordered by PCP) - PCP will do a1c at next visit    Follow Up:  2 months    Arminda Tao, PharmD    CC:   SAMIR Molina MD

## 2023-06-29 ENCOUNTER — NON-PROVIDER VISIT (OUTPATIENT)
Dept: CARDIOLOGY | Facility: MEDICAL CENTER | Age: 88
End: 2023-06-29
Attending: PHYSICIAN ASSISTANT
Payer: MEDICARE

## 2023-06-29 VITALS — WEIGHT: 157 LBS | BODY MASS INDEX: 22.53 KG/M2

## 2023-06-29 DIAGNOSIS — E11.649 TYPE 2 DIABETES MELLITUS WITH HYPOGLYCEMIA WITHOUT COMA, WITHOUT LONG-TERM CURRENT USE OF INSULIN (HCC): ICD-10-CM

## 2023-06-29 PROCEDURE — 99212 OFFICE O/P EST SF 10 MIN: CPT

## 2023-06-29 RX ORDER — GLUCOSAMINE HCL/CHONDROITIN SU 500-400 MG
CAPSULE ORAL
Qty: 100 EACH | Refills: 11 | Status: SHIPPED | OUTPATIENT
Start: 2023-06-29

## 2023-06-29 RX ORDER — LANCETS 30 GAUGE
EACH MISCELLANEOUS
Qty: 100 EACH | Refills: 11 | Status: SHIPPED | OUTPATIENT
Start: 2023-06-29

## 2023-06-29 ASSESSMENT — FIBROSIS 4 INDEX: FIB4 SCORE: 1.56

## 2023-07-10 ENCOUNTER — NON-PROVIDER VISIT (OUTPATIENT)
Dept: CARDIOLOGY | Facility: MEDICAL CENTER | Age: 88
End: 2023-07-10
Payer: MEDICARE

## 2023-07-10 PROCEDURE — 93294 REM INTERROG EVL PM/LDLS PM: CPT | Performed by: INTERNAL MEDICINE

## 2023-07-10 NOTE — CARDIAC REMOTE MONITOR - SCAN
Device transmission reviewed. Device demonstrated appropriate function.       Electronically Signed by: Sean Erazo M.D.    7/20/2023  2:19 PM

## 2023-07-19 ENCOUNTER — HOSPITAL ENCOUNTER (OUTPATIENT)
Dept: LAB | Facility: MEDICAL CENTER | Age: 88
End: 2023-07-19
Attending: FAMILY MEDICINE
Payer: MEDICARE

## 2023-07-19 DIAGNOSIS — E11.9 TYPE 2 DIABETES MELLITUS WITHOUT COMPLICATION, WITHOUT LONG-TERM CURRENT USE OF INSULIN (HCC): ICD-10-CM

## 2023-07-19 LAB — TSH SERPL DL<=0.005 MIU/L-ACNC: 3.19 UIU/ML (ref 0.38–5.33)

## 2023-07-19 PROCEDURE — 36415 COLL VENOUS BLD VENIPUNCTURE: CPT

## 2023-07-19 PROCEDURE — 84443 ASSAY THYROID STIM HORMONE: CPT

## 2023-07-19 PROCEDURE — 80061 LIPID PANEL: CPT

## 2023-07-19 PROCEDURE — 80053 COMPREHEN METABOLIC PANEL: CPT

## 2023-07-20 LAB
ALBUMIN SERPL BCP-MCNC: 4.3 G/DL (ref 3.2–4.9)
ALBUMIN/GLOB SERPL: 1.4 G/DL
ALP SERPL-CCNC: 65 U/L (ref 30–99)
ALT SERPL-CCNC: 27 U/L (ref 2–50)
ANION GAP SERPL CALC-SCNC: 13 MMOL/L (ref 7–16)
AST SERPL-CCNC: 23 U/L (ref 12–45)
BILIRUB SERPL-MCNC: 0.7 MG/DL (ref 0.1–1.5)
BUN SERPL-MCNC: 29 MG/DL (ref 8–22)
CALCIUM ALBUM COR SERPL-MCNC: 9.9 MG/DL (ref 8.5–10.5)
CALCIUM SERPL-MCNC: 10.1 MG/DL (ref 8.5–10.5)
CHLORIDE SERPL-SCNC: 106 MMOL/L (ref 96–112)
CHOLEST SERPL-MCNC: 114 MG/DL (ref 100–199)
CO2 SERPL-SCNC: 23 MMOL/L (ref 20–33)
CREAT SERPL-MCNC: 1.2 MG/DL (ref 0.5–1.4)
GFR SERPLBLD CREATININE-BSD FMLA CKD-EPI: 59 ML/MIN/1.73 M 2
GLOBULIN SER CALC-MCNC: 3 G/DL (ref 1.9–3.5)
GLUCOSE SERPL-MCNC: 131 MG/DL (ref 65–99)
HDLC SERPL-MCNC: 39 MG/DL
LDLC SERPL CALC-MCNC: 57 MG/DL
POTASSIUM SERPL-SCNC: 4.6 MMOL/L (ref 3.6–5.5)
PROT SERPL-MCNC: 7.3 G/DL (ref 6–8.2)
SODIUM SERPL-SCNC: 142 MMOL/L (ref 135–145)
TRIGL SERPL-MCNC: 88 MG/DL (ref 0–149)

## 2023-07-25 ENCOUNTER — OFFICE VISIT (OUTPATIENT)
Dept: INTERNAL MEDICINE | Facility: IMAGING CENTER | Age: 88
End: 2023-07-25
Payer: MEDICARE

## 2023-07-25 VITALS
HEART RATE: 63 BPM | DIASTOLIC BLOOD PRESSURE: 70 MMHG | TEMPERATURE: 96.9 F | SYSTOLIC BLOOD PRESSURE: 132 MMHG | WEIGHT: 158 LBS | OXYGEN SATURATION: 96 % | RESPIRATION RATE: 14 BRPM | BODY MASS INDEX: 22.62 KG/M2 | HEIGHT: 70 IN

## 2023-07-25 DIAGNOSIS — E11.9 TYPE 2 DIABETES MELLITUS WITHOUT COMPLICATION, WITHOUT LONG-TERM CURRENT USE OF INSULIN (HCC): ICD-10-CM

## 2023-07-25 LAB
HBA1C MFR BLD: 6.9 % (ref ?–5.8)
POCT INT CON NEG: NEGATIVE
POCT INT CON POS: POSITIVE

## 2023-07-25 PROCEDURE — 3078F DIAST BP <80 MM HG: CPT | Performed by: FAMILY MEDICINE

## 2023-07-25 PROCEDURE — 83036 HEMOGLOBIN GLYCOSYLATED A1C: CPT | Performed by: FAMILY MEDICINE

## 2023-07-25 PROCEDURE — 3075F SYST BP GE 130 - 139MM HG: CPT | Performed by: FAMILY MEDICINE

## 2023-07-25 PROCEDURE — 99214 OFFICE O/P EST MOD 30 MIN: CPT | Performed by: FAMILY MEDICINE

## 2023-07-25 ASSESSMENT — FIBROSIS 4 INDEX: FIB4 SCORE: 1.8

## 2023-07-26 DIAGNOSIS — I10 HYPERTENSION, UNSPECIFIED TYPE: ICD-10-CM

## 2023-07-26 RX ORDER — LOSARTAN POTASSIUM 25 MG/1
25 TABLET ORAL
Qty: 200 TABLET | Refills: 2 | Status: SHIPPED | OUTPATIENT
Start: 2023-07-26

## 2023-07-26 NOTE — TELEPHONE ENCOUNTER
Is the patient due for a refill? Yes- pharmacy requesting 100-day supply per insurance.       Was the patient seen the past year? Yes    Date of last office visit: 4/20/2023    Does the patient have an upcoming appointment?  Yes   If yes, When? 10/23/2023    Provider to refill:JA    Does the patients insurance require a 100 day supply?  Yes

## 2023-08-04 ENCOUNTER — OFFICE VISIT (OUTPATIENT)
Dept: INTERNAL MEDICINE | Facility: IMAGING CENTER | Age: 88
End: 2023-08-04
Payer: MEDICARE

## 2023-08-04 VITALS
HEART RATE: 68 BPM | OXYGEN SATURATION: 94 % | RESPIRATION RATE: 14 BRPM | DIASTOLIC BLOOD PRESSURE: 74 MMHG | SYSTOLIC BLOOD PRESSURE: 126 MMHG | TEMPERATURE: 97.3 F

## 2023-08-04 DIAGNOSIS — G31.9 DEGENERATIVE DISEASE OF NERVOUS SYSTEM, UNSPECIFIED (HCC): ICD-10-CM

## 2023-08-04 DIAGNOSIS — F32.0 MAJOR DEPRESSIVE DISORDER, SINGLE EPISODE, MILD (HCC): ICD-10-CM

## 2023-08-04 DIAGNOSIS — E11.22 TYPE 2 DIABETES MELLITUS WITH STAGE 3A CHRONIC KIDNEY DISEASE, WITHOUT LONG-TERM CURRENT USE OF INSULIN (HCC): ICD-10-CM

## 2023-08-04 DIAGNOSIS — N18.31 TYPE 2 DIABETES MELLITUS WITH STAGE 3A CHRONIC KIDNEY DISEASE, WITHOUT LONG-TERM CURRENT USE OF INSULIN (HCC): ICD-10-CM

## 2023-08-04 DIAGNOSIS — R19.7 DIARRHEA, UNSPECIFIED TYPE: ICD-10-CM

## 2023-08-04 PROCEDURE — 99214 OFFICE O/P EST MOD 30 MIN: CPT | Performed by: FAMILY MEDICINE

## 2023-08-04 PROCEDURE — 3074F SYST BP LT 130 MM HG: CPT | Performed by: FAMILY MEDICINE

## 2023-08-04 PROCEDURE — 3078F DIAST BP <80 MM HG: CPT | Performed by: FAMILY MEDICINE

## 2023-08-04 NOTE — PROGRESS NOTES
Subjective:     HPI:   Alfred Erazo Jr. is a 87 y.o. male w/ h/o depression, DM here to discuss the evaluation and management of:  Chief Complaint   Patient presents with    Diarrhea     Lasted 3 days now resolved.   He reports it was quite explosive and he had a big  mess at home    He has dealt with diarrhea off and on in the past    He reports his fasting blood sugar this morning was 119  He is having no trouble with his meds    He is not feeling depressed in any way     Objective:     /74   Pulse 68   Temp 36.3 °C (97.3 °F)   Resp 14   SpO2 94%      Physical Exam:  Physical Exam  Constitutional:       General: He is not in acute distress.     Appearance: Normal appearance. He is normal weight.   HENT:      Head: Normocephalic and atraumatic.   Eyes:      Conjunctiva/sclera: Conjunctivae normal.   Cardiovascular:      Rate and Rhythm: Normal rate and regular rhythm.      Heart sounds: Normal heart sounds.   Pulmonary:      Effort: Pulmonary effort is normal. No respiratory distress.      Breath sounds: Normal breath sounds. No stridor. No wheezing, rhonchi or rales.   Musculoskeletal:      Right lower leg: No edema.      Left lower leg: No edema.   Skin:     General: Skin is warm and dry.   Neurological:      General: No focal deficit present.      Mental Status: He is alert.   Psychiatric:         Mood and Affect: Mood normal.         Behavior: Behavior normal.            Assessment and Plan:     The following treatment plan was discussed:     1. Diarrhea, unspecified type  Resolved, likely due to Mexican food    2. Type 2 diabetes mellitus with stage 3a chronic kidney disease, without long-term current use of insulin (HCC)    Stable, morning     - Microalbumin Creat Ratio Urine - Lab Collect; Future  - Referral to Ophthalmology  - Diabetic Monofilament Lower Extremity Exam    3. 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 Continue with current defined treatment plan: no decline in function . Follow-up at least annually.  Diagnosis: F32.0 - Major depressive disorder, single episode, mild (HCC)  Assessment and plan: Chronic, stable off meds. Follow-up at least annually.  Last edited 08/04/23 09:34 PDT by Wilma Guillermo M.D.        Any change or worsening of signs or symptoms, patient encouraged to follow-up or report to emergency room for further evaluation. Patient verbalizes understanding and agrees.    Follow-Up: No follow-ups on file.      PLEASE NOTE: This dictation was created using voice recognition software. I have made every reasonable attempt to correct obvious errors, but I expect that there are errors of grammar and possibly content that I did not discover before finalizing the note.    My total time spent caring for the patient on the day of the encounter was  greater than 30 minutes.   This includes obtaining history, reviewing chart, physical exam, patient education, reviewing outside records, placing orders, interpreting tests and coordinating care.

## 2023-08-20 NOTE — PROGRESS NOTES
"  Subjective:     HPI:   Alfred Erazo Jr. is a 87 y.o. male here to discuss the evaluation and management of:  Chief Complaint   Patient presents with    Follow-Up     He has less of an appetite on current meds otherwise feels fine.            Objective:     /70   Pulse 63   Temp 36.1 °C (96.9 °F)   Resp 14   Ht 1.778 m (5' 10\")   Wt 71.7 kg (158 lb)   SpO2 96%   BMI 22.67 kg/m²      Physical Exam:  Physical Exam     Office Visit on 07/25/2023   Component Date Value Ref Range Status    Glycohemoglobin 07/25/2023 6.9 (A)  5.8 % Final    Internal Control Positive 07/25/2023 Positive   Final    Internal Control Negative 07/25/2023 Negative   Final   Hospital Outpatient Visit on 07/19/2023   Component Date Value Ref Range Status    Sodium 07/19/2023 142  135 - 145 mmol/L Final    Potassium 07/19/2023 4.6  3.6 - 5.5 mmol/L Final    Chloride 07/19/2023 106  96 - 112 mmol/L Final    Co2 07/19/2023 23  20 - 33 mmol/L Final    Anion Gap 07/19/2023 13.0  7.0 - 16.0 Final    Glucose 07/19/2023 131 (H)  65 - 99 mg/dL Final    Bun 07/19/2023 29 (H)  8 - 22 mg/dL Final    Creatinine 07/19/2023 1.20  0.50 - 1.40 mg/dL Final    Calcium 07/19/2023 10.1  8.5 - 10.5 mg/dL Final    AST(SGOT) 07/19/2023 23  12 - 45 U/L Final    ALT(SGPT) 07/19/2023 27  2 - 50 U/L Final    Alkaline Phosphatase 07/19/2023 65  30 - 99 U/L Final    Total Bilirubin 07/19/2023 0.7  0.1 - 1.5 mg/dL Final    Albumin 07/19/2023 4.3  3.2 - 4.9 g/dL Final    Total Protein 07/19/2023 7.3  6.0 - 8.2 g/dL Final    Globulin 07/19/2023 3.0  1.9 - 3.5 g/dL Final    A-G Ratio 07/19/2023 1.4  g/dL Final    Cholesterol,Tot 07/19/2023 114  100 - 199 mg/dL Final    Triglycerides 07/19/2023 88  0 - 149 mg/dL Final    HDL 07/19/2023 39 (A)  >=40 mg/dL Final    LDL 07/19/2023 57  <100 mg/dL Final    TSH 07/19/2023 3.190  0.380 - 5.330 uIU/mL Final    Comment: The 2011 American Thyroid Association (MICHELLE) guidelines  recommended that the interpretation of " thyroid function in  pregnancy be based on trimester specific reference ranges.    1st Trimester  0.100-2.500 mIU/L  2nd Trimester  0.200-3.000 mIU/L  3rd Trimester  0.300-3.500 mIU/L    These established reference ranges have not been validated  at [facility].      Correct Calcium 07/19/2023 9.9  8.5 - 10.5 mg/dL Final    GFR (CKD-EPI) 07/19/2023 59 (A)  >60 mL/min/1.73 m 2 Final    Comment: Estimated Glomerular Filtration Rate is calculated using  race neutral CKD-EPI 2021 equation per NKF-ASN recommendations.           Assessment and Plan:     The following treatment plan was discussed:     1. Type 2 diabetes mellitus without complication, without long-term current use of insulin (HCC)    - POCT A1C  Labs are stable, no med changes indicated      Medical Decision Making: Today's Assessment/Status/Plan:                  Any change or worsening of signs or symptoms, patient encouraged to follow-up or report to emergency room for further evaluation. Patient verbalizes understanding and agrees.    Follow-Up: No follow-ups on file.      PLEASE NOTE: This dictation was created using voice recognition software. I have made every reasonable attempt to correct obvious errors, but I expect that there are errors of grammar and possibly content that I did not discover before finalizing the note.    My total time spent caring for the patient on the day of the encounter was  greater than 30 minutes.   This includes obtaining history, reviewing chart, physical exam, patient education, reviewing outside records, placing orders, interpreting tests and coordinating care.

## 2023-08-24 ENCOUNTER — NON-PROVIDER VISIT (OUTPATIENT)
Dept: CARDIOLOGY | Facility: MEDICAL CENTER | Age: 88
End: 2023-08-24
Attending: PHYSICIAN ASSISTANT
Payer: MEDICARE

## 2023-08-24 VITALS
SYSTOLIC BLOOD PRESSURE: 127 MMHG | HEART RATE: 60 BPM | DIASTOLIC BLOOD PRESSURE: 58 MMHG | WEIGHT: 159.61 LBS | BODY MASS INDEX: 22.9 KG/M2

## 2023-08-24 PROCEDURE — 99212 OFFICE O/P EST SF 10 MIN: CPT | Performed by: INTERNAL MEDICINE

## 2023-08-24 ASSESSMENT — FIBROSIS 4 INDEX: FIB4 SCORE: 1.8

## 2023-08-24 NOTE — PROGRESS NOTES
Patient Consult Note    Primary care physician: Wilma Guillermo M.D.    Reason for consult: Management of Uncontrolled Type 2 Diabetes    HPI:  Alfred Erazo Jr. is a 87 y.o. old patient who comes in today for evaluation of above stated problem.    Most Recent HbA1c and POCT glucose:   Lab Results   Component Value Date/Time    HBA1C 6.9 (A) 2023 11:06 AM        Most Recent Scr:  Lab Results   Component Value Date/Time    CREATININE 1.20 2023 08:06 AM        Current Diabetes Medication Regimen  GLP-1 Agent: Ozempic 0.5 mg weekly - reports high copay but able to afford    Previous Diabetes Medications and Reason for Discontinuation  Glimeperide - hypoglycemia    Pt has home glucometer and proper testing technique - yes  Discussed BG Goals: FBG 80 - 130, 2hPP < 180, A1c < 7%    Pt reports blood sugars:   FB-167, mostly 130s-140s    Hypoglycemia awareness - educated patient  Nocturnal hypoglycemia - none  Hypoglycemia:  None    Pt's treatment of Hypoglycemia - previously educated  - 15:15 Rule    Current Exercise - none - belong to a fitness program  Exercise Goal - 10 minutes daily    Dietary  - varies (anything he can grab)  - states he has been eating better (increased appetite) than at last visit    Preventative Management  BP regimen (ACE/ARB) - losartan 25 mg BID  ASA - none  Statin - atorvastatin 20 mg  Last Retinal Scan - 2023  Last Foot Exam - 2023  Last A1c -   Lab Results   Component Value Date/Time    HBA1C 6.9 (A) 2023 11:06 AM      Last Microalbuminuria - due soon, ordered by PCP   Latest Reference Range & Units 10/19/22 09:30   Microalbumin, Urine Random mg/dL <1.2       Past Medical History:  Patient Active Problem List    Diagnosis Date Noted    Major depressive disorder, single episode, mild (HCC) 2023    Degenerative disease of nervous system, unspecified (HCC) 2023    Secondary hypercoagulable state (HCC) 2023    Stage 3a chronic kidney disease  (HCC) 04/20/2023    Paroxysmal atrial fibrillation (HCC) 08/29/2022    Peripheral arterial disease (HCC) 08/29/2022    HTN (hypertension) 06/03/2022    Pacemaker 03/04/2022    Suspected cerebrovascular accident (CVA) 11/23/2021    DNR (do not resuscitate) 08/29/2021    COVID-19 08/28/2021    Age-related physical debility 08/28/2021    B12 deficiency 08/28/2021    GERD (gastroesophageal reflux disease) 08/28/2021    Elevated troponin 08/28/2021    Benign prostatic hyperplasia 01/22/2020    Type 2 diabetes mellitus with stage 3a chronic kidney disease, without long-term current use of insulin (HCC) 10/18/2017    Bilateral carotid artery disease (HCC) 10/18/2017    Psoriasis 06/14/2017    Vitamin D deficiency 08/27/2015    History of shingles 08/27/2015    Hyperlipidemia 07/08/2015    Hypothyroidism 07/08/2015    MCI (mild cognitive impairment) 07/08/2015       Past Surgical History:  Past Surgical History:   Procedure Laterality Date    CATARACT EXTRACTION WITH IOL      TONSILLECTOMY      VASECTOMY         Allergies:  Pcn [penicillins] and Sulfa drugs    Social History:  Social History     Socioeconomic History    Marital status:      Spouse name: Not on file    Number of children: Not on file    Years of education: Not on file    Highest education level: Not on file   Occupational History    Not on file   Tobacco Use    Smoking status: Never    Smokeless tobacco: Never   Vaping Use    Vaping Use: Never used   Substance and Sexual Activity    Alcohol use: Yes     Alcohol/week: 2.4 oz     Types: 4 Standard drinks or equivalent per week     Comment: weekly    Drug use: No    Sexual activity: Not on file     Comment:  , 1 child, retired    Other Topics Concern    Not on file   Social History Narrative           1 child, retired      Social Determinants of Health     Financial Resource Strain: Low Risk  (12/7/2021)    Overall Financial Resource Strain (CARDIA)     Difficulty of  Paying Living Expenses: Not hard at all   Food Insecurity: No Food Insecurity (12/7/2021)    Hunger Vital Sign     Worried About Running Out of Food in the Last Year: Never true     Ran Out of Food in the Last Year: Never true   Transportation Needs: No Transportation Needs (12/7/2021)    PRAPARE - Transportation     Lack of Transportation (Medical): No     Lack of Transportation (Non-Medical): No   Physical Activity: Not on file   Stress: Not on file   Social Connections: Not on file   Intimate Partner Violence: Not on file   Housing Stability: Not on file       Family History:  Family History   Problem Relation Age of Onset    Stroke Brother     Stroke Father        Medications:    Current Outpatient Medications:     losartan (COZAAR) 25 MG Tab, Take 1 Tablet by mouth 2 (two) times a day., Disp: 200 Tablet, Rfl: 2    Semaglutide,0.25 or 0.5MG/DOS, 2 MG/1.5ML Solution Pen-injector, Inject 0.5 mg under the skin every 7 days., Disp: 4 Each, Rfl: 3    furosemide (LASIX) 20 MG Tab, TAKE 1 TABLET BY MOUTH 1 TIME A DAY AS NEEDED., Disp: 100 Tablet, Rfl: 3    finasteride (PROSCAR) 5 MG Tab, TAKE 1 TABLET BY MOUTH EVERY DAY, Disp: 90 Tablet, Rfl: 2    ELIQUIS 5 MG Tab, TAKE 1 TABLET BY MOUTH TWICE A DAY, Disp: 180 Tablet, Rfl: 3    tamsulosin (FLOMAX) 0.4 MG capsule, TAKE 1 CAPSULE BY MOUTH HALF HOUR AFTER BREAKFAST, Disp: 90 Capsule, Rfl: 3    levothyroxine (SYNTHROID) 75 MCG Tab, TAKE 1 TABLET BY MOUTH EVERY DAY, Disp: 90 Tablet, Rfl: 3    liothyronine (CYTOMEL) 5 MCG Tab, TAKE 2 TABLETS BY MOUTH EVERY DAY, Disp: 180 Tablet, Rfl: 2    atorvastatin (LIPITOR) 20 MG Tab, TAKE 1 TABLET BY MOUTH EVERY DAY, Disp: 100 Tablet, Rfl: 3    Ascorbic Acid (VITAMIN C PO), Take 500 mg by mouth every day., Disp: , Rfl:     Cholecalciferol (VITAMIN D3 PO), Take 50 mcg by mouth every day., Disp: , Rfl:     Cyanocobalamin (VITAMIN B 12 PO), Take 500 mcg by mouth every day., Disp: , Rfl:     Blood Glucose Test Strips, Use one Abbott  Freestyle Lite strip to test blood sugar once daily early morning before first meal., Disp: 100 Strip, Rfl: 11    Lancets, Use one Abbott Freestyle Lite lancet to test blood sugar once daily early morning before first meal., Disp: 100 Each, Rfl: 11    Alcohol Swabs, Wipe site with prep pad prior to injection., Disp: 100 Each, Rfl: 11    famotidine (PEPCID) 40 MG Tab, TAKE 1 TABLET BY MOUTH EVERY DAY IN THE EVENING, Disp: 90 Tablet, Rfl: 1    FREESTYLE LITE strip, USE ONE ABBOTT FREESTYLE LITE STRIP TO TEST BLOOD SUGAR ONCE DAILY EARLY MORNING BEFORE FIRST MEAL., Disp: 100 Strip, Rfl: 3    Blood Glucose Monitoring Suppl (FREESTYLE FREEDOM LITE) w/Device Kit, PLEASE SEE ATTACHED FOR DETAILED DIRECTIONS, Disp: , Rfl:     Blood Glucose Meter Kit, Test blood sugar as recommended by provider. Abbott Freestyle Lite blood glucose monitoring kit. Or whatever meter is preferred by insurance, Disp: 1 Kit, Rfl: 0    Labs: Reviewed    Physical Examination:  Vital signs: /58   Pulse 60   Wt 72.4 kg (159 lb 9.8 oz)   BMI 22.90 kg/m²  Body mass index is 22.9 kg/m².    Assessment and Plan:    1. DM2  Discussed Goals: FBG 80 - 130, 2hPP < 180, a1c < 7.0%   Based on patient's age < 8% reasonable and FBG < 150 reasonable  A1c has continued to improve from 8.4% to 6.9%, now at goal  FBG readings mostly at goal  Patient tolerating Ozempic dose  Pt notes medication is expensive (copay ~$200). Discussed PAP however patient believes he does not qualify. No issues with affordability and obtaining the medication from the pharmacy  No changes warranted at this time    - Medication changes:  Continue Ozempic 0.5 mg weekly     - Lifestyle changes:  Increase exercise to 10 minutes daily  Increase vegetables and protein in diet  Complete labs: alb/creatinine ratio (ordered by PCP)     Follow Up:  6 months    Cristian Dowling PharmD    CC:   SAMIR Molina MD

## 2023-09-22 ENCOUNTER — APPOINTMENT (RX ONLY)
Dept: URBAN - METROPOLITAN AREA CLINIC 20 | Facility: CLINIC | Age: 88
Setting detail: DERMATOLOGY
End: 2023-09-22

## 2023-09-22 DIAGNOSIS — L57.0 ACTINIC KERATOSIS: ICD-10-CM | Status: INADEQUATELY CONTROLLED

## 2023-09-22 DIAGNOSIS — L82.1 OTHER SEBORRHEIC KERATOSIS: ICD-10-CM

## 2023-09-22 DIAGNOSIS — Z85.828 PERSONAL HISTORY OF OTHER MALIGNANT NEOPLASM OF SKIN: ICD-10-CM

## 2023-09-22 DIAGNOSIS — L259 CONTACT DERMATITIS AND OTHER ECZEMA, UNSPECIFIED CAUSE: ICD-10-CM | Status: INADEQUATELY CONTROLLED

## 2023-09-22 DIAGNOSIS — Z71.89 OTHER SPECIFIED COUNSELING: ICD-10-CM

## 2023-09-22 DIAGNOSIS — D18.0 HEMANGIOMA: ICD-10-CM

## 2023-09-22 DIAGNOSIS — D22 MELANOCYTIC NEVI: ICD-10-CM

## 2023-09-22 DIAGNOSIS — L81.4 OTHER MELANIN HYPERPIGMENTATION: ICD-10-CM

## 2023-09-22 PROBLEM — D22.39 MELANOCYTIC NEVI OF OTHER PARTS OF FACE: Status: ACTIVE | Noted: 2023-09-22

## 2023-09-22 PROBLEM — D22.62 MELANOCYTIC NEVI OF LEFT UPPER LIMB, INCLUDING SHOULDER: Status: ACTIVE | Noted: 2023-09-22

## 2023-09-22 PROBLEM — D18.01 HEMANGIOMA OF SKIN AND SUBCUTANEOUS TISSUE: Status: ACTIVE | Noted: 2023-09-22

## 2023-09-22 PROBLEM — D22.71 MELANOCYTIC NEVI OF RIGHT LOWER LIMB, INCLUDING HIP: Status: ACTIVE | Noted: 2023-09-22

## 2023-09-22 PROBLEM — D22.72 MELANOCYTIC NEVI OF LEFT LOWER LIMB, INCLUDING HIP: Status: ACTIVE | Noted: 2023-09-22

## 2023-09-22 PROBLEM — D22.61 MELANOCYTIC NEVI OF RIGHT UPPER LIMB, INCLUDING SHOULDER: Status: ACTIVE | Noted: 2023-09-22

## 2023-09-22 PROBLEM — L30.8 OTHER SPECIFIED DERMATITIS: Status: ACTIVE | Noted: 2023-09-22

## 2023-09-22 PROBLEM — D22.5 MELANOCYTIC NEVI OF TRUNK: Status: ACTIVE | Noted: 2023-09-22

## 2023-09-22 PROCEDURE — 17000 DESTRUCT PREMALG LESION: CPT

## 2023-09-22 PROCEDURE — 17003 DESTRUCT PREMALG LES 2-14: CPT

## 2023-09-22 PROCEDURE — 99214 OFFICE O/P EST MOD 30 MIN: CPT | Mod: 25

## 2023-09-22 PROCEDURE — ? SUNSCREEN RECOMMENDATIONS

## 2023-09-22 PROCEDURE — ? MEDICATION COUNSELING

## 2023-09-22 PROCEDURE — ? OBSERVATION

## 2023-09-22 PROCEDURE — ? COUNSELING

## 2023-09-22 PROCEDURE — ? LIQUID NITROGEN

## 2023-09-22 PROCEDURE — ? PRESCRIPTION

## 2023-09-22 RX ORDER — TRIAMCINOLONE ACETONIDE 1 MG/G
CREAM TOPICAL BID
Qty: 454 | Refills: 0 | Status: ERX | COMMUNITY
Start: 2023-09-22

## 2023-09-22 RX ADMIN — TRIAMCINOLONE ACETONIDE: 1 CREAM TOPICAL at 00:00

## 2023-09-22 ASSESSMENT — LOCATION SIMPLE DESCRIPTION DERM
LOCATION SIMPLE: POSTERIOR SCALP
LOCATION SIMPLE: RIGHT UPPER BACK
LOCATION SIMPLE: RIGHT POPLITEAL SKIN
LOCATION SIMPLE: RIGHT POSTERIOR THIGH
LOCATION SIMPLE: RIGHT FOREARM
LOCATION SIMPLE: LEFT CALF
LOCATION SIMPLE: LEFT WRIST
LOCATION SIMPLE: RIGHT POSTERIOR UPPER ARM
LOCATION SIMPLE: LEFT EAR
LOCATION SIMPLE: RIGHT EAR
LOCATION SIMPLE: RIGHT LOWER BACK
LOCATION SIMPLE: UPPER BACK
LOCATION SIMPLE: RIGHT FOREHEAD
LOCATION SIMPLE: LEFT CHEEK
LOCATION SIMPLE: SCALP
LOCATION SIMPLE: LEFT PRETIBIAL REGION
LOCATION SIMPLE: NECK
LOCATION SIMPLE: LEFT POSTERIOR UPPER ARM
LOCATION SIMPLE: LEFT FOREARM
LOCATION SIMPLE: LEFT LIP
LOCATION SIMPLE: RIGHT CHEEK
LOCATION SIMPLE: LEFT POSTERIOR THIGH
LOCATION SIMPLE: RIGHT PRETIBIAL REGION

## 2023-09-22 ASSESSMENT — LOCATION DETAILED DESCRIPTION DERM
LOCATION DETAILED: INFERIOR THORACIC SPINE
LOCATION DETAILED: RIGHT CENTRAL LATERAL NECK
LOCATION DETAILED: LEFT LATERAL PROXIMAL PRETIBIAL REGION
LOCATION DETAILED: RIGHT POPLITEAL SKIN
LOCATION DETAILED: LEFT PROXIMAL CALF
LOCATION DETAILED: POSTERIOR MID-PARIETAL SCALP
LOCATION DETAILED: LEFT SUPERIOR LATERAL MALAR CHEEK
LOCATION DETAILED: RIGHT VENTRAL PROXIMAL FOREARM
LOCATION DETAILED: LEFT DORSAL WRIST
LOCATION DETAILED: RIGHT SUPERIOR CENTRAL MALAR CHEEK
LOCATION DETAILED: RIGHT INFERIOR MEDIAL MIDBACK
LOCATION DETAILED: RIGHT INFERIOR CENTRAL MALAR CHEEK
LOCATION DETAILED: LEFT INFERIOR VERMILION LIP
LOCATION DETAILED: LEFT VENTRAL PROXIMAL FOREARM
LOCATION DETAILED: RIGHT PROXIMAL LATERAL POSTERIOR THIGH
LOCATION DETAILED: LEFT PROXIMAL LATERAL POSTERIOR THIGH
LOCATION DETAILED: RIGHT DISTAL POSTERIOR UPPER ARM
LOCATION DETAILED: LEFT SUPERIOR PARIETAL SCALP
LOCATION DETAILED: RIGHT PROXIMAL PRETIBIAL REGION
LOCATION DETAILED: RIGHT INFERIOR FOREHEAD
LOCATION DETAILED: LEFT ANTERIOR EARLOBE
LOCATION DETAILED: LEFT SUPERIOR HELIX
LOCATION DETAILED: LEFT PROXIMAL POSTERIOR UPPER ARM
LOCATION DETAILED: LEFT INFERIOR POSTERIOR HELIX
LOCATION DETAILED: RIGHT SUPERIOR UPPER BACK
LOCATION DETAILED: LEFT DISTAL POSTERIOR THIGH
LOCATION DETAILED: RIGHT SUPERIOR POSTERIOR HELIX
LOCATION DETAILED: RIGHT INFERIOR MEDIAL UPPER BACK
LOCATION DETAILED: LEFT POSTERIOR EAR
LOCATION DETAILED: RIGHT DISTAL POSTERIOR THIGH

## 2023-09-22 ASSESSMENT — LOCATION ZONE DERM
LOCATION ZONE: LEG
LOCATION ZONE: EAR
LOCATION ZONE: LIP
LOCATION ZONE: NECK
LOCATION ZONE: FACE
LOCATION ZONE: ARM
LOCATION ZONE: TRUNK
LOCATION ZONE: SCALP

## 2023-09-22 ASSESSMENT — BSA RASH: BSA RASH: 0

## 2023-09-22 NOTE — PROCEDURE: LIQUID NITROGEN
Detail Level: Detailed
Show Aperture Variable?: Yes
Consent: The patient's consent was obtained including but not limited to risks of crusting, scabbing, blistering, scarring, darker or lighter pigmentary change, recurrence, incomplete removal and infection. RTC in 2 months if lesion(s) persistent.
Number Of Freeze-Thaw Cycles: 2 freeze-thaw cycles
Duration Of Freeze Thaw-Cycle (Seconds): 10
Post-Care Instructions: I reviewed with the patient in detail post-care instructions. Patient is to wear sunprotection, and avoid picking at any of the treated lesions. Pt may apply Vaseline to crusted or scabbing areas.
Render Note In Bullet Format When Appropriate: No

## 2023-09-22 NOTE — PROCEDURE: MEDICATION COUNSELING
Nsaids Pregnancy And Lactation Text: These medications are considered safe up to 30 weeks gestation. It is excreted in breast milk.
Minoxidil Pregnancy And Lactation Text: This medication has not been assigned a Pregnancy Risk Category but animal studies failed to show danger with the topical medication. It is unknown if the medication is excreted in breast milk.
High Dose Vitamin A Counseling: Side effects reviewed, pt to contact office should one occur.
Ivermectin Pregnancy And Lactation Text: This medication is Pregnancy Category C and it isn't known if it is safe during pregnancy. It is also excreted in breast milk.
VTAMA Counseling: I discussed with the patient that VTAMA is not for use in the eyes, mouth or mouth. They should call the office if they develop any signs of allergic reactions to VTAMA. The patient verbalized understanding of the proper use and possible adverse effects of VTAMA.  All of the patient's questions and concerns were addressed.
Methotrexate Counseling:  Patient counseled regarding adverse effects of methotrexate including but not limited to nausea, vomiting, abnormalities in liver function tests. Patients may develop mouth sores, rash, diarrhea, and abnormalities in blood counts. The patient understands that monitoring is required including LFT's and blood counts.  There is a rare possibility of scarring of the liver and lung problems that can occur when taking methotrexate. Persistent nausea, loss of appetite, pale stools, dark urine, cough, and shortness of breath should be reported immediately. Patient advised to discontinue methotrexate treatment at least three months before attempting to become pregnant.  I discussed the need for folate supplements while taking methotrexate.  These supplements can decrease side effects during methotrexate treatment. The patient verbalized understanding of the proper use and possible adverse effects of methotrexate.  All of the patient's questions and concerns were addressed.
Azithromycin Counseling:  I discussed with the patient the risks of azithromycin including but not limited to GI upset, allergic reaction, drug rash, diarrhea, and yeast infections.
Soolantra Pregnancy And Lactation Text: This medication is Pregnancy Category C. This medication is considered safe during breast feeding.
Carac Counseling:  I discussed with the patient the risks of Carac including but not limited to erythema, scaling, itching, weeping, crusting, and pain.
Odomzo Counseling- I discussed with the patient the risks of Odomzo including but not limited to nausea, vomiting, diarrhea, constipation, weight loss, changes in the sense of taste, decreased appetite, muscle spasms, and hair loss.  The patient verbalized understanding of the proper use and possible adverse effects of Odomzo.  All of the patient's questions and concerns were addressed.
Doxycycline Pregnancy And Lactation Text: This medication is Pregnancy Category D and not consider safe during pregnancy. It is also excreted in breast milk but is considered safe for shorter treatment courses.
Spironolactone Pregnancy And Lactation Text: This medication can cause feminization of the male fetus and should be avoided during pregnancy. The active metabolite is also found in breast milk.
Doxepin Counseling:  Patient advised that the medication is sedating and not to drive a car after taking this medication. Patient informed of potential adverse effects including but not limited to dry mouth, urinary retention, and blurry vision.  The patient verbalized understanding of the proper use and possible adverse effects of doxepin.  All of the patient's questions and concerns were addressed.
Siliq Counseling:  I discussed with the patient the risks of Siliq including but not limited to new or worsening depression, suicidal thoughts and behavior, immunosuppression, malignancy, posterior leukoencephalopathy syndrome, and serious infections.  The patient understands that monitoring is required including a PPD at baseline and must alert us or the primary physician if symptoms of infection or other concerning signs are noted. There is also a special program designed to monitor depression which is required with Siliq.
Sotyktu Pregnancy And Lactation Text: There is insufficient data to evaluate whether or not Sotyktu is safe to use during pregnancy.? ?It is not known if Sotyktu passes into breast milk and whether or not it is safe to use when breastfeeding.??
Arava Counseling:  Patient counseled regarding adverse effects of Arava including but not limited to nausea, vomiting, abnormalities in liver function tests. Patients may develop mouth sores, rash, diarrhea, and abnormalities in blood counts. The patient understands that monitoring is required including LFTs and blood counts.  There is a rare possibility of scarring of the liver and lung problems that can occur when taking methotrexate. Persistent nausea, loss of appetite, pale stools, dark urine, cough, and shortness of breath should be reported immediately. Patient advised to discontinue Arava treatment and consult with a physician prior to attempting conception. The patient will have to undergo a treatment to eliminate Arava from the body prior to conception.
Qbrexza Counseling:  I discussed with the patient the risks of Qbrexza including but not limited to headache, mydriasis, blurred vision, dry eyes, nasal dryness, dry mouth, dry throat, dry skin, urinary hesitation, and constipation.  Local skin reactions including erythema, burning, stinging, and itching can also occur.
Prednisone Counseling:  I discussed with the patient the risks of prolonged use of prednisone including but not limited to weight gain, insomnia, osteoporosis, mood changes, diabetes, susceptibility to infection, glaucoma and high blood pressure.  In cases where prednisone use is prolonged, patients should be monitored with blood pressure checks, serum glucose levels and an eye exam.  Additionally, the patient may need to be placed on GI prophylaxis, PCP prophylaxis, and calcium and vitamin D supplementation and/or a bisphosphonate.  The patient verbalized understanding of the proper use and the possible adverse effects of prednisone.  All of the patient's questions and concerns were addressed.
Propranolol Counseling:  I discussed with the patient the risks of propranolol including but not limited to low heart rate, low blood pressure, low blood sugar, restlessness and increased cold sensitivity. They should call the office if they experience any of these side effects.
Erythromycin Counseling:  I discussed with the patient the risks of erythromycin including but not limited to GI upset, allergic reaction, drug rash, diarrhea, increase in liver enzymes, and yeast infections.
Arava Pregnancy And Lactation Text: This medication is Pregnancy Category X and is absolutely contraindicated during pregnancy. It is unknown if it is excreted in breast milk.
Qbrexza Pregnancy And Lactation Text: There is no available data on Qbrexza use in pregnant women.  There is no available data on Qbrexza use in lactation.
Propranolol Pregnancy And Lactation Text: This medication is Pregnancy Category C and it isn't known if it is safe during pregnancy. It is excreted in breast milk.
Topical Retinoid counseling:  Patient advised to apply a pea-sized amount only at bedtime and wait 30 minutes after washing their face before applying.  If too drying, patient may add a non-comedogenic moisturizer. The patient verbalized understanding of the proper use and possible adverse effects of retinoids.  All of the patient's questions and concerns were addressed.
Prednisone Pregnancy And Lactation Text: This medication is Pregnancy Category C and it isn't know if it is safe during pregnancy. This medication is excreted in breast milk.
Itraconazole Counseling:  I discussed with the patient the risks of itraconazole including but not limited to liver damage, nausea/vomiting, neuropathy, and severe allergy.  The patient understands that this medication is best absorbed when taken with acidic beverages such as non-diet cola or ginger ale.  The patient understands that monitoring is required including baseline LFTs and repeat LFTs at intervals.  The patient understands that they are to contact us or the primary physician if concerning signs are noted.
Carac Pregnancy And Lactation Text: This medication is Pregnancy Category X and contraindicated in pregnancy and in women who may become pregnant. It is unknown if this medication is excreted in breast milk.
Doxepin Pregnancy And Lactation Text: This medication is Pregnancy Category C and it isn't known if it is safe during pregnancy. It is also excreted in breast milk and breast feeding isn't recommended.
Eucrisa Counseling: Patient may experience a mild burning sensation during topical application. Eucrisa is not approved in children less than 2 years of age.
Tremfya Counseling: I discussed with the patient the risks of guselkumab including but not limited to immunosuppression, serious infections, worsening of inflammatory bowel disease and drug reactions.  The patient understands that monitoring is required including a PPD at baseline and must alert us or the primary physician if symptoms of infection or other concerning signs are noted.
Rifampin Pregnancy And Lactation Text: This medication is Pregnancy Category C and it isn't know if it is safe during pregnancy. It is also excreted in breast milk and should not be used if you are breast feeding.
Enbrel Counseling:  I discussed with the patient the risks of etanercept including but not limited to myelosuppression, immunosuppression, autoimmune hepatitis, demyelinating diseases, lymphoma, and infections.  The patient understands that monitoring is required including a PPD at baseline and must alert us or the primary physician if symptoms of infection or other concerning signs are noted.
Glycopyrrolate Counseling:  I discussed with the patient the risks of glycopyrrolate including but not limited to skin rash, drowsiness, dry mouth, difficulty urinating, and blurred vision.
Topical Metronidazole Pregnancy And Lactation Text: This medication is Pregnancy Category B and considered safe during pregnancy.  It is also considered safe to use while breastfeeding.
Topical Steroids Counseling: I discussed with the patient that prolonged use of topical steroids can result in the increased appearance of superficial blood vessels (telangiectasias), lightening (hypopigmentation) and thinning of the skin (atrophy).  Patient understands to avoid using high potency steroids in skin folds, the groin or the face.  The patient verbalized understanding of the proper use and possible adverse effects of topical steroids.  All of the patient's questions and concerns were addressed.
Glycopyrrolate Pregnancy And Lactation Text: This medication is Pregnancy Category B and is considered safe during pregnancy. It is unknown if it is excreted breast milk.
Azathioprine Counseling:  I discussed with the patient the risks of azathioprine including but not limited to myelosuppression, immunosuppression, hepatotoxicity, lymphoma, and infections.  The patient understands that monitoring is required including baseline LFTs, Creatinine, possible TPMP genotyping and weekly CBCs for the first month and then every 2 weeks thereafter.  The patient verbalized understanding of the proper use and possible adverse effects of azathioprine.  All of the patient's questions and concerns were addressed.
High Dose Vitamin A Pregnancy And Lactation Text: High dose vitamin A therapy is contraindicated during pregnancy and breast feeding.
Vtama Pregnancy And Lactation Text: It is unknown if this medication can cause problems during pregnancy and breastfeeding.
Enbrel Pregnancy And Lactation Text: This medication is Pregnancy Category B and is considered safe during pregnancy. It is unknown if this medication is excreted in breast milk.
Olanzapine Counseling- I discussed with the patient the common side effects of olanzapine including but are not limited to: lack of energy, dry mouth, increased appetite, sleepiness, tremor, constipation, dizziness, changes in behavior, or restlessness.  Explained that teenagers are more likely to experience headaches, abdominal pain, pain in the arms or legs, tiredness, and sleepiness.  Serious side effects include but are not limited: increased risk of death in elderly patients who are confused, have memory loss, or dementia-related psychosis; hyperglycemia; increased cholesterol and triglycerides; and weight gain.
Xeljanz Counseling: I discussed with the patient the risks of Xeljanz therapy including increased risk of infection, liver issues, headache, diarrhea, or cold symptoms. Live vaccines should be avoided. They were instructed to call if they have any problems.
Mirvaso Counseling: Mirvaso is a topical medication which can decrease superficial blood flow where applied. Side effects are uncommon and include stinging, redness and allergic reactions.
Azithromycin Pregnancy And Lactation Text: This medication is considered safe during pregnancy and is also secreted in breast milk.
Siliq Pregnancy And Lactation Text: The risk during pregnancy and breastfeeding is uncertain with this medication.
Xelgonzaloz Pregnancy And Lactation Text: This medication is Pregnancy Category D and is not considered safe during pregnancy.  The risk during breast feeding is also uncertain.
Simponi Counseling:  I discussed with the patient the risks of golimumab including but not limited to myelosuppression, immunosuppression, autoimmune hepatitis, demyelinating diseases, lymphoma, and serious infections.  The patient understands that monitoring is required including a PPD at baseline and must alert us or the primary physician if symptoms of infection or other concerning signs are noted.
Calcipotriene Counseling:  I discussed with the patient the risks of calcipotriene including but not limited to erythema, scaling, itching, and irritation.
Clofazimine Counseling:  I discussed with the patient the risks of clofazimine including but not limited to skin and eye pigmentation, liver damage, nausea/vomiting, gastrointestinal bleeding and allergy.
Topical Retinoid Pregnancy And Lactation Text: This medication is Pregnancy Category C. It is unknown if this medication is excreted in breast milk.
Sarecycline Counseling: Patient advised regarding possible photosensitivity and discoloration of the teeth, skin, lips, tongue and gums.  Patient instructed to avoid sunlight, if possible.  When exposed to sunlight, patients should wear protective clothing, sunglasses, and sunscreen.  The patient was instructed to call the office immediately if the following severe adverse effects occur:  hearing changes, easy bruising/bleeding, severe headache, or vision changes.  The patient verbalized understanding of the proper use and possible adverse effects of sarecycline.  All of the patient's questions and concerns were addressed.
SSKI Counseling:  I discussed with the patient the risks of SSKI including but not limited to thyroid abnormalities, metallic taste, GI upset, fever, headache, acne, arthralgias, paraesthesias, lymphadenopathy, easy bleeding, arrhythmias, and allergic reaction.
Rhofade Counseling: Rhofade is a topical medication which can decrease superficial blood flow where applied. Side effects are uncommon and include stinging, redness and allergic reactions.
Adbry Counseling: I discussed with the patient the risks of tralokinumab including but not limited to eye infection and irritation, cold sores, injection site reactions, worsening of asthma, allergic reactions and increased risk of parasitic infection.  Live vaccines should be avoided while taking tralokinumab. The patient understands that monitoring is required and they must alert us or the primary physician if symptoms of infection or other concerning signs are noted.
Erythromycin Pregnancy And Lactation Text: This medication is Pregnancy Category B and is considered safe during pregnancy. It is also excreted in breast milk.
Itraconazole Pregnancy And Lactation Text: This medication is Pregnancy Category C and it isn't know if it is safe during pregnancy. It is also excreted in breast milk.
Sarecycline Pregnancy And Lactation Text: This medication is Pregnancy Category D and not consider safe during pregnancy. It is also excreted in breast milk.
Humira Counseling:  I discussed with the patient the risks of adalimumab including but not limited to myelosuppression, immunosuppression, autoimmune hepatitis, demyelinating diseases, lymphoma, and serious infections.  The patient understands that monitoring is required including a PPD at baseline and must alert us or the primary physician if symptoms of infection or other concerning signs are noted.
Hydroxychloroquine Counseling:  I discussed with the patient that a baseline ophthalmologic exam is needed at the start of therapy and every year thereafter while on therapy. A CBC may also be warranted for monitoring.  The side effects of this medication were discussed with the patient, including but not limited to agranulocytosis, aplastic anemia, seizures, rashes, retinopathy, and liver toxicity. Patient instructed to call the office should any adverse effect occur.  The patient verbalized understanding of the proper use and possible adverse effects of Plaquenil.  All the patient's questions and concerns were addressed.
Ketoconazole Counseling:   Patient counseled regarding improving absorption with orange juice.  Adverse effects include but are not limited to breast enlargement, headache, diarrhea, nausea, upset stomach, liver function test abnormalities, taste disturbance, and stomach pain.  There is a rare possibility of liver failure that can occur when taking ketoconazole. The patient understands that monitoring of LFTs may be required, especially at baseline. The patient verbalized understanding of the proper use and possible adverse effects of ketoconazole.  All of the patient's questions and concerns were addressed.
Topical Steroids Applications Pregnancy And Lactation Text: Most topical steroids are considered safe to use during pregnancy and lactation.  Any topical steroid applied to the breast or nipple should be washed off before breastfeeding.
Azathioprine Pregnancy And Lactation Text: This medication is Pregnancy Category D and isn't considered safe during pregnancy. It is unknown if this medication is excreted in breast milk.
Hydroquinone Counseling:  Patient advised that medication may result in skin irritation, lightening (hypopigmentation), dryness, and burning.  In the event of skin irritation, the patient was advised to reduce the amount of the drug applied or use it less frequently.  Rarely, spots that are treated with hydroquinone can become darker (pseudoochronosis).  Should this occur, patient instructed to stop medication and call the office. The patient verbalized understanding of the proper use and possible adverse effects of hydroquinone.  All of the patient's questions and concerns were addressed.
Bactrim Counseling:  I discussed with the patient the risks of sulfa antibiotics including but not limited to GI upset, allergic reaction, drug rash, diarrhea, dizziness, photosensitivity, and yeast infections.  Rarely, more serious reactions can occur including but not limited to aplastic anemia, agranulocytosis, methemoglobinemia, blood dyscrasias, liver or kidney failure, lung infiltrates or desquamative/blistering drug rashes.
Dutasteride Male Counseling: Dustasteride Counseling:  I discussed with the patient the risks of use of dutasteride including but not limited to decreased libido, decreased ejaculate volume, and gynecomastia. Women who can become pregnant should not handle medication.  All of the patient's questions and concerns were addressed.
Cibinqo Counseling: I discussed with the patient the risks of Cibinqo therapy including but not limited to common cold, nausea, headache, cold sores, increased blood CPK levels, dizziness, UTIs, fatigue, acne, and vomitting. Live vaccines should be avoided.  This medication has been linked to serious infections; higher rate of mortality; malignancy and lymphoproliferative disorders; major adverse cardiovascular events; thrombosis; thrombocytopenia and lymphopenia; lipid elevations; and retinal detachment.
Mirvaso Pregnancy And Lactation Text: This medication has not been assigned a Pregnancy Risk Category. It is unknown if the medication is excreted in breast milk.
Calcipotriene Pregnancy And Lactation Text: The use of this medication during pregnancy or lactation is not recommended as there is insufficient data.
Zoryve Counseling:  I discussed with the patient that Zoryve is not for use in the eyes, mouth or vagina. The most commonly reported side effects include diarrhea, headache, insomnia, application site pain, upper respiratory tract infections, and urinary tract infections.  All of the patient's questions and concerns were addressed.
Olanzapine Pregnancy And Lactation Text: This medication is pregnancy category C.   There are no adequate and well controlled trials with olanzapine in pregnant females.  Olanzapine should be used during pregnancy only if the potential benefit justifies the potential risk to the fetus.   In a study in lactating healthy women, olanzapine was excreted in breast milk.  It is recommended that women taking olanzapine should not breast feed.
Bactrim Pregnancy And Lactation Text: This medication is Pregnancy Category D and is known to cause fetal risk.  It is also excreted in breast milk.
Opzelura Counseling:  I discussed with the patient the risks of Opzelura including but not limited to nasopharngitis, bronchitis, ear infection, eosinophila, hives, diarrhea, folliculitis, tonsillitis, and rhinorrhea.  Taken orally, this medication has been linked to serious infections; higher rate of mortality; malignancy and lymphoproliferative disorders; major adverse cardiovascular events; thrombosis; thrombocytopenia, anemia, and neutropenia; and lipid elevations.
Oral Minoxidil Counseling- I discussed with the patient the risks of oral minoxidil including but not limited to shortness of breath, swelling of the feet or ankles, dizziness, lightheadedness, unwanted hair growth and allergic reaction.  The patient verbalized understanding of the proper use and possible adverse effects of oral minoxidil.  All of the patient's questions and concerns were addressed.
Cantharidin Pregnancy And Lactation Text: This medication has not been proven safe during pregnancy. It is unknown if this medication is excreted in breast milk.
Aklief counseling:  Patient advised to apply a pea-sized amount only at bedtime and wait 30 minutes after washing their face before applying.  If too drying, patient may add a non-comedogenic moisturizer.  The most commonly reported side effects including irritation, redness, scaling, dryness, stinging, burning, itching, and increased risk of sunburn.  The patient verbalized understanding of the proper use and possible adverse effects of retinoids.  All of the patient's questions and concerns were addressed.
Cantharidin Counseling:  I discussed with the patient the risks of Cantharidin including but not limited to pain, redness, burning, itching, and blistering.
Metronidazole Counseling:  I discussed with the patient the risks of metronidazole including but not limited to seizures, nausea/vomiting, a metallic taste in the mouth, nausea/vomiting and severe allergy.
Sski Pregnancy And Lactation Text: This medication is Pregnancy Category D and isn't considered safe during pregnancy. It is excreted in breast milk.
Adbry Pregnancy And Lactation Text: It is unknown if this medication will adversely affect pregnancy or breast feeding.
Xolair Counseling:  Patient informed of potential adverse effects including but not limited to fever, muscle aches, rash and allergic reactions.  The patient verbalized understanding of the proper use and possible adverse effects of Xolair.  All of the patient's questions and concerns were addressed.
Tazorac Counseling:  Patient advised that medication is irritating and drying.  Patient may need to apply sparingly and wash off after an hour before eventually leaving it on overnight.  The patient verbalized understanding of the proper use and possible adverse effects of tazorac.  All of the patient's questions and concerns were addressed.
Clofazimine Pregnancy And Lactation Text: This medication is Pregnancy Category C and isn't considered safe during pregnancy. It is excreted in breast milk.
Colchicine Counseling:  Patient counseled regarding adverse effects including but not limited to stomach upset (nausea, vomiting, stomach pain, or diarrhea).  Patient instructed to limit alcohol consumption while taking this medication.  Colchicine may reduce blood counts especially with prolonged use.  The patient understands that monitoring of kidney function and blood counts may be required, especially at baseline. The patient verbalized understanding of the proper use and possible adverse effects of colchicine.  All of the patient's questions and concerns were addressed.
Xolair Pregnancy And Lactation Text: This medication is Pregnancy Category B and is considered safe during pregnancy. This medication is excreted in breast milk.
Thalidomide Counseling: I discussed with the patient the risks of thalidomide including but not limited to birth defects, anxiety, weakness, chest pain, dizziness, cough and severe allergy.
Acitretin Counseling:  I discussed with the patient the risks of acitretin including but not limited to hair loss, dry lips/skin/eyes, liver damage, hyperlipidemia, depression/suicidal ideation, photosensitivity.  Serious rare side effects can include but are not limited to pancreatitis, pseudotumor cerebri, bony changes, clot formation/stroke/heart attack.  Patient understands that alcohol is contraindicated since it can result in liver toxicity and significantly prolong the elimination of the drug by many years.
Ketoconazole Pregnancy And Lactation Text: This medication is Pregnancy Category C and it isn't know if it is safe during pregnancy. It is also excreted in breast milk and breast feeding isn't recommended.
Cellcept Counseling:  I discussed with the patient the risks of mycophenolate mofetil including but not limited to infection/immunosuppression, GI upset, hypokalemia, hypercholesterolemia, bone marrow suppression, lymphoproliferative disorders, malignancy, GI ulceration/bleed/perforation, colitis, interstitial lung disease, kidney failure, progressive multifocal leukoencephalopathy, and birth defects.  The patient understands that monitoring is required including a baseline creatinine and regular CBC testing. In addition, patient must alert us immediately if symptoms of infection or other concerning signs are noted.
Hydroxychloroquine Pregnancy And Lactation Text: This medication has been shown to cause fetal harm but it isn't assigned a Pregnancy Risk Category. There are small amounts excreted in breast milk.
Topical Sulfur Applications Counseling: Topical Sulfur Counseling: Patient counseled that this medication may cause skin irritation or allergic reactions.  In the event of skin irritation, the patient was advised to reduce the amount of the drug applied or use it less frequently.   The patient verbalized understanding of the proper use and possible adverse effects of topical sulfur application.  All of the patient's questions and concerns were addressed.
Cibinqo Pregnancy And Lactation Text: It is unknown if this medication will adversely affect pregnancy or breast feeding.  You should not take this medication if you are currently pregnant or planning a pregnancy or while breastfeeding.
Dutasteride Pregnancy And Lactation Text: This medication is absolutely contraindicated in women, especially during pregnancy and breast feeding. Feminization of male fetuses is possible if taking while pregnant.
Tetracycline Counseling: Patient counseled regarding possible photosensitivity and increased risk for sunburn.  Patient instructed to avoid sunlight, if possible.  When exposed to sunlight, patients should wear protective clothing, sunglasses, and sunscreen.  The patient was instructed to call the office immediately if the following severe adverse effects occur:  hearing changes, easy bruising/bleeding, severe headache, or vision changes.  The patient verbalized understanding of the proper use and possible adverse effects of tetracycline.  All of the patient's questions and concerns were addressed. Patient understands to avoid pregnancy while on therapy due to potential birth defects.
Olumiant Counseling: I discussed with the patient the risks of Olumiant therapy including but not limited to upper respiratory tract infections, shingles, cold sores, and nausea. Live vaccines should be avoided.  This medication has been linked to serious infections; higher rate of mortality; malignancy and lymphoproliferative disorders; major adverse cardiovascular events; thrombosis; gastrointestinal perforations; neutropenia; lymphopenia; anemia; liver enzyme elevations; and lipid elevations.
Finasteride Male Counseling: Finasteride Counseling:  I discussed with the patient the risks of use of finasteride including but not limited to decreased libido, decreased ejaculate volume, gynecomastia, and depression. Women should not handle medication.  All of the patient's questions and concerns were addressed.
Aklief Pregnancy And Lactation Text: It is unknown if this medication is safe to use during pregnancy.  It is unknown if this medication is excreted in breast milk.  Breastfeeding women should use the topical cream on the smallest area of the skin for the shortest time needed while breastfeeding.  Do not apply to nipple and areola.
Zyclara Counseling:  I discussed with the patient the risks of imiquimod including but not limited to erythema, scaling, itching, weeping, crusting, and pain.  Patient understands that the inflammatory response to imiquimod is variable from person to person and was educated regarded proper titration schedule.  If flu-like symptoms develop, patient knows to discontinue the medication and contact us.
5-Fu Counseling: 5-Fluorouracil Counseling:  I discussed with the patient the risks of 5-fluorouracil including but not limited to erythema, scaling, itching, weeping, crusting, and pain.
Tazorac Pregnancy And Lactation Text: This medication is not safe during pregnancy. It is unknown if this medication is excreted in breast milk.
Metronidazole Pregnancy And Lactation Text: This medication is Pregnancy Category B and considered safe during pregnancy.  It is also excreted in breast milk.
Oral Minoxidil Pregnancy And Lactation Text: This medication should only be used when clearly needed if you are pregnant, attempting to become pregnant or breast feeding.
Hydroxyzine Counseling: Patient advised that the medication is sedating and not to drive a car after taking this medication.  Patient informed of potential adverse effects including but not limited to dry mouth, urinary retention, and blurry vision.  The patient verbalized understanding of the proper use and possible adverse effects of hydroxyzine.  All of the patient's questions and concerns were addressed.
Opzelura Pregnancy And Lactation Text: There is insufficient data to evaluate drug-associated risk for major birth defects, miscarriage, or other adverse maternal or fetal outcomes.  There is a pregnancy registry that monitors pregnancy outcomes in pregnant persons exposed to the medication during pregnancy.  It is unknown if this medication is excreted in breast milk.  Do not breastfeed during treatment and for about 4 weeks after the last dose.
Skyrizi Counseling: I discussed with the patient the risks of risankizumab-rzaa including but not limited to immunosuppression, and serious infections.  The patient understands that monitoring is required including a PPD at baseline and must alert us or the primary physician if symptoms of infection or other concerning signs are noted.
Cephalexin Counseling: I counseled the patient regarding use of cephalexin as an antibiotic for prophylactic and/or therapeutic purposes. Cephalexin (commonly prescribed under brand name Keflex) is a cephalosporin antibiotic which is active against numerous classes of bacteria, including most skin bacteria. Side effects may include nausea, diarrhea, gastrointestinal upset, rash, hives, yeast infections, and in rare cases, hepatitis, kidney disease, seizures, fever, confusion, neurologic symptoms, and others. Patients with severe allergies to penicillin medications are cautioned that there is about a 10% incidence of cross-reactivity with cephalosporins. When possible, patients with penicillin allergies should use alternatives to cephalosporins for antibiotic therapy.
Solaraze Counseling:  I discussed with the patient the risks of Solaraze including but not limited to erythema, scaling, itching, weeping, crusting, and pain.
Cimzia Counseling:  I discussed with the patient the risks of Cimzia including but not limited to immunosuppression, allergic reactions and infections.  The patient understands that monitoring is required including a PPD at baseline and must alert us or the primary physician if symptoms of infection or other concerning signs are noted.
Minocycline Counseling: Patient advised regarding possible photosensitivity and discoloration of the teeth, skin, lips, tongue and gums.  Patient instructed to avoid sunlight, if possible.  When exposed to sunlight, patients should wear protective clothing, sunglasses, and sunscreen.  The patient was instructed to call the office immediately if the following severe adverse effects occur:  hearing changes, easy bruising/bleeding, severe headache, or vision changes.  The patient verbalized understanding of the proper use and possible adverse effects of minocycline.  All of the patient's questions and concerns were addressed.
Cimzia Pregnancy And Lactation Text: This medication crosses the placenta but can be considered safe in certain situations. Cimzia may be excreted in breast milk.
Solaraze Pregnancy And Lactation Text: This medication is Pregnancy Category B and is considered safe. There is some data to suggest avoiding during the third trimester. It is unknown if this medication is excreted in breast milk.
Topical Clindamycin Counseling: Patient counseled that this medication may cause skin irritation or allergic reactions.  In the event of skin irritation, the patient was advised to reduce the amount of the drug applied or use it less frequently.   The patient verbalized understanding of the proper use and possible adverse effects of clindamycin.  All of the patient's questions and concerns were addressed.
Terbinafine Counseling: Patient counseling regarding adverse effects of terbinafine including but not limited to headache, diarrhea, rash, upset stomach, liver function test abnormalities, itching, taste/smell disturbance, nausea, abdominal pain, and flatulence.  There is a rare possibility of liver failure that can occur when taking terbinafine.  The patient understands that a baseline LFT and kidney function test may be required. The patient verbalized understanding of the proper use and possible adverse effects of terbinafine.  All of the patient's questions and concerns were addressed.
Acitretin Pregnancy And Lactation Text: This medication is Pregnancy Category X and should not be given to women who are pregnant or may become pregnant in the future. This medication is excreted in breast milk.
Imiquimod Counseling:  I discussed with the patient the risks of imiquimod including but not limited to erythema, scaling, itching, weeping, crusting, and pain.  Patient understands that the inflammatory response to imiquimod is variable from person to person and was educated regarded proper titration schedule.  If flu-like symptoms develop, patient knows to discontinue the medication and contact us.
Ilumya Counseling: I discussed with the patient the risks of tildrakizumab including but not limited to immunosuppression, malignancy, posterior leukoencephalopathy syndrome, and serious infections.  The patient understands that monitoring is required including a PPD at baseline and must alert us or the primary physician if symptoms of infection or other concerning signs are noted.
Topical Sulfur Applications Pregnancy And Lactation Text: This medication is Pregnancy Category C and has an unknown safety profile during pregnancy. It is unknown if this topical medication is excreted in breast milk.
Low Dose Naltrexone Counseling- I discussed with the patient the potential risks and side effects of low dose naltrexone including but not limited to: more vivid dreams, headaches, nausea, vomiting, abdominal pain, fatigue, dizziness, and anxiety.
Low Dose Naltrexone Pregnancy And Lactation Text: Naltrexone is pregnancy category C.  There have been no adequate and well-controlled studies in pregnant women.  It should be used in pregnancy only if the potential benefit justifies the potential risk to the fetus.   Limited data indicates that naltrexone is minimally excreted into breastmilk.
Wartpeel Counseling:  I discussed with the patient the risks of Wartpeel including but not limited to erythema, scaling, itching, weeping, crusting, and pain.
Cyclophosphamide Counseling:  I discussed with the patient the risks of cyclophosphamide including but not limited to hair loss, hormonal abnormalities, decreased fertility, abdominal pain, diarrhea, nausea and vomiting, bone marrow suppression and infection. The patient understands that monitoring is required while taking this medication.
Bexarotene Counseling:  I discussed with the patient the risks of bexarotene including but not limited to hair loss, dry lips/skin/eyes, liver abnormalities, hyperlipidemia, pancreatitis, depression/suicidal ideation, photosensitivity, drug rash/allergic reactions, hypothyroidism, anemia, leukopenia, infection, cataracts, and teratogenicity.  Patient understands that they will need regular blood tests to check lipid profile, liver function tests, white blood cell count, thyroid function tests and pregnancy test if applicable.
Valtrex Counseling: I discussed with the patient the risks of valacyclovir including but not limited to kidney damage, nausea, vomiting and severe allergy.  The patient understands that if the infection seems to be worsening or is not improving, they are to call.
Detail Level: Zone
Azelaic Acid Counseling: Patient counseled that medicine may cause skin irritation and to avoid applying near the eyes.  In the event of skin irritation, the patient was advised to reduce the amount of the drug applied or use it less frequently.   The patient verbalized understanding of the proper use and possible adverse effects of azelaic acid.  All of the patient's questions and concerns were addressed.
Erivedge Counseling- I discussed with the patient the risks of Erivedge including but not limited to nausea, vomiting, diarrhea, constipation, weight loss, changes in the sense of taste, decreased appetite, muscle spasms, and hair loss.  The patient verbalized understanding of the proper use and possible adverse effects of Erivedge.  All of the patient's questions and concerns were addressed.
Finasteride Pregnancy And Lactation Text: This medication is absolutely contraindicated during pregnancy. It is unknown if it is excreted in breast milk.
Cephalexin Pregnancy And Lactation Text: This medication is Pregnancy Category B and considered safe during pregnancy.  It is also excreted in breast milk but can be used safely for shorter doses.
Infliximab Counseling:  I discussed with the patient the risks of infliximab including but not limited to myelosuppression, immunosuppression, autoimmune hepatitis, demyelinating diseases, lymphoma, and serious infections.  The patient understands that monitoring is required including a PPD at baseline and must alert us or the primary physician if symptoms of infection or other concerning signs are noted.
Otezla Counseling: The side effects of Otezla were discussed with the patient, including but not limited to worsening or new depression, weight loss, diarrhea, nausea, upper respiratory tract infection, and headache. Patient instructed to call the office should any adverse effect occur.  The patient verbalized understanding of the proper use and possible adverse effects of Otezla.  All the patient's questions and concerns were addressed.
Hydroxyzine Pregnancy And Lactation Text: This medication is not safe during pregnancy and should not be taken. It is also excreted in breast milk and breast feeding isn't recommended.
Picato Counseling:  I discussed with the patient the risks of Picato including but not limited to erythema, scaling, itching, weeping, crusting, and pain.
Olumiant Pregnancy And Lactation Text: Based on animal studies, Olumiant may cause embryo-fetal harm when administered to pregnant women.  The medication should not be used in pregnancy.  Breastfeeding is not recommended during treatment.
Stelara Counseling:  I discussed with the patient the risks of ustekinumab including but not limited to immunosuppression, malignancy, posterior leukoencephalopathy syndrome, and serious infections.  The patient understands that monitoring is required including a PPD at baseline and must alert us or the primary physician if symptoms of infection or other concerning signs are noted.
Otezla Pregnancy And Lactation Text: This medication is Pregnancy Category C and it isn't known if it is safe during pregnancy. It is unknown if it is excreted in breast milk.
Drysol Counseling:  I discussed with the patient the risks of drysol/aluminum chloride including but not limited to skin rash, itching, irritation, burning.
Fluconazole Counseling:  Patient counseled regarding adverse effects of fluconazole including but not limited to headache, diarrhea, nausea, upset stomach, liver function test abnormalities, taste disturbance, and stomach pain.  There is a rare possibility of liver failure that can occur when taking fluconazole.  The patient understands that monitoring of LFTs and kidney function test may be required, especially at baseline. The patient verbalized understanding of the proper use and possible adverse effects of fluconazole.  All of the patient's questions and concerns were addressed.
Dapsone Counseling: I discussed with the patient the risks of dapsone including but not limited to hemolytic anemia, agranulocytosis, rashes, methemoglobinemia, kidney failure, peripheral neuropathy, headaches, GI upset, and liver toxicity.  Patients who start dapsone require monitoring including baseline LFTs and weekly CBCs for the first month, then every month thereafter.  The patient verbalized understanding of the proper use and possible adverse effects of dapsone.  All of the patient's questions and concerns were addressed.
Topical Clindamycin Pregnancy And Lactation Text: This medication is Pregnancy Category B and is considered safe during pregnancy. It is unknown if it is excreted in breast milk.
Azelaic Acid Pregnancy And Lactation Text: This medication is considered safe during pregnancy and breast feeding.
Tranexamic Acid Counseling:  Patient advised of the small risk of bleeding problems with tranexamic acid. They were also instructed to call if they developed any nausea, vomiting or diarrhea. All of the patient's questions and concerns were addressed.
Soolantra Counseling: I discussed with the patients the risks of topial Soolantra. This is a medicine which decreases the number of mites and inflammation in the skin. You experience burning, stinging, eye irritation or allergic reactions.  Please call our office if you develop any problems from using this medication.
Cosentyx Counseling:  I discussed with the patient the risks of Cosentyx including but not limited to worsening of Crohn's disease, immunosuppression, allergic reactions and infections.  The patient understands that monitoring is required including a PPD at baseline and must alert us or the primary physician if symptoms of infection or other concerning signs are noted.
Terbinafine Pregnancy And Lactation Text: This medication is Pregnancy Category B and is considered safe during pregnancy. It is also excreted in breast milk and breast feeding isn't recommended.
Albendazole Counseling:  I discussed with the patient the risks of albendazole including but not limited to cytopenia, kidney damage, nausea/vomiting and severe allergy.  The patient understands that this medication is being used in an off-label manner.
Bexarotene Pregnancy And Lactation Text: This medication is Pregnancy Category X and should not be given to women who are pregnant or may become pregnant. This medication should not be used if you are breast feeding.
Cyclophosphamide Pregnancy And Lactation Text: This medication is Pregnancy Category D and it isn't considered safe during pregnancy. This medication is excreted in breast milk.
Opioid Counseling: I discussed with the patient the potential side effects of opioids including but not limited to addiction, altered mental status, and depression. I stressed avoiding alcohol, benzodiazepines, muscle relaxants and sleep aids unless specifically okayed by a physician. The patient verbalized understanding of the proper use and possible adverse effects of opioids. All of the patient's questions and concerns were addressed. They were instructed to flush the remaining pills down the toilet if they did not need them for pain.
Clindamycin Counseling: I counseled the patient regarding use of clindamycin as an antibiotic for prophylactic and/or therapeutic purposes. Clindamycin is active against numerous classes of bacteria, including skin bacteria. Side effects may include nausea, diarrhea, gastrointestinal upset, rash, hives, yeast infections, and in rare cases, colitis.
Niacinamide Counseling: I recommended taking niacin or niacinamide, also know as vitamin B3, twice daily. Recent evidence suggests that taking vitamin B3 (500 mg twice daily) can reduce the risk of actinic keratoses and non-melanoma skin cancers. Side effects of vitamin B3 include flushing and headache.
Birth Control Pills Counseling: Birth Control Pill Counseling: I discussed with the patient the potential side effects of OCPs including but not limited to increased risk of stroke, heart attack, thrombophlebitis, deep venous thrombosis, hepatic adenomas, breast changes, GI upset, headaches, and depression.  The patient verbalized understanding of the proper use and possible adverse effects of OCPs. All of the patient's questions and concerns were addressed.
Valtrex Pregnancy And Lactation Text: this medication is Pregnancy Category B and is considered safe during pregnancy. This medication is not directly found in breast milk but it's metabolite acyclovir is present.
Klisyri Counseling:  I discussed with the patient the risks of Klisyri including but not limited to erythema, scaling, itching, weeping, crusting, and pain.
Rinvoq Counseling: I discussed with the patient the risks of Rinvoq therapy including but not limited to upper respiratory tract infections, shingles, cold sores, bronchitis, nausea, cough, fever, acne, and headache. Live vaccines should be avoided.  This medication has been linked to serious infections; higher rate of mortality; malignancy and lymphoproliferative disorders; major adverse cardiovascular events; thrombosis; thrombocytopenia, anemia, and neutropenia; lipid elevations; liver enzyme elevations; and gastrointestinal perforations.
Include Pregnancy/Lactation Warning?: No
Rituxan Counseling:  I discussed with the patient the risks of Rituxan infusions. Side effects can include infusion reactions, severe drug rashes including mucocutaneous reactions, reactivation of latent hepatitis and other infections and rarely progressive multifocal leukoencephalopathy.  All of the patient's questions and concerns were addressed.
Oxybutynin Counseling:  I discussed with the patient the risks of oxybutynin including but not limited to skin rash, drowsiness, dry mouth, difficulty urinating, and blurred vision.
Protopic Counseling: Patient may experience a mild burning sensation during topical application. Protopic is not approved in children less than 2 years of age. There have been case reports of hematologic and skin malignancies in patients using topical calcineurin inhibitors although causality is questionable.
Libtayo Counseling- I discussed with the patient the risks of Libtayo including but not limited to nausea, vomiting, diarrhea, and bone or muscle pain.  The patient verbalized understanding of the proper use and possible adverse effects of Libtayo.  All of the patient's questions and concerns were addressed.
Benzoyl Peroxide Counseling: Patient counseled that medicine may cause skin irritation and bleach clothing.  In the event of skin irritation, the patient was advised to reduce the amount of the drug applied or use it less frequently.   The patient verbalized understanding of the proper use and possible adverse effects of benzoyl peroxide.  All of the patient's questions and concerns were addressed.
Cimetidine Counseling:  I discussed with the patient the risks of Cimetidine including but not limited to gynecomastia, headache, diarrhea, nausea, drowsiness, arrhythmias, pancreatitis, skin rashes, psychosis, bone marrow suppression and kidney toxicity.
Clindamycin Pregnancy And Lactation Text: This medication can be used in pregnancy if certain situations. Clindamycin is also present in breast milk.
Quinolones Counseling:  I discussed with the patient the risks of fluoroquinolones including but not limited to GI upset, allergic reaction, drug rash, diarrhea, dizziness, photosensitivity, yeast infections, liver function test abnormalities, tendonitis/tendon rupture.
Tranexamic Acid Pregnancy And Lactation Text: It is unknown if this medication is safe during pregnancy or breast feeding.
Topical Ketoconazole Counseling: Patient counseled that this medication may cause skin irritation or allergic reactions.  In the event of skin irritation, the patient was advised to reduce the amount of the drug applied or use it less frequently.   The patient verbalized understanding of the proper use and possible adverse effects of ketoconazole.  All of the patient's questions and concerns were addressed.
Dapsone Pregnancy And Lactation Text: This medication is Pregnancy Category C and is not considered safe during pregnancy or breast feeding.
Gabapentin Counseling: I discussed with the patient the risks of gabapentin including but not limited to dizziness, somnolence, fatigue and ataxia.
Dupixent Counseling: I discussed with the patient the risks of dupilumab including but not limited to eye infection and irritation, cold sores, injection site reactions, worsening of asthma, allergic reactions and increased risk of parasitic infection.  Live vaccines should be avoided while taking dupilumab. Dupilumab will also interact with certain medications such as warfarin and cyclosporine. The patient understands that monitoring is required and they must alert us or the primary physician if symptoms of infection or other concerning signs are noted.
Opioid Pregnancy And Lactation Text: These medications can lead to premature delivery and should be avoided during pregnancy. These medications are also present in breast milk in small amounts.
Elidel Counseling: Patient may experience a mild burning sensation during topical application. Elidel is not approved in children less than 2 years of age. There have been case reports of hematologic and skin malignancies in patients using topical calcineurin inhibitors although causality is questionable.
Isotretinoin Counseling: Patient should get monthly blood tests, not donate blood, not drive at night if vision affected, not share medication, and not undergo elective surgery for 6 months after tx completed. Side effects reviewed, pt to contact office should one occur.
Klisyri Pregnancy And Lactation Text: It is unknown if this medication can harm a developing fetus or if it is excreted in breast milk.
Rinvoq Pregnancy And Lactation Text: Based on animal studies, Rinvoq may cause embryo-fetal harm when administered to pregnant women.  The medication should not be used in pregnancy.  Breastfeeding is not recommended during treatment and for 6 days after the last dose.
Birth Control Pills Pregnancy And Lactation Text: This medication should be avoided if pregnant and for the first 30 days post-partum.
Cyclosporine Counseling:  I discussed with the patient the risks of cyclosporine including but not limited to hypertension, gingival hyperplasia,myelosuppression, immunosuppression, liver damage, kidney damage, neurotoxicity, lymphoma, and serious infections. The patient understands that monitoring is required including baseline blood pressure, CBC, CMP, lipid panel and uric acid, and then 1-2 times monthly CMP and blood pressure.
Niacinamide Pregnancy And Lactation Text: These medications are considered safe during pregnancy.
Winlevi Counseling:  I discussed with the patient the risks of topical clascoterone including but not limited to erythema, scaling, itching, and stinging. Patient voiced their understanding.
Sotyktu Counseling:  I discussed the most common side effects of Sotyktu including: common cold, sore throat, sinus infections, cold sores, canker sores, folliculitis, and acne.? I also discussed more serious side effects of Sotyktu including but not limited to: serious allergic reactions; increased risk for infections such as TB; cancers such as lymphomas; rhabdomyolysis and elevated CPK; and elevated triglycerides and liver enzymes.?
Nsaids Counseling: NSAID Counseling: I discussed with the patient that NSAIDs should be taken with food. Prolonged use of NSAIDs can result in the development of stomach ulcers.  Patient advised to stop taking NSAIDs if abdominal pain occurs.  The patient verbalized understanding of the proper use and possible adverse effects of NSAIDs.  All of the patient's questions and concerns were addressed.
Winlevi Pregnancy And Lactation Text: This medication is considered safe during pregnancy and breastfeeding.
Spironolactone Counseling: Patient advised regarding risks of diarrhea, abdominal pain, hyperkalemia, birth defects (for female patients), liver toxicity and renal toxicity. The patient may need blood work to monitor liver and kidney function and potassium levels while on therapy. The patient verbalized understanding of the proper use and possible adverse effects of spironolactone.  All of the patient's questions and concerns were addressed.
Benzoyl Peroxide Pregnancy And Lactation Text: This medication is Pregnancy Category C. It is unknown if benzoyl peroxide is excreted in breast milk.
Methotrexate Pregnancy And Lactation Text: This medication is Pregnancy Category X and is known to cause fetal harm. This medication is excreted in breast milk.
Isotretinoin Pregnancy And Lactation Text: This medication is Pregnancy Category X and is considered extremely dangerous during pregnancy. It is unknown if it is excreted in breast milk.
Libtayo Pregnancy And Lactation Text: This medication is contraindicated in pregnancy and when breast feeding.
Rituxan Pregnancy And Lactation Text: This medication is Pregnancy Category C and it isn't know if it is safe during pregnancy. It is unknown if this medication is excreted in breast milk but similar antibodies are known to be excreted.
Protopic Pregnancy And Lactation Text: This medication is Pregnancy Category C. It is unknown if this medication is excreted in breast milk when applied topically.
Doxycycline Counseling:  Patient counseled regarding possible photosensitivity and increased risk for sunburn.  Patient instructed to avoid sunlight, if possible.  When exposed to sunlight, patients should wear protective clothing, sunglasses, and sunscreen.  The patient was instructed to call the office immediately if the following severe adverse effects occur:  hearing changes, easy bruising/bleeding, severe headache, or vision changes.  The patient verbalized understanding of the proper use and possible adverse effects of doxycycline.  All of the patient's questions and concerns were addressed.
Taltz Counseling: I discussed with the patient the risks of ixekizumab including but not limited to immunosuppression, serious infections, worsening of inflammatory bowel disease and drug reactions.  The patient understands that monitoring is required including a PPD at baseline and must alert us or the primary physician if symptoms of infection or other concerning signs are noted.
Griseofulvin Counseling:  I discussed with the patient the risks of griseofulvin including but not limited to photosensitivity, cytopenia, liver damage, nausea/vomiting and severe allergy.  The patient understands that this medication is best absorbed when taken with a fatty meal (e.g., ice cream or french fries).
Griseofulvin Pregnancy And Lactation Text: This medication is Pregnancy Category X and is known to cause serious birth defects. It is unknown if this medication is excreted in breast milk but breast feeding should be avoided.
Topical Metronidazole Counseling: Metronidazole is a topical antibiotic medication. You may experience burning, stinging, redness, or allergic reactions.  Please call our office if you develop any problems from using this medication.
Ivermectin Counseling:  Patient instructed to take medication on an empty stomach with a full glass of water.  Patient informed of potential adverse effects including but not limited to nausea, diarrhea, dizziness, itching, and swelling of the extremities or lymph nodes.  The patient verbalized understanding of the proper use and possible adverse effects of ivermectin.  All of the patient's questions and concerns were addressed.
Dupixent Pregnancy And Lactation Text: This medication likely crosses the placenta but the risk for the fetus is uncertain. This medication is excreted in breast milk.
Rifampin Counseling: I discussed with the patient the risks of rifampin including but not limited to liver damage, kidney damage, red-orange body fluids, nausea/vomiting and severe allergy.
Minoxidil Counseling: Minoxidil is a topical medication which can increase blood flow where it is applied. It is uncertain how this medication increases hair growth. Side effects are uncommon and include stinging and allergic reactions.

## 2023-10-09 ENCOUNTER — NON-PROVIDER VISIT (OUTPATIENT)
Dept: CARDIOLOGY | Facility: MEDICAL CENTER | Age: 88
End: 2023-10-09
Payer: MEDICARE

## 2023-10-09 PROCEDURE — 93294 REM INTERROG EVL PM/LDLS PM: CPT | Performed by: INTERNAL MEDICINE

## 2023-10-09 NOTE — CARDIAC REMOTE MONITOR - SCAN
Device transmission reviewed. Device demonstrated appropriate function.       Electronically Signed by: Nancy Vasquez M.D.    10/23/2023  2:15 PM

## 2023-11-06 RX ORDER — FINASTERIDE 5 MG/1
5 TABLET, FILM COATED ORAL DAILY
Qty: 90 TABLET | Refills: 2 | Status: SHIPPED | OUTPATIENT
Start: 2023-11-06

## 2023-11-08 ENCOUNTER — OFFICE VISIT (OUTPATIENT)
Dept: INTERNAL MEDICINE | Facility: IMAGING CENTER | Age: 88
End: 2023-11-08
Payer: MEDICARE

## 2023-11-08 ENCOUNTER — HOSPITAL ENCOUNTER (OUTPATIENT)
Facility: MEDICAL CENTER | Age: 88
End: 2023-11-08
Attending: FAMILY MEDICINE
Payer: MEDICARE

## 2023-11-08 VITALS
DIASTOLIC BLOOD PRESSURE: 70 MMHG | HEIGHT: 70 IN | BODY MASS INDEX: 22.62 KG/M2 | WEIGHT: 158 LBS | RESPIRATION RATE: 14 BRPM | TEMPERATURE: 97.9 F | OXYGEN SATURATION: 94 % | HEART RATE: 63 BPM | SYSTOLIC BLOOD PRESSURE: 124 MMHG

## 2023-11-08 DIAGNOSIS — Z23 NEED FOR VACCINATION: ICD-10-CM

## 2023-11-08 DIAGNOSIS — E11.9 TYPE 2 DIABETES MELLITUS WITHOUT COMPLICATION, WITHOUT LONG-TERM CURRENT USE OF INSULIN (HCC): ICD-10-CM

## 2023-11-08 DIAGNOSIS — Z00.00 MEDICARE ANNUAL WELLNESS VISIT, SUBSEQUENT: Primary | ICD-10-CM

## 2023-11-08 DIAGNOSIS — I10 HYPERTENSION, UNSPECIFIED TYPE: ICD-10-CM

## 2023-11-08 LAB
ALBUMIN SERPL BCP-MCNC: 4.5 G/DL (ref 3.2–4.9)
ALBUMIN/GLOB SERPL: 1.5 G/DL
ALP SERPL-CCNC: 66 U/L (ref 30–99)
ALT SERPL-CCNC: 23 U/L (ref 2–50)
ANION GAP SERPL CALC-SCNC: 12 MMOL/L (ref 7–16)
AST SERPL-CCNC: 20 U/L (ref 12–45)
BILIRUB SERPL-MCNC: 0.7 MG/DL (ref 0.1–1.5)
BUN SERPL-MCNC: 37 MG/DL (ref 8–22)
CALCIUM ALBUM COR SERPL-MCNC: 9.8 MG/DL (ref 8.5–10.5)
CALCIUM SERPL-MCNC: 10.2 MG/DL (ref 8.5–10.5)
CHLORIDE SERPL-SCNC: 104 MMOL/L (ref 96–112)
CHOLEST SERPL-MCNC: 137 MG/DL (ref 100–199)
CO2 SERPL-SCNC: 23 MMOL/L (ref 20–33)
CREAT SERPL-MCNC: 1.25 MG/DL (ref 0.5–1.4)
CREAT UR-MCNC: 86.69 MG/DL
EST. AVERAGE GLUCOSE BLD GHB EST-MCNC: 163 MG/DL
GFR SERPLBLD CREATININE-BSD FMLA CKD-EPI: 56 ML/MIN/1.73 M 2
GLOBULIN SER CALC-MCNC: 3.1 G/DL (ref 1.9–3.5)
GLUCOSE SERPL-MCNC: 147 MG/DL (ref 65–99)
HBA1C MFR BLD: 7.3 % (ref 4–5.6)
HDLC SERPL-MCNC: 42 MG/DL
LDLC SERPL CALC-MCNC: 73 MG/DL
MICROALBUMIN UR-MCNC: 1.7 MG/DL
MICROALBUMIN/CREAT UR: 20 MG/G (ref 0–30)
POTASSIUM SERPL-SCNC: 4.8 MMOL/L (ref 3.6–5.5)
PROT SERPL-MCNC: 7.6 G/DL (ref 6–8.2)
SODIUM SERPL-SCNC: 139 MMOL/L (ref 135–145)
TRIGL SERPL-MCNC: 111 MG/DL (ref 0–149)
TSH SERPL DL<=0.005 MIU/L-ACNC: 2.52 UIU/ML (ref 0.38–5.33)

## 2023-11-08 PROCEDURE — 3078F DIAST BP <80 MM HG: CPT | Performed by: FAMILY MEDICINE

## 2023-11-08 PROCEDURE — 3074F SYST BP LT 130 MM HG: CPT | Performed by: FAMILY MEDICINE

## 2023-11-08 PROCEDURE — 82570 ASSAY OF URINE CREATININE: CPT

## 2023-11-08 PROCEDURE — 83036 HEMOGLOBIN GLYCOSYLATED A1C: CPT

## 2023-11-08 PROCEDURE — 80053 COMPREHEN METABOLIC PANEL: CPT

## 2023-11-08 PROCEDURE — 80061 LIPID PANEL: CPT

## 2023-11-08 PROCEDURE — G0439 PPPS, SUBSEQ VISIT: HCPCS | Mod: 25 | Performed by: FAMILY MEDICINE

## 2023-11-08 PROCEDURE — G0008 ADMIN INFLUENZA VIRUS VAC: HCPCS | Performed by: FAMILY MEDICINE

## 2023-11-08 PROCEDURE — 82043 UR ALBUMIN QUANTITATIVE: CPT

## 2023-11-08 PROCEDURE — 90662 IIV NO PRSV INCREASED AG IM: CPT | Performed by: FAMILY MEDICINE

## 2023-11-08 PROCEDURE — 84443 ASSAY THYROID STIM HORMONE: CPT

## 2023-11-08 ASSESSMENT — PATIENT HEALTH QUESTIONNAIRE - PHQ9: CLINICAL INTERPRETATION OF PHQ2 SCORE: 0

## 2023-11-08 ASSESSMENT — FIBROSIS 4 INDEX: FIB4 SCORE: 1.82

## 2023-11-08 ASSESSMENT — ACTIVITIES OF DAILY LIVING (ADL): BATHING_REQUIRES_ASSISTANCE: 0

## 2023-11-08 ASSESSMENT — ENCOUNTER SYMPTOMS: GENERAL WELL-BEING: EXCELLENT

## 2023-11-27 RX ORDER — FAMOTIDINE 40 MG/1
TABLET, FILM COATED ORAL
Qty: 90 TABLET | Refills: 1 | Status: SHIPPED | OUTPATIENT
Start: 2023-11-27

## 2023-11-27 NOTE — PROGRESS NOTES
Chief Complaint   Patient presents with    Medicare Annual Wellness    Urinary Frequency       HPI:  FRANKY TEMPLE Jr. is a 88 y.o. here for Medicare Annual Wellness Visit     Urinating more than usual, no blood, no dysuria   BS stable    Patient Active Problem List    Diagnosis Date Noted    Major depressive disorder, single episode, mild ([facility]) 08/04/2023    Degenerative disease of nervous system, unspecified ([facility]) 08/04/2023    Secondary hypercoagulable state ([facility]) 04/20/2023    Stage 3a chronic kidney disease ([facility]) 04/20/2023    Paroxysmal atrial fibrillation ([facility]) 08/29/2022    Peripheral arterial disease ([facility]) 08/29/2022    HTN (hypertension) 06/03/2022    Pacemaker 03/04/2022    Suspected cerebrovascular accident (CVA) 11/23/2021    DNR (do not resuscitate) 08/29/2021    COVID-19 08/28/2021    Age-related physical debility 08/28/2021    B12 deficiency 08/28/2021    GERD (gastroesophageal reflux disease) 08/28/2021    Elevated troponin 08/28/2021    Benign prostatic hyperplasia 01/22/2020    Type 2 diabetes mellitus with stage 3a chronic kidney disease, without long-term current use of insulin ([facility]) 10/18/2017    Bilateral carotid artery disease ([facility]) 10/18/2017    Psoriasis 06/14/2017    Vitamin D deficiency 08/27/2015    History of shingles 08/27/2015    Hyperlipidemia 07/08/2015    Hypothyroidism 07/08/2015    MCI (mild cognitive impairment) 07/08/2015       Current Outpatient Medications   Medication Sig Dispense Refill    finasteride (PROSCAR) 5 MG Tab TAKE 1 TABLET BY MOUTH EVERY DAY 90 Tablet 2    losartan (COZAAR) 25 MG Tab Take 1 Tablet by mouth 2 (two) times a day. 200 Tablet 2    Blood Glucose Test Strips Use one Abbott Freestyle Lite strip to test blood sugar once daily early morning before first meal. 100 Strip 11    Lancets Use one Abbott Freestyle Lite lancet to test blood sugar once daily early morning before first meal. 100 Each 11    Alcohol Swabs Wipe site with prep pad prior to  injection. 100 Each 11    famotidine (PEPCID) 40 MG Tab TAKE 1 TABLET BY MOUTH EVERY DAY IN THE EVENING 90 Tablet 1    FREESTYLE LITE strip USE ONE ABBOTT FREESTYLE LITE STRIP TO TEST BLOOD SUGAR ONCE DAILY EARLY MORNING BEFORE FIRST MEAL. 100 Strip 3    Semaglutide,0.25 or 0.5MG/DOS, 2 MG/1.5ML Solution Pen-injector Inject 0.5 mg under the skin every 7 days. 4 Each 3    furosemide (LASIX) 20 MG Tab TAKE 1 TABLET BY MOUTH 1 TIME A DAY AS NEEDED. 100 Tablet 3    Blood Glucose Monitoring Suppl (FREESTYLE FREEDOM LITE) w/Device Kit PLEASE SEE ATTACHED FOR DETAILED DIRECTIONS      Blood Glucose Meter Kit Test blood sugar as recommended by provider. Abbott Freestyle Lite blood glucose monitoring kit. Or whatever meter is preferred by insurance 1 Kit 0    ELIQUIS 5 MG Tab TAKE 1 TABLET BY MOUTH TWICE A  Tablet 3    tamsulosin (FLOMAX) 0.4 MG capsule TAKE 1 CAPSULE BY MOUTH HALF HOUR AFTER BREAKFAST 90 Capsule 3    levothyroxine (SYNTHROID) 75 MCG Tab TAKE 1 TABLET BY MOUTH EVERY DAY 90 Tablet 3    liothyronine (CYTOMEL) 5 MCG Tab TAKE 2 TABLETS BY MOUTH EVERY  Tablet 2    atorvastatin (LIPITOR) 20 MG Tab TAKE 1 TABLET BY MOUTH EVERY  Tablet 3    Ascorbic Acid (VITAMIN C PO) Take 500 mg by mouth every day.      Cholecalciferol (VITAMIN D3 PO) Take 50 mcg by mouth every day.      Cyanocobalamin (VITAMIN B 12 PO) Take 500 mcg by mouth every day.       No current facility-administered medications for this visit.          Current supplements as per medication list.     Allergies: Pcn [penicillins] and Sulfa drugs    Current social contact/activities: friends/family     He  reports that he has never smoked. He has never used smokeless tobacco. He reports current alcohol use of about 2.4 oz of alcohol per week. He reports that he does not use drugs.  Counseling given: Not Answered      ROS:          Gait: Uses no assistive device  Ostomy: No  Other tubes: No  Amputations: No  Chronic oxygen use: No  Last eye  exam: Up-to-date  Wears hearing aids: No   : Denies any unmanageable urinary leakage during the last 6 months       Screening:     Depression Screening  Little interest or pleasure in doing things?  0 - not at all  Feeling down, depressed , or hopeless? 0 - not at all  Patient Health Questionnaire Score: 0     If depressive symptoms identified deferred to follow up visit unless specifically addressed in assessment and plan.    Interpretation of PHQ-9 Total Score   Score Severity   1-4 No Depression   5-9 Mild Depression   10-14 Moderate Depression   15-19 Moderately Severe Depression   20-27 Severe Depression    Screening for Cognitive Impairment  Do you or any of your friends or family members have any concern about your memory? No  Three Minute Recall (Banana, Sunrise, Chair) 2/3    Joseph clock face with all 12 numbers and set the hands to show 20 past 8.  Yes    Cognitive concerns identified deferred for follow up unless specifically addressed in assessment and plan.    Fall Risk Assessment  Has the patient had two or more falls in the last year or any fall with injury in the last year?  No    Safety Assessment  Do you always wear your seatbelt?  Yes  Any changes to home needed to function safely? Yes  Difficulty hearing.  Yes  Patient counseled about all safety risks that were identified.    Functional Assessment ADLs  Are there any barriers preventing you from cooking for yourself or meeting nutritional needs?  No.    Are there any barriers preventing you from driving safely or obtaining transportation?  No.    Are there any barriers preventing you from using a telephone or calling for help?  No    Are there any barriers preventing you from shopping?  No.    Are there any barriers preventing you from taking care of your own finances?  No    Are there any barriers preventing you from managing your medications?  No    Are there any barriers preventing you from showering, bathing or dressing yourself? No    Are  there any barriers preventing you from doing housework or laundry? No  Are there any barriers preventing you from using the toilet?No  Are you currently engaging in any exercise or physical activity?  No.      Self-Assessment of Health  What is your perception of your health? Excellent  Do you sleep more than six hours a night? No  In the past 7 days, how much did pain keep you from doing your normal work? None  Do you spend quality time with family or friends (virtually or in person)? Yes  Do you usually eat a heart healthy diet that constists of a variety of fruits, vegetables, whole grains and fiber? Yes  Do you eat foods high in fat and/or Fast Food more than three times per week? No    Advance Care Planning  Do you have an Advance Directive, Living Will, Durable Power of , or POLST? No                 Health Maintenance Summary            Overdue - IMM DTaP/Tdap/Td Vaccine (1 - Tdap) Overdue - never done      No completion history exists for this topic.              Overdue - Zoster (Shingles) Vaccines (2 of 3) Overdue since 7/17/2014 05/22/2014  Imm Admin: Zoster Vaccine Live (ZVL) (Zostavax) - HISTORICAL DATA              Overdue - COVID-19 Vaccine (3 - Pfizer series) Overdue since 4/8/2021 02/11/2021  Imm Admin: PFIZER PURPLE CAP SARS-COV-2 VACCINATION (12+)    01/21/2021  Imm Admin: PFIZER PURPLE CAP SARS-COV-2 VACCINATION (12+)              Overdue - Annual Wellness Visit (Every 366 Days) Overdue since 8/30/2023 08/29/2022  Level of Service: [place] ANNUAL WELLNESS VISIT-INCLUDES PPPS SUBSEQUE*    08/07/2020  Subsequent Annual Wellness Visit - Includes PPPS ()    08/07/2020  Visit Dx: Medicare annual wellness visit, subsequent    10/24/2018  Visit Dx: Medicare annual wellness visit, subsequent    10/17/2017  Visit Dx: Medicare annual wellness visit, subsequent              A1c Screening (Every 6 Months) Next due on 5/8/2024 11/08/2023  HEMOGLOBIN A1C    07/25/2023  POCT A1C     04/20/2023  HEMOGLOBIN A1C    01/24/2023  POCT A1C    10/19/2022  HEMOGLOBIN A1C    Only the first 5 history entries have been loaded, but more history exists.              Diabetes: Retinopathy Screening (Yearly) Next due on 5/31/2024 05/31/2023  Done    08/06/2018  REFERRAL FOR RETINAL SCREENING EXAM              Diabetes: Monofilament / LE Exam (Yearly) Tentatively due on 8/4/2024 08/04/2023  Diabetic Monofilament Lower Extremity Exam    08/07/2020  Diabetic Monofilament Lower Extremity Exam    10/24/2018  Diabetic Monofilament LE Exam    10/17/2017  Done              Fasting Lipid Profile (Yearly) Next due on 11/8/2024 11/08/2023  Lipid Profile    07/19/2023  Lipid Profile    10/19/2022  Lipid Profile    11/24/2021  Lipid Profile (Lipid Panel) Fasting    08/29/2021  Lipid Profile    Only the first 5 history entries have been loaded, but more history exists.              Diabetes: Urine Protein Screening (Yearly) Next due on 11/8/2024 11/08/2023  MICROALBUMIN CREAT RATIO URINE    10/19/2022  MICROALBUMIN CREAT RATIO URINE    08/11/2020  MICROALBUMIN CREAT RATIO URINE    10/31/2018  MICROALBUMIN CREAT RATIO URINE    10/12/2017  MICROALBUMIN CREAT RATIO URINE    Only the first 5 history entries have been loaded, but more history exists.              SERUM CREATININE (Yearly) Next due on 11/8/2024 11/08/2023  Comp Metabolic Panel    07/19/2023  Comp Metabolic Panel    03/21/2023  Basic Metabolic Panel    10/19/2022  Comp Metabolic Panel    07/18/2022  Basic Metabolic Panel    Only the first 5 history entries have been loaded, but more history exists.              Pneumococcal Vaccine: 65+ Years (Series Information) Completed      11/08/2016  Imm Admin: Pneumococcal Conjugate Vaccine (Prevnar/PCV-13)    10/08/2013  Imm Admin: Pneumococcal polysaccharide vaccine (PPSV-23)              Influenza Vaccine (Series Information) Completed      11/08/2023  Imm Admin: Influenza Vaccine Adult HD     10/19/2022  Imm Admin: Influenza Vaccine Adult HD    10/13/2020  Imm Admin: Influenza Vaccine Adult HD    09/25/2019  Imm Admin: Influenza Vaccine Adult HD    10/09/2018  Imm Admin: Influenza Vaccine Adult HD    Only the first 5 history entries have been loaded, but more history exists.              Hepatitis A Vaccine (Hep A) (Series Information) Aged Out      No completion history exists for this topic.              HPV Vaccines (Series Information) Aged Out      No completion history exists for this topic.              Polio Vaccine (Inactivated Polio) (Series Information) Aged Out      No completion history exists for this topic.              Meningococcal Immunization (Series Information) Aged Out      No completion history exists for this topic.              Discontinued - Colorectal Cancer Screening  Discontinued        Frequency changed to Never automatically (Topic No Longer Applies)    02/21/2013  CT-VIRTUAL COLONOSCOPY-DIAG    02/10/2013  AMB REFERRAL TO GI FOR COLONOSCOPY              Discontinued - Hepatitis B Vaccine (Hep B)  Discontinued      No completion history exists for this topic.                    Patient Care Team:  Wilma Guillermo M.D. as PCP - General (Family Medicine)  Wilma Guillermo M.D. as PCP - [facility] Paneled  Faviola Lennon R.N. as Registered Nurse        Social History     Tobacco Use    Smoking status: Never    Smokeless tobacco: Never   Vaping Use    Vaping Use: Never used   Substance Use Topics    Alcohol use: Yes     Alcohol/week: 2.4 oz     Types: 4 Standard drinks or equivalent per week     Comment: weekly    Drug use: No     Family History   Problem Relation Age of Onset    Stroke Brother     Stroke Father      He  has a past medical history of Asthma, BPH (benign prostatic hyperplasia), Cataract, Dental disorder, Diabetes (HCC), Diverticulitis, Esophageal stricture, Hiatal hernia, High cholesterol, History of shingles (8/27/2015), Hyperlipidemia (7/8/2015),  "Hypertension, Hypothyroid, PHN (postherpetic neuralgia) (8/27/2015), Psoriasis (6/14/2017), Stroke (HCC), TIA (transient ischemic attack), Urinary incontinence, and Vitamin D deficiency (8/27/2015).    He has no past medical history of Cancer (HCC).   Past Surgical History:   Procedure Laterality Date    CATARACT EXTRACTION WITH IOL      TONSILLECTOMY      VASECTOMY         Exam:   /70   Pulse 63   Temp 36.6 °C (97.9 °F)   Resp 14   Ht 1.778 m (5' 10\")   Wt 71.7 kg (158 lb)   SpO2 94%  Body mass index is 22.67 kg/m².         Physical Exam  Constitutional:       General: he is not in acute distress.     Appearance: Normal appearance.   HENT:      Head: Normocephalic and atraumatic.      Nose: Nose normal.      Mouth/Throat:      Mouth: Mucous membranes are moist.   Eyes:      Conjunctiva/sclera: Conjunctivae normal.   Cardiovascular:      Rate and Rhythm: Normal rate and regular rhythm.   Pulmonary:      Effort: Pulmonary effort is normal.      Breath sounds: Normal breath sounds. No wheezing.   Abdominal:      General: Abdomen is flat.      Palpations: Abdomen is soft.   Musculoskeletal:      Cervical back: No rigidity.      Right lower leg: No edema.      Left lower leg: No edema.   Lymphadenopathy:      Cervical: No cervical adenopathy.   Skin:     Coloration: Skin is not jaundiced.      Findings: No erythema.   Neurological:      General: No focal deficit present.      Mental Status: he is alert and oriented to person, place, and time. Mental status is at baseline.   Psychiatric:         Mood and Affect: Mood normal.         Behavior: Behavior normal.         Thought Content: Thought content normal.         Judgment: Judgment normal.   Hospital Outpatient Visit on 11/08/2023   Component Date Value Ref Range Status    Sodium 11/08/2023 139  135 - 145 mmol/L Final    Potassium 11/08/2023 4.8  3.6 - 5.5 mmol/L Final    Chloride 11/08/2023 104  96 - 112 mmol/L Final    Co2 11/08/2023 23  20 - 33 mmol/L " Final    Anion Gap 11/08/2023 12.0  7.0 - 16.0 Final    Glucose 11/08/2023 147 (H)  65 - 99 mg/dL Final    Bun 11/08/2023 37 (H)  8 - 22 mg/dL Final    Creatinine 11/08/2023 1.25  0.50 - 1.40 mg/dL Final    Calcium 11/08/2023 10.2  8.5 - 10.5 mg/dL Final    Correct Calcium 11/08/2023 9.8  8.5 - 10.5 mg/dL Final    AST(SGOT) 11/08/2023 20  12 - 45 U/L Final    ALT(SGPT) 11/08/2023 23  2 - 50 U/L Final    Alkaline Phosphatase 11/08/2023 66  30 - 99 U/L Final    Total Bilirubin 11/08/2023 0.7  0.1 - 1.5 mg/dL Final    Albumin 11/08/2023 4.5  3.2 - 4.9 g/dL Final    Total Protein 11/08/2023 7.6  6.0 - 8.2 g/dL Final    Globulin 11/08/2023 3.1  1.9 - 3.5 g/dL Final    A-G Ratio 11/08/2023 1.5  g/dL Final    Cholesterol,Tot 11/08/2023 137  100 - 199 mg/dL Final    Triglycerides 11/08/2023 111  0 - 149 mg/dL Final    HDL 11/08/2023 42  >=40 mg/dL Final    LDL 11/08/2023 73  <100 mg/dL Final    Glycohemoglobin 11/08/2023 7.3 (H)  4.0 - 5.6 % Final    Comment: Increased risk for diabetes:  5.7 -6.4%  Diabetes:  >6.4%  Glycemic control for adults with diabetes:  <7.0%    The above interpretations are per ADA guidelines.  Diagnosis  of diabetes mellitus on the basis of elevated Hemoglobin A1c  should be confirmed by repeating the Hb A1c test.      Est Avg Glucose 11/08/2023 163  mg/dL Final    Comment: The eAG calculation is based on the A1c-Derived Daily Glucose  (ADAG) study.  See the ADA's website for additional information.      Creatinine, Urine 11/08/2023 86.69  mg/dL Final    Microalbumin, Urine Random 11/08/2023 1.7  mg/dL Final    Micro Alb Creat Ratio 11/08/2023 20  0 - 30 mg/g Final    TSH 11/08/2023 2.520  0.380 - 5.330 uIU/mL Final    Comment: The 2011 American Thyroid Association (MICHELLE) guidelines  recommended that the interpretation of thyroid function in  pregnancy be based on trimester specific reference ranges.    1st Trimester  0.100-2.500 mIU/L  2nd Trimester  0.200-3.000 mIU/L  3rd Trimester  0.300-3.500  mIU/L    These established reference ranges have not been validated  at [facility].      GFR (CKD-EPI) 11/08/2023 56 (A)  >60 mL/min/1.73 m 2 Final    Comment: Estimated Glomerular Filtration Rate is calculated using  race neutral CKD-EPI 2021 equation per NKF-ASN recommendations.     Office Visit on 07/25/2023   Component Date Value Ref Range Status    Glycohemoglobin 07/25/2023 6.9 (A)  5.8 % Final    Internal Control Positive 07/25/2023 Positive   Final    Internal Control Negative 07/25/2023 Negative   Final   Hospital Outpatient Visit on 07/19/2023   Component Date Value Ref Range Status    Sodium 07/19/2023 142  135 - 145 mmol/L Final    Potassium 07/19/2023 4.6  3.6 - 5.5 mmol/L Final    Chloride 07/19/2023 106  96 - 112 mmol/L Final    Co2 07/19/2023 23  20 - 33 mmol/L Final    Anion Gap 07/19/2023 13.0  7.0 - 16.0 Final    Glucose 07/19/2023 131 (H)  65 - 99 mg/dL Final    Bun 07/19/2023 29 (H)  8 - 22 mg/dL Final    Creatinine 07/19/2023 1.20  0.50 - 1.40 mg/dL Final    Calcium 07/19/2023 10.1  8.5 - 10.5 mg/dL Final    AST(SGOT) 07/19/2023 23  12 - 45 U/L Final    ALT(SGPT) 07/19/2023 27  2 - 50 U/L Final    Alkaline Phosphatase 07/19/2023 65  30 - 99 U/L Final    Total Bilirubin 07/19/2023 0.7  0.1 - 1.5 mg/dL Final    Albumin 07/19/2023 4.3  3.2 - 4.9 g/dL Final    Total Protein 07/19/2023 7.3  6.0 - 8.2 g/dL Final    Globulin 07/19/2023 3.0  1.9 - 3.5 g/dL Final    A-G Ratio 07/19/2023 1.4  g/dL Final    Cholesterol,Tot 07/19/2023 114  100 - 199 mg/dL Final    Triglycerides 07/19/2023 88  0 - 149 mg/dL Final    HDL 07/19/2023 39 (A)  >=40 mg/dL Final    LDL 07/19/2023 57  <100 mg/dL Final    TSH 07/19/2023 3.190  0.380 - 5.330 uIU/mL Final    Comment: The 2011 American Thyroid Association (MICHELLE) guidelines  recommended that the interpretation of thyroid function in  pregnancy be based on trimester specific reference ranges.    1st Trimester  0.100-2.500 mIU/L  2nd Trimester  0.200-3.000  mIU/L  3rd Trimester  0.300-3.500 mIU/L    These established reference ranges have not been validated  at [facility].      Correct Calcium 07/19/2023 9.9  8.5 - 10.5 mg/dL Final    GFR (CKD-EPI) 07/19/2023 59 (A)  >60 mL/min/1.73 m 2 Final    Comment: Estimated Glomerular Filtration Rate is calculated using  race neutral CKD-EPI 2021 equation per NKF-ASN recommendations.     ]       Assessment and Plan. The following treatment and monitoring plan is recommended:     1. Medicare annual wellness visit, subsequent- all cond stable, donnell labs q  6m    2. Type 2 diabetes mellitus without complication, without long-term current use of insulin (HCC)  --stable continue current regimen    - TSH; Future  - MICROALBUMIN CREAT RATIO URINE; Future  - HEMOGLOBIN A1C; Future  - Lipid Profile; Future  - Comp Metabolic Panel; Future    3. Hypertension, unspecified type--stable continue current regimen      4. Need for vaccination  - INFLUENZA VACCINE, HIGH DOSE (65+ ONLY)           Services suggested: No services needed at this time  Health Care Screening: Age-appropriate preventive services recommended by USPTF and ACIP covered by Medicare were discussed today. Services ordered if indicated and agreed upon by the patient.  Referrals offered: Community-based lifestyle interventions to reduce health risks and promote self-management and wellness, fall prevention, nutrition, physical activity, tobacco-use cessation, weight loss, and mental health services as per orders if indicated.    Discussion today about general wellness and lifestyle habits:    Prevent falls and reduce trip hazards; Cautioned about securing or removing rugs.  Have a working fire alarm and carbon monoxide detector;   Engage in regular physical activity and social activities     Follow-up: Annually for annual wellness exam and as needed

## 2023-12-13 DIAGNOSIS — E03.8 OTHER SPECIFIED HYPOTHYROIDISM: ICD-10-CM

## 2023-12-13 RX ORDER — LIOTHYRONINE SODIUM 5 UG/1
TABLET ORAL
Qty: 180 TABLET | Refills: 2 | Status: SHIPPED | OUTPATIENT
Start: 2023-12-13

## 2024-01-08 ENCOUNTER — NON-PROVIDER VISIT (OUTPATIENT)
Dept: CARDIOLOGY | Facility: MEDICAL CENTER | Age: 89
End: 2024-01-08
Payer: MEDICARE

## 2024-01-08 PROCEDURE — 93294 REM INTERROG EVL PM/LDLS PM: CPT | Performed by: INTERNAL MEDICINE

## 2024-01-08 NOTE — CARDIAC REMOTE MONITOR - SCAN
Device transmission reviewed. Device demonstrated appropriate function.       Electronically Signed by: Nancy Vasquez M.D.    1/10/2024  9:22 AM

## 2024-02-02 DIAGNOSIS — E03.9 HYPOTHYROIDISM, UNSPECIFIED TYPE: ICD-10-CM

## 2024-02-02 RX ORDER — LEVOTHYROXINE SODIUM 0.07 MG/1
75 TABLET ORAL
Qty: 90 TABLET | Refills: 3 | Status: SHIPPED | OUTPATIENT
Start: 2024-02-02

## 2024-02-06 ENCOUNTER — OFFICE VISIT (OUTPATIENT)
Dept: INTERNAL MEDICINE | Facility: IMAGING CENTER | Age: 89
End: 2024-02-06
Payer: MEDICARE

## 2024-02-06 VITALS
HEIGHT: 70 IN | RESPIRATION RATE: 14 BRPM | HEART RATE: 86 BPM | DIASTOLIC BLOOD PRESSURE: 70 MMHG | WEIGHT: 154 LBS | TEMPERATURE: 96.9 F | OXYGEN SATURATION: 96 % | BODY MASS INDEX: 22.05 KG/M2 | SYSTOLIC BLOOD PRESSURE: 118 MMHG

## 2024-02-06 DIAGNOSIS — E11.22 TYPE 2 DIABETES MELLITUS WITH STAGE 3A CHRONIC KIDNEY DISEASE, WITHOUT LONG-TERM CURRENT USE OF INSULIN (HCC): ICD-10-CM

## 2024-02-06 DIAGNOSIS — I73.9 PERIPHERAL ARTERIAL DISEASE (HCC): ICD-10-CM

## 2024-02-06 DIAGNOSIS — I48.0 PAROXYSMAL ATRIAL FIBRILLATION (HCC): ICD-10-CM

## 2024-02-06 DIAGNOSIS — F32.0 MAJOR DEPRESSIVE DISORDER, SINGLE EPISODE, MILD (HCC): ICD-10-CM

## 2024-02-06 DIAGNOSIS — N18.31 TYPE 2 DIABETES MELLITUS WITH STAGE 3A CHRONIC KIDNEY DISEASE, WITHOUT LONG-TERM CURRENT USE OF INSULIN (HCC): ICD-10-CM

## 2024-02-06 PROCEDURE — 3074F SYST BP LT 130 MM HG: CPT | Performed by: FAMILY MEDICINE

## 2024-02-06 PROCEDURE — 3078F DIAST BP <80 MM HG: CPT | Performed by: FAMILY MEDICINE

## 2024-02-06 PROCEDURE — 99215 OFFICE O/P EST HI 40 MIN: CPT | Performed by: FAMILY MEDICINE

## 2024-02-06 ASSESSMENT — FIBROSIS 4 INDEX: FIB4 SCORE: 1.72

## 2024-02-06 NOTE — PROGRESS NOTES
"  Subjective:     HPI:   FRANKY TEMPLE Jr. is a 88 y.o. male 3 of diabetes, with diabetes, A-fib, depression, here  to discuss the evaluation and management of:   Chief Complaint   Patient presents with    Follow-Up     He is living with his son and daughter-in-law now  He is still driving  He admits that he could use more social stimulation  Blood sugar has been stable running between 120 and 150s    Denies any chest pain, shortness of breath, GI distress, loss of appetite    He sees cardiology for his A-fib and he is on Eliquis  He is taking semaglutide weekly       Objective:     /70   Pulse 86   Temp 36.1 °C (96.9 °F)   Resp 14   Ht 1.778 m (5' 10\")   Wt 69.9 kg (154 lb)   SpO2 96%   BMI 22.10 kg/m²     Physical Exam:  Physical Exam  Constitutional:       Appearance: Normal appearance.   HENT:      Head: Normocephalic and atraumatic.      Nose: Nose normal.   Eyes:      Conjunctiva/sclera: Conjunctivae normal.   Cardiovascular:      Rate and Rhythm: Normal rate and regular rhythm.   Pulmonary:      Effort: Pulmonary effort is normal. No respiratory distress.      Breath sounds: Normal breath sounds.   Neurological:      General: No focal deficit present.      Mental Status: He is alert.   Psychiatric:         Mood and Affect: Mood normal.         Behavior: Behavior normal.         Thought Content: Thought content normal.         Judgment: Judgment normal.        Assessment and Plan:     The following treatment plan was discussed/researched:  1. Peripheral arterial disease (HCC)  Asymptomatic, no med changes indicated    2. Type 2 diabetes mellitus with stage 3a chronic kidney disease, without long-term current use of insulin (HCC)  Check A1c next week  Well-managed on current regimen      3. Major depressive disorder, single episode, mild (HCC)  Stable, discussed continuing adult classes for social stimulation    4. Paroxysmal atrial fibrillation (HCC)  Stable on Eliquis, managed by " cardiology          Medical Decision Making: Today's Assessment/Status/Plan:            HCC Gap Form    Diagnosis to address: I73.9 - Peripheral arterial disease (HCC)  Assessment and plan: Chronic, stable. Continue with current defined treatment plan: asymptomatic  Cont w/ current meds  . Follow-up at least annually.  Assessment and plan: Chronic, stable. Continue with current defined treatment plan: cont w/ Eliquis, BP and DM management. Follow-up at least annually.  Diagnosis: E11.22, N18.31 - Type 2 diabetes mellitus with stage 3a chronic kidney disease, without long-term current use of insulin (HCC)  Assessment and plan: Chronic, stable. Continue with current defined treatment plan: Keep A1c <8. Follow-up  q 3 m.  Diagnosis: F32.0 - Major depressive disorder, single episode, mild (HCC)  Assessment and plan: Chronic, stable. Continue with current defined treatment plan: no med changes, Recommended social activities. Follow-up at least annually.  Diagnosis: I48.0 - Paroxysmal atrial fibrillation (HCC)  Assessment and plan: Chronic, stable. Continue with current defined treatment plan: cont w/ Eliquis. Follow-up at least annually.  Last edited 02/06/24 12:23 PST by Wilma Guillermo M.D.                                                                                                                                                                             Any change or worsening of signs or symptoms, patient encouraged to follow-up or report to emergency room for further evaluation. Patient verbalizes understanding and agrees.    Follow-Up: No follow-ups on file.      PLEASE NOTE: This dictation was created using voice recognition software. I have made every reasonable attempt to correct obvious errors, but I expect that there are errors of grammar and possibly content that I did not discover before finalizing the note.      My total time spent caring for the patient on the day of the encounter was  greater than  40 minutes.   This includes obtaining history, reviewing chart, physical exam, patient education, reviewing outside records, placing orders, interpreting tests and coordinating care.

## 2024-02-06 NOTE — LETTER
February 6, 2024        FRANKY Torin MAVERICK Aguirre.  [address] [facility] [place] [address]        Dear FRANKY:    Current Outpatient Medications Ordered in Epic   Medication Sig Dispense Refill    finasteride (PROSCAR) 5 MG Tab TAKE 1 TABLET BY MOUTH EVERY DAY 90 Tablet 2    levothyroxine (SYNTHROID) 75 MCG Tab TAKE 1 TABLET BY MOUTH EVERY DAY 90 Tablet 3    liothyronine (CYTOMEL) 5 MCG Tab TAKE 2 TABLETS BY MOUTH EVERY  Tablet 2    famotidine (PEPCID) 40 MG Tab TAKE 1 TABLET BY MOUTH EVERY DAY IN THE EVENING 90 Tablet 1    losartan (COZAAR) 25 MG Tab Take 1 Tablet by mouth 2 (two) times a day. 200 Tablet 2    Blood Glucose Test Strips Use one Abbott Freestyle Lite strip to test blood sugar once daily early morning before first meal. 100 Strip 11    Lancets Use one Abbott Freestyle Lite lancet to test blood sugar once daily early morning before first meal. 100 Each 11    Alcohol Swabs Wipe site with prep pad prior to injection. 100 Each 11    FREESTYLE LITE strip USE ONE ABBOTT FREESTYLE LITE STRIP TO TEST BLOOD SUGAR ONCE DAILY EARLY MORNING BEFORE FIRST MEAL. 100 Strip 3    Semaglutide,0.25 or 0.5MG/DOS, 2 MG/1.5ML Solution Pen-injector Inject 0.5 mg under the skin every 7 days. 4 Each 3    furosemide (LASIX) 20 MG Tab TAKE 1 TABLET BY MOUTH 1 TIME A DAY AS NEEDED. 100 Tablet 3    Blood Glucose Monitoring Suppl (FREESTYLE FREEDOM LITE) w/Device Kit PLEASE SEE ATTACHED FOR DETAILED DIRECTIONS      Blood Glucose Meter Kit Test blood sugar as recommended by provider. Abbott Freestyle Lite blood glucose monitoring kit. Or whatever meter is preferred by insurance 1 Kit 0    ELIQUIS 5 MG Tab TAKE 1 TABLET BY MOUTH TWICE A  Tablet 3    tamsulosin (FLOMAX) 0.4 MG capsule TAKE 1 CAPSULE BY MOUTH HALF HOUR AFTER BREAKFAST 90 Capsule 3    atorvastatin (LIPITOR) 20 MG Tab TAKE 1 TABLET BY MOUTH EVERY  Tablet 3    Ascorbic Acid (VITAMIN C PO) Take 500 mg by mouth every day.      Cholecalciferol (VITAMIN D3 PO) Take  50 mcg by mouth every day.      Cyanocobalamin (VITAMIN B 12 PO) Take 500 mcg by mouth every day.       No current [facility]-ordered facility-administered medications on file.        If you have any questions or concerns, please don't hesitate to call.        Sincerely,        Wilma Guillermo M.D.    Electronically Signed

## 2024-02-13 RX ORDER — ATORVASTATIN CALCIUM 20 MG/1
20 TABLET, FILM COATED ORAL
Qty: 100 TABLET | Refills: 3 | Status: SHIPPED | OUTPATIENT
Start: 2024-02-13

## 2024-02-15 ENCOUNTER — NON-PROVIDER VISIT (OUTPATIENT)
Dept: INTERNAL MEDICINE | Facility: IMAGING CENTER | Age: 89
End: 2024-02-15
Payer: MEDICARE

## 2024-02-15 DIAGNOSIS — N18.31 TYPE 2 DIABETES MELLITUS WITH STAGE 3A CHRONIC KIDNEY DISEASE, WITHOUT LONG-TERM CURRENT USE OF INSULIN (HCC): ICD-10-CM

## 2024-02-15 DIAGNOSIS — E11.22 TYPE 2 DIABETES MELLITUS WITH STAGE 3A CHRONIC KIDNEY DISEASE, WITHOUT LONG-TERM CURRENT USE OF INSULIN (HCC): ICD-10-CM

## 2024-02-15 LAB
HBA1C MFR BLD: 6.6 % (ref ?–5.8)
POCT INT CON NEG: NEGATIVE
POCT INT CON POS: POSITIVE

## 2024-02-15 PROCEDURE — 83036 HEMOGLOBIN GLYCOSYLATED A1C: CPT | Performed by: FAMILY MEDICINE

## 2024-03-19 ENCOUNTER — APPOINTMENT (RX ONLY)
Dept: URBAN - METROPOLITAN AREA CLINIC 20 | Facility: CLINIC | Age: 89
Setting detail: DERMATOLOGY
End: 2024-03-19

## 2024-03-19 DIAGNOSIS — D22 MELANOCYTIC NEVI: ICD-10-CM

## 2024-03-19 DIAGNOSIS — Z71.89 OTHER SPECIFIED COUNSELING: ICD-10-CM

## 2024-03-19 DIAGNOSIS — L57.0 ACTINIC KERATOSIS: ICD-10-CM

## 2024-03-19 DIAGNOSIS — D485 NEOPLASM OF UNCERTAIN BEHAVIOR OF SKIN: ICD-10-CM

## 2024-03-19 DIAGNOSIS — D18.0 HEMANGIOMA: ICD-10-CM

## 2024-03-19 DIAGNOSIS — L82.1 OTHER SEBORRHEIC KERATOSIS: ICD-10-CM

## 2024-03-19 DIAGNOSIS — Z85.828 PERSONAL HISTORY OF OTHER MALIGNANT NEOPLASM OF SKIN: ICD-10-CM

## 2024-03-19 DIAGNOSIS — L81.4 OTHER MELANIN HYPERPIGMENTATION: ICD-10-CM

## 2024-03-19 PROBLEM — D22.5 MELANOCYTIC NEVI OF TRUNK: Status: ACTIVE | Noted: 2024-03-19

## 2024-03-19 PROBLEM — D18.01 HEMANGIOMA OF SKIN AND SUBCUTANEOUS TISSUE: Status: ACTIVE | Noted: 2024-03-19

## 2024-03-19 PROBLEM — D22.61 MELANOCYTIC NEVI OF RIGHT UPPER LIMB, INCLUDING SHOULDER: Status: ACTIVE | Noted: 2024-03-19

## 2024-03-19 PROBLEM — D22.72 MELANOCYTIC NEVI OF LEFT LOWER LIMB, INCLUDING HIP: Status: ACTIVE | Noted: 2024-03-19

## 2024-03-19 PROBLEM — D22.39 MELANOCYTIC NEVI OF OTHER PARTS OF FACE: Status: ACTIVE | Noted: 2024-03-19

## 2024-03-19 PROBLEM — D22.71 MELANOCYTIC NEVI OF RIGHT LOWER LIMB, INCLUDING HIP: Status: ACTIVE | Noted: 2024-03-19

## 2024-03-19 PROBLEM — D22.62 MELANOCYTIC NEVI OF LEFT UPPER LIMB, INCLUDING SHOULDER: Status: ACTIVE | Noted: 2024-03-19

## 2024-03-19 PROBLEM — D48.5 NEOPLASM OF UNCERTAIN BEHAVIOR OF SKIN: Status: ACTIVE | Noted: 2024-03-19

## 2024-03-19 PROCEDURE — ? BIOPSY BY SHAVE METHOD

## 2024-03-19 PROCEDURE — 11102 TANGNTL BX SKIN SINGLE LES: CPT

## 2024-03-19 PROCEDURE — ? OBSERVATION

## 2024-03-19 PROCEDURE — ? COUNSELING

## 2024-03-19 PROCEDURE — 17003 DESTRUCT PREMALG LES 2-14: CPT

## 2024-03-19 PROCEDURE — 17000 DESTRUCT PREMALG LESION: CPT | Mod: 59

## 2024-03-19 PROCEDURE — 99213 OFFICE O/P EST LOW 20 MIN: CPT | Mod: 25

## 2024-03-19 PROCEDURE — ? SUNSCREEN RECOMMENDATIONS

## 2024-03-19 PROCEDURE — ? LIQUID NITROGEN

## 2024-03-19 ASSESSMENT — LOCATION DETAILED DESCRIPTION DERM
LOCATION DETAILED: LEFT LATERAL PROXIMAL PRETIBIAL REGION
LOCATION DETAILED: RIGHT VENTRAL PROXIMAL FOREARM
LOCATION DETAILED: RIGHT CENTRAL MALAR CHEEK
LOCATION DETAILED: INFERIOR THORACIC SPINE
LOCATION DETAILED: RIGHT DISTAL POSTERIOR THIGH
LOCATION DETAILED: RIGHT CENTRAL LATERAL NECK
LOCATION DETAILED: LEFT ANTERIOR EARLOBE
LOCATION DETAILED: RIGHT PROXIMAL PRETIBIAL REGION
LOCATION DETAILED: LEFT CENTRAL MALAR CHEEK
LOCATION DETAILED: RIGHT INFERIOR FOREHEAD
LOCATION DETAILED: LEFT DISTAL POSTERIOR THIGH
LOCATION DETAILED: RIGHT INFERIOR MEDIAL MIDBACK
LOCATION DETAILED: LEFT DORSAL WRIST
LOCATION DETAILED: LEFT INFERIOR VERMILION LIP
LOCATION DETAILED: LEFT SUPERIOR OCCIPITAL SCALP
LOCATION DETAILED: MID-OCCIPITAL SCALP
LOCATION DETAILED: POSTERIOR MID-PARIETAL SCALP
LOCATION DETAILED: LEFT INFERIOR FOREHEAD
LOCATION DETAILED: RIGHT DISTAL POSTERIOR UPPER ARM
LOCATION DETAILED: LEFT SUPERIOR HELIX
LOCATION DETAILED: RIGHT SUPERIOR LATERAL FOREHEAD
LOCATION DETAILED: LEFT PROXIMAL POSTERIOR UPPER ARM
LOCATION DETAILED: RIGHT CENTRAL POSTERIOR NECK
LOCATION DETAILED: RIGHT SUPERIOR UPPER BACK
LOCATION DETAILED: RIGHT INFERIOR MEDIAL UPPER BACK
LOCATION DETAILED: RIGHT INFERIOR CENTRAL MALAR CHEEK
LOCATION DETAILED: LEFT VENTRAL PROXIMAL FOREARM

## 2024-03-19 ASSESSMENT — LOCATION ZONE DERM
LOCATION ZONE: ARM
LOCATION ZONE: LEG
LOCATION ZONE: LIP
LOCATION ZONE: TRUNK
LOCATION ZONE: EAR
LOCATION ZONE: SCALP
LOCATION ZONE: FACE
LOCATION ZONE: NECK

## 2024-03-19 ASSESSMENT — LOCATION SIMPLE DESCRIPTION DERM
LOCATION SIMPLE: NECK
LOCATION SIMPLE: LEFT LIP
LOCATION SIMPLE: LEFT FOREHEAD
LOCATION SIMPLE: RIGHT PRETIBIAL REGION
LOCATION SIMPLE: RIGHT POSTERIOR THIGH
LOCATION SIMPLE: LEFT EAR
LOCATION SIMPLE: RIGHT LOWER BACK
LOCATION SIMPLE: LEFT POSTERIOR THIGH
LOCATION SIMPLE: LEFT FOREARM
LOCATION SIMPLE: POSTERIOR SCALP
LOCATION SIMPLE: RIGHT POSTERIOR UPPER ARM
LOCATION SIMPLE: LEFT CHEEK
LOCATION SIMPLE: LEFT PRETIBIAL REGION
LOCATION SIMPLE: RIGHT FOREHEAD
LOCATION SIMPLE: LEFT WRIST
LOCATION SIMPLE: UPPER BACK
LOCATION SIMPLE: RIGHT FOREARM
LOCATION SIMPLE: RIGHT UPPER BACK
LOCATION SIMPLE: RIGHT CHEEK
LOCATION SIMPLE: LEFT POSTERIOR UPPER ARM
LOCATION SIMPLE: LEFT OCCIPITAL SCALP

## 2024-03-19 NOTE — PROCEDURE: LIQUID NITROGEN
Duration Of Freeze Thaw-Cycle (Seconds): 10
Post-Care Instructions: I reviewed with the patient in detail post-care instructions. Patient is to wear sunprotection, and avoid picking at any of the treated lesions. Pt may apply Vaseline to crusted or scabbing areas.
Number Of Freeze-Thaw Cycles: 2 freeze-thaw cycles
Render Post-Care Instructions In Note?: yes
Consent: The patient's consent was obtained including but not limited to risks of crusting, scabbing, blistering, scarring, darker or lighter pigmentary change, recurrence, incomplete removal and infection. RTC in 2 months if lesion(s) persistent.
Detail Level: Detailed
Render Note In Bullet Format When Appropriate: No

## 2024-04-08 ENCOUNTER — NON-PROVIDER VISIT (OUTPATIENT)
Dept: CARDIOLOGY | Facility: MEDICAL CENTER | Age: 89
End: 2024-04-08
Payer: MEDICARE

## 2024-04-08 ENCOUNTER — PATIENT MESSAGE (OUTPATIENT)
Dept: INTERNAL MEDICINE | Facility: IMAGING CENTER | Age: 89
End: 2024-04-08
Payer: MEDICARE

## 2024-04-08 DIAGNOSIS — K22.2: ICD-10-CM

## 2024-04-08 PROCEDURE — 93294 REM INTERROG EVL PM/LDLS PM: CPT | Performed by: INTERNAL MEDICINE

## 2024-04-20 DIAGNOSIS — I48.0 PAF (PAROXYSMAL ATRIAL FIBRILLATION) (HCC): ICD-10-CM

## 2024-04-22 RX ORDER — APIXABAN 5 MG/1
TABLET, FILM COATED ORAL
Qty: 180 TABLET | Refills: 3 | Status: SHIPPED | OUTPATIENT
Start: 2024-04-22

## 2024-04-24 DIAGNOSIS — I48.0 PAF (PAROXYSMAL ATRIAL FIBRILLATION) (HCC): ICD-10-CM

## 2024-04-24 RX ORDER — APIXABAN 5 MG/1
TABLET, FILM COATED ORAL
Qty: 180 TABLET | Refills: 3 | OUTPATIENT
Start: 2024-04-24

## 2024-04-25 ENCOUNTER — OFFICE VISIT (OUTPATIENT)
Dept: INTERNAL MEDICINE | Facility: IMAGING CENTER | Age: 89
End: 2024-04-25
Payer: MEDICARE

## 2024-04-25 ENCOUNTER — HOSPITAL ENCOUNTER (OUTPATIENT)
Facility: MEDICAL CENTER | Age: 89
End: 2024-04-25
Attending: FAMILY MEDICINE
Payer: MEDICARE

## 2024-04-25 VITALS
TEMPERATURE: 97.9 F | SYSTOLIC BLOOD PRESSURE: 138 MMHG | DIASTOLIC BLOOD PRESSURE: 72 MMHG | RESPIRATION RATE: 14 BRPM | WEIGHT: 154 LBS | HEIGHT: 70 IN | BODY MASS INDEX: 22.05 KG/M2 | HEART RATE: 64 BPM | OXYGEN SATURATION: 97 %

## 2024-04-25 DIAGNOSIS — I48.0 PAROXYSMAL ATRIAL FIBRILLATION (HCC): ICD-10-CM

## 2024-04-25 DIAGNOSIS — N18.31 TYPE 2 DIABETES MELLITUS WITH STAGE 3A CHRONIC KIDNEY DISEASE, WITHOUT LONG-TERM CURRENT USE OF INSULIN (HCC): ICD-10-CM

## 2024-04-25 DIAGNOSIS — Z79.899 MEDICATION MANAGEMENT: ICD-10-CM

## 2024-04-25 DIAGNOSIS — I10 PRIMARY HYPERTENSION: ICD-10-CM

## 2024-04-25 DIAGNOSIS — D68.69 SECONDARY HYPERCOAGULABLE STATE (HCC): ICD-10-CM

## 2024-04-25 DIAGNOSIS — E11.22 TYPE 2 DIABETES MELLITUS WITH STAGE 3A CHRONIC KIDNEY DISEASE, WITHOUT LONG-TERM CURRENT USE OF INSULIN (HCC): ICD-10-CM

## 2024-04-25 LAB
25(OH)D3 SERPL-MCNC: 47 NG/ML (ref 30–100)
ALBUMIN SERPL BCP-MCNC: 4.2 G/DL (ref 3.2–4.9)
ALBUMIN/GLOB SERPL: 1.4 G/DL
ALP SERPL-CCNC: 76 U/L (ref 30–99)
ALT SERPL-CCNC: 15 U/L (ref 2–50)
ANION GAP SERPL CALC-SCNC: 12 MMOL/L (ref 7–16)
AST SERPL-CCNC: 20 U/L (ref 12–45)
BILIRUB SERPL-MCNC: 0.3 MG/DL (ref 0.1–1.5)
BUN SERPL-MCNC: 21 MG/DL (ref 8–22)
CALCIUM ALBUM COR SERPL-MCNC: 9.2 MG/DL (ref 8.5–10.5)
CALCIUM SERPL-MCNC: 9.4 MG/DL (ref 8.5–10.5)
CHLORIDE SERPL-SCNC: 105 MMOL/L (ref 96–112)
CO2 SERPL-SCNC: 23 MMOL/L (ref 20–33)
CREAT SERPL-MCNC: 0.96 MG/DL (ref 0.5–1.4)
ERYTHROCYTE [DISTWIDTH] IN BLOOD BY AUTOMATED COUNT: 46.7 FL (ref 35.9–50)
GFR SERPLBLD CREATININE-BSD FMLA CKD-EPI: 77 ML/MIN/1.73 M 2
GLOBULIN SER CALC-MCNC: 3 G/DL (ref 1.9–3.5)
GLUCOSE SERPL-MCNC: 167 MG/DL (ref 65–99)
HCT VFR BLD AUTO: 39.4 % (ref 42–52)
HGB BLD-MCNC: 12.8 G/DL (ref 14–18)
MCH RBC QN AUTO: 30.7 PG (ref 27–33)
MCHC RBC AUTO-ENTMCNC: 32.5 G/DL (ref 32.3–36.5)
MCV RBC AUTO: 94.5 FL (ref 81.4–97.8)
PLATELET # BLD AUTO: 256 K/UL (ref 164–446)
PMV BLD AUTO: 11.2 FL (ref 9–12.9)
POTASSIUM SERPL-SCNC: 4 MMOL/L (ref 3.6–5.5)
PROT SERPL-MCNC: 7.2 G/DL (ref 6–8.2)
RBC # BLD AUTO: 4.17 M/UL (ref 4.7–6.1)
SODIUM SERPL-SCNC: 140 MMOL/L (ref 135–145)
TSH SERPL DL<=0.005 MIU/L-ACNC: 1.99 UIU/ML (ref 0.38–5.33)
VIT B12 SERPL-MCNC: 935 PG/ML (ref 211–911)
WBC # BLD AUTO: 7.4 K/UL (ref 4.8–10.8)

## 2024-04-25 PROCEDURE — 85027 COMPLETE CBC AUTOMATED: CPT

## 2024-04-25 PROCEDURE — 82607 VITAMIN B-12: CPT

## 2024-04-25 PROCEDURE — 82306 VITAMIN D 25 HYDROXY: CPT

## 2024-04-25 PROCEDURE — 84443 ASSAY THYROID STIM HORMONE: CPT

## 2024-04-25 PROCEDURE — 80053 COMPREHEN METABOLIC PANEL: CPT

## 2024-04-25 ASSESSMENT — FIBROSIS 4 INDEX: FIB4 SCORE: 1.72

## 2024-04-25 NOTE — PROGRESS NOTES
"Verbal consent was acquired by the patient to use [employer] ambient listening note generation during this visit     Patient is a 88 y.o.male.established patient      History of Present Illness  The patient presents for evaluation of multiple medical concerns. He is accompanied by his daughter Luisa.    The patient was unable to procure Ozempic due to its high cost, prompting his daughter's request for an alternative medication that is less costly . His daughter has expressed concerns regarding his weight and elevated morning blood sugar levels over the past two weeks. Additionally, the patient has been experiencing diarrhea, the etiology of which is uncertain, and is uncertain if Ozempic exacerbates it. The patient's appetite remains robust. His blood glucose levels have consistently been around 160, and he reports feeling well. His last Ozempic injection was administered this past Sunday.    Supplemental Information  He was supposed to see cardiology today, but it was cancelled. He feels okay in terms of strength.   He has not gone to the gym. He has not gone on walks. He is completely out of his blood thinners.            /72   Pulse 64   Temp 36.6 °C (97.9 °F)   Resp 14   Ht 1.778 m (5' 10\")   Wt 69.9 kg (154 lb)   SpO2 97% , Body mass index is 22.1 kg/m².    Physical Exam      Physical Exam  Constitutional:       General: He is not in acute distress.     Appearance: Normal appearance. He is normal weight. He is not ill-appearing, toxic-appearing or diaphoretic.   HENT:      Head: Normocephalic and atraumatic.      Left Ear: There is impacted cerumen.      Nose: Nose normal.   Eyes:      Conjunctiva/sclera: Conjunctivae normal.   Cardiovascular:      Rate and Rhythm: Normal rate and regular rhythm.      Heart sounds: Normal heart sounds.   Pulmonary:      Effort: Pulmonary effort is normal.      Breath sounds: Normal breath sounds.   Musculoskeletal:      Cervical back: Neck supple.   Neurological: "      Mental Status: He is alert. Mental status is at baseline.   Psychiatric:         Mood and Affect: Mood normal.         Behavior: Behavior normal.         Thought Content: Thought content normal.         Judgment: Judgment normal.           Results  Laboratory Studies  A1c was 6.6. Blood sugar was 160 in the morning.          Assessment & Plan  1. Diabetes Mellitus.  The patient's glycemic control was commendable as of 02/2024, and he has not experienced any weight loss since 02/2024.   A follow-up A1c test will be conducted on 05/15/2024. The patient was educated about the increased risk of hypoglycemia associated with advancing age on his previous meds.  . A comprehensive blood work will be conducted today, and the patient will be informed of the results by tomorrow morning. The patient was encouraged to maintain a balanced diet and engage in regular physical activity. . However, if the renal function remains suboptimal, an alternative medication will be considered. If the patient's blood glucose levels exceed 200, he is to contact us immediately.       HCC Gap Form    Diagnosis to address: D68.69 - Secondary hypercoagulable state (HCC)  Assessment and plan: Chronic, stable, as based on today's assessment and impact on other conditions evaluated today. Continue with Eliquis . Follow-up with specialist as directed, but at least annually.  Last edited 04/25/24 11:48 PDT by Wilma Guillermo M.D.     Samples of Eliquis given.          This note was created using voice recognition software (Dragon). The accuracy of the dictation is limited by the abilities of the software. I have reviewed the note prior to signing, however some errors in grammar and context are still possible. If you have any questions related to this note please do not hesitate to contact our office.

## 2024-04-29 ENCOUNTER — TELEPHONE (OUTPATIENT)
Dept: CARDIOLOGY | Facility: MEDICAL CENTER | Age: 89
End: 2024-04-29
Payer: MEDICARE

## 2024-04-29 DIAGNOSIS — E11.9 TYPE 2 DIABETES MELLITUS WITHOUT COMPLICATION, WITHOUT LONG-TERM CURRENT USE OF INSULIN (HCC): ICD-10-CM

## 2024-04-29 RX ORDER — GLIMEPIRIDE 4 MG/1
4 TABLET ORAL EVERY MORNING
Qty: 100 TABLET | Refills: 3 | Status: SHIPPED | OUTPATIENT
Start: 2024-04-29

## 2024-05-14 ENCOUNTER — TELEPHONE (OUTPATIENT)
Dept: CARDIOLOGY | Facility: MEDICAL CENTER | Age: 89
End: 2024-05-14
Payer: MEDICARE

## 2024-05-14 NOTE — TELEPHONE ENCOUNTER
Last OV: 04-  Proposed Surgery: EGD With Deep (Propofol) Sedation  Surgery Date: 07/08/2024  Requesting Office Name: [facility]  Fax Number: [phone]  Preference of Location (default is surgery center unless specified by Cardiologist or [facility])  Prior Clearance Addressed: No      Anticoags/Antiplatelets: Apixaban   Anticoags/Antiplatelet managed by Cardiology?  YES   Outstanding Cardiac Imaging : No  Stent, Cardiac Devices, or Catheterization: No  Ablation, TAVR/Valve (including open heart), Cardioversion: No  Recent Cardiac Hospitalization: No            When: N/A  History (cardiac history):   Past Medical History:   Diagnosis Date    Asthma     BPH (benign prostatic hyperplasia)     Cataract     Surgically removed bilat    Dental disorder     Upper    Diabetes (HCC)     Diverticulitis     Esophageal stricture     dilated spring 2016    Hiatal hernia     High cholesterol     History of shingles 8/27/2015    Hyperlipidemia 7/8/2015    Hypertension     Hypothyroid     PHN (postherpetic neuralgia) 8/27/2015    Psoriasis 6/14/2017    Stroke (HCC)     Last one was around 11/2021.  Has had several strokes including one in 9/2021.    TIA (transient ischemic attack)     Urinary incontinence     Vitamin D deficiency 8/27/2015             Surgical Clearance Letter Sent: YES   **Scan clearance request letter into [facility].**

## 2024-05-14 NOTE — LETTER
PROCEDURE/SURGERY CLEARANCE FORM      Encounter Date: 5/14/2024    Patient: FRANKY TEMPLE Jr.  YOB: 1935    CARDIOLOGIST:  Gisella Hwang P.A.-C.    REFERRING DOCTOR:  No ref. provider found      The following procedure/surgery: EGD With Deep (Propofol) Sedation                                              PROCEDURE/SURGERY CLEARANCE FORM    Date: 5/14/2024   Patient Name: FRANKY TEMPLE Jr.    Dear Surgeon or Proceduralist,      Thank you for your request for cardiac stratification of our mutual patient FRANKY TEMPLE Jr. 1935. We have reviewed their [facility] records; and to the best of our understanding this patient has not had stenting, ablation, cardiothoracic surgery or hospitalization for cardiovascular reasons in the past 6 months.  FRANKY TEMPLE Jr. has been seen within the past 18 months and is considered to have non-modifiable cardiac risk for this low-risk procedure/surgery. They may proceed from a cardiovascular standpoint and may hold their antiplatelet/anticoagulation as briefly as possible. Please have patient resume this medication when hemodynamically stable to do so.     Aspirin or Prasugrel   - hold 7 days prior to procedure/surgery, resume when hemodynamically stable      Clopidrogrel or Ticagrelor  - hold 7 days for all neurological procedures, hold 5 days prior to all other procedure/surgery,  resume when hemodynamically stable     Warfarin - hold 7 days for all neurological procedures, hold 5 days prior to all other procedure/surgery and coordinate with [facility] ([phone]) INR testing and dose management.      Pradaxa/Xarelto/Eliquis/Savesya - hold 1 day prior to procedure for low bleeding risk procedure, 2 days for high bleeding risk procedure, or consider holding 3 days or longer for patients with reduced kidney function (CrCl <30mL/min) or spinal/cranial surgeries/procedures.      If they have a mechanical heart valve,  please coordinate with [facility] ([phone]) the proper management of their anticoagulant in the periprocedural or perioperative period.      Some patients have higher risk for cardiovascular complications or holding medication. If our patient has had prior complications of holding antiplatelet or anticoagulants in the past and we have seen them after these events, we have addressed these concerns with the patient. They are at an unknown degree of increased risk for recurrent complication.  You may hold anticoagulation/antiplatelets for the procedure or surgery if the benefits of the procedure or surgery outweigh this nonmodifiable risk.      If FRANKY TEMPLE Jr. 1935 has new symptoms of heart failure decompensation, unstable arrythmia, or angina please reach out and we will assess the patient.      If you have other patient-specific concerns, please feel free to reach out to the patient's cardiologist directly at [phone].     Thank you,       [facility]

## 2024-05-19 DIAGNOSIS — E11.22 TYPE 2 DIABETES MELLITUS WITH STAGE 3A CHRONIC KIDNEY DISEASE, WITHOUT LONG-TERM CURRENT USE OF INSULIN (HCC): ICD-10-CM

## 2024-05-19 DIAGNOSIS — N18.31 TYPE 2 DIABETES MELLITUS WITH STAGE 3A CHRONIC KIDNEY DISEASE, WITHOUT LONG-TERM CURRENT USE OF INSULIN (HCC): ICD-10-CM

## 2024-05-20 RX ORDER — SEMAGLUTIDE 0.68 MG/ML
0.5 INJECTION, SOLUTION SUBCUTANEOUS
Qty: 3 ML | Refills: 11 | Status: SHIPPED | OUTPATIENT
Start: 2024-05-20

## 2024-05-22 RX ORDER — FAMOTIDINE 40 MG/1
TABLET, FILM COATED ORAL
Qty: 90 TABLET | Refills: 1 | Status: SHIPPED | OUTPATIENT
Start: 2024-05-22

## 2024-05-23 ENCOUNTER — OFFICE VISIT (OUTPATIENT)
Dept: CARDIOLOGY | Facility: MEDICAL CENTER | Age: 89
End: 2024-05-23
Attending: NURSE PRACTITIONER
Payer: MEDICARE

## 2024-05-23 VITALS
OXYGEN SATURATION: 95 % | HEART RATE: 70 BPM | HEIGHT: 70 IN | DIASTOLIC BLOOD PRESSURE: 64 MMHG | SYSTOLIC BLOOD PRESSURE: 106 MMHG | BODY MASS INDEX: 21.47 KG/M2 | WEIGHT: 150 LBS | RESPIRATION RATE: 12 BRPM

## 2024-05-23 DIAGNOSIS — D68.69 SECONDARY HYPERCOAGULABLE STATE (HCC): ICD-10-CM

## 2024-05-23 DIAGNOSIS — I65.23 BILATERAL CAROTID ARTERY STENOSIS: ICD-10-CM

## 2024-05-23 DIAGNOSIS — I44.1 SECOND DEGREE HEART BLOCK: ICD-10-CM

## 2024-05-23 DIAGNOSIS — I48.0 PAF (PAROXYSMAL ATRIAL FIBRILLATION) (HCC): ICD-10-CM

## 2024-05-23 DIAGNOSIS — I10 HYPERTENSION, UNSPECIFIED TYPE: ICD-10-CM

## 2024-05-23 DIAGNOSIS — Z95.0 CARDIAC PACEMAKER IN SITU: ICD-10-CM

## 2024-05-23 DIAGNOSIS — E78.5 HYPERLIPIDEMIA, UNSPECIFIED HYPERLIPIDEMIA TYPE: ICD-10-CM

## 2024-05-23 DIAGNOSIS — E03.9 HYPOTHYROIDISM, UNSPECIFIED TYPE: ICD-10-CM

## 2024-05-23 DIAGNOSIS — N18.31 STAGE 3A CHRONIC KIDNEY DISEASE: ICD-10-CM

## 2024-05-23 PROCEDURE — RXMED WILLOW AMBULATORY MEDICATION CHARGE: Performed by: NURSE PRACTITIONER

## 2024-05-23 RX ORDER — LOSARTAN POTASSIUM 25 MG/1
25 TABLET ORAL
Qty: 200 TABLET | Refills: 3 | Status: SHIPPED | OUTPATIENT
Start: 2024-05-23

## 2024-05-23 RX ORDER — ATORVASTATIN CALCIUM 20 MG/1
20 TABLET, FILM COATED ORAL
Qty: 100 TABLET | Refills: 3 | Status: SHIPPED | OUTPATIENT
Start: 2024-05-23

## 2024-05-23 ASSESSMENT — ENCOUNTER SYMPTOMS
DIZZINESS: 0
ORTHOPNEA: 0
WEAKNESS: 0
PND: 0
WEIGHT LOSS: 0
SHORTNESS OF BREATH: 0
PALPITATIONS: 0
CLAUDICATION: 0

## 2024-05-23 ASSESSMENT — FIBROSIS 4 INDEX: FIB4 SCORE: 1.73

## 2024-05-23 NOTE — PROGRESS NOTES
Chief Complaint   Patient presents with    Hyperlipidemia     F/V DX: Hyperlipidemia, unspecified hyperlipidemia type    Atrial Fibrillation     F/V DX: Paroxysmal atrial fibrillation (HCC)    Hypertension     F/V DX: HTN (hypertension)       Subjective     Cam Erazo Jr. is a 86 y.o. male patient of Dr. Buckner who presents today for follow up.    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     Today patient states that he is doing well. Denies having any significant cardiac concerns or complaints. Tolerating OAC well with no bleeding problems.     Past Medical History:   Diagnosis Date    Asthma     BPH (benign prostatic hyperplasia)     Cataract     Surgically removed bilat    Dental disorder     Upper    Diabetes (HCC)     Diverticulitis     Esophageal stricture     dilated spring 2016    Hiatal hernia     High cholesterol     History of shingles 8/27/2015    Hyperlipidemia 7/8/2015    Hypertension     Hypothyroid     PHN (postherpetic neuralgia) 8/27/2015    Psoriasis 6/14/2017    Stroke (HCC)     Last one was around 11/2021.  Has had several strokes including one in 9/2021.    TIA (transient ischemic attack)     Urinary incontinence     Vitamin D deficiency 8/27/2015     Past Surgical History:   Procedure Laterality Date    CATARACT EXTRACTION WITH IOL      TONSILLECTOMY      VASECTOMY       Family History   Problem Relation Age of Onset    Stroke Brother     Stroke Father      Social History     Socioeconomic History    Marital status:      Spouse name: Not on file    Number of children: Not on file    Years of education: Not on file    Highest education level: Not on file   Occupational History    Not on file   Tobacco Use    Smoking status: Never    Smokeless tobacco: Never   Vaping Use    Vaping status: Never Used   Substance and Sexual Activity    Alcohol use: Yes      "Alcohol/week: 2.4 oz     Types: 4 Standard drinks or equivalent per week     Comment: weekly    Drug use: No    Sexual activity: Not on file     Comment:  , 1 child, retired    Other Topics Concern    Not on file   Social History Narrative           1 child, retired      Social Determinants of Health     Financial Resource Strain: Low Risk  (12/7/2021)    Overall Financial Resource Strain (CARDIA)     Difficulty of Paying Living Expenses: Not hard at all   Food Insecurity: No Food Insecurity (12/7/2021)    Hunger Vital Sign     Worried About Running Out of Food in the Last Year: Never true     Ran Out of Food in the Last Year: Never true   Transportation Needs: No Transportation Needs (12/7/2021)    PRAPARE - Transportation     Lack of Transportation (Medical): No     Lack of Transportation (Non-Medical): No   Physical Activity: Not on file   Stress: Not on file   Social Connections: Not on file   Intimate Partner Violence: Not on file   Housing Stability: Not on file     Allergies   Allergen Reactions    Pcn [Penicillins]      \"my joints start to bubble up\"    Sulfa Drugs      \"my joints start to bubble up\"  \"Its been 40 yrs since I've had either of those\"     Outpatient Encounter Medications as of 5/23/2024   Medication Sig Dispense Refill    apixaban (ELIQUIS) 5mg Tab TAKE 1 TABLET BY MOUTH TWICE A  Tablet 3    losartan (COZAAR) 25 MG Tab Take 1 Tablet by mouth 2 (two) times a day. 200 Tablet 3    atorvastatin (LIPITOR) 20 MG Tab Take 1 Tablet by mouth every day. 100 Tablet 3    famotidine (PEPCID) 40 MG Tab TAKE 1 TABLET BY MOUTH EVERY DAY IN THE EVENING 90 Tablet 1    glimepiride (AMARYL) 4 MG Tab Take 1 Tablet by mouth every morning. 100 Tablet 3    levothyroxine (SYNTHROID) 75 MCG Tab TAKE 1 TABLET BY MOUTH EVERY DAY 90 Tablet 3    liothyronine (CYTOMEL) 5 MCG Tab TAKE 2 TABLETS BY MOUTH EVERY  Tablet 2    finasteride (PROSCAR) 5 MG Tab TAKE 1 TABLET BY MOUTH " EVERY DAY 90 Tablet 2    Blood Glucose Test Strips Use one Abbott Freestyle Lite strip to test blood sugar once daily early morning before first meal. 100 Strip 11    Lancets Use one Abbott Freestyle Lite lancet to test blood sugar once daily early morning before first meal. 100 Each 11    Alcohol Swabs Wipe site with prep pad prior to injection. 100 Each 11    FREESTYLE LITE strip USE ONE ABBOTT FREESTYLE LITE STRIP TO TEST BLOOD SUGAR ONCE DAILY EARLY MORNING BEFORE FIRST MEAL. 100 Strip 3    furosemide (LASIX) 20 MG Tab TAKE 1 TABLET BY MOUTH 1 TIME A DAY AS NEEDED. 100 Tablet 3    Blood Glucose Monitoring Suppl (FREESTYLE FREEDOM LITE) w/Device Kit PLEASE SEE ATTACHED FOR DETAILED DIRECTIONS      Blood Glucose Meter Kit Test blood sugar as recommended by provider. Abbott Freestyle Lite blood glucose monitoring kit. Or whatever meter is preferred by insurance 1 Kit 0    tamsulosin (FLOMAX) 0.4 MG capsule TAKE 1 CAPSULE BY MOUTH HALF HOUR AFTER BREAKFAST 90 Capsule 3    Ascorbic Acid (VITAMIN C PO) Take 500 mg by mouth every day.      Cholecalciferol (VITAMIN D3 PO) Take 50 mcg by mouth every day.      Cyanocobalamin (VITAMIN B 12 PO) Take 500 mcg by mouth every day.      OZEMPIC, 0.25 OR 0.5 MG/DOSE, 2 MG/3ML Solution Pen-injector INJECT 0.5MG UNDER THE SKIN EVERY 7 DAYS (Patient not taking: Reported on 5/23/2024) 3 mL 11    [DISCONTINUED] apixaban (ELIQUIS) 5mg Tab TAKE 1 TABLET BY MOUTH TWICE A  Tablet 3    [DISCONTINUED] atorvastatin (LIPITOR) 20 MG Tab TAKE 1 TABLET BY MOUTH EVERY  Tablet 3    [DISCONTINUED] losartan (COZAAR) 25 MG Tab Take 1 Tablet by mouth 2 (two) times a day. 200 Tablet 2    Semaglutide,0.25 or 0.5MG/DOS, 2 MG/1.5ML Solution Pen-injector Inject 0.5 mg under the skin every 7 days. 4 Each 3     No facility-administered encounter medications on file as of 5/23/2024.     Review of Systems   Constitutional:  Negative for malaise/fatigue and weight loss.   Respiratory:  Negative  "for shortness of breath.    Cardiovascular:  Negative for chest pain, palpitations, orthopnea, claudication, leg swelling and PND.   Neurological:  Negative for dizziness and weakness.   All other systems reviewed and are negative.             Objective     /64 (BP Location: Left arm, Patient Position: Sitting, BP Cuff Size: Adult)   Pulse 70   Resp 12   Ht 1.778 m (5' 10\")   Wt 68 kg (150 lb)   SpO2 95%   BMI 21.52 kg/m²     Physical Exam  Constitutional:       General: He is not in acute distress.     Appearance: He is well-developed.   HENT:      Head: Normocephalic.   Eyes:      Extraocular Movements: Extraocular movements intact.   Neck:      Vascular: No carotid bruit or JVD.   Cardiovascular:      Rate and Rhythm: Normal rate and regular rhythm.      Heart sounds: Normal heart sounds. No murmur heard.  Pulmonary:      Effort: Pulmonary effort is normal.      Breath sounds: Normal breath sounds.   Musculoskeletal:      Cervical back: Normal range of motion.      Right lower leg: No edema.      Left lower leg: No edema.   Skin:     General: Skin is warm and dry.   Neurological:      Mental Status: He is alert and oriented to person, place, and time.   Psychiatric:         Behavior: Behavior normal.         EP OP Report 12/29/2021:  IMPLANTED DEVICE INFORMATION:  Pulse generator is a [employer] model L311  Serial # [id number]     LEAD INFORMATION:  1)Right atrial lead is a [employer] model #7841 , serial #[id number] ,P wave 4.2 millivolts, threshold 1.2 Volts at 0.5 milliseconds, pacing impedance 575 Ohms.     2)Right ventricular lead is a [employer]  model #7842  , serial #[id number] ,R wave 5.3 millivolts, threshold 0.5 Volts at 0.5 milliseconds, pacing impedance 820 Ohms.     DEVICE PROGRAMMING:  DDD 60 -120 ppm     FLUOROSCOPY TIME: 1.9 min     IMPRESSIONS:  1. Successful dual chamber pacemaker implantation         Assessment & Plan     1. PAF (paroxysmal atrial fibrillation) (HCC)  " apixaban (ELIQUIS) 5mg Tab      2. Bilateral carotid artery stenosis  atorvastatin (LIPITOR) 20 MG Tab      3. Hypertension, unspecified type  losartan (COZAAR) 25 MG Tab      4. Secondary hypercoagulable state (HCC)        5. Stage 3a chronic kidney disease        6. Hypothyroidism, unspecified type        7. Hyperlipidemia, unspecified hyperlipidemia type                Medical Decision Making: Today's Assessment/Status/Plan:     1. S/P Permanent Pacemaker:  - S/P successful BSI dual-chamber pacemaker implantation with Dr. Buckner on 12/29/2021 due to symptomatic second degree heart block.   - Device interrogation today showed normal sensing and function. Battery longevity is ~ 6 years. 47% AP and 95% .     2. HTN:  - Well controlled.   - Continue losartan 25 mg daily.     3. Hypothyroid:  - TSH in April was WNL.   - On Synthroid 75 mcg daily.   - Followed by PCP.     4. HLD:  - Recent LDL in April near goal at 73.   - Continue Atorvastatin 20 mg daily.     5. H/O CVA:  - On statin as above.  - No ASA due to chronic OAC.     6. PAF:  - [place] remains <1%.   - Continue OAC with Eliquis 5 mg BID, tolerating well with no bleeding issues.     Patient will follow up with Dr. Buckner in 6 months with PPM interrogation or earlier if needed. Encouraged patient to contact our office should any questions or concerns arise in the mean time.     Future Appointments   Date Time Provider Department Center   6/3/2022  2:40 PM SAMIR Valentine None   10/12/2022 11:00 AM TOÑITO Belle [facility] None

## 2024-05-28 PROCEDURE — RXMED WILLOW AMBULATORY MEDICATION CHARGE: Performed by: FAMILY MEDICINE

## 2024-05-28 RX ORDER — TAMSULOSIN HYDROCHLORIDE 0.4 MG/1
CAPSULE ORAL
Qty: 90 CAPSULE | Refills: 3 | Status: SHIPPED | OUTPATIENT
Start: 2024-05-28

## 2024-05-29 ENCOUNTER — TELEPHONE (OUTPATIENT)
Dept: CARDIOLOGY | Facility: MEDICAL CENTER | Age: 89
End: 2024-05-29
Payer: MEDICARE

## 2024-05-29 NOTE — TELEPHONE ENCOUNTER
Contact:  Phone number:[phone] (home)    Name of person spoken with and relationship to patient: isaiah   Patient’s Adherence:  How patient is doing on medication: Very Well    How many missed doses and reason: 0 N/A    Any new medications: No    Any new conditions: No    Any new allergies: No    Any new side effects: No    Any new diagnoses: No    How many doses remainin    Did patient want to speak with pharmacist: No   Delivery:  Delivery date and method: couier 24 via 24    Needs by Date: 24    Signature required: No     Any additional details for : N/A   Teach Appointment Date:  N/A   Shipping Address:  [address] [place] [id number]   Medication(name,strength and dose):  Tamsulosin 0.4 mg caps, atrovastatin 20 mg tab, losartan 25 mg tabs   Copay:  $12.50   Payment Method:  Credit card on file   Supplies:  NA   Additional Information:  NA

## 2024-05-30 ENCOUNTER — PHARMACY VISIT (OUTPATIENT)
Dept: PHARMACY | Facility: MEDICAL CENTER | Age: 89
End: 2024-05-30
Payer: COMMERCIAL

## 2024-06-01 DIAGNOSIS — I10 HYPERTENSION, UNSPECIFIED TYPE: ICD-10-CM

## 2024-06-03 RX ORDER — LOSARTAN POTASSIUM 25 MG/1
25 TABLET ORAL 2 TIMES DAILY
Qty: 200 TABLET | Refills: 1 | Status: SHIPPED | OUTPATIENT
Start: 2024-06-03 | End: 2024-06-05 | Stop reason: SDUPTHER

## 2024-06-05 ENCOUNTER — TELEPHONE (OUTPATIENT)
Dept: CARDIOLOGY | Facility: MEDICAL CENTER | Age: 89
End: 2024-06-05
Payer: MEDICARE

## 2024-06-05 DIAGNOSIS — E11.9 TYPE 2 DIABETES MELLITUS WITHOUT COMPLICATION, WITHOUT LONG-TERM CURRENT USE OF INSULIN (HCC): ICD-10-CM

## 2024-06-05 DIAGNOSIS — E03.9 HYPOTHYROIDISM, UNSPECIFIED TYPE: ICD-10-CM

## 2024-06-05 DIAGNOSIS — I10 HYPERTENSION, UNSPECIFIED TYPE: ICD-10-CM

## 2024-06-05 DIAGNOSIS — E03.8 OTHER SPECIFIED HYPOTHYROIDISM: ICD-10-CM

## 2024-06-05 RX ORDER — LEVOTHYROXINE SODIUM 0.07 MG/1
75 TABLET ORAL
Qty: 100 TABLET | Refills: 3 | Status: SHIPPED | OUTPATIENT
Start: 2024-06-05

## 2024-06-05 RX ORDER — GLIMEPIRIDE 4 MG/1
4 TABLET ORAL EVERY MORNING
Qty: 100 TABLET | Refills: 3 | Status: SHIPPED | OUTPATIENT
Start: 2024-06-05

## 2024-06-05 RX ORDER — FINASTERIDE 5 MG/1
5 TABLET, FILM COATED ORAL DAILY
Qty: 100 TABLET | Refills: 3 | Status: SHIPPED | OUTPATIENT
Start: 2024-06-05

## 2024-06-05 RX ORDER — LOSARTAN POTASSIUM 25 MG/1
25 TABLET ORAL 2 TIMES DAILY
Qty: 200 TABLET | Refills: 1 | Status: SHIPPED | OUTPATIENT
Start: 2024-06-05

## 2024-06-05 RX ORDER — LIOTHYRONINE SODIUM 5 UG/1
TABLET ORAL
Qty: 200 TABLET | Refills: 3 | Status: SHIPPED | OUTPATIENT
Start: 2024-06-05

## 2024-06-05 RX ORDER — FAMOTIDINE 40 MG/1
40 TABLET, FILM COATED ORAL EVERY EVENING
Qty: 100 TABLET | Refills: 1 | Status: SHIPPED | OUTPATIENT
Start: 2024-06-05 | End: 2024-06-14 | Stop reason: SDUPTHER

## 2024-06-05 NOTE — TELEPHONE ENCOUNTER
Lenore Allen    Can I please get a new rx for Losartan sent to [place] . He is transferring all his medication to [place]. [facility] can't find the transfer. Sorry. Thank you

## 2024-06-10 DIAGNOSIS — E11.649 TYPE 2 DIABETES MELLITUS WITH HYPOGLYCEMIA WITHOUT COMA, WITHOUT LONG-TERM CURRENT USE OF INSULIN (HCC): ICD-10-CM

## 2024-06-10 RX ORDER — GLUCOSAMINE HCL/CHONDROITIN SU 500-400 MG
CAPSULE ORAL
Qty: 100 EACH | Refills: 11 | Status: SHIPPED | OUTPATIENT
Start: 2024-06-10

## 2024-06-10 RX ORDER — BLOOD-GLUCOSE METER
KIT MISCELLANEOUS
Qty: 100 STRIP | Refills: 3 | Status: SHIPPED | OUTPATIENT
Start: 2024-06-10

## 2024-06-10 RX ORDER — LANCETS 30 GAUGE
EACH MISCELLANEOUS
Qty: 100 EACH | Refills: 11 | Status: SHIPPED | OUTPATIENT
Start: 2024-06-10

## 2024-06-12 PROCEDURE — RXMED WILLOW AMBULATORY MEDICATION CHARGE: Performed by: NURSE PRACTITIONER

## 2024-06-14 DIAGNOSIS — E11.649 TYPE 2 DIABETES MELLITUS WITH HYPOGLYCEMIA WITHOUT COMA, WITHOUT LONG-TERM CURRENT USE OF INSULIN (HCC): ICD-10-CM

## 2024-06-14 PROCEDURE — RXMED WILLOW AMBULATORY MEDICATION CHARGE: Performed by: FAMILY MEDICINE

## 2024-06-14 RX ORDER — FAMOTIDINE 40 MG/1
40 TABLET, FILM COATED ORAL EVERY EVENING
Qty: 100 TABLET | Refills: 3 | Status: SHIPPED | OUTPATIENT
Start: 2024-06-14

## 2024-06-14 RX ORDER — DIPHENHYDRAMINE HYDROCHLORIDE 25 MG/1
CAPSULE, LIQUID FILLED ORAL
Qty: 1 KIT | Refills: 0 | Status: SHIPPED | OUTPATIENT
Start: 2024-06-14

## 2024-06-18 ENCOUNTER — PHARMACY VISIT (OUTPATIENT)
Dept: PHARMACY | Facility: MEDICAL CENTER | Age: 89
End: 2024-06-18
Payer: COMMERCIAL

## 2024-07-08 ENCOUNTER — NON-PROVIDER VISIT (OUTPATIENT)
Dept: CARDIOLOGY | Facility: MEDICAL CENTER | Age: 89
End: 2024-07-08
Payer: MEDICARE

## 2024-07-08 PROCEDURE — RXMED WILLOW AMBULATORY MEDICATION CHARGE: Performed by: FAMILY MEDICINE

## 2024-07-08 PROCEDURE — 93294 REM INTERROG EVL PM/LDLS PM: CPT | Performed by: INTERNAL MEDICINE

## 2024-07-11 ENCOUNTER — PHARMACY VISIT (OUTPATIENT)
Dept: PHARMACY | Facility: MEDICAL CENTER | Age: 89
End: 2024-07-11
Payer: COMMERCIAL

## 2024-08-12 ENCOUNTER — PHARMACY VISIT (OUTPATIENT)
Dept: PHARMACY | Facility: MEDICAL CENTER | Age: 89
End: 2024-08-12
Payer: COMMERCIAL

## 2024-08-12 DIAGNOSIS — E11.649 TYPE 2 DIABETES MELLITUS WITH HYPOGLYCEMIA WITHOUT COMA, WITHOUT LONG-TERM CURRENT USE OF INSULIN (HCC): ICD-10-CM

## 2024-08-12 PROCEDURE — RXMED WILLOW AMBULATORY MEDICATION CHARGE: Performed by: FAMILY MEDICINE

## 2024-08-12 RX ORDER — BLOOD SUGAR DIAGNOSTIC
STRIP MISCELLANEOUS
Qty: 100 STRIP | Refills: 3 | Status: SHIPPED | OUTPATIENT
Start: 2024-08-12

## 2024-08-16 ENCOUNTER — OFFICE VISIT (OUTPATIENT)
Dept: INTERNAL MEDICINE | Facility: IMAGING CENTER | Age: 89
End: 2024-08-16
Payer: MEDICARE

## 2024-08-16 VITALS
RESPIRATION RATE: 14 BRPM | HEART RATE: 61 BPM | BODY MASS INDEX: 21.52 KG/M2 | HEIGHT: 70 IN | DIASTOLIC BLOOD PRESSURE: 70 MMHG | TEMPERATURE: 97.4 F | SYSTOLIC BLOOD PRESSURE: 122 MMHG | OXYGEN SATURATION: 97 %

## 2024-08-16 DIAGNOSIS — E11.22 TYPE 2 DIABETES MELLITUS WITH STAGE 3A CHRONIC KIDNEY DISEASE, WITHOUT LONG-TERM CURRENT USE OF INSULIN (HCC): ICD-10-CM

## 2024-08-16 DIAGNOSIS — N18.31 TYPE 2 DIABETES MELLITUS WITH STAGE 3A CHRONIC KIDNEY DISEASE, WITHOUT LONG-TERM CURRENT USE OF INSULIN (HCC): ICD-10-CM

## 2024-08-16 PROCEDURE — 99214 OFFICE O/P EST MOD 30 MIN: CPT | Performed by: FAMILY MEDICINE

## 2024-08-16 PROCEDURE — 3078F DIAST BP <80 MM HG: CPT | Performed by: FAMILY MEDICINE

## 2024-08-16 PROCEDURE — RXMED WILLOW AMBULATORY MEDICATION CHARGE: Performed by: FAMILY MEDICINE

## 2024-08-16 PROCEDURE — 3074F SYST BP LT 130 MM HG: CPT | Performed by: FAMILY MEDICINE

## 2024-08-16 NOTE — PROGRESS NOTES
"Verbal consent was acquired by the patient to use [email or web address] ambient listening note generation during this visit     Patient is a 88 y.o.male.established patient      History of Present Illness  The patient presents for evaluation of multiple medical concerns.    He has been monitoring his blood glucose levels at home, which have shown satisfactory control. He is currently on oral medication for diabetes management. He experienced significant weight loss due to an injectable medication, leading him to discontinue its use.   His diet is carefully monitored by his daughter, contributing to his stable condition.    He feels well and his fasting blood sugars have been under 130  He did have 2 cupcakes and the next morning it was 144        He reports frequent urination at night, approximately every 2 hours, and is curious if this could be a side effect of any of his medications.     Additionally, he mentions that his feet tend to get cold easily but reports no associated pain.    He had a tooth pulled out and it has been bothering him for a week now. It is getting better each day. He took Tylenol and oxycodone for the pain.            /70   Pulse 61   Temp 36.3 °C (97.4 °F)   Resp 14   Ht 1.778 m (5' 10\")   SpO2 97% , Body mass index is 21.52 kg/m².    Physical Exam  Vital Signs  Patient's weight is 156.6.    Physical Exam  Constitutional:       General: He is not in acute distress.     Appearance: Normal appearance. He is normal weight. He is not ill-appearing, toxic-appearing or diaphoretic.   HENT:      Head: Normocephalic and atraumatic.      Ears:      Comments:       Nose: Nose normal.   Eyes:      Conjunctiva/sclera: Conjunctivae normal.   Cardiovascular:      Rate and Rhythm: Normal rate.   Pulmonary:      Effort: Pulmonary effort is normal.   Musculoskeletal:      Cervical back: Neck supple.   Neurological:      General: No focal deficit present.      Mental Status: He is alert.   Psychiatric:       "   Mood and Affect: Mood normal.         Behavior: Behavior normal.         Thought Content: Thought content normal.         Judgment: Judgment normal.         Monofilament testing with a 10 gram force: sensation: intact bilaterally  Visual Inspection: Feet without maceration, ulcers, or fissures.  Pedal pulses: decreased bilaterally   Results  Laboratory Studies  Fasting blood sugar was 88 today          Assessment & Plan  1. Diabetes Mellitus.  His fasting blood glucose levels are within the normal range, indicating good control of his diabetes. His weight has also returned to near-normal levels. A prescription for a new sensor will be sent to his pharmacy    He was advised to wear looser socks for comfort.     A printout of his current medications will be provided for his reference.   He is currently taking oral medications for diabetes management and not injections.   He reported that his daughter has been helping him with his diet, which has been beneficial.     He experienced a temporary spike in blood sugar to 144 mg/dL after consuming two cupcakes, but this was not considered alarming.    2. Tooth Extraction Pain.  He reported discomfort following a recent tooth extraction, which has been improving. He was advised to take Tylenol for pain management, which has been effective. He also took one oxycodone from a previous prescription as advised by his dentist.    3. Frequent Nocturnal Urination.  He reported frequent urination at night, approximately every 2 hours. It was noted that he might be taking Furosemide, which could be contributing to this issue. He was advised to double-check his medication list to confirm if he is taking Furosemide daily.    4. Driving License Renewal.  He needs to complete paperwork for his 's license renewal by September 9, 2024. He was advised to contact the DMV to clarify the required steps and bring any necessary forms for signature.           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 Continue with current defined treatment plan:  OAC BS, BP control. Follow-up at least annually.  Last edited 08/16/24 16:00 PDT by Wilma Guillermo M.D.         F/u in Nov for AWV    This note was created using voice recognition software (Dragon). The accuracy of the dictation is limited by the abilities of the software. I have reviewed the note prior to signing, however some errors in grammar and context are still possible. If you have any questions related to this note please do not hesitate to contact our office.

## 2024-08-19 ENCOUNTER — PHARMACY VISIT (OUTPATIENT)
Dept: PHARMACY | Facility: MEDICAL CENTER | Age: 89
End: 2024-08-19
Payer: COMMERCIAL

## 2024-08-27 ENCOUNTER — DOCUMENTATION (OUTPATIENT)
Dept: CARDIOLOGY | Facility: MEDICAL CENTER | Age: 89
End: 2024-08-27
Payer: MEDICARE

## 2024-08-27 NOTE — PROGRESS NOTES
I attempted to call this patient .  Unfortunately, I was not able to speak with them or leave a voice message; therefore, we will need to call back at a later time.

## 2024-08-29 ENCOUNTER — PHARMACY VISIT (OUTPATIENT)
Dept: PHARMACY | Facility: MEDICAL CENTER | Age: 89
End: 2024-08-29
Payer: COMMERCIAL

## 2024-08-29 PROCEDURE — RXMED WILLOW AMBULATORY MEDICATION CHARGE: Performed by: FAMILY MEDICINE

## 2024-09-03 ENCOUNTER — PHARMACY VISIT (OUTPATIENT)
Dept: PHARMACY | Facility: MEDICAL CENTER | Age: 89
End: 2024-09-03
Payer: COMMERCIAL

## 2024-09-03 PROCEDURE — RXMED WILLOW AMBULATORY MEDICATION CHARGE: Performed by: FAMILY MEDICINE

## 2024-09-03 PROCEDURE — RXMED WILLOW AMBULATORY MEDICATION CHARGE: Performed by: NURSE PRACTITIONER

## 2024-09-04 ENCOUNTER — PATIENT MESSAGE (OUTPATIENT)
Dept: HEALTH INFORMATION MANAGEMENT | Facility: OTHER | Age: 89
End: 2024-09-04

## 2024-09-11 PROCEDURE — RXMED WILLOW AMBULATORY MEDICATION CHARGE: Performed by: DENTIST

## 2024-09-12 ENCOUNTER — PHARMACY VISIT (OUTPATIENT)
Dept: PHARMACY | Facility: MEDICAL CENTER | Age: 89
End: 2024-09-12
Payer: COMMERCIAL

## 2024-09-12 PROCEDURE — RXMED WILLOW AMBULATORY MEDICATION CHARGE: Performed by: FAMILY MEDICINE

## 2024-09-17 ENCOUNTER — APPOINTMENT (RX ONLY)
Dept: URBAN - METROPOLITAN AREA CLINIC 20 | Facility: CLINIC | Age: 89
Setting detail: DERMATOLOGY
End: 2024-09-17

## 2024-09-17 DIAGNOSIS — D18.0 HEMANGIOMA: ICD-10-CM

## 2024-09-17 DIAGNOSIS — L30.9 DERMATITIS, UNSPECIFIED: ICD-10-CM

## 2024-09-17 DIAGNOSIS — L57.0 ACTINIC KERATOSIS: ICD-10-CM

## 2024-09-17 DIAGNOSIS — Z85.828 PERSONAL HISTORY OF OTHER MALIGNANT NEOPLASM OF SKIN: ICD-10-CM

## 2024-09-17 DIAGNOSIS — L82.1 OTHER SEBORRHEIC KERATOSIS: ICD-10-CM

## 2024-09-17 DIAGNOSIS — L81.4 OTHER MELANIN HYPERPIGMENTATION: ICD-10-CM

## 2024-09-17 DIAGNOSIS — D22 MELANOCYTIC NEVI: ICD-10-CM

## 2024-09-17 DIAGNOSIS — Z71.89 OTHER SPECIFIED COUNSELING: ICD-10-CM

## 2024-09-17 PROBLEM — D22.62 MELANOCYTIC NEVI OF LEFT UPPER LIMB, INCLUDING SHOULDER: Status: ACTIVE | Noted: 2024-09-17

## 2024-09-17 PROBLEM — D18.01 HEMANGIOMA OF SKIN AND SUBCUTANEOUS TISSUE: Status: ACTIVE | Noted: 2024-09-17

## 2024-09-17 PROBLEM — D22.71 MELANOCYTIC NEVI OF RIGHT LOWER LIMB, INCLUDING HIP: Status: ACTIVE | Noted: 2024-09-17

## 2024-09-17 PROBLEM — D22.61 MELANOCYTIC NEVI OF RIGHT UPPER LIMB, INCLUDING SHOULDER: Status: ACTIVE | Noted: 2024-09-17

## 2024-09-17 PROBLEM — D22.72 MELANOCYTIC NEVI OF LEFT LOWER LIMB, INCLUDING HIP: Status: ACTIVE | Noted: 2024-09-17

## 2024-09-17 PROBLEM — D22.5 MELANOCYTIC NEVI OF TRUNK: Status: ACTIVE | Noted: 2024-09-17

## 2024-09-17 PROBLEM — D22.39 MELANOCYTIC NEVI OF OTHER PARTS OF FACE: Status: ACTIVE | Noted: 2024-09-17

## 2024-09-17 PROCEDURE — ? SUNSCREEN RECOMMENDATIONS

## 2024-09-17 PROCEDURE — 17003 DESTRUCT PREMALG LES 2-14: CPT

## 2024-09-17 PROCEDURE — 99214 OFFICE O/P EST MOD 30 MIN: CPT | Mod: 25

## 2024-09-17 PROCEDURE — 17000 DESTRUCT PREMALG LESION: CPT

## 2024-09-17 PROCEDURE — RXMED WILLOW AMBULATORY MEDICATION CHARGE: Performed by: NURSE PRACTITIONER

## 2024-09-17 PROCEDURE — ? PRESCRIPTION

## 2024-09-17 PROCEDURE — ? MEDICATION COUNSELING

## 2024-09-17 PROCEDURE — ? OBSERVATION

## 2024-09-17 PROCEDURE — ? LIQUID NITROGEN

## 2024-09-17 PROCEDURE — ? COUNSELING

## 2024-09-17 PROCEDURE — ? ADDITIONAL NOTES

## 2024-09-17 RX ORDER — TRIAMCINOLONE ACETONIDE 1 MG/G
CREAM TOPICAL BID
Qty: 454 | Refills: 0 | Status: ERX

## 2024-09-17 ASSESSMENT — LOCATION SIMPLE DESCRIPTION DERM
LOCATION SIMPLE: RIGHT FOREHEAD
LOCATION SIMPLE: LEFT EAR
LOCATION SIMPLE: LEFT POSTERIOR UPPER ARM
LOCATION SIMPLE: NECK
LOCATION SIMPLE: SCALP
LOCATION SIMPLE: LEFT OCCIPITAL SCALP
LOCATION SIMPLE: RIGHT POSTERIOR UPPER ARM
LOCATION SIMPLE: LEFT SCALP
LOCATION SIMPLE: RIGHT LOWER BACK
LOCATION SIMPLE: RIGHT UPPER BACK
LOCATION SIMPLE: UPPER BACK
LOCATION SIMPLE: RIGHT POSTERIOR THIGH
LOCATION SIMPLE: LEFT POSTERIOR THIGH
LOCATION SIMPLE: LEFT FOREARM
LOCATION SIMPLE: LEFT LIP
LOCATION SIMPLE: LEFT PRETIBIAL REGION
LOCATION SIMPLE: RIGHT PRETIBIAL REGION
LOCATION SIMPLE: POSTERIOR SCALP
LOCATION SIMPLE: RIGHT CHEEK
LOCATION SIMPLE: LEFT THIGH
LOCATION SIMPLE: LEFT WRIST
LOCATION SIMPLE: RIGHT FOREARM
LOCATION SIMPLE: RIGHT THIGH

## 2024-09-17 ASSESSMENT — LOCATION ZONE DERM
LOCATION ZONE: SCALP
LOCATION ZONE: EAR
LOCATION ZONE: LEG
LOCATION ZONE: LIP
LOCATION ZONE: NECK
LOCATION ZONE: ARM
LOCATION ZONE: TRUNK
LOCATION ZONE: FACE

## 2024-09-17 ASSESSMENT — LOCATION DETAILED DESCRIPTION DERM
LOCATION DETAILED: RIGHT DISTAL DORSAL FOREARM
LOCATION DETAILED: LEFT DISTAL POSTERIOR THIGH
LOCATION DETAILED: LEFT ANTERIOR EARLOBE
LOCATION DETAILED: LEFT MEDIAL FRONTAL SCALP
LOCATION DETAILED: RIGHT DISTAL POSTERIOR UPPER ARM
LOCATION DETAILED: LEFT CENTRAL PARIETAL SCALP
LOCATION DETAILED: RIGHT INFERIOR MEDIAL MIDBACK
LOCATION DETAILED: POSTERIOR MID-PARIETAL SCALP
LOCATION DETAILED: INFERIOR THORACIC SPINE
LOCATION DETAILED: RIGHT CENTRAL MALAR CHEEK
LOCATION DETAILED: RIGHT ANTERIOR PROXIMAL THIGH
LOCATION DETAILED: LEFT DORSAL WRIST
LOCATION DETAILED: RIGHT INFERIOR CENTRAL MALAR CHEEK
LOCATION DETAILED: LEFT SUPERIOR OCCIPITAL SCALP
LOCATION DETAILED: RIGHT DISTAL POSTERIOR THIGH
LOCATION DETAILED: RIGHT SUPERIOR UPPER BACK
LOCATION DETAILED: LEFT LATERAL PROXIMAL PRETIBIAL REGION
LOCATION DETAILED: RIGHT PROXIMAL PRETIBIAL REGION
LOCATION DETAILED: LEFT PROXIMAL POSTERIOR UPPER ARM
LOCATION DETAILED: LEFT ANTERIOR PROXIMAL THIGH
LOCATION DETAILED: LEFT SUPERIOR HELIX
LOCATION DETAILED: LEFT INFERIOR VERMILION LIP
LOCATION DETAILED: RIGHT CENTRAL LATERAL NECK
LOCATION DETAILED: RIGHT INFERIOR FOREHEAD
LOCATION DETAILED: RIGHT VENTRAL PROXIMAL FOREARM
LOCATION DETAILED: RIGHT INFERIOR MEDIAL UPPER BACK
LOCATION DETAILED: LEFT VENTRAL PROXIMAL FOREARM

## 2024-09-17 NOTE — PROCEDURE: MEDICATION COUNSELING
Cimzia Pregnancy And Lactation Text: This medication crosses the placenta but can be considered safe in certain situations. Cimzia may be excreted in breast milk.
Drysol Counseling:  I discussed with the patient the risks of drysol/aluminum chloride including but not limited to skin rash, itching, irritation, burning.
Terbinafine Counseling: Patient counseling regarding adverse effects of terbinafine including but not limited to headache, diarrhea, rash, upset stomach, liver function test abnormalities, itching, taste/smell disturbance, nausea, abdominal pain, and flatulence.  There is a rare possibility of liver failure that can occur when taking terbinafine.  The patient understands that a baseline LFT and kidney function test may be required. The patient verbalized understanding of the proper use and possible adverse effects of terbinafine.  All of the patient's questions and concerns were addressed.
Imiquimod Pregnancy And Lactation Text: This medication is Pregnancy Category C. It is unknown if this medication is excreted in breast milk.
Otezla Counseling: The side effects of Otezla were discussed with the patient, including but not limited to worsening or new depression, weight loss, diarrhea, nausea, upper respiratory tract infection, and headache. Patient instructed to call the office should any adverse effect occur.  The patient verbalized understanding of the proper use and possible adverse effects of Otezla.  All the patient's questions and concerns were addressed.
Taltz Counseling: I discussed with the patient the risks of ixekizumab including but not limited to immunosuppression, serious infections, worsening of inflammatory bowel disease and drug reactions.  The patient understands that monitoring is required including a PPD at baseline and must alert us or the primary physician if symptoms of infection or other concerning signs are noted.
Libtayo Counseling- I discussed with the patient the risks of Libtayo including but not limited to nausea, vomiting, diarrhea, and bone or muscle pain.  The patient verbalized understanding of the proper use and possible adverse effects of Libtayo.  All of the patient's questions and concerns were addressed.
Rinvoq Pregnancy And Lactation Text: Based on animal studies, Rinvoq may cause embryo-fetal harm when administered to pregnant women.  The medication should not be used in pregnancy.  Breastfeeding is not recommended during treatment and for 6 days after the last dose.
Siliq Pregnancy And Lactation Text: The risk during pregnancy and breastfeeding is uncertain with this medication.
Winlevi Pregnancy And Lactation Text: This medication is considered safe during pregnancy and breastfeeding.
Gabapentin Counseling: I discussed with the patient the risks of gabapentin including but not limited to dizziness, somnolence, fatigue and ataxia.
Include Pregnancy/Lactation Warning?: No
Azathioprine Pregnancy And Lactation Text: This medication is Pregnancy Category D and isn't considered safe during pregnancy. It is unknown if this medication is excreted in breast milk.
Tetracycline Pregnancy And Lactation Text: This medication is Pregnancy Category D and not consider safe during pregnancy. It is also excreted in breast milk.
Niacinamide Pregnancy And Lactation Text: These medications are considered safe during pregnancy.
Topical Metronidazole Counseling: Metronidazole is a topical antibiotic medication. You may experience burning, stinging, redness, or allergic reactions.  Please call our office if you develop any problems from using this medication.
Hyrimoz Counseling:  I discussed with the patient the risks of adalimumab including but not limited to myelosuppression, immunosuppression, autoimmune hepatitis, demyelinating diseases, lymphoma, and serious infections.  The patient understands that monitoring is required including a PPD at baseline and must alert us or the primary physician if symptoms of infection or other concerning signs are noted.
Prednisone Counseling:  I discussed with the patient the risks of prolonged use of prednisone including but not limited to weight gain, insomnia, osteoporosis, mood changes, diabetes, susceptibility to infection, glaucoma and high blood pressure.  In cases where prednisone use is prolonged, patients should be monitored with blood pressure checks, serum glucose levels and an eye exam.  Additionally, the patient may need to be placed on GI prophylaxis, PCP prophylaxis, and calcium and vitamin D supplementation and/or a bisphosphonate.  The patient verbalized understanding of the proper use and the possible adverse effects of prednisone.  All of the patient's questions and concerns were addressed.
Soolantra Pregnancy And Lactation Text: This medication is Pregnancy Category C. This medication is considered safe during breast feeding.
Protopic Counseling: Patient may experience a mild burning sensation during topical application. Protopic is not approved in children less than 2 years of age. There have been case reports of hematologic and skin malignancies in patients using topical calcineurin inhibitors although causality is questionable.
Thalidomide Pregnancy And Lactation Text: This medication is Pregnancy Category X and is absolutely contraindicated during pregnancy. It is unknown if it is excreted in breast milk.
Cimetidine Counseling:  I discussed with the patient the risks of Cimetidine including but not limited to gynecomastia, headache, diarrhea, nausea, drowsiness, arrhythmias, pancreatitis, skin rashes, psychosis, bone marrow suppression and kidney toxicity.
Tranexamic Acid Counseling:  Patient advised of the small risk of bleeding problems with tranexamic acid. They were also instructed to call if they developed any nausea, vomiting or diarrhea. All of the patient's questions and concerns were addressed.
Gabapentin Pregnancy And Lactation Text: This medication is Pregnancy Category C and isn't considered safe during pregnancy. It is excreted in breast milk.
Clindamycin Counseling: I counseled the patient regarding use of clindamycin as an antibiotic for prophylactic and/or therapeutic purposes. Clindamycin is active against numerous classes of bacteria, including skin bacteria. Side effects may include nausea, diarrhea, gastrointestinal upset, rash, hives, yeast infections, and in rare cases, colitis.
Protopic Pregnancy And Lactation Text: This medication is Pregnancy Category C. It is unknown if this medication is excreted in breast milk when applied topically.
Nsaids Counseling: NSAID Counseling: I discussed with the patient that NSAIDs should be taken with food. Prolonged use of NSAIDs can result in the development of stomach ulcers.  Patient advised to stop taking NSAIDs if abdominal pain occurs.  The patient verbalized understanding of the proper use and possible adverse effects of NSAIDs.  All of the patient's questions and concerns were addressed.
Ivermectin Counseling:  Patient instructed to take medication on an empty stomach with a full glass of water.  Patient informed of potential adverse effects including but not limited to nausea, diarrhea, dizziness, itching, and swelling of the extremities or lymph nodes.  The patient verbalized understanding of the proper use and possible adverse effects of ivermectin.  All of the patient's questions and concerns were addressed.
Isotretinoin Counseling: Patient should get monthly blood tests, not donate blood, not drive at night if vision affected, not share medication, and not undergo elective surgery for 6 months after tx completed. Side effects reviewed, pt to contact office should one occur.
Finasteride Pregnancy And Lactation Text: This medication is absolutely contraindicated during pregnancy. It is unknown if it is excreted in breast milk.
Benzoyl Peroxide Counseling: Patient counseled that medicine may cause skin irritation and bleach clothing.  In the event of skin irritation, the patient was advised to reduce the amount of the drug applied or use it less frequently.   The patient verbalized understanding of the proper use and possible adverse effects of benzoyl peroxide.  All of the patient's questions and concerns were addressed.
Benzoyl Peroxide Pregnancy And Lactation Text: This medication is Pregnancy Category C. It is unknown if benzoyl peroxide is excreted in breast milk.
Fluconazole Counseling:  Patient counseled regarding adverse effects of fluconazole including but not limited to headache, diarrhea, nausea, upset stomach, liver function test abnormalities, taste disturbance, and stomach pain.  There is a rare possibility of liver failure that can occur when taking fluconazole.  The patient understands that monitoring of LFTs and kidney function test may be required, especially at baseline. The patient verbalized understanding of the proper use and possible adverse effects of fluconazole.  All of the patient's questions and concerns were addressed.
Simponi Counseling:  I discussed with the patient the risks of golimumab including but not limited to myelosuppression, immunosuppression, autoimmune hepatitis, demyelinating diseases, lymphoma, and serious infections.  The patient understands that monitoring is required including a PPD at baseline and must alert us or the primary physician if symptoms of infection or other concerning signs are noted.
Klisyri Counseling:  I discussed with the patient the risks of Klisyri including but not limited to erythema, scaling, itching, weeping, crusting, and pain.
Drysol Pregnancy And Lactation Text: This medication is considered safe during pregnancy and breast feeding.
Sotyktu Counseling:  I discussed the most common side effects of Sotyktu including: common cold, sore throat, sinus infections, cold sores, canker sores, folliculitis, and acne.? I also discussed more serious side effects of Sotyktu including but not limited to: serious allergic reactions; increased risk for infections such as TB; cancers such as lymphomas; rhabdomyolysis and elevated CPK; and elevated triglycerides and liver enzymes.?
Birth Control Pills Counseling: Birth Control Pill Counseling: I discussed with the patient the potential side effects of OCPs including but not limited to increased risk of stroke, heart attack, thrombophlebitis, deep venous thrombosis, hepatic adenomas, breast changes, GI upset, headaches, and depression.  The patient verbalized understanding of the proper use and possible adverse effects of OCPs. All of the patient's questions and concerns were addressed.
Ivermectin Pregnancy And Lactation Text: This medication is Pregnancy Category C and it isn't known if it is safe during pregnancy. It is also excreted in breast milk.
VTAMA Counseling: I discussed with the patient that VTAMA is not for use in the eyes, mouth or mouth. They should call the office if they develop any signs of allergic reactions to VTAMA. The patient verbalized understanding of the proper use and possible adverse effects of VTAMA.  All of the patient's questions and concerns were addressed.
Topical Metronidazole Pregnancy And Lactation Text: This medication is Pregnancy Category B and considered safe during pregnancy.  It is also considered safe to use while breastfeeding.
Cellcept Counseling:  I discussed with the patient the risks of mycophenolate mofetil including but not limited to infection/immunosuppression, GI upset, hypokalemia, hypercholesterolemia, bone marrow suppression, lymphoproliferative disorders, malignancy, GI ulceration/bleed/perforation, colitis, interstitial lung disease, kidney failure, progressive multifocal leukoencephalopathy, and birth defects.  The patient understands that monitoring is required including a baseline creatinine and regular CBC testing. In addition, patient must alert us immediately if symptoms of infection or other concerning signs are noted.
Topical Retinoid counseling:  Patient advised to apply a pea-sized amount only at bedtime and wait 30 minutes after washing their face before applying.  If too drying, patient may add a non-comedogenic moisturizer. The patient verbalized understanding of the proper use and possible adverse effects of retinoids.  All of the patient's questions and concerns were addressed.
Prednisone Pregnancy And Lactation Text: This medication is Pregnancy Category C and it isn't know if it is safe during pregnancy. This medication is excreted in breast milk.
Hyrimoz Pregnancy And Lactation Text: This medication is Pregnancy Category B and is considered safe during pregnancy. It is unknown if this medication is excreted in breast milk.
Cosentyx Counseling:  I discussed with the patient the risks of Cosentyx including but not limited to worsening of Crohn's disease, immunosuppression, allergic reactions and infections.  The patient understands that monitoring is required including a PPD at baseline and must alert us or the primary physician if symptoms of infection or other concerning signs are noted.
Arava Counseling:  Patient counseled regarding adverse effects of Arava including but not limited to nausea, vomiting, abnormalities in liver function tests. Patients may develop mouth sores, rash, diarrhea, and abnormalities in blood counts. The patient understands that monitoring is required including LFTs and blood counts.  There is a rare possibility of scarring of the liver and lung problems that can occur when taking methotrexate. Persistent nausea, loss of appetite, pale stools, dark urine, cough, and shortness of breath should be reported immediately. Patient advised to discontinue Arava treatment and consult with a physician prior to attempting conception. The patient will have to undergo a treatment to eliminate Arava from the body prior to conception.
Libtayo Pregnancy And Lactation Text: This medication is contraindicated in pregnancy and when breast feeding.
Otezla Pregnancy And Lactation Text: This medication is Pregnancy Category C and it isn't known if it is safe during pregnancy. It is unknown if it is excreted in breast milk.
Oxybutynin Counseling:  I discussed with the patient the risks of oxybutynin including but not limited to skin rash, drowsiness, dry mouth, difficulty urinating, and blurred vision.
Qbrexza Counseling:  I discussed with the patient the risks of Qbrexza including but not limited to headache, mydriasis, blurred vision, dry eyes, nasal dryness, dry mouth, dry throat, dry skin, urinary hesitation, and constipation.  Local skin reactions including erythema, burning, stinging, and itching can also occur.
Cimetidine Pregnancy And Lactation Text: This medication is Pregnancy Category B and is considered safe during pregnancy. It is also excreted in breast milk and breast feeding isn't recommended.
Odomzo Counseling- I discussed with the patient the risks of Odomzo including but not limited to nausea, vomiting, diarrhea, constipation, weight loss, changes in the sense of taste, decreased appetite, muscle spasms, and hair loss.  The patient verbalized understanding of the proper use and possible adverse effects of Odomzo.  All of the patient's questions and concerns were addressed.
Tranexamic Acid Pregnancy And Lactation Text: It is unknown if this medication is safe during pregnancy or breast feeding.
Clindamycin Pregnancy And Lactation Text: This medication can be used in pregnancy if certain situations. Clindamycin is also present in breast milk.
Vtama Pregnancy And Lactation Text: It is unknown if this medication can cause problems during pregnancy and breastfeeding.
Quinolones Counseling:  I discussed with the patient the risks of fluoroquinolones including but not limited to GI upset, allergic reaction, drug rash, diarrhea, dizziness, photosensitivity, yeast infections, liver function test abnormalities, tendonitis/tendon rupture.
Topical Steroids Counseling: I discussed with the patient that prolonged use of topical steroids can result in the increased appearance of superficial blood vessels (telangiectasias), lightening (hypopigmentation) and thinning of the skin (atrophy).  Patient understands to avoid using high potency steroids in skin folds, the groin or the face.  The patient verbalized understanding of the proper use and possible adverse effects of topical steroids.  All of the patient's questions and concerns were addressed.
Nsaids Pregnancy And Lactation Text: These medications are considered safe up to 30 weeks gestation. It is excreted in breast milk.
Isotretinoin Pregnancy And Lactation Text: This medication is Pregnancy Category X and is considered extremely dangerous during pregnancy. It is unknown if it is excreted in breast milk.
Tremfya Counseling: I discussed with the patient the risks of guselkumab including but not limited to immunosuppression, serious infections, worsening of inflammatory bowel disease and drug reactions.  The patient understands that monitoring is required including a PPD at baseline and must alert us or the primary physician if symptoms of infection or other concerning signs are noted.
High Dose Vitamin A Counseling: Side effects reviewed, pt to contact office should one occur.
Fluconazole Pregnancy And Lactation Text: This medication is Pregnancy Category C and it isn't know if it is safe during pregnancy. It is also excreted in breast milk.
Cibinqo Counseling: I discussed with the patient the risks of Cibinqo therapy including but not limited to common cold, nausea, headache, cold sores, increased blood CPK levels, dizziness, UTIs, fatigue, acne, and vomitting. Live vaccines should be avoided.  This medication has been linked to serious infections; higher rate of mortality; malignancy and lymphoproliferative disorders; major adverse cardiovascular events; thrombosis; thrombocytopenia and lymphopenia; lipid elevations; and retinal detachment.
Sotyktu Pregnancy And Lactation Text: There is insufficient data to evaluate whether or not Sotyktu is safe to use during pregnancy.? ?It is not known if Sotyktu passes into breast milk and whether or not it is safe to use when breastfeeding.??
Klisyri Pregnancy And Lactation Text: It is unknown if this medication can harm a developing fetus or if it is excreted in breast milk.
Ilumya Counseling: I discussed with the patient the risks of tildrakizumab including but not limited to immunosuppression, malignancy, posterior leukoencephalopathy syndrome, and serious infections.  The patient understands that monitoring is required including a PPD at baseline and must alert us or the primary physician if symptoms of infection or other concerning signs are noted.
Carac Counseling:  I discussed with the patient the risks of Carac including but not limited to erythema, scaling, itching, weeping, crusting, and pain.
Glycopyrrolate Counseling:  I discussed with the patient the risks of glycopyrrolate including but not limited to skin rash, drowsiness, dry mouth, difficulty urinating, and blurred vision.
Elidel Counseling: Patient may experience a mild burning sensation during topical application. Elidel is not approved in children less than 2 years of age. There have been case reports of hematologic and skin malignancies in patients using topical calcineurin inhibitors although causality is questionable.
Qbrexza Pregnancy And Lactation Text: There is no available data on Qbrexza use in pregnant women.  There is no available data on Qbrexza use in lactation.
Xeljanz Counseling: I discussed with the patient the risks of Xeljanz therapy including increased risk of infection, liver issues, headache, diarrhea, or cold symptoms. Live vaccines should be avoided. They were instructed to call if they have any problems.
Clofazimine Counseling:  I discussed with the patient the risks of clofazimine including but not limited to skin and eye pigmentation, liver damage, nausea/vomiting, gastrointestinal bleeding and allergy.
Oxybutynin Pregnancy And Lactation Text: This medication is Pregnancy Category B and is considered safe during pregnancy. It is unknown if it is excreted in breast milk.
Mirvaso Counseling: Mirvaso is a topical medication which can decrease superficial blood flow where applied. Side effects are uncommon and include stinging, redness and allergic reactions.
Zoryve Counseling:  I discussed with the patient that Zoryve is not for use in the eyes, mouth or vagina. The most commonly reported side effects include diarrhea, headache, insomnia, application site pain, upper respiratory tract infections, and urinary tract infections.  All of the patient's questions and concerns were addressed.
Minoxidil Counseling: Minoxidil is a topical medication which can increase blood flow where it is applied. It is uncertain how this medication increases hair growth. Side effects are uncommon and include stinging and allergic reactions.
Valtrex Counseling: I discussed with the patient the risks of valacyclovir including but not limited to kidney damage, nausea, vomiting and severe allergy.  The patient understands that if the infection seems to be worsening or is not improving, they are to call.
Tazorac Counseling:  Patient advised that medication is irritating and drying.  Patient may need to apply sparingly and wash off after an hour before eventually leaving it on overnight.  The patient verbalized understanding of the proper use and possible adverse effects of tazorac.  All of the patient's questions and concerns were addressed.
Glycopyrrolate Pregnancy And Lactation Text: This medication is Pregnancy Category B and is considered safe during pregnancy. It is unknown if it is excreted breast milk.
Cyclophosphamide Counseling:  I discussed with the patient the risks of cyclophosphamide including but not limited to hair loss, hormonal abnormalities, decreased fertility, abdominal pain, diarrhea, nausea and vomiting, bone marrow suppression and infection. The patient understands that monitoring is required while taking this medication.
Doxycycline Counseling:  Patient counseled regarding possible photosensitivity and increased risk for sunburn.  Patient instructed to avoid sunlight, if possible.  When exposed to sunlight, patients should wear protective clothing, sunglasses, and sunscreen.  The patient was instructed to call the office immediately if the following severe adverse effects occur:  hearing changes, easy bruising/bleeding, severe headache, or vision changes.  The patient verbalized understanding of the proper use and possible adverse effects of doxycycline.  All of the patient's questions and concerns were addressed.
Doxepin Counseling:  Patient advised that the medication is sedating and not to drive a car after taking this medication. Patient informed of potential adverse effects including but not limited to dry mouth, urinary retention, and blurry vision.  The patient verbalized understanding of the proper use and possible adverse effects of doxepin.  All of the patient's questions and concerns were addressed.
Olanzapine Counseling- I discussed with the patient the common side effects of olanzapine including but are not limited to: lack of energy, dry mouth, increased appetite, sleepiness, tremor, constipation, dizziness, changes in behavior, or restlessness.  Explained that teenagers are more likely to experience headaches, abdominal pain, pain in the arms or legs, tiredness, and sleepiness.  Serious side effects include but are not limited: increased risk of death in elderly patients who are confused, have memory loss, or dementia-related psychosis; hyperglycemia; increased cholesterol and triglycerides; and weight gain.
Topical Steroids Applications Pregnancy And Lactation Text: Most topical steroids are considered safe to use during pregnancy and lactation.  Any topical steroid applied to the breast or nipple should be washed off before breastfeeding.
Doxycycline Pregnancy And Lactation Text: This medication is Pregnancy Category D and not consider safe during pregnancy. It is also excreted in breast milk but is considered safe for shorter treatment courses.
Skyrizi Counseling: I discussed with the patient the risks of risankizumab-rzaa including but not limited to immunosuppression, and serious infections.  The patient understands that monitoring is required including a PPD at baseline and must alert us or the primary physician if symptoms of infection or other concerning signs are noted.
Cibinqo Pregnancy And Lactation Text: It is unknown if this medication will adversely affect pregnancy or breast feeding.  You should not take this medication if you are currently pregnant or planning a pregnancy or while breastfeeding.
Griseofulvin Counseling:  I discussed with the patient the risks of griseofulvin including but not limited to photosensitivity, cytopenia, liver damage, nausea/vomiting and severe allergy.  The patient understands that this medication is best absorbed when taken with a fatty meal (e.g., ice cream or french fries).
High Dose Vitamin A Pregnancy And Lactation Text: High dose vitamin A therapy is contraindicated during pregnancy and breast feeding.
Dupixent Counseling: I discussed with the patient the risks of dupilumab including but not limited to eye infection and irritation, cold sores, injection site reactions, worsening of asthma, allergic reactions and increased risk of parasitic infection.  Live vaccines should be avoided while taking dupilumab. Dupilumab will also interact with certain medications such as warfarin and cyclosporine. The patient understands that monitoring is required and they must alert us or the primary physician if symptoms of infection or other concerning signs are noted.
Azithromycin Counseling:  I discussed with the patient the risks of azithromycin including but not limited to GI upset, allergic reaction, drug rash, diarrhea, and yeast infections.
Carac Pregnancy And Lactation Text: This medication is Pregnancy Category X and contraindicated in pregnancy and in women who may become pregnant. It is unknown if this medication is excreted in breast milk.
Dupixent Pregnancy And Lactation Text: This medication likely crosses the placenta but the risk for the fetus is uncertain. This medication is excreted in breast milk.
Adbry Counseling: I discussed with the patient the risks of tralokinumab including but not limited to eye infection and irritation, cold sores, injection site reactions, worsening of asthma, allergic reactions and increased risk of parasitic infection.  Live vaccines should be avoided while taking tralokinumab. The patient understands that monitoring is required and they must alert us or the primary physician if symptoms of infection or other concerning signs are noted.
Litfulo Counseling: I discussed with the patient the risks of Litfulo therapy including but not limited to upper respiratory tract infections, shingles, cold sores, and nausea. Live vaccines should be avoided.  This medication has been linked to serious infections; higher rate of mortality; malignancy and lymphoproliferative disorders; major adverse cardiovascular events; thrombosis; gastrointestinal perforations; neutropenia; lymphopenia; anemia; liver enzyme elevations; and lipid elevations.
Minoxidil Pregnancy And Lactation Text: This medication has not been assigned a Pregnancy Risk Category but animal studies failed to show danger with the topical medication. It is unknown if the medication is excreted in breast milk.
Eucrisa Counseling: Patient may experience a mild burning sensation during topical application. Eucrisa is not approved in children less than 2 years of age.
Topical Sulfur Applications Counseling: Topical Sulfur Counseling: Patient counseled that this medication may cause skin irritation or allergic reactions.  In the event of skin irritation, the patient was advised to reduce the amount of the drug applied or use it less frequently.   The patient verbalized understanding of the proper use and possible adverse effects of topical sulfur application.  All of the patient's questions and concerns were addressed.
Propranolol Counseling:  I discussed with the patient the risks of propranolol including but not limited to low heart rate, low blood pressure, low blood sugar, restlessness and increased cold sensitivity. They should call the office if they experience any of these side effects.
Xelgonzaloz Pregnancy And Lactation Text: This medication is Pregnancy Category D and is not considered safe during pregnancy.  The risk during breast feeding is also uncertain.
Azithromycin Pregnancy And Lactation Text: This medication is considered safe during pregnancy and is also secreted in breast milk.
Infliximab Counseling:  I discussed with the patient the risks of infliximab including but not limited to myelosuppression, immunosuppression, autoimmune hepatitis, demyelinating diseases, lymphoma, and serious infections.  The patient understands that monitoring is required including a PPD at baseline and must alert us or the primary physician if symptoms of infection or other concerning signs are noted.
Hydroxychloroquine Counseling:  I discussed with the patient that a baseline ophthalmologic exam is needed at the start of therapy and every year thereafter while on therapy. A CBC may also be warranted for monitoring.  The side effects of this medication were discussed with the patient, including but not limited to agranulocytosis, aplastic anemia, seizures, rashes, retinopathy, and liver toxicity. Patient instructed to call the office should any adverse effect occur.  The patient verbalized understanding of the proper use and possible adverse effects of Plaquenil.  All the patient's questions and concerns were addressed.
Cyclophosphamide Pregnancy And Lactation Text: This medication is Pregnancy Category D and it isn't considered safe during pregnancy. This medication is excreted in breast milk.
Rifampin Counseling: I discussed with the patient the risks of rifampin including but not limited to liver damage, kidney damage, red-orange body fluids, nausea/vomiting and severe allergy.
Tazorac Pregnancy And Lactation Text: This medication is not safe during pregnancy. It is unknown if this medication is excreted in breast milk.
Mirvaso Pregnancy And Lactation Text: This medication has not been assigned a Pregnancy Risk Category. It is unknown if the medication is excreted in breast milk.
Rhofade Counseling: Rhofade is a topical medication which can decrease superficial blood flow where applied. Side effects are uncommon and include stinging, redness and allergic reactions.
Doxepin Pregnancy And Lactation Text: This medication is Pregnancy Category C and it isn't known if it is safe during pregnancy. It is also excreted in breast milk and breast feeding isn't recommended.
Valtrex Pregnancy And Lactation Text: this medication is Pregnancy Category B and is considered safe during pregnancy. This medication is not directly found in breast milk but it's metabolite acyclovir is present.
Erythromycin Counseling:  I discussed with the patient the risks of erythromycin including but not limited to GI upset, allergic reaction, drug rash, diarrhea, increase in liver enzymes, and yeast infections.
Hydroxyzine Counseling: Patient advised that the medication is sedating and not to drive a car after taking this medication.  Patient informed of potential adverse effects including but not limited to dry mouth, urinary retention, and blurry vision.  The patient verbalized understanding of the proper use and possible adverse effects of hydroxyzine.  All of the patient's questions and concerns were addressed.
Birth Control Pills Pregnancy And Lactation Text: This medication should be avoided if pregnant and for the first 30 days post-partum.
Griseofulvin Pregnancy And Lactation Text: This medication is Pregnancy Category X and is known to cause serious birth defects. It is unknown if this medication is excreted in breast milk but breast feeding should be avoided.
Dutasteride Male Counseling: Dustasteride Counseling:  I discussed with the patient the risks of use of dutasteride including but not limited to decreased libido, decreased ejaculate volume, and gynecomastia. Women who can become pregnant should not handle medication.  All of the patient's questions and concerns were addressed.
Calcipotriene Counseling:  I discussed with the patient the risks of calcipotriene including but not limited to erythema, scaling, itching, and irritation.
Xolair Counseling:  Patient informed of potential adverse effects including but not limited to fever, muscle aches, rash and allergic reactions.  The patient verbalized understanding of the proper use and possible adverse effects of Xolair.  All of the patient's questions and concerns were addressed.
Calcipotriene Pregnancy And Lactation Text: The use of this medication during pregnancy or lactation is not recommended as there is insufficient data.
Bactrim Counseling:  I discussed with the patient the risks of sulfa antibiotics including but not limited to GI upset, allergic reaction, drug rash, diarrhea, dizziness, photosensitivity, and yeast infections.  Rarely, more serious reactions can occur including but not limited to aplastic anemia, agranulocytosis, methemoglobinemia, blood dyscrasias, liver or kidney failure, lung infiltrates or desquamative/blistering drug rashes.
Itraconazole Counseling:  I discussed with the patient the risks of itraconazole including but not limited to liver damage, nausea/vomiting, neuropathy, and severe allergy.  The patient understands that this medication is best absorbed when taken with acidic beverages such as non-diet cola or ginger ale.  The patient understands that monitoring is required including baseline LFTs and repeat LFTs at intervals.  The patient understands that they are to contact us or the primary physician if concerning signs are noted.
Litfulo Pregnancy And Lactation Text: Based on animal studies, Lifulo may cause embryo-fetal harm when administered to pregnant women.  The medication should not be used in pregnancy.  Breastfeeding is not recommended during treatment.
Xolair Pregnancy And Lactation Text: This medication is Pregnancy Category B and is considered safe during pregnancy. This medication is excreted in breast milk.
Spevigo Counseling: I discussed with the patient the risks of Spevigo including but not limited to fatigue, nasuea, vomiting, headache, pruritus, urinary tract infection, an infusion related reactions.  The patient understands that monitoring is required including screening for tuberculosis at baseline and yearly screening thereafter while continuing Spevigo therapy. They should contact us if symptoms of infection or other concerning signs are noted.
Zyclara Counseling:  I discussed with the patient the risks of imiquimod including but not limited to erythema, scaling, itching, weeping, crusting, and pain.  Patient understands that the inflammatory response to imiquimod is variable from person to person and was educated regarded proper titration schedule.  If flu-like symptoms develop, patient knows to discontinue the medication and contact us.
Topical Sulfur Applications Pregnancy And Lactation Text: This medication is Pregnancy Category C and has an unknown safety profile during pregnancy. It is unknown if this topical medication is excreted in breast milk.
Topical Clindamycin Counseling: Patient counseled that this medication may cause skin irritation or allergic reactions.  In the event of skin irritation, the patient was advised to reduce the amount of the drug applied or use it less frequently.   The patient verbalized understanding of the proper use and possible adverse effects of clindamycin.  All of the patient's questions and concerns were addressed.
Hydroxychloroquine Pregnancy And Lactation Text: This medication has been shown to cause fetal harm but it isn't assigned a Pregnancy Risk Category. There are small amounts excreted in breast milk.
Cyclosporine Counseling:  I discussed with the patient the risks of cyclosporine including but not limited to hypertension, gingival hyperplasia,myelosuppression, immunosuppression, liver damage, kidney damage, neurotoxicity, lymphoma, and serious infections. The patient understands that monitoring is required including baseline blood pressure, CBC, CMP, lipid panel and uric acid, and then 1-2 times monthly CMP and blood pressure.
Rifampin Pregnancy And Lactation Text: This medication is Pregnancy Category C and it isn't know if it is safe during pregnancy. It is also excreted in breast milk and should not be used if you are breast feeding.
Enbrel Counseling:  I discussed with the patient the risks of etanercept including but not limited to myelosuppression, immunosuppression, autoimmune hepatitis, demyelinating diseases, lymphoma, and infections.  The patient understands that monitoring is required including a PPD at baseline and must alert us or the primary physician if symptoms of infection or other concerning signs are noted.
Colchicine Counseling:  Patient counseled regarding adverse effects including but not limited to stomach upset (nausea, vomiting, stomach pain, or diarrhea).  Patient instructed to limit alcohol consumption while taking this medication.  Colchicine may reduce blood counts especially with prolonged use.  The patient understands that monitoring of kidney function and blood counts may be required, especially at baseline. The patient verbalized understanding of the proper use and possible adverse effects of colchicine.  All of the patient's questions and concerns were addressed.
Adbry Pregnancy And Lactation Text: It is unknown if this medication will adversely affect pregnancy or breast feeding.
Opzelura Counseling:  I discussed with the patient the risks of Opzelura including but not limited to nasopharngitis, bronchitis, ear infection, eosinophila, hives, diarrhea, folliculitis, tonsillitis, and rhinorrhea.  Taken orally, this medication has been linked to serious infections; higher rate of mortality; malignancy and lymphoproliferative disorders; major adverse cardiovascular events; thrombosis; thrombocytopenia, anemia, and neutropenia; and lipid elevations.
Propranolol Pregnancy And Lactation Text: This medication is Pregnancy Category C and it isn't known if it is safe during pregnancy. It is excreted in breast milk.
SSKI Counseling:  I discussed with the patient the risks of SSKI including but not limited to thyroid abnormalities, metallic taste, GI upset, fever, headache, acne, arthralgias, paraesthesias, lymphadenopathy, easy bleeding, arrhythmias, and allergic reaction.
Hydroxyzine Pregnancy And Lactation Text: This medication is not safe during pregnancy and should not be taken. It is also excreted in breast milk and breast feeding isn't recommended.
Spironolactone Counseling: Patient advised regarding risks of diarrhea, abdominal pain, hyperkalemia, birth defects (for female patients), liver toxicity and renal toxicity. The patient may need blood work to monitor liver and kidney function and potassium levels while on therapy. The patient verbalized understanding of the proper use and possible adverse effects of spironolactone.  All of the patient's questions and concerns were addressed.
Opzelura Pregnancy And Lactation Text: There is insufficient data to evaluate drug-associated risk for major birth defects, miscarriage, or other adverse maternal or fetal outcomes.  There is a pregnancy registry that monitors pregnancy outcomes in pregnant persons exposed to the medication during pregnancy.  It is unknown if this medication is excreted in breast milk.  Do not breastfeed during treatment and for about 4 weeks after the last dose.
Sarecycline Counseling: Patient advised regarding possible photosensitivity and discoloration of the teeth, skin, lips, tongue and gums.  Patient instructed to avoid sunlight, if possible.  When exposed to sunlight, patients should wear protective clothing, sunglasses, and sunscreen.  The patient was instructed to call the office immediately if the following severe adverse effects occur:  hearing changes, easy bruising/bleeding, severe headache, or vision changes.  The patient verbalized understanding of the proper use and possible adverse effects of sarecycline.  All of the patient's questions and concerns were addressed.
Erythromycin Pregnancy And Lactation Text: This medication is Pregnancy Category B and is considered safe during pregnancy. It is also excreted in breast milk.
Acitretin Counseling:  I discussed with the patient the risks of acitretin including but not limited to hair loss, dry lips/skin/eyes, liver damage, hyperlipidemia, depression/suicidal ideation, photosensitivity.  Serious rare side effects can include but are not limited to pancreatitis, pseudotumor cerebri, bony changes, clot formation/stroke/heart attack.  Patient understands that alcohol is contraindicated since it can result in liver toxicity and significantly prolong the elimination of the drug by many years.
Dutasteride Female Counseling: Dutasteride Counseling:  I discussed with the patient the risks of use of dutasteride including but not limited to decreased libido and sexual dysfunction. Explained the teratogenic nature of the medication and stressed the importance of not getting pregnant during treatment. All of the patient's questions and concerns were addressed.
Aklief counseling:  Patient advised to apply a pea-sized amount only at bedtime and wait 30 minutes after washing their face before applying.  If too drying, patient may add a non-comedogenic moisturizer.  The most commonly reported side effects including irritation, redness, scaling, dryness, stinging, burning, itching, and increased risk of sunburn.  The patient verbalized understanding of the proper use and possible adverse effects of retinoids.  All of the patient's questions and concerns were addressed.
Aklief Pregnancy And Lactation Text: It is unknown if this medication is safe to use during pregnancy.  It is unknown if this medication is excreted in breast milk.  Breastfeeding women should use the topical cream on the smallest area of the skin for the shortest time needed while breastfeeding.  Do not apply to nipple and areola.
Spevigo Pregnancy And Lactation Text: The risk during pregnancy and breastfeeding is uncertain with this medication. This medication does cross the placenta. It is unknown if this medication is found in breast milk.
Hydroquinone Counseling:  Patient advised that medication may result in skin irritation, lightening (hypopigmentation), dryness, and burning.  In the event of skin irritation, the patient was advised to reduce the amount of the drug applied or use it less frequently.  Rarely, spots that are treated with hydroquinone can become darker (pseudoochronosis).  Should this occur, patient instructed to stop medication and call the office. The patient verbalized understanding of the proper use and possible adverse effects of hydroquinone.  All of the patient's questions and concerns were addressed.
Olumiant Counseling: I discussed with the patient the risks of Olumiant therapy including but not limited to upper respiratory tract infections, shingles, cold sores, and nausea. Live vaccines should be avoided.  This medication has been linked to serious infections; higher rate of mortality; malignancy and lymphoproliferative disorders; major adverse cardiovascular events; thrombosis; gastrointestinal perforations; neutropenia; lymphopenia; anemia; liver enzyme elevations; and lipid elevations.
Cantharidin Counseling:  I discussed with the patient the risks of Cantharidin including but not limited to pain, redness, burning, itching, and blistering.
Dutasteride Pregnancy And Lactation Text: This medication is absolutely contraindicated in women, especially during pregnancy and breast feeding. Feminization of male fetuses is possible if taking while pregnant.
Wartpeel Counseling:  I discussed with the patient the risks of Wartpeel including but not limited to erythema, scaling, itching, weeping, crusting, and pain.
Solaraze Counseling:  I discussed with the patient the risks of Solaraze including but not limited to erythema, scaling, itching, weeping, crusting, and pain.
Rituxan Counseling:  I discussed with the patient the risks of Rituxan infusions. Side effects can include infusion reactions, severe drug rashes including mucocutaneous reactions, reactivation of latent hepatitis and other infections and rarely progressive multifocal leukoencephalopathy.  All of the patient's questions and concerns were addressed.
Low Dose Naltrexone Counseling- I discussed with the patient the potential risks and side effects of low dose naltrexone including but not limited to: more vivid dreams, headaches, nausea, vomiting, abdominal pain, fatigue, dizziness, and anxiety.
Bimzelx Counseling:  I discussed with the patient the risks of Bimzelx including but not limited to depression, immunosuppression, allergic reactions and infections.  The patient understands that monitoring is required including a PPD at baseline and must alert us or the primary physician if symptoms of infection or other concerning signs are noted.
Olanzapine Pregnancy And Lactation Text: This medication is pregnancy category C.   There are no adequate and well controlled trials with olanzapine in pregnant females.  Olanzapine should be used during pregnancy only if the potential benefit justifies the potential risk to the fetus.   In a study in lactating healthy women, olanzapine was excreted in breast milk.  It is recommended that women taking olanzapine should not breast feed.
Solaraze Pregnancy And Lactation Text: This medication is Pregnancy Category B and is considered safe. There is some data to suggest avoiding during the third trimester. It is unknown if this medication is excreted in breast milk.
Methotrexate Counseling:  Patient counseled regarding adverse effects of methotrexate including but not limited to nausea, vomiting, abnormalities in liver function tests. Patients may develop mouth sores, rash, diarrhea, and abnormalities in blood counts. The patient understands that monitoring is required including LFT's and blood counts.  There is a rare possibility of scarring of the liver and lung problems that can occur when taking methotrexate. Persistent nausea, loss of appetite, pale stools, dark urine, cough, and shortness of breath should be reported immediately. Patient advised to discontinue methotrexate treatment at least three months before attempting to become pregnant.  I discussed the need for folate supplements while taking methotrexate.  These supplements can decrease side effects during methotrexate treatment. The patient verbalized understanding of the proper use and possible adverse effects of methotrexate.  All of the patient's questions and concerns were addressed.
Picato Counseling:  I discussed with the patient the risks of Picato including but not limited to erythema, scaling, itching, weeping, crusting, and pain.
Oral Minoxidil Counseling- I discussed with the patient the risks of oral minoxidil including but not limited to shortness of breath, swelling of the feet or ankles, dizziness, lightheadedness, unwanted hair growth and allergic reaction.  The patient verbalized understanding of the proper use and possible adverse effects of oral minoxidil.  All of the patient's questions and concerns were addressed.
Erivedge Counseling- I discussed with the patient the risks of Erivedge including but not limited to nausea, vomiting, diarrhea, constipation, weight loss, changes in the sense of taste, decreased appetite, muscle spasms, and hair loss.  The patient verbalized understanding of the proper use and possible adverse effects of Erivedge.  All of the patient's questions and concerns were addressed.
Sski Pregnancy And Lactation Text: This medication is Pregnancy Category D and isn't considered safe during pregnancy. It is excreted in breast milk.
Bimzelx Pregnancy And Lactation Text: This medication crosses the placenta and the safety is uncertain during pregnancy. It is unknown if this medication is present in breast milk.
Low Dose Naltrexone Pregnancy And Lactation Text: Naltrexone is pregnancy category C.  There have been no adequate and well-controlled studies in pregnant women.  It should be used in pregnancy only if the potential benefit justifies the potential risk to the fetus.   Limited data indicates that naltrexone is minimally excreted into breastmilk.
Topical Ketoconazole Counseling: Patient counseled that this medication may cause skin irritation or allergic reactions.  In the event of skin irritation, the patient was advised to reduce the amount of the drug applied or use it less frequently.   The patient verbalized understanding of the proper use and possible adverse effects of ketoconazole.  All of the patient's questions and concerns were addressed.
Cantharidin Pregnancy And Lactation Text: This medication has not been proven safe during pregnancy. It is unknown if this medication is excreted in breast milk.
Metronidazole Counseling:  I discussed with the patient the risks of metronidazole including but not limited to seizures, nausea/vomiting, a metallic taste in the mouth, nausea/vomiting and severe allergy.
Bactrim Pregnancy And Lactation Text: This medication is Pregnancy Category D and is known to cause fetal risk.  It is also excreted in breast milk.
Acitretin Pregnancy And Lactation Text: This medication is Pregnancy Category X and should not be given to women who are pregnant or may become pregnant in the future. This medication is excreted in breast milk.
Spironolactone Pregnancy And Lactation Text: This medication can cause feminization of the male fetus and should be avoided during pregnancy. The active metabolite is also found in breast milk.
Finasteride Male Counseling: Finasteride Counseling:  I discussed with the patient the risks of use of finasteride including but not limited to decreased libido, decreased ejaculate volume, gynecomastia, and depression. Women should not handle medication.  All of the patient's questions and concerns were addressed.
Cephalexin Counseling: I counseled the patient regarding use of cephalexin as an antibiotic for prophylactic and/or therapeutic purposes. Cephalexin (commonly prescribed under brand name Keflex) is a cephalosporin antibiotic which is active against numerous classes of bacteria, including most skin bacteria. Side effects may include nausea, diarrhea, gastrointestinal upset, rash, hives, yeast infections, and in rare cases, hepatitis, kidney disease, seizures, fever, confusion, neurologic symptoms, and others. Patients with severe allergies to penicillin medications are cautioned that there is about a 10% incidence of cross-reactivity with cephalosporins. When possible, patients with penicillin allergies should use alternatives to cephalosporins for antibiotic therapy.
Bexarotene Counseling:  I discussed with the patient the risks of bexarotene including but not limited to hair loss, dry lips/skin/eyes, liver abnormalities, hyperlipidemia, pancreatitis, depression/suicidal ideation, photosensitivity, drug rash/allergic reactions, hypothyroidism, anemia, leukopenia, infection, cataracts, and teratogenicity.  Patient understands that they will need regular blood tests to check lipid profile, liver function tests, white blood cell count, thyroid function tests and pregnancy test if applicable.
Ketoconazole Counseling:   Patient counseled regarding improving absorption with orange juice.  Adverse effects include but are not limited to breast enlargement, headache, diarrhea, nausea, upset stomach, liver function test abnormalities, taste disturbance, and stomach pain.  There is a rare possibility of liver failure that can occur when taking ketoconazole. The patient understands that monitoring of LFTs may be required, especially at baseline. The patient verbalized understanding of the proper use and possible adverse effects of ketoconazole.  All of the patient's questions and concerns were addressed.
Opioid Counseling: I discussed with the patient the potential side effects of opioids including but not limited to addiction, altered mental status, and depression. I stressed avoiding alcohol, benzodiazepines, muscle relaxants and sleep aids unless specifically okayed by a physician. The patient verbalized understanding of the proper use and possible adverse effects of opioids. All of the patient's questions and concerns were addressed. They were instructed to flush the remaining pills down the toilet if they did not need them for pain.
Stelara Counseling:  I discussed with the patient the risks of ustekinumab including but not limited to immunosuppression, malignancy, posterior leukoencephalopathy syndrome, and serious infections.  The patient understands that monitoring is required including a PPD at baseline and must alert us or the primary physician if symptoms of infection or other concerning signs are noted.
Olumiant Pregnancy And Lactation Text: Based on animal studies, Olumiant may cause embryo-fetal harm when administered to pregnant women.  The medication should not be used in pregnancy.  Breastfeeding is not recommended during treatment.
Rituxan Pregnancy And Lactation Text: This medication is Pregnancy Category C and it isn't know if it is safe during pregnancy. It is unknown if this medication is excreted in breast milk but similar antibodies are known to be excreted.
Humira Counseling:  I discussed with the patient the risks of adalimumab including but not limited to myelosuppression, immunosuppression, autoimmune hepatitis, demyelinating diseases, lymphoma, and serious infections.  The patient understands that monitoring is required including a PPD at baseline and must alert us or the primary physician if symptoms of infection or other concerning signs are noted.
Azelaic Acid Counseling: Patient counseled that medicine may cause skin irritation and to avoid applying near the eyes.  In the event of skin irritation, the patient was advised to reduce the amount of the drug applied or use it less frequently.   The patient verbalized understanding of the proper use and possible adverse effects of azelaic acid.  All of the patient's questions and concerns were addressed.
Dapsone Counseling: I discussed with the patient the risks of dapsone including but not limited to hemolytic anemia, agranulocytosis, rashes, methemoglobinemia, kidney failure, peripheral neuropathy, headaches, GI upset, and liver toxicity.  Patients who start dapsone require monitoring including baseline LFTs and weekly CBCs for the first month, then every month thereafter.  The patient verbalized understanding of the proper use and possible adverse effects of dapsone.  All of the patient's questions and concerns were addressed.
Albendazole Counseling:  I discussed with the patient the risks of albendazole including but not limited to cytopenia, kidney damage, nausea/vomiting and severe allergy.  The patient understands that this medication is being used in an off-label manner.
5-Fu Counseling: 5-Fluorouracil Counseling:  I discussed with the patient the risks of 5-fluorouracil including but not limited to erythema, scaling, itching, weeping, crusting, and pain.
Cimzia Counseling:  I discussed with the patient the risks of Cimzia including but not limited to immunosuppression, allergic reactions and infections.  The patient understands that monitoring is required including a PPD at baseline and must alert us or the primary physician if symptoms of infection or other concerning signs are noted.
Rinvoq Counseling: I discussed with the patient the risks of Rinvoq therapy including but not limited to upper respiratory tract infections, shingles, cold sores, bronchitis, nausea, cough, fever, acne, and headache. Live vaccines should be avoided.  This medication has been linked to serious infections; higher rate of mortality; malignancy and lymphoproliferative disorders; major adverse cardiovascular events; thrombosis; thrombocytopenia, anemia, and neutropenia; lipid elevations; liver enzyme elevations; and gastrointestinal perforations.
Oral Minoxidil Pregnancy And Lactation Text: This medication should only be used when clearly needed if you are pregnant, attempting to become pregnant or breast feeding.
Imiquimod Counseling:  I discussed with the patient the risks of imiquimod including but not limited to erythema, scaling, itching, weeping, crusting, and pain.  Patient understands that the inflammatory response to imiquimod is variable from person to person and was educated regarded proper titration schedule.  If flu-like symptoms develop, patient knows to discontinue the medication and contact us.
Winlevi Counseling:  I discussed with the patient the risks of topical clascoterone including but not limited to erythema, scaling, itching, and stinging. Patient voiced their understanding.
Thalidomide Counseling: I discussed with the patient the risks of thalidomide including but not limited to birth defects, anxiety, weakness, chest pain, dizziness, cough and severe allergy.
Detail Level: Zone
Dapsone Pregnancy And Lactation Text: This medication is Pregnancy Category C and is not considered safe during pregnancy or breast feeding.
Siliq Counseling:  I discussed with the patient the risks of Siliq including but not limited to new or worsening depression, suicidal thoughts and behavior, immunosuppression, malignancy, posterior leukoencephalopathy syndrome, and serious infections.  The patient understands that monitoring is required including a PPD at baseline and must alert us or the primary physician if symptoms of infection or other concerning signs are noted. There is also a special program designed to monitor depression which is required with Siliq.
Azathioprine Counseling:  I discussed with the patient the risks of azathioprine including but not limited to myelosuppression, immunosuppression, hepatotoxicity, lymphoma, and infections.  The patient understands that monitoring is required including baseline LFTs, Creatinine, possible TPMP genotyping and weekly CBCs for the first month and then every 2 weeks thereafter.  The patient verbalized understanding of the proper use and possible adverse effects of azathioprine.  All of the patient's questions and concerns were addressed.
Metronidazole Pregnancy And Lactation Text: This medication is Pregnancy Category B and considered safe during pregnancy.  It is also excreted in breast milk.
Niacinamide Counseling: I recommended taking niacin or niacinamide, also know as vitamin B3, twice daily. Recent evidence suggests that taking vitamin B3 (500 mg twice daily) can reduce the risk of actinic keratoses and non-melanoma skin cancers. Side effects of vitamin B3 include flushing and headache.
Methotrexate Pregnancy And Lactation Text: This medication is Pregnancy Category X and is known to cause fetal harm. This medication is excreted in breast milk.
Tetracycline Counseling: Patient counseled regarding possible photosensitivity and increased risk for sunburn.  Patient instructed to avoid sunlight, if possible.  When exposed to sunlight, patients should wear protective clothing, sunglasses, and sunscreen.  The patient was instructed to call the office immediately if the following severe adverse effects occur:  hearing changes, easy bruising/bleeding, severe headache, or vision changes.  The patient verbalized understanding of the proper use and possible adverse effects of tetracycline.  All of the patient's questions and concerns were addressed. Patient understands to avoid pregnancy while on therapy due to potential birth defects.
Soolantra Counseling: I discussed with the patients the risks of topial Soolantra. This is a medicine which decreases the number of mites and inflammation in the skin. You experience burning, stinging, eye irritation or allergic reactions.  Please call our office if you develop any problems from using this medication.
Minocycline Counseling: Patient advised regarding possible photosensitivity and discoloration of the teeth, skin, lips, tongue and gums.  Patient instructed to avoid sunlight, if possible.  When exposed to sunlight, patients should wear protective clothing, sunglasses, and sunscreen.  The patient was instructed to call the office immediately if the following severe adverse effects occur:  hearing changes, easy bruising/bleeding, severe headache, or vision changes.  The patient verbalized understanding of the proper use and possible adverse effects of minocycline.  All of the patient's questions and concerns were addressed.
Cephalexin Pregnancy And Lactation Text: This medication is Pregnancy Category B and considered safe during pregnancy.  It is also excreted in breast milk but can be used safely for shorter doses.
Ketoconazole Pregnancy And Lactation Text: This medication is Pregnancy Category C and it isn't know if it is safe during pregnancy. It is also excreted in breast milk and breast feeding isn't recommended.
Bexarotene Pregnancy And Lactation Text: This medication is Pregnancy Category X and should not be given to women who are pregnant or may become pregnant. This medication should not be used if you are breast feeding.
Finasteride Female Counseling: Finasteride Counseling:  I discussed with the patient the risks of use of finasteride including but not limited to decreased libido and sexual dysfunction. Explained the teratogenic nature of the medication and stressed the importance of not getting pregnant during treatment. All of the patient's questions and concerns were addressed.
Opioid Pregnancy And Lactation Text: These medications can lead to premature delivery and should be avoided during pregnancy. These medications are also present in breast milk in small amounts.

## 2024-09-17 NOTE — PROCEDURE: ADDITIONAL NOTES
Render Risk Assessment In Note?: no
Detail Level: Detailed
Additional Notes: Gave direct line for pt to call in 2 weeks to update status of rash after TAC tx\\nWill joe biopsy if does not improve

## 2024-09-17 NOTE — PROCEDURE: LIQUID NITROGEN
Duration Of Freeze Thaw-Cycle (Seconds): 10
Post-Care Instructions: I reviewed with the patient in detail post-care instructions. Patient is to wear sunprotection, and avoid picking at any of the treated lesions. Pt may apply Vaseline to crusted or scabbing areas.
Consent: The patient's consent was obtained including but not limited to risks of crusting, scabbing, blistering, scarring, darker or lighter pigmentary change, recurrence, incomplete removal and infection. RTC in 2 months if lesion(s) persistent.
Number Of Freeze-Thaw Cycles: 2 freeze-thaw cycles
Show Aperture Variable?: Yes
Detail Level: Detailed
Render Note In Bullet Format When Appropriate: No

## 2024-09-19 ENCOUNTER — PHARMACY VISIT (OUTPATIENT)
Dept: PHARMACY | Facility: MEDICAL CENTER | Age: 89
End: 2024-09-19
Payer: COMMERCIAL

## 2024-09-30 ENCOUNTER — PHARMACY VISIT (OUTPATIENT)
Dept: PHARMACY | Facility: MEDICAL CENTER | Age: 89
End: 2024-09-30
Payer: COMMERCIAL

## 2024-09-30 PROCEDURE — RXMED WILLOW AMBULATORY MEDICATION CHARGE: Performed by: NURSE PRACTITIONER

## 2024-09-30 PROCEDURE — RXMED WILLOW AMBULATORY MEDICATION CHARGE

## 2024-10-07 ENCOUNTER — NON-PROVIDER VISIT (OUTPATIENT)
Dept: CARDIOLOGY | Facility: MEDICAL CENTER | Age: 89
End: 2024-10-07
Payer: MEDICARE

## 2024-10-07 PROCEDURE — 93294 REM INTERROG EVL PM/LDLS PM: CPT | Performed by: INTERNAL MEDICINE

## 2024-11-01 ENCOUNTER — PHARMACY VISIT (OUTPATIENT)
Dept: PHARMACY | Facility: MEDICAL CENTER | Age: 89
End: 2024-11-01
Payer: COMMERCIAL

## 2024-11-01 PROCEDURE — RXMED WILLOW AMBULATORY MEDICATION CHARGE: Performed by: FAMILY MEDICINE

## 2024-11-01 PROCEDURE — RXMED WILLOW AMBULATORY MEDICATION CHARGE: Performed by: NURSE PRACTITIONER

## 2024-11-13 ENCOUNTER — NON-PROVIDER VISIT (OUTPATIENT)
Dept: INTERNAL MEDICINE | Facility: IMAGING CENTER | Age: 89
End: 2024-11-13
Payer: MEDICARE

## 2024-11-13 ENCOUNTER — HOSPITAL ENCOUNTER (OUTPATIENT)
Facility: MEDICAL CENTER | Age: 89
End: 2024-11-13
Attending: FAMILY MEDICINE
Payer: MEDICARE

## 2024-11-13 DIAGNOSIS — N18.31 TYPE 2 DIABETES MELLITUS WITH STAGE 3A CHRONIC KIDNEY DISEASE, WITHOUT LONG-TERM CURRENT USE OF INSULIN (HCC): ICD-10-CM

## 2024-11-13 DIAGNOSIS — Z01.89 ENCOUNTER FOR ROUTINE LABORATORY TESTING: ICD-10-CM

## 2024-11-13 DIAGNOSIS — E11.22 TYPE 2 DIABETES MELLITUS WITH STAGE 3A CHRONIC KIDNEY DISEASE, WITHOUT LONG-TERM CURRENT USE OF INSULIN (HCC): ICD-10-CM

## 2024-11-13 LAB
25(OH)D3 SERPL-MCNC: 39 NG/ML (ref 30–100)
ALBUMIN SERPL BCP-MCNC: 4.2 G/DL (ref 3.2–4.9)
ALBUMIN/GLOB SERPL: 1.4 G/DL
ALP SERPL-CCNC: 70 U/L (ref 30–99)
ALT SERPL-CCNC: 22 U/L (ref 2–50)
ANION GAP SERPL CALC-SCNC: 11 MMOL/L (ref 7–16)
AST SERPL-CCNC: 27 U/L (ref 12–45)
BILIRUB SERPL-MCNC: 0.4 MG/DL (ref 0.1–1.5)
BUN SERPL-MCNC: 30 MG/DL (ref 8–22)
CALCIUM ALBUM COR SERPL-MCNC: 9.4 MG/DL (ref 8.5–10.5)
CALCIUM SERPL-MCNC: 9.6 MG/DL (ref 8.5–10.5)
CHLORIDE SERPL-SCNC: 108 MMOL/L (ref 96–112)
CHOLEST SERPL-MCNC: 128 MG/DL (ref 100–199)
CO2 SERPL-SCNC: 21 MMOL/L (ref 20–33)
CREAT SERPL-MCNC: 1.02 MG/DL (ref 0.5–1.4)
ERYTHROCYTE [DISTWIDTH] IN BLOOD BY AUTOMATED COUNT: 49.8 FL (ref 35.9–50)
EST. AVERAGE GLUCOSE BLD GHB EST-MCNC: 134 MG/DL
GFR SERPLBLD CREATININE-BSD FMLA CKD-EPI: 71 ML/MIN/1.73 M 2
GLOBULIN SER CALC-MCNC: 3.1 G/DL (ref 1.9–3.5)
GLUCOSE SERPL-MCNC: 110 MG/DL (ref 65–99)
HBA1C MFR BLD: 6.3 % (ref 4–5.6)
HCT VFR BLD AUTO: 42.8 % (ref 42–52)
HDLC SERPL-MCNC: 46 MG/DL
HGB BLD-MCNC: 13.8 G/DL (ref 14–18)
LDLC SERPL CALC-MCNC: 58 MG/DL
MCH RBC QN AUTO: 32.1 PG (ref 27–33)
MCHC RBC AUTO-ENTMCNC: 32.2 G/DL (ref 32.3–36.5)
MCV RBC AUTO: 99.5 FL (ref 81.4–97.8)
PLATELET # BLD AUTO: 165 K/UL (ref 164–446)
PMV BLD AUTO: 11.1 FL (ref 9–12.9)
POTASSIUM SERPL-SCNC: 4 MMOL/L (ref 3.6–5.5)
PROT SERPL-MCNC: 7.3 G/DL (ref 6–8.2)
RBC # BLD AUTO: 4.3 M/UL (ref 4.7–6.1)
SODIUM SERPL-SCNC: 140 MMOL/L (ref 135–145)
TRIGL SERPL-MCNC: 120 MG/DL (ref 0–149)
TSH SERPL-ACNC: 3.25 UIU/ML (ref 0.35–5.5)
VIT B12 SERPL-MCNC: 743 PG/ML (ref 211–911)
WBC # BLD AUTO: 8.1 K/UL (ref 4.8–10.8)

## 2024-11-13 PROCEDURE — 80053 COMPREHEN METABOLIC PANEL: CPT

## 2024-11-13 PROCEDURE — 85027 COMPLETE CBC AUTOMATED: CPT

## 2024-11-13 PROCEDURE — 82607 VITAMIN B-12: CPT

## 2024-11-13 PROCEDURE — 84443 ASSAY THYROID STIM HORMONE: CPT

## 2024-11-13 PROCEDURE — 80061 LIPID PANEL: CPT

## 2024-11-13 PROCEDURE — 99999 PR NO CHARGE: CPT

## 2024-11-13 PROCEDURE — 83036 HEMOGLOBIN GLYCOSYLATED A1C: CPT

## 2024-11-13 PROCEDURE — 82306 VITAMIN D 25 HYDROXY: CPT

## 2024-11-14 ENCOUNTER — PHARMACY VISIT (OUTPATIENT)
Dept: PHARMACY | Facility: MEDICAL CENTER | Age: 89
End: 2024-11-14
Payer: COMMERCIAL

## 2024-11-14 PROCEDURE — RXMED WILLOW AMBULATORY MEDICATION CHARGE: Performed by: FAMILY MEDICINE

## 2024-11-20 ENCOUNTER — OFFICE VISIT (OUTPATIENT)
Dept: INTERNAL MEDICINE | Facility: IMAGING CENTER | Age: 89
End: 2024-11-20
Payer: MEDICARE

## 2024-11-20 VITALS
OXYGEN SATURATION: 95 % | HEIGHT: 70 IN | SYSTOLIC BLOOD PRESSURE: 136 MMHG | WEIGHT: 161 LBS | HEART RATE: 69 BPM | RESPIRATION RATE: 14 BRPM | BODY MASS INDEX: 23.05 KG/M2 | DIASTOLIC BLOOD PRESSURE: 74 MMHG | TEMPERATURE: 96.9 F

## 2024-11-20 DIAGNOSIS — E03.9 HYPOTHYROIDISM, UNSPECIFIED TYPE: ICD-10-CM

## 2024-11-20 DIAGNOSIS — Z79.899 MEDICATION MANAGEMENT: ICD-10-CM

## 2024-11-20 DIAGNOSIS — I10 PRIMARY HYPERTENSION: ICD-10-CM

## 2024-11-20 DIAGNOSIS — E11.649 TYPE 2 DIABETES MELLITUS WITH HYPOGLYCEMIA WITHOUT COMA, WITHOUT LONG-TERM CURRENT USE OF INSULIN (HCC): ICD-10-CM

## 2024-11-20 DIAGNOSIS — Z23 NEED FOR VACCINATION: ICD-10-CM

## 2024-11-20 DIAGNOSIS — Z00.00 MEDICARE ANNUAL WELLNESS VISIT, SUBSEQUENT: Primary | ICD-10-CM

## 2024-11-20 PROBLEM — K44.9 DIAPHRAGMATIC HERNIA WITHOUT OBSTRUCTION OR GANGRENE: Status: ACTIVE | Noted: 2024-11-20

## 2024-11-20 PROBLEM — R13.14 DYSPHAGIA, PHARYNGOESOPHAGEAL PHASE: Status: ACTIVE | Noted: 2024-11-20

## 2024-11-20 PROBLEM — Z86.73 PERSONAL HISTORY OF TRANSIENT ISCHEMIC ATTACK (TIA), AND CEREBRAL INFARCTION WITHOUT RESIDUAL DEFICITS: Status: ACTIVE | Noted: 2024-11-20

## 2024-11-20 PROBLEM — K85.90 ACUTE PANCREATITIS: Status: ACTIVE | Noted: 2024-11-20

## 2024-11-20 PROBLEM — K44.9 HIATAL HERNIA: Status: ACTIVE | Noted: 2024-11-20

## 2024-11-20 PROBLEM — K31.7 POLYP OF STOMACH AND DUODENUM: Status: ACTIVE | Noted: 2024-11-20

## 2024-11-20 PROBLEM — K22.2 ESOPHAGEAL OBSTRUCTION: Status: ACTIVE | Noted: 2024-11-20

## 2024-11-20 PROCEDURE — G0439 PPPS, SUBSEQ VISIT: HCPCS | Mod: 25 | Performed by: FAMILY MEDICINE

## 2024-11-20 PROCEDURE — 90662 IIV NO PRSV INCREASED AG IM: CPT | Performed by: FAMILY MEDICINE

## 2024-11-20 PROCEDURE — 3078F DIAST BP <80 MM HG: CPT | Performed by: FAMILY MEDICINE

## 2024-11-20 PROCEDURE — 90471 IMMUNIZATION ADMIN: CPT | Performed by: FAMILY MEDICINE

## 2024-11-20 PROCEDURE — 3075F SYST BP GE 130 - 139MM HG: CPT | Performed by: FAMILY MEDICINE

## 2024-11-20 ASSESSMENT — FIBROSIS 4 INDEX: FIB4 SCORE: 3.04

## 2024-11-20 ASSESSMENT — ENCOUNTER SYMPTOMS: GENERAL WELL-BEING: GOOD

## 2024-11-20 ASSESSMENT — ACTIVITIES OF DAILY LIVING (ADL): BATHING_REQUIRES_ASSISTANCE: 0

## 2024-11-20 ASSESSMENT — PATIENT HEALTH QUESTIONNAIRE - PHQ9: CLINICAL INTERPRETATION OF PHQ2 SCORE: 0
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
## 2024-12-02 ENCOUNTER — PHARMACY VISIT (OUTPATIENT)
Dept: PHARMACY | Facility: MEDICAL CENTER | Age: 89
End: 2024-12-02
Payer: COMMERCIAL

## 2024-12-02 PROCEDURE — RXMED WILLOW AMBULATORY MEDICATION CHARGE: Performed by: FAMILY MEDICINE

## 2024-12-09 ENCOUNTER — PHARMACY VISIT (OUTPATIENT)
Dept: PHARMACY | Facility: MEDICAL CENTER | Age: 89
End: 2024-12-09
Payer: COMMERCIAL

## 2024-12-09 PROCEDURE — RXMED WILLOW AMBULATORY MEDICATION CHARGE: Performed by: FAMILY MEDICINE

## 2024-12-09 PROCEDURE — RXMED WILLOW AMBULATORY MEDICATION CHARGE: Performed by: NURSE PRACTITIONER

## 2025-01-06 ENCOUNTER — NON-PROVIDER VISIT (OUTPATIENT)
Dept: CARDIOLOGY | Facility: MEDICAL CENTER | Age: 89
End: 2025-01-06
Payer: MEDICARE

## 2025-01-06 PROCEDURE — 93294 REM INTERROG EVL PM/LDLS PM: CPT | Performed by: INTERNAL MEDICINE

## 2025-01-06 NOTE — CARDIAC REMOTE MONITOR - SCAN
Device transmission reviewed. Device demonstrated appropriate function.       Electronically Signed by: Nancy Vasquez M.D.    1/20/2025  3:27 PM

## 2025-01-10 ENCOUNTER — PHARMACY VISIT (OUTPATIENT)
Dept: PHARMACY | Facility: MEDICAL CENTER | Age: 89
End: 2025-01-10
Payer: COMMERCIAL

## 2025-01-10 PROCEDURE — RXMED WILLOW AMBULATORY MEDICATION CHARGE: Performed by: FAMILY MEDICINE

## 2025-01-10 PROCEDURE — RXMED WILLOW AMBULATORY MEDICATION CHARGE: Performed by: NURSE PRACTITIONER

## 2025-02-03 ENCOUNTER — PHARMACY VISIT (OUTPATIENT)
Dept: PHARMACY | Facility: MEDICAL CENTER | Age: 89
End: 2025-02-03
Payer: COMMERCIAL

## 2025-02-03 PROCEDURE — RXMED WILLOW AMBULATORY MEDICATION CHARGE: Performed by: NURSE PRACTITIONER

## 2025-02-04 PROCEDURE — RXMED WILLOW AMBULATORY MEDICATION CHARGE: Performed by: FAMILY MEDICINE

## 2025-02-07 ENCOUNTER — APPOINTMENT (OUTPATIENT)
Dept: RADIOLOGY | Facility: MEDICAL CENTER | Age: 89
End: 2025-02-07
Attending: STUDENT IN AN ORGANIZED HEALTH CARE EDUCATION/TRAINING PROGRAM
Payer: MEDICARE

## 2025-02-07 ENCOUNTER — HOSPITAL ENCOUNTER (EMERGENCY)
Facility: MEDICAL CENTER | Age: 89
End: 2025-02-07
Attending: STUDENT IN AN ORGANIZED HEALTH CARE EDUCATION/TRAINING PROGRAM
Payer: MEDICARE

## 2025-02-07 VITALS
HEART RATE: 60 BPM | RESPIRATION RATE: 18 BRPM | SYSTOLIC BLOOD PRESSURE: 184 MMHG | TEMPERATURE: 98 F | DIASTOLIC BLOOD PRESSURE: 79 MMHG | OXYGEN SATURATION: 95 %

## 2025-02-07 DIAGNOSIS — G45.9 TIA (TRANSIENT ISCHEMIC ATTACK): Primary | ICD-10-CM

## 2025-02-07 LAB
ABO GROUP BLD: NORMAL
ALBUMIN SERPL BCP-MCNC: 4.2 G/DL (ref 3.2–4.9)
ALBUMIN/GLOB SERPL: 1.2 G/DL
ALP SERPL-CCNC: 69 U/L (ref 30–99)
ALT SERPL-CCNC: 27 U/L (ref 2–50)
ANION GAP SERPL CALC-SCNC: 13 MMOL/L (ref 7–16)
APTT PPP: 29.9 SEC (ref 24.7–36)
AST SERPL-CCNC: 33 U/L (ref 12–45)
BASOPHILS # BLD AUTO: 0.2 % (ref 0–1.8)
BASOPHILS # BLD: 0.01 K/UL (ref 0–0.12)
BILIRUB SERPL-MCNC: 0.5 MG/DL (ref 0.1–1.5)
BLD GP AB SCN SERPL QL: NORMAL
BUN SERPL-MCNC: 30 MG/DL (ref 8–22)
CALCIUM ALBUM COR SERPL-MCNC: 9.6 MG/DL (ref 8.5–10.5)
CALCIUM SERPL-MCNC: 9.8 MG/DL (ref 8.5–10.5)
CHLORIDE SERPL-SCNC: 108 MMOL/L (ref 96–112)
CO2 SERPL-SCNC: 21 MMOL/L (ref 20–33)
CREAT SERPL-MCNC: 1.43 MG/DL (ref 0.5–1.4)
EKG IMPRESSION: NORMAL
EOSINOPHIL # BLD AUTO: 0.13 K/UL (ref 0–0.51)
EOSINOPHIL NFR BLD: 2 % (ref 0–6.9)
ERYTHROCYTE [DISTWIDTH] IN BLOOD BY AUTOMATED COUNT: 50.3 FL (ref 35.9–50)
GFR SERPLBLD CREATININE-BSD FMLA CKD-EPI: 47 ML/MIN/1.73 M 2
GLOBULIN SER CALC-MCNC: 3.4 G/DL (ref 1.9–3.5)
GLUCOSE BLD STRIP.AUTO-MCNC: 89 MG/DL (ref 65–99)
GLUCOSE SERPL-MCNC: 96 MG/DL (ref 65–99)
HCT VFR BLD AUTO: 42.7 % (ref 42–52)
HGB BLD-MCNC: 14.2 G/DL (ref 14–18)
IMM GRANULOCYTES # BLD AUTO: 0.03 K/UL (ref 0–0.11)
IMM GRANULOCYTES NFR BLD AUTO: 0.5 % (ref 0–0.9)
INR PPP: 1.34 (ref 0.87–1.13)
LYMPHOCYTES # BLD AUTO: 1.82 K/UL (ref 1–4.8)
LYMPHOCYTES NFR BLD: 27.5 % (ref 22–41)
MCH RBC QN AUTO: 31.5 PG (ref 27–33)
MCHC RBC AUTO-ENTMCNC: 33.3 G/DL (ref 32.3–36.5)
MCV RBC AUTO: 94.7 FL (ref 81.4–97.8)
MONOCYTES # BLD AUTO: 0.61 K/UL (ref 0–0.85)
MONOCYTES NFR BLD AUTO: 9.2 % (ref 0–13.4)
NEUTROPHILS # BLD AUTO: 4.01 K/UL (ref 1.82–7.42)
NEUTROPHILS NFR BLD: 60.6 % (ref 44–72)
NRBC # BLD AUTO: 0 K/UL
NRBC BLD-RTO: 0 /100 WBC (ref 0–0.2)
PLATELET # BLD AUTO: 206 K/UL (ref 164–446)
PMV BLD AUTO: 10.5 FL (ref 9–12.9)
POTASSIUM SERPL-SCNC: 3.8 MMOL/L (ref 3.6–5.5)
PROT SERPL-MCNC: 7.6 G/DL (ref 6–8.2)
PROTHROMBIN TIME: 16.7 SEC (ref 12–14.6)
RBC # BLD AUTO: 4.51 M/UL (ref 4.7–6.1)
RH BLD: NORMAL
SODIUM SERPL-SCNC: 142 MMOL/L (ref 135–145)
TROPONIN T SERPL-MCNC: 29 NG/L (ref 6–19)
WBC # BLD AUTO: 6.6 K/UL (ref 4.8–10.8)

## 2025-02-07 PROCEDURE — 82962 GLUCOSE BLOOD TEST: CPT

## 2025-02-07 PROCEDURE — 700102 HCHG RX REV CODE 250 W/ 637 OVERRIDE(OP): Performed by: STUDENT IN AN ORGANIZED HEALTH CARE EDUCATION/TRAINING PROGRAM

## 2025-02-07 PROCEDURE — 700117 HCHG RX CONTRAST REV CODE 255: Performed by: STUDENT IN AN ORGANIZED HEALTH CARE EDUCATION/TRAINING PROGRAM

## 2025-02-07 PROCEDURE — 85730 THROMBOPLASTIN TIME PARTIAL: CPT

## 2025-02-07 PROCEDURE — 0042T CT-CEREBRAL PERFUSION ANALYSIS: CPT

## 2025-02-07 PROCEDURE — 86901 BLOOD TYPING SEROLOGIC RH(D): CPT

## 2025-02-07 PROCEDURE — 86850 RBC ANTIBODY SCREEN: CPT

## 2025-02-07 PROCEDURE — 93005 ELECTROCARDIOGRAM TRACING: CPT | Mod: TC | Performed by: STUDENT IN AN ORGANIZED HEALTH CARE EDUCATION/TRAINING PROGRAM

## 2025-02-07 PROCEDURE — A9270 NON-COVERED ITEM OR SERVICE: HCPCS | Performed by: STUDENT IN AN ORGANIZED HEALTH CARE EDUCATION/TRAINING PROGRAM

## 2025-02-07 PROCEDURE — 71045 X-RAY EXAM CHEST 1 VIEW: CPT

## 2025-02-07 PROCEDURE — 80053 COMPREHEN METABOLIC PANEL: CPT

## 2025-02-07 PROCEDURE — 36415 COLL VENOUS BLD VENIPUNCTURE: CPT

## 2025-02-07 PROCEDURE — 85025 COMPLETE CBC W/AUTO DIFF WBC: CPT

## 2025-02-07 PROCEDURE — 70496 CT ANGIOGRAPHY HEAD: CPT

## 2025-02-07 PROCEDURE — 70498 CT ANGIOGRAPHY NECK: CPT

## 2025-02-07 PROCEDURE — 84484 ASSAY OF TROPONIN QUANT: CPT

## 2025-02-07 PROCEDURE — 99284 EMERGENCY DEPT VISIT MOD MDM: CPT

## 2025-02-07 PROCEDURE — 86900 BLOOD TYPING SEROLOGIC ABO: CPT

## 2025-02-07 PROCEDURE — 70450 CT HEAD/BRAIN W/O DYE: CPT

## 2025-02-07 PROCEDURE — 85610 PROTHROMBIN TIME: CPT

## 2025-02-07 RX ORDER — LOSARTAN POTASSIUM 25 MG/1
25 TABLET ORAL ONCE
Status: COMPLETED | OUTPATIENT
Start: 2025-02-07 | End: 2025-02-07

## 2025-02-07 RX ADMIN — LOSARTAN POTASSIUM 25 MG: 25 TABLET, FILM COATED ORAL at 03:01

## 2025-02-07 RX ADMIN — IOHEXOL 40 ML: 350 INJECTION, SOLUTION INTRAVENOUS at 01:28

## 2025-02-07 RX ADMIN — IOHEXOL 80 ML: 350 INJECTION, SOLUTION INTRAVENOUS at 01:30

## 2025-02-07 NOTE — DISCHARGE INSTRUCTIONS
You were seen and evaluated in the emergency department for your symptoms.  Your symptoms had resolved prior to your evaluation here.  Your imaging was negative for any acute large vessel occlusion or bleeding in the brain.  We offered admission to the hospital for further workup and MRI but you guys elected to decline at this time.  You are stable for discharge, I do recommend following up with your primary care doctor for repeat evaluation.  If you have any other concerns or recurrence of symptoms please return to the emergency department.  Please please continue taking your medications as prescribed

## 2025-02-07 NOTE — ED PROVIDER NOTES
ER Provider Note    Scribed for Jose M Bhandari M.d. by Edgardo Caraballo. 2/7/2025  1:07 AM    Primary Care Provider: Wilma Guillermo M.D.    CHIEF COMPLAINT   Speech Difficulties    EXTERNAL RECORDS REVIEWED  Primary care doctor annual wellness visit from 11/20/2024, history of diabetes, previous TIA, strokes, BPH, pacemaker.      HPI/ROS  LIMITATION TO HISTORY   Select: : None  OUTSIDE HISTORIAN(S):  Family Patient's daughter is present at bedside.    FRANKY TEMPLE Jr. is a 89 y.o. male with history of strokes who presents to the ED for evaluation of a possible stroke onset earlier tonight. Patient's daughter reports that he woke up confused and weak on his left side. He was also disoriented and could not form a sentence, but these symptoms have since resolved. Denies facial droops. Daughter notes that he has experienced these symptoms before. The patient reports a frontal headache that he has had since before he went to bed. Denies chest pain or changes in vision. Patient takes Eliquis. Denies recent falls or traumas. Patient has a medical history of heart problems and diabetes.     PAST MEDICAL HISTORY  Past Medical History:   Diagnosis Date    Asthma     BPH (benign prostatic hyperplasia)     Cataract     Surgically removed bilat    Dental disorder     Upper    Diabetes (HCC)     Diverticulitis     Esophageal stricture     dilated spring 2016    Hiatal hernia     High cholesterol     History of shingles 8/27/2015    Hyperlipidemia 7/8/2015    Hypertension     Hypothyroid     PHN (postherpetic neuralgia) 8/27/2015    Psoriasis 6/14/2017    Stroke (HCC)     Last one was around 11/2021.  Has had several strokes including one in 9/2021.    TIA (transient ischemic attack)     Urinary incontinence     Vitamin D deficiency 8/27/2015       SURGICAL HISTORY  Past Surgical History:   Procedure Laterality Date    CATARACT EXTRACTION WITH IOL      TONSILLECTOMY      VASECTOMY         FAMILY HISTORY  Family  History   Problem Relation Age of Onset    Stroke Brother     Stroke Father        SOCIAL HISTORY   reports that he has never smoked. He has never used smokeless tobacco. He reports current alcohol use of about 2.4 oz of alcohol per week. He reports that he does not use drugs.    CURRENT MEDICATIONS  Previous Medications    ALCOHOL SWABS    Wipe site with prep pad prior to injection.    APIXABAN (ELIQUIS) 5MG TAB    TAKE 1 TABLET BY MOUTH TWICE A DAY    ASCORBIC ACID (VITAMIN C PO)    Take 500 mg by mouth every day.    ATORVASTATIN (LIPITOR) 20 MG TAB    Take 1 Tablet by mouth every day.    BLOOD GLUCOSE MONITORING SUPPL (BLOOD GLUCOSE MONITOR SYSTEM) W/DEVICE KIT    Use as directed for blood sugar monitoring.    CHOLECALCIFEROL (VITAMIN D3 PO)    Take 50 mcg by mouth every day.    CLINDAMYCIN (CLEOCIN) 150 MG CAP    Take 2 Capsules by mouth every 6 hours for infection until gone.    CONTINUOUS GLUCOSE  (FREESTYLE MICHELLE 2 READER) DEVICE    1 Units four times daily - before meals and nightly as needed (feeling shaky). 1 unit    CONTINUOUS GLUCOSE SENSOR (FREESTYLE MICHELLE 2 SENSOR) MISC    Use 1 sensor every 14 days.    CYANOCOBALAMIN (VITAMIN B 12 PO)    Take 500 mcg by mouth every day.    FAMOTIDINE (PEPCID) 40 MG TAB    Take 1 Tablet by mouth every evening.    FINASTERIDE (PROSCAR) 5 MG TAB    Take 1 Tablet by mouth every day.    FUROSEMIDE (LASIX) 20 MG TAB    TAKE 1 TABLET BY MOUTH 1 TIME A DAY AS NEEDED.    GLIMEPIRIDE (AMARYL) 4 MG TAB    Take 1 Tablet by mouth every morning.    GLUCOSE BLOOD (ACCU-CHEK GUIDE) STRIP    use once daily for high or low sugar    GLUCOSE BLOOD (FREESTYLE LITE) STRIP    USE ONE STRIP TO TEST BLOOD SUGAR ONCE DAILY EARLY MORNING BEFORE FIRST MEAL.    LANCETS    Use one lancet to test blood sugar once daily early morning before first meal.    LEVOTHYROXINE (SYNTHROID) 75 MCG TAB    Take 1 Tablet by mouth every day.    LIOTHYRONINE (CYTOMEL) 5 MCG TAB    TAKE 2 TABLETS BY MOUTH  EVERY DAY    LOSARTAN (COZAAR) 25 MG TAB    Take 1 Tablet by mouth 2 times a day.    LOSARTAN (COZAAR) 25 MG TAB    Take 1 Tablet by mouth twice a day    SEMAGLUTIDE,0.25 OR 0.5MG/DOS, 2 MG/1.5ML SOLUTION PEN-INJECTOR    Inject 0.5 mg under the skin every 7 days.    TAMSULOSIN (FLOMAX) 0.4 MG CAPSULE    TAKE 1 CAPSULE BY MOUTH HALF HOUR AFTER BREAKFAST    TRIAMCINOLONE ACETONIDE (KENALOG) 0.1 % CREAM    Apply sparingly to itchy rash areas on body twice daily for up to 2 weeks.  Hold for 1 week. May repeat for flares.       ALLERGIES  Pcn [penicillins] and Sulfa drugs    PHYSICAL EXAM  BP (!) 184/79   Pulse 60   Temp 36.7 °C (98 °F)   Resp 18   SpO2 95%   Constitutional: Well developed, Well nourished, No acute distress, Non-toxic appearance.   HENT: Normocephalic, Atraumatic, Bilateral external ears normal, Oropharynx is clear mucous membranes are moist. No oral exudates or nasal discharge.   Eyes: Pupils are equal round and reactive, EOMI, Conjunctiva normal, No discharge.   Neck: Normal range of motion, No tenderness, Supple, No stridor. No meningismus.  Lymphatic: No lymphadenopathy noted.   Cardiovascular: Regular rate and rhythm without murmur rub or gallop.  Thorax & Lungs: Clear breath sounds bilaterally without wheezes, rhonchi or rales. There is no chest wall tenderness.   Abdomen: Soft non-tender non-distended. There is no rebound or guarding. No organomegaly is appreciated. Bowel sounds are normal.  Skin: Normal without rash.   Back: No CVA or spinal tenderness.   Extremities: Intact distal pulses, No edema, No tenderness, No cyanosis, No clubbing. Capillary refill is less than 2 seconds.  Musculoskeletal: Good range of motion in all major joints. No tenderness to palpation or major deformities noted.   Neurologic: Alert & oriented x 3, Normal motor function, Normal sensory function, No focal deficits noted. Reflexes are normal.  Psychiatric: Affect normal, Judgment normal, Mood normal. There is no  suicidal ideation or patient reported hallucinations.      DIAGNOSTIC STUDIES    EKG/LABS  Labs Reviewed   CBC WITH DIFFERENTIAL - Abnormal; Notable for the following components:       Result Value    RBC 4.51 (*)     RDW 50.3 (*)     All other components within normal limits   COMP METABOLIC PANEL - Abnormal; Notable for the following components:    Bun 30 (*)     Creatinine 1.43 (*)     All other components within normal limits   PROTHROMBIN TIME - Abnormal; Notable for the following components:    PT 16.7 (*)     INR 1.34 (*)     All other components within normal limits   TROPONIN - Abnormal; Notable for the following components:    Troponin T 29 (*)     All other components within normal limits   ESTIMATED GFR - Abnormal; Notable for the following components:    GFR (CKD-EPI) 47 (*)     All other components within normal limits   APTT   COD (ADULT)   POCT GLUCOSE DEVICE RESULTS      Results for orders placed or performed during the hospital encounter of 25   EKG (NOW)   Result Value Ref Range    Report       [facility]    Test Date:  2025  Pt Name:    FRANKY TEMPLE                  Department: ER  MRN:        [id number]                      Room:        10  Gender:     Male                         Technician: [id number]  :        [id number]                   Requested By:CARROLL BELTRAN  Order #:    [id number]                    Reading MD:    Measurements  Intervals                                Axis  Rate:       66                           P:          0  DC:         0                            QRS:        -88  QRSD:       177                          T:          103  QT:         474  QTc:        497    Interpretive Statements  Atrial-ventricular dual-paced complexes  No further analysis attempted due to paced rhythm  Baseline wander in lead(s) V4  Compared to ECG 2022 11:50:33  Atrial-paced complex(es) or rhythm no longer present  First degree AV block no  longer present  Right bundle-branch block no longer present  Intraventricular conduction  delay no longer present  Left ventricular hypertrophy no longer present  Early repolarization no longer present        I have independently interpreted this EKG    RADIOLOGY/PROCEDURES   The attending emergency physician has independently interpreted the diagnostic imaging associated with this visit and am waiting the final reading from the radiologist.   My preliminary interpretation is a follows: Cardiomegaly, CT angiogram within normal limits no LVO or aneurysm.  CT perfusion was largely negative.  CT head did not show any evidence of intracranial hemorrhage    Radiologist interpretation:  DX-CHEST-PORTABLE (1 VIEW)   Final Result         1.  Interstitial pulmonary parenchymal prominence suggest chronic underlying lung disease, component of interstitial edema and/or infiltrates not excluded.   2.  Cardiomegaly   3.  Atherosclerosis      CT-CTA NECK WITH & W/O-POST PROCESSING   Final Result         1.  CT angiogram of the neck within normal limits.         CT-CTA HEAD WITH & W/O-POST PROCESS   Final Result         1.  No large vessel occlusion or aneurysm identified      CT-CEREBRAL PERFUSION ANALYSIS   Final Result      1. Cerebral blood flow less than 30% possibly representing completed infarct = 1 mL. Based on distribution of this finding, this is unlikely to represent artifact.      2. T Max more than 6 seconds possibly representing combination of completed infarct and ischemia = 7 mL. Based on the distribution of this finding, this is unlikely to represent artifact.      3. Mismatched volume possibly representing ischemic brain/penumbra= 6 mL      4.  Please note that this cerebral perfusion study and report is Quantitative and targets supratentorial (cerebral) perfusion for evaluation of large vessel territory acute ischemia/infarction. For example, lacunar infarcts, and brainstem/posterior fossa    ischemia/infarction  "are not evaluated on this study.  Data acquisition is subject to artifacts which can yield non-anatomically plausible perfusion maps which may be due to motion, bolus timing, signal to noise ratio, or other technical factors.    Perfusion map abnormalities which show non-anatomic distributions are likely artifact.   This study is not \"stand-alone\" and should only be utilized for diagnosis, management/treatment in correlation with CT, CTA, and/or MRI and clinical factors.         CT-HEAD W/O   Final Result         1.  No acute intracranial abnormality is identified, there are nonspecific white matter changes, commonly associated with small vessel ischemic disease.  Associated mild cerebral atrophy is noted.      Note: Diagnostic sensitivity of this examination is somewhat limited due to streak and scatter artifacts from cochlear implant.             COURSE & MEDICAL DECISION MAKING     ASSESSMENT, COURSE AND PLAN  Care Narrative:     1:12 AM - Patient seen and examined at bedside.  Patient a stroke evaluation from triage.  Patient had episode of slurred speech and speech difficulty that is since resolved, has a history of TIA and small strokes in the past, currently on Eliquis, patient currently has no complaint besides a mild headache, otherwise neurologically intact.  Patient will be taken for CT imaging per stroke protocol.  Patient was not activated as a stroke due to blood thinner usage, NIH of 0.  Discussed plan of care, including labs, imaging, and EKG. Patient agrees to the plan of care. The patient will be medicated with Cozaar tablet 25 mg. Ordered for DX chest 1 view, CT had without, CT cerebral perfusion analysis, CT-CTA head with and without, CT-CTA neck with and without, CBC with diff, CMP, prothrombin time, APTT, COD, troponin, EKG to evaluate his symptoms.      Imaging negative for any LVO, significant aneurysm or intracranial hemorrhage.  Labs largely consistent with patient's baseline.    1:33 AM - " Patient was reevaluated at bedside. Discussed the patient's case with his son in law, who just arrived at bedside.    Differential diagnosis considered.  Patient presentation consistent with potential TIA.  Patient cannot get MRI due to indwelling hardware of both his pacemaker and intracranial magnet.  Discussed further inpatient management, neurology consultation.  Patient at baseline, does not want to stay in the hospital and family is okay with taking him home.  Son-in-law is involved in healthcare and is reliable.    3:21 AM - Patient was reevaluated at bedside. Discussed radiology and lab results with the patient. Patient is back at baseline at this time and is essentially already medically optimized with stroke preventions such as blood thinners and statins. He cannot get an MRI as he has indwelling hardware. He declines admission and would like to go home. Patient is stable for discharge.        ADDITIONAL PROBLEM LIST  None    DISPOSITION AND DISCUSSIONS  I have discussed management of the patient with the following physicians and DEMETRIO's:  None    Discussion of management with other QHP or appropriate source(s): None    Escalation of care considered, and ultimately not performed: after discussion with the patient / family, they have elected to decline an escalation in care.    Barriers to care at this time, including but not limited to:  None .     Decision tools and prescription drugs considered including, but not limited to:  noen .     The patient will return for new or worsening symptoms and is stable at the time of discharge.    The patient is referred to a primary physician for blood pressure management, diabetic screening, and for all other preventative health concerns.    DISPOSITION:  Patient will be discharged home in stable condition.    FOLLOW UP:  Wilma Guillermo M.D.  [address] [facility]  [address]  [facility] [address]  [phone]    Schedule an appointment as soon as possible for a visit          OUTPATIENT MEDICATIONS:  Discharge Medication List as of 2/7/2025  3:39 AM           FINAL DIANGOSIS  1. TIA (transient ischemic attack) Acute        IEdgardo (Anali), am scribing for, and in the presence of, Jose M Bhandari M.D..    Electronically signed by: Edgardo Caraballo (Anali), 2/7/2025    Jose M CARRENO M.D. personally performed the services described in this documentation, as scribed by Edgardo Caraballo in my presence, and it is both accurate and complete.      The note accurately reflects work and decisions made by me.  Jose M Bhandari M.D.  2/7/2025  7:57 AM

## 2025-02-10 ENCOUNTER — PHARMACY VISIT (OUTPATIENT)
Dept: PHARMACY | Facility: MEDICAL CENTER | Age: 89
End: 2025-02-10
Payer: COMMERCIAL

## 2025-02-10 NOTE — H&P
Hospital Medicine History & Physical Note    Date of Service  11/23/2021    Primary Care Physician  Wilma Guillermo M.D.    Consultants  None    Code Status  DNAR/DNI    Chief Complaint  Chief Complaint   Patient presents with   • ALOC     stroke rule out       History of Presenting Illness  86 y.o. male with a past medical history of multiple vascular risk factors, TIA, hypothyroidism, diabetes type 2, BPH, and GERD presents emergency department on 11/23/2021 as he was noted by son around 1230 to have difficulty finding words.  Patient extremely hard of hearing and presented with altered mental status thus HPI obtained from chart review and discussion with ED staff.  Unknown baseline but as per chart review patient [place] the airport who noticed that he was unable to communicate with him and was having difficulty finding words which prompted him to come to emergency department.    At the emergency department, vital signs with hypertension with SBP in the 200s.  NIH 2. Patient saturating on room air.  CBC with mild anemia.  Chemistry with hyperglycemia in the 240s.  Troponin 30.  Chest x-ray with bilateral interstitial prominence and mild cardiomegaly.  Head CT with no acute intracranial findings.  Head/neck CTA with no hemodynamically significant narrowing of major intracranial vessels, cervical carotid or cervical vertebral arteries.  Patient was admitted for CVA/TIA work-up and management.    I discussed the plan of care with patient.    Review of Systems  Review of Systems   Unable to perform ROS: Mental status change     Past Medical History   has a past medical history of Asthma, BPH (benign prostatic hyperplasia), Diabetes (HCC), Diverticulitis, Esophageal stricture, Hiatal hernia, History of shingles (8/27/2015), Hyperlipidemia (7/8/2015), Hypertension, Hypothyroid, PHN (postherpetic neuralgia) (8/27/2015), Psoriasis (6/14/2017), TIA (transient ischemic attack), and Vitamin D deficiency  "(8/27/2015).    Surgical History   has a past surgical history that includes cataract extraction with iol; tonsillectomy; and vasectomy.     Family History  family history includes Stroke in his brother and father.     Social History   reports that he has never smoked. He has never used smokeless tobacco. He reports that he does not drink alcohol and does not use drugs.    Allergies  Allergies   Allergen Reactions   • Pcn [Penicillins]      \"my joints start to bubble up\"   • Sulfa Drugs      \"my joints start to bubble up\"  \"Its been 40 yrs since I've had either of those\"       Medications  Prior to Admission Medications   Prescriptions Last Dose Informant Patient Reported? Taking?   Blood Glucose Monitoring Suppl Device N/A at N/A Patient's Home Pharmacy No No   Sig: Meter: Dispense glucose meter preferred on patient's insurance.  Sig. Use as directed for blood sugar monitoring.   atorvastatin (LIPITOR) 20 MG Tab UNKNOWN at UNKNOWN TIME Patient's Home Pharmacy No No   Sig: Take 1 Tab by mouth every day.   clopidogrel (PLAVIX) 75 MG Tab UNKNOWN at UNKNOWN TIME Patient's Home Pharmacy No No   Sig: Take 1 Tablet by mouth every day.   finasteride (PROSCAR) 5 MG Tab UNKNOWN at UNKNOWN TIME Patient's Home Pharmacy No No   Sig: Take 1 Tab by mouth every day.   glimepiride (AMARYL) 4 MG Tab UNKNOWN at UNKNOWN TIME Patient's Home Pharmacy No No   Sig: Take 1 tablet by mouth every morning.   levothyroxine (SYNTHROID) 75 MCG Tab UNKNOWN at UNKNOWN TIME Patient's Home Pharmacy No No   Sig: Take 1 Tab by mouth every day.   liothyronine (CYTOMEL) 5 MCG Tab UNKNOWN at UNKNOWN TIME Patient's Home Pharmacy No No   Sig: TAKE 2 TABLETS BY MOUTH EVERY DAY   meloxicam (MOBIC) 15 MG tablet UNKNOWN at UNKNOWN TIME Patient's Home Pharmacy Yes No   Sig: Take 15 mg by mouth every day.   omeprazole (PRILOSEC) 20 MG delayed-release capsule UNKNOWN at UNKNOWN TIME Patient's Home Pharmacy Yes No   Sig: Take 20 mg by mouth every day.   tamsulosin " (FLOMAX) 0.4 MG capsule UNKNOWN at UNKNOWN TIME Patient's Home Pharmacy No No   Sig: TAKE 1 CAPSULE BY MOUTH 0.5 HOUR AFTER BREAKFAST   venlafaxine XR (EFFEXOR XR) 37.5 MG CAPSULE SR 24 HR UNKNOWN at UNKNOWN TIME Patient's Home Pharmacy Yes No   Sig: Take 37.5 mg by mouth 1/2 hour after breakfast.      Facility-Administered Medications: None       Physical Exam  Temp:  [36.2 °C (97.1 °F)] 36.2 °C (97.1 °F)  Pulse:  [71-89] 89  Resp:  [14-20] 20  BP: (191-202)/(76-77) 200/77  SpO2:  [94 %-97 %] 94 %  Blood Pressure : (!) 191/77   Temperature: 36.2 °C (97.1 °F)   Pulse: 71   Respiration: 14   Pulse Oximetry: 97 %       Physical Exam  Constitutional:       General: He is not in acute distress.     Appearance: Normal appearance. He is ill-appearing. He is not toxic-appearing or diaphoretic.   HENT:      Head: Normocephalic and atraumatic.      Nose: Nose normal. No congestion.      Mouth/Throat:      Mouth: Mucous membranes are moist.   Eyes:      Extraocular Movements: Extraocular movements intact.      Pupils: Pupils are equal, round, and reactive to light.   Cardiovascular:      Rate and Rhythm: Normal rate and regular rhythm.      Pulses: Normal pulses.      Heart sounds: Normal heart sounds.   Pulmonary:      Effort: Pulmonary effort is normal.      Breath sounds: Normal breath sounds.   Abdominal:      General: Bowel sounds are normal. There is no distension.      Palpations: Abdomen is soft.      Tenderness: There is no abdominal tenderness. There is no guarding or rebound.   Musculoskeletal:         General: No swelling. Normal range of motion.      Cervical back: Normal range of motion and neck supple.   Skin:     General: Skin is warm.      Coloration: Skin is not jaundiced.   Neurological:      Mental Status: He is alert. He is disoriented.      Cranial Nerves: No cranial nerve deficit.         Laboratory:  Recent Labs     11/23/21  1320   WBC 7.6   RBC 4.27*   HEMOGLOBIN 13.6*   HEMATOCRIT 41.0*   MCV 96.0    MCH 31.9   MCHC 33.2*   RDW 48.2   PLATELETCT 191   MPV 10.1     Recent Labs     11/23/21  1320   SODIUM 140   POTASSIUM 4.1   CHLORIDE 105   CO2 23   GLUCOSE 248*   BUN 17   CREATININE 1.04   CALCIUM 9.7     Recent Labs     11/23/21  1320   ALTSGPT 28   ASTSGOT 21   ALKPHOSPHAT 82   TBILIRUBIN 0.5   GLUCOSE 248*     Recent Labs     11/23/21  1320   APTT 28.2   INR 1.07     No results for input(s): NTPROBNP in the last 72 hours.      Recent Labs     11/23/21  1320   TROPONINT 30*       Imaging:  CT-CTA NECK WITH & W/O-POST PROCESSING   Final Result      1. No evidence of flow-limiting stenosis in the cervical carotid or cervical vertebral arteries.      2. Diminutive left vertebral artery, similar to prior.      CT-CTA HEAD WITH & W/O-POST PROCESS   Final Result         1. No hemodynamically significant narrowing of the major intracranial vessels.      CT-CEREBRAL PERFUSION ANALYSIS   Final Result      1.  Cerebral blood flow less than 30% likely representing completed infarct = 0 mL.      2.  T Max more than 6 seconds likely representing combination of completed infarct and ischemia = 0 mL.      3.  Mismatched volume likely representing ischemic brain/penumbra = None      4.  Please note that the cerebral perfusion was performed on the limited brain tissue around the basal ganglia region. Infarct/ischemia outside the CT perfusion sections can be missed in this study.      DX-CHEST-PORTABLE (1 VIEW)   Final Result      1.  Bilateral interstitial prominence.      2.  Mild cardiomegaly.      CT-HEAD W/O   Final Result         1. No acute intracranial findings. No evidence of acute intracranial hemorrhage or mass lesion.      2. White matter lucencies most consistent with chronic small vessel ischemic change.                     MR-BRAIN-WITH & W/O    (Results Pending)   [id number] VIEW    (Results Pending)   EC-ECHOCARDIOGRAM COMPLETE W/O CONT    (Results Pending)   MR-BRAIN-W/O    (Results Pending)        Assessment/Plan:  I anticipate this patient is appropriate for observation status at this time.    * Suspected cerebrovascular accident (CVA)  Assessment & Plan  Present with expressive aphasia  NIH at the ED 2  Not a candidate for  TPN or thrombectomy  Admit to telemetry  Keep n.p.o. for now, pending swallow evaluation  Aspiration/fall precautions  Continue aspirin and high-dose statin  A1c and lipid panel  Pending TSH, B12, PVR and KUB  Pending brain MRI  Pending echocardiogram  Labs on AM    Type 2 diabetes mellitus without complication, without long-term current use of insulin (HCC)- (present on admission)  Assessment & Plan  Hold home meds for now  Diabetic diet when appropriate  Insulin sliding scale  Frequent Accu-Cheks  A1c    GERD (gastroesophageal reflux disease)- (present on admission)  Assessment & Plan  Continue home PPI    Benign prostatic hyperplasia- (present on admission)  Assessment & Plan  Repeat PVR  Continue home tamsulosin and finasteride    Hyperlipidemia- (present on admission)  Assessment & Plan  Continue home statin    Vitamin D deficiency- (present on admission)  Assessment & Plan  Repeat vitamin D    Hypothyroidism- (present on admission)  Assessment & Plan  Repeat TSH  Continue home meds for now      VTE prophylaxis: SCDs/TEDs   10-Feb-2025 17:14

## 2025-02-12 ENCOUNTER — OFFICE VISIT (OUTPATIENT)
Dept: INTERNAL MEDICINE | Facility: IMAGING CENTER | Age: 89
End: 2025-02-12
Payer: MEDICARE

## 2025-02-12 VITALS
OXYGEN SATURATION: 97 % | HEIGHT: 70 IN | WEIGHT: 160 LBS | SYSTOLIC BLOOD PRESSURE: 158 MMHG | DIASTOLIC BLOOD PRESSURE: 84 MMHG | BODY MASS INDEX: 22.9 KG/M2 | RESPIRATION RATE: 14 BRPM | TEMPERATURE: 97 F | HEART RATE: 64 BPM

## 2025-02-12 DIAGNOSIS — I48.0 PAROXYSMAL ATRIAL FIBRILLATION (HCC): ICD-10-CM

## 2025-02-12 DIAGNOSIS — Z86.73 HISTORY OF CVA (CEREBROVASCULAR ACCIDENT): ICD-10-CM

## 2025-02-12 DIAGNOSIS — E03.9 HYPOTHYROIDISM, UNSPECIFIED TYPE: ICD-10-CM

## 2025-02-12 DIAGNOSIS — I10 PRIMARY HYPERTENSION: ICD-10-CM

## 2025-02-12 DIAGNOSIS — N18.31 TYPE 2 DIABETES MELLITUS WITH STAGE 3A CHRONIC KIDNEY DISEASE, WITHOUT LONG-TERM CURRENT USE OF INSULIN (HCC): ICD-10-CM

## 2025-02-12 DIAGNOSIS — E11.22 TYPE 2 DIABETES MELLITUS WITH STAGE 3A CHRONIC KIDNEY DISEASE, WITHOUT LONG-TERM CURRENT USE OF INSULIN (HCC): ICD-10-CM

## 2025-02-12 DIAGNOSIS — Z86.73 PERSONAL HISTORY OF TRANSIENT ISCHEMIC ATTACK (TIA), AND CEREBRAL INFARCTION WITHOUT RESIDUAL DEFICITS: ICD-10-CM

## 2025-02-12 DIAGNOSIS — Z09 HOSPITAL DISCHARGE FOLLOW-UP: Primary | ICD-10-CM

## 2025-02-12 PROCEDURE — 3079F DIAST BP 80-89 MM HG: CPT | Performed by: FAMILY MEDICINE

## 2025-02-12 PROCEDURE — 99215 OFFICE O/P EST HI 40 MIN: CPT | Performed by: FAMILY MEDICINE

## 2025-02-12 PROCEDURE — 3077F SYST BP >= 140 MM HG: CPT | Performed by: FAMILY MEDICINE

## 2025-02-12 ASSESSMENT — PATIENT HEALTH QUESTIONNAIRE - PHQ9
2. FEELING DOWN, DEPRESSED, IRRITABLE, OR HOPELESS: NOT AT ALL
6. FEELING BAD ABOUT YOURSELF - OR THAT YOU ARE A FAILURE OR HAVE LET YOURSELF OR YOUR FAMILY DOWN: NOT AL ALL
5. POOR APPETITE OR OVEREATING: NOT AT ALL
SUM OF ALL RESPONSES TO PHQ9 QUESTIONS 1 AND 2: 0
3. TROUBLE FALLING OR STAYING ASLEEP OR SLEEPING TOO MUCH: NOT AT ALL
7. TROUBLE CONCENTRATING ON THINGS, SUCH AS READING THE NEWSPAPER OR WATCHING TELEVISION: NOT AT ALL
SUM OF ALL RESPONSES TO PHQ QUESTIONS 1-9: 0
1. LITTLE INTEREST OR PLEASURE IN DOING THINGS: NOT AT ALL
9. THOUGHTS THAT YOU WOULD BE BETTER OFF DEAD, OR OF HURTING YOURSELF: NOT AT ALL
4. FEELING TIRED OR HAVING LITTLE ENERGY: NOT AT ALL
8. MOVING OR SPEAKING SO SLOWLY THAT OTHER PEOPLE COULD HAVE NOTICED. OR THE OPPOSITE, BEING SO FIGETY OR RESTLESS THAT YOU HAVE BEEN MOVING AROUND A LOT MORE THAN USUAL: NOT AT ALL

## 2025-02-12 ASSESSMENT — FIBROSIS 4 INDEX: FIB4 SCORE: 2.68

## 2025-02-12 NOTE — PROGRESS NOTES
Verbal consent was acquired by the patient to use [employer] ambient listening note generation during this visit     Patient is a 89 y.o.male.established patient      History of Present Illness  The patient presents today for a follow-up of an emergency room visit from 02/07/2025. He is accompanied by his son-in-law.    He has a history of paroxysmal atrial fibrillation, type 2 diabetes, stage 3a chronic kidney disease, and previous strokes. He presented to the ER with possible stroke onset earlier that night. His daughter reported that he woke up confused, weak on his left side, disoriented, and unable to form a sentence. These symptoms resolved, and he had no facial droop. His daughter noted that he had experienced these symptoms before. He reported a frontal headache that began before he went to bed. He had no chest pain or changes in vision. In the ER, a CT/CTA of the neck revealed normal findings with no large vessel occlusion or aneurysm identified. A CT of the head showed no acute abnormality. An MRI could not be performed due to indwelling hardware from a pacemaker and an intracranial magnet. He declined inpatient management or neurology consultation and chose to leave the ER. His son-in-law reports that he experienced a severe headache and confusion around midnight, prompting a visit to the ER. He had no recollection of the events leading up to the episode and remained confused for several days afterward. He did not remember going to dinner with his daughter and having Mexican food at a restaurant. He did not remember any of that until Sunday. He has been living with his son-in-law for the past year, who has observed a gradual decline in his cognitive function. Since Thursday, he has become unsteady on his feet, often leaning on furniture for support. His speech has been intermittently slurred, and he appears to require more time to process information. He has not consulted a neurologist since his last  "stroke but expresses interest in doing so. He reports no weakness in his arms or legs. He describes the headache as a sharp, axe-like pain that woke him from sleep. He continues to experience headaches, rating the pain as 2 out of 10. He reports no allergies or sinus issues. He has been advised against driving until further notice. He is on Eliquis and has had no recent falls or trauma. He took Tylenol a few days later, which provided relief. He has a history of stroke but no history of migraines. He has experienced minor headaches in the past but nothing as severe as the recent episode.    His blood pressure readings have been consistently elevated, with a recent reading of 158/82. He is currently on losartan and furosemide. His son-in-law suspects that his elevated blood pressure may be contributing to his overall discomfort. His blood pressure readings are typically taken in the morning before coffee consumption. His son-in-law has introduced decaffeinated coffee into his diet, but he has not noticed any significant changes.    His blood glucose levels have been well-managed.    MEDICATIONS  Eliquis, losartan, furosemide, Tylenol.            BP (!) 158/84   Pulse 64   Temp 36.1 °C (97 °F)   Resp 14   Ht 1.778 m (5' 10\")   Wt 72.6 kg (160 lb)   SpO2 97% , Body mass index is 22.96 kg/m².    Physical Exam  Lungs were auscultated.  Heart was examined.    Vital Signs  Blood pressure is 158/82.    Physical Exam  Constitutional:       General: He is not in acute distress.     Appearance: Normal appearance. He is normal weight. He is not ill-appearing, toxic-appearing or diaphoretic.   HENT:      Head: Normocephalic and atraumatic.      Ears:      Comments:       Nose: Nose normal.   Eyes:      Conjunctiva/sclera: Conjunctivae normal.   Cardiovascular:      Rate and Rhythm: Normal rate and regular rhythm.   Pulmonary:      Effort: Pulmonary effort is normal.      Breath sounds: Normal breath sounds. No stridor. "   Musculoskeletal:      Cervical back: Neck supple.   Skin:     General: Skin is warm and dry.   Neurological:      General: No focal deficit present.      Mental Status: He is alert and oriented to person, place, and time. Mental status is at baseline.   Psychiatric:         Mood and Affect: Mood normal.         Behavior: Behavior normal.         Thought Content: Thought content normal.         Judgment: Judgment normal.             Results  Imaging  CT/CTA neck revealed normal findings with no large vessel occlusion or aneurysm identified. CT of the head showed no acute abnormality.          Assessment & Plan  1. Transient Ischemic Attack (TIA).  The patient's symptoms, including confusion, weakness on the left side, and inability to form a sentence, suggest a TIA. He also reported a severe frontal headache. CT/CTA of the neck and head showed no acute abnormalities or large vessel occlusion. He is at risk of having more strokes. He is advised to avoid driving for the next 2 weeks to ensure safety. A referral to [facility] will be initiated for further evaluation and management. He is advised to continue his current medications, including Eliquis, and to monitor for any new or worsening symptoms.    2. Hypertension.  His blood pressure readings have been consistently elevated, ranging from 140s to 160s systolic. He is currently on a low dose of losartan. The dosage of losartan will be increased from 25 mg to 50 mg daily. He is instructed to monitor his blood pressure regularly and report any instances of hypotension. If his blood pressure readings become too low, the dosage of losartan will be adjusted accordingly.    3. Type 2 Diabetes Mellitus.  His blood sugar levels are well-controlled. He should continue his current diabetes management plan, including medication adherence and regular monitoring of blood glucose levels.    4. Chronic Kidney Disease, Stage 3a.  His kidney function remains stable.  He should continue his current medication regimen and maintain regular follow-up appointments to monitor kidney function.    5. Paroxysmal Atrial Fibrillation.  He is on Eliquis for stroke prevention. He should continue this medication and ensure regular follow-up with his cardiologist to manage his atrial fibrillation effectively.    Follow-up  The patient will follow up in June 2025 for a comprehensive panel review, or earlier if his blood pressure remains uncontrolled.    PROCEDURE  The patient has an indwelling pacemaker and intracranial magnet.       HCC Gap Form    Diagnosis to address: I48.0 - Paroxysmal atrial fibrillation (HCC)  Assessment and plan: Chronic, stable, as based on today's assessment and impact on other conditions evaluated today. Continue with current treatment plan: Eliquis. Follow-up with specialist as directed, but at least annually.  Diagnosis: N18.31 - Stage 3a chronic kidney disease  Assessment and plan: Chronic, stable. Continue with current defined treatment plan: cont current meds. Follow-up at least annually.  Diagnosis: E11.22, N18.31 - Type 2 diabetes mellitus with stage 3a chronic kidney disease, without long-term current use of insulin (HCC)  Assessment and plan: Chronic, stable.   Lab Results       Component                Value               Date/Time                  HBA1C                    6.3 (H)             11/13/2024 08:40 AM     Continue with current  meds. Follow-up q 3 m..  Last edited 02/13/25 13:01 PST by Wilma Guillermo M.D.             This note was created using voice recognition software (Dragon). The accuracy of the dictation is limited by the abilities of the software. I have reviewed the note prior to signing, however some errors in grammar and context are still possible. If you have any questions related to this note please do not hesitate to contact our office.

## 2025-02-14 ENCOUNTER — NON-PROVIDER VISIT (OUTPATIENT)
Dept: INTERNAL MEDICINE | Facility: IMAGING CENTER | Age: 89
End: 2025-02-14
Payer: MEDICARE

## 2025-02-14 DIAGNOSIS — J06.9 UPPER RESPIRATORY TRACT INFECTION, UNSPECIFIED TYPE: ICD-10-CM

## 2025-02-14 LAB
FLUAV RNA SPEC QL NAA+PROBE: NEGATIVE
FLUBV RNA SPEC QL NAA+PROBE: NEGATIVE
RSV RNA SPEC QL NAA+PROBE: NEGATIVE
SARS-COV-2 RNA RESP QL NAA+PROBE: NEGATIVE

## 2025-02-14 PROCEDURE — 0241U POCT CEPHEID COV-2, FLU A/B, RSV - PCR: CPT

## 2025-02-14 NOTE — Clinical Note
REFERRAL APPROVAL NOTICE         Sent on February 14, 2025                   Cam TEMPLE .  [address]  [place] [facility] [place] [facility] [address]                   Dear Mr. TEMPLE,    After a careful review of the medical information and benefit coverage, [facility] has processed your referral. See below for additional details.    If applicable, you must be actively enrolled with your insurance for coverage of the authorized service. If you have any questions regarding your coverage, please contact your insurance directly.    REFERRAL INFORMATION   Referral #:  [id number]  Referred-To Department    Referred-By Provider:  Neurology    Wilma Guillermo M.D.   [facility] [place]      [address]  [place] [place] [address]  [phone] [address], [address]  [facility] [place] [facility] [place] [id number] [phone]    Referral Start Date:  02/12/2025  Referral End Date:   02/12/2026           SCHEDULING  If you do not already have an appointment, please call [phone] to make an appointment.   MORE INFORMATION  As a reminder, [facility] ownership has changed, meaning this location is now owned and operated by [facility]. As such, we want to clarify that our patients should expect to receive two separate bills for the services received at [facility] - one representing the [facility] facility fees as the owner of the establishment, and the other to represent the physician's services and subsequent fees. You can speak with your insurance carrier for a pricing estimate by calling the customer service number on the back of your card and ask about charges for a hospital outpatient visit.  If you do not already have a [employer] account, sign up at: [email or web address]  You can access your medical information, make appointments, see lab results, billing information, and more.  If you have questions regarding this referral, please contact  the [facility] Referrals department at:             [phone]. Monday -  Friday 7:30AM - 5:00PM.      Sincerely,  [facility]

## 2025-02-16 ENCOUNTER — RESULTS FOLLOW-UP (OUTPATIENT)
Dept: INTERNAL MEDICINE | Facility: IMAGING CENTER | Age: 89
End: 2025-02-16

## 2025-02-18 ENCOUNTER — TELEPHONE (OUTPATIENT)
Dept: HEALTH INFORMATION MANAGEMENT | Facility: OTHER | Age: 89
End: 2025-02-18
Payer: MEDICARE

## 2025-02-18 ENCOUNTER — TELEPHONE (OUTPATIENT)
Dept: NEUROLOGY | Facility: MEDICAL CENTER | Age: 89
End: 2025-02-18
Payer: MEDICARE

## 2025-02-25 ENCOUNTER — PHARMACY VISIT (OUTPATIENT)
Dept: PHARMACY | Facility: MEDICAL CENTER | Age: 89
End: 2025-02-25
Payer: COMMERCIAL

## 2025-02-25 ENCOUNTER — TELEPHONE (OUTPATIENT)
Dept: INTERNAL MEDICINE | Facility: IMAGING CENTER | Age: 89
End: 2025-02-25
Payer: MEDICARE

## 2025-02-25 PROCEDURE — RXMED WILLOW AMBULATORY MEDICATION CHARGE: Performed by: FAMILY MEDICINE

## 2025-02-25 RX ORDER — AMLODIPINE BESYLATE 5 MG/1
5 TABLET ORAL DAILY
Qty: 100 TABLET | Refills: 3 | Status: SHIPPED | OUTPATIENT
Start: 2025-02-25 | End: 2026-04-01

## 2025-02-25 NOTE — TELEPHONE ENCOUNTER
Cam started the increase in Losartan since his last visit. It has now been two weeks. He is consistently getting readings of the systolic in the 140-160s range. No complaints of headache but his blood pressure has not improved with the losartan increased. HR remains in the 60-70s.    Any other ideas?

## 2025-03-03 PROCEDURE — RXMED WILLOW AMBULATORY MEDICATION CHARGE: Performed by: FAMILY MEDICINE

## 2025-03-05 ENCOUNTER — TELEPHONE (OUTPATIENT)
Dept: INTERNAL MEDICINE | Facility: IMAGING CENTER | Age: 89
End: 2025-03-05
Payer: MEDICARE

## 2025-03-05 PROCEDURE — RXMED WILLOW AMBULATORY MEDICATION CHARGE: Performed by: FAMILY MEDICINE

## 2025-03-05 NOTE — TELEPHONE ENCOUNTER
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 Charlie (Cam's son-in-law) called with an update. His BP has been in the 130s/60s. Blood sugars have improved as well with some spikes due to diet. Cam will continue with the current regimen. Check blood pressures a couple times a week and as needed when having symptoms.

## 2025-03-06 ENCOUNTER — PHARMACY VISIT (OUTPATIENT)
Dept: PHARMACY | Facility: MEDICAL CENTER | Age: 89
End: 2025-03-06
Payer: COMMERCIAL

## 2025-03-17 PROCEDURE — RXMED WILLOW AMBULATORY MEDICATION CHARGE: Performed by: FAMILY MEDICINE

## 2025-03-18 ENCOUNTER — PHARMACY VISIT (OUTPATIENT)
Dept: PHARMACY | Facility: MEDICAL CENTER | Age: 89
End: 2025-03-18
Payer: COMMERCIAL

## 2025-03-19 ENCOUNTER — PHARMACY VISIT (OUTPATIENT)
Dept: PHARMACY | Facility: MEDICAL CENTER | Age: 89
End: 2025-03-19
Payer: COMMERCIAL

## 2025-03-19 PROCEDURE — RXMED WILLOW AMBULATORY MEDICATION CHARGE: Performed by: NURSE PRACTITIONER

## 2025-03-21 ENCOUNTER — APPOINTMENT (OUTPATIENT)
Dept: URBAN - METROPOLITAN AREA CLINIC 20 | Facility: CLINIC | Age: 89
Setting detail: DERMATOLOGY
End: 2025-03-21

## 2025-03-21 DIAGNOSIS — D485 NEOPLASM OF UNCERTAIN BEHAVIOR OF SKIN: ICD-10-CM

## 2025-03-21 DIAGNOSIS — D18.0 HEMANGIOMA: ICD-10-CM

## 2025-03-21 DIAGNOSIS — Z85.828 PERSONAL HISTORY OF OTHER MALIGNANT NEOPLASM OF SKIN: ICD-10-CM

## 2025-03-21 DIAGNOSIS — L57.0 ACTINIC KERATOSIS: ICD-10-CM

## 2025-03-21 DIAGNOSIS — L82.1 OTHER SEBORRHEIC KERATOSIS: ICD-10-CM

## 2025-03-21 DIAGNOSIS — L81.4 OTHER MELANIN HYPERPIGMENTATION: ICD-10-CM

## 2025-03-21 DIAGNOSIS — Z71.89 OTHER SPECIFIED COUNSELING: ICD-10-CM

## 2025-03-21 DIAGNOSIS — D22 MELANOCYTIC NEVI: ICD-10-CM

## 2025-03-21 PROBLEM — D22.5 MELANOCYTIC NEVI OF TRUNK: Status: ACTIVE | Noted: 2025-03-21

## 2025-03-21 PROBLEM — D22.62 MELANOCYTIC NEVI OF LEFT UPPER LIMB, INCLUDING SHOULDER: Status: ACTIVE | Noted: 2025-03-21

## 2025-03-21 PROBLEM — D48.5 NEOPLASM OF UNCERTAIN BEHAVIOR OF SKIN: Status: ACTIVE | Noted: 2025-03-21

## 2025-03-21 PROBLEM — D18.01 HEMANGIOMA OF SKIN AND SUBCUTANEOUS TISSUE: Status: ACTIVE | Noted: 2025-03-21

## 2025-03-21 PROBLEM — D22.61 MELANOCYTIC NEVI OF RIGHT UPPER LIMB, INCLUDING SHOULDER: Status: ACTIVE | Noted: 2025-03-21

## 2025-03-21 PROCEDURE — ? BIOPSY BY SHAVE METHOD

## 2025-03-21 PROCEDURE — 17003 DESTRUCT PREMALG LES 2-14: CPT

## 2025-03-21 PROCEDURE — ? COUNSELING

## 2025-03-21 PROCEDURE — ? LIQUID NITROGEN

## 2025-03-21 PROCEDURE — 17000 DESTRUCT PREMALG LESION: CPT | Mod: 59

## 2025-03-21 PROCEDURE — 11102 TANGNTL BX SKIN SINGLE LES: CPT | Mod: 59

## 2025-03-21 PROCEDURE — ? OBSERVATION

## 2025-03-21 PROCEDURE — 69100 BIOPSY OF EXTERNAL EAR: CPT | Mod: 59,RT

## 2025-03-21 PROCEDURE — ? SUNSCREEN RECOMMENDATIONS

## 2025-03-21 PROCEDURE — 99213 OFFICE O/P EST LOW 20 MIN: CPT | Mod: 25

## 2025-03-21 ASSESSMENT — LOCATION DETAILED DESCRIPTION DERM
LOCATION DETAILED: LEFT ANTERIOR EARLOBE
LOCATION DETAILED: RIGHT MEDIAL UPPER BACK
LOCATION DETAILED: RIGHT PROXIMAL DORSAL FOREARM
LOCATION DETAILED: LEFT VENTRAL PROXIMAL FOREARM
LOCATION DETAILED: RIGHT RADIAL DORSAL HAND
LOCATION DETAILED: RIGHT VENTRAL DISTAL FOREARM
LOCATION DETAILED: INFERIOR THORACIC SPINE
LOCATION DETAILED: RIGHT ULNAR DORSAL HAND
LOCATION DETAILED: LEFT RADIAL DORSAL HAND
LOCATION DETAILED: RIGHT CENTRAL LATERAL NECK
LOCATION DETAILED: LEFT MEDIAL MALAR CHEEK
LOCATION DETAILED: POSTERIOR MID-PARIETAL SCALP
LOCATION DETAILED: SUPERIOR THORACIC SPINE
LOCATION DETAILED: RIGHT INFERIOR MEDIAL FOREHEAD
LOCATION DETAILED: RIGHT INFERIOR UPPER BACK
LOCATION DETAILED: LEFT PROXIMAL DORSAL FOREARM
LOCATION DETAILED: LEFT SUPERIOR OCCIPITAL SCALP
LOCATION DETAILED: RIGHT SUPERIOR HELIX
LOCATION DETAILED: LEFT LATERAL BUCCAL CHEEK
LOCATION DETAILED: LEFT INFERIOR VERMILION LIP
LOCATION DETAILED: LEFT SUPERIOR HELIX
LOCATION DETAILED: RIGHT FOREHEAD
LOCATION DETAILED: LEFT CENTRAL LATERAL NECK
LOCATION DETAILED: LEFT MEDIAL FRONTAL SCALP

## 2025-03-21 ASSESSMENT — LOCATION SIMPLE DESCRIPTION DERM
LOCATION SIMPLE: RIGHT FOREARM
LOCATION SIMPLE: RIGHT UPPER BACK
LOCATION SIMPLE: NECK
LOCATION SIMPLE: LEFT SCALP
LOCATION SIMPLE: RIGHT FOREHEAD
LOCATION SIMPLE: LEFT OCCIPITAL SCALP
LOCATION SIMPLE: LEFT EAR
LOCATION SIMPLE: LEFT HAND
LOCATION SIMPLE: RIGHT EAR
LOCATION SIMPLE: RIGHT HAND
LOCATION SIMPLE: UPPER BACK
LOCATION SIMPLE: LEFT CHEEK
LOCATION SIMPLE: LEFT LIP
LOCATION SIMPLE: POSTERIOR SCALP
LOCATION SIMPLE: LEFT FOREARM

## 2025-03-21 ASSESSMENT — LOCATION ZONE DERM
LOCATION ZONE: ARM
LOCATION ZONE: EAR
LOCATION ZONE: FACE
LOCATION ZONE: NECK
LOCATION ZONE: TRUNK
LOCATION ZONE: SCALP
LOCATION ZONE: LIP
LOCATION ZONE: HAND

## 2025-03-21 NOTE — PROCEDURE: LIQUID NITROGEN
Detail Level: Detailed
Show Applicator Variable?: Yes
Consent: The patient's consent was obtained including but not limited to risks of crusting, scabbing, blistering, scarring, darker or lighter pigmentary change, recurrence, incomplete removal and infection. RTC in 2 months if lesion(s) persistent.
Post-Care Instructions: I reviewed with the patient in detail post-care instructions. Patient is to wear sunprotection, and avoid picking at any of the treated lesions. Pt may apply Vaseline to crusted or scabbing areas.
Duration Of Freeze Thaw-Cycle (Seconds): 10
Number Of Freeze-Thaw Cycles: 2 freeze-thaw cycles
Render Note In Bullet Format When Appropriate: No

## 2025-03-22 ENCOUNTER — HOSPITAL ENCOUNTER (EMERGENCY)
Facility: MEDICAL CENTER | Age: 89
End: 2025-03-22
Attending: EMERGENCY MEDICINE
Payer: MEDICARE

## 2025-03-22 ENCOUNTER — APPOINTMENT (OUTPATIENT)
Dept: RADIOLOGY | Facility: MEDICAL CENTER | Age: 89
End: 2025-03-22
Attending: EMERGENCY MEDICINE
Payer: MEDICARE

## 2025-03-22 VITALS
OXYGEN SATURATION: 94 % | HEART RATE: 60 BPM | TEMPERATURE: 97.6 F | WEIGHT: 160 LBS | DIASTOLIC BLOOD PRESSURE: 71 MMHG | RESPIRATION RATE: 20 BRPM | SYSTOLIC BLOOD PRESSURE: 149 MMHG | BODY MASS INDEX: 22.9 KG/M2 | HEIGHT: 70 IN

## 2025-03-22 DIAGNOSIS — R06.02 SHORTNESS OF BREATH: Primary | ICD-10-CM

## 2025-03-22 LAB
ALBUMIN SERPL BCP-MCNC: 3.8 G/DL (ref 3.2–4.9)
ALBUMIN/GLOB SERPL: 1.2 G/DL
ALP SERPL-CCNC: 72 U/L (ref 30–99)
ALT SERPL-CCNC: 24 U/L (ref 2–50)
ANION GAP SERPL CALC-SCNC: 12 MMOL/L (ref 7–16)
APPEARANCE UR: CLEAR
AST SERPL-CCNC: 36 U/L (ref 12–45)
BASOPHILS # BLD AUTO: 0.4 % (ref 0–1.8)
BASOPHILS # BLD: 0.02 K/UL (ref 0–0.12)
BILIRUB SERPL-MCNC: 0.5 MG/DL (ref 0.1–1.5)
BILIRUB UR QL STRIP.AUTO: NEGATIVE
BUN SERPL-MCNC: 27 MG/DL (ref 8–22)
CALCIUM ALBUM COR SERPL-MCNC: 9.5 MG/DL (ref 8.5–10.5)
CALCIUM SERPL-MCNC: 9.3 MG/DL (ref 8.5–10.5)
CHLORIDE SERPL-SCNC: 110 MMOL/L (ref 96–112)
CO2 SERPL-SCNC: 18 MMOL/L (ref 20–33)
COLOR UR: YELLOW
CREAT SERPL-MCNC: 1.11 MG/DL (ref 0.5–1.4)
EKG IMPRESSION: NORMAL
EOSINOPHIL # BLD AUTO: 0.24 K/UL (ref 0–0.51)
EOSINOPHIL NFR BLD: 4.9 % (ref 0–6.9)
ERYTHROCYTE [DISTWIDTH] IN BLOOD BY AUTOMATED COUNT: 52.9 FL (ref 35.9–50)
GFR SERPLBLD CREATININE-BSD FMLA CKD-EPI: 64 ML/MIN/1.73 M 2
GLOBULIN SER CALC-MCNC: 3.1 G/DL (ref 1.9–3.5)
GLUCOSE SERPL-MCNC: 109 MG/DL (ref 65–99)
GLUCOSE UR STRIP.AUTO-MCNC: NEGATIVE MG/DL
HCT VFR BLD AUTO: 39 % (ref 42–52)
HGB BLD-MCNC: 12.5 G/DL (ref 14–18)
IMM GRANULOCYTES # BLD AUTO: 0.02 K/UL (ref 0–0.11)
IMM GRANULOCYTES NFR BLD AUTO: 0.4 % (ref 0–0.9)
KETONES UR STRIP.AUTO-MCNC: NEGATIVE MG/DL
LEUKOCYTE ESTERASE UR QL STRIP.AUTO: NEGATIVE
LYMPHOCYTES # BLD AUTO: 1.32 K/UL (ref 1–4.8)
LYMPHOCYTES NFR BLD: 26.8 % (ref 22–41)
MCH RBC QN AUTO: 31.7 PG (ref 27–33)
MCHC RBC AUTO-ENTMCNC: 32.1 G/DL (ref 32.3–36.5)
MCV RBC AUTO: 99 FL (ref 81.4–97.8)
MICRO URNS: NORMAL
MONOCYTES # BLD AUTO: 0.67 K/UL (ref 0–0.85)
MONOCYTES NFR BLD AUTO: 13.6 % (ref 0–13.4)
NEUTROPHILS # BLD AUTO: 2.66 K/UL (ref 1.82–7.42)
NEUTROPHILS NFR BLD: 53.9 % (ref 44–72)
NITRITE UR QL STRIP.AUTO: NEGATIVE
NRBC # BLD AUTO: 0 K/UL
NRBC BLD-RTO: 0 /100 WBC (ref 0–0.2)
NT-PROBNP SERPL IA-MCNC: 557 PG/ML (ref 0–125)
PH UR STRIP.AUTO: 5.5 [PH] (ref 5–8)
PLATELET # BLD AUTO: 195 K/UL (ref 164–446)
PMV BLD AUTO: 10.7 FL (ref 9–12.9)
POTASSIUM SERPL-SCNC: 4.4 MMOL/L (ref 3.6–5.5)
PROT SERPL-MCNC: 6.9 G/DL (ref 6–8.2)
PROT UR QL STRIP: NEGATIVE MG/DL
RBC # BLD AUTO: 3.94 M/UL (ref 4.7–6.1)
RBC UR QL AUTO: NEGATIVE
SODIUM SERPL-SCNC: 140 MMOL/L (ref 135–145)
SP GR UR STRIP.AUTO: 1.01
TROPONIN T SERPL-MCNC: 27 NG/L (ref 6–19)
TROPONIN T SERPL-MCNC: 29 NG/L (ref 6–19)
UROBILINOGEN UR STRIP.AUTO-MCNC: 1 EU/DL
WBC # BLD AUTO: 4.9 K/UL (ref 4.8–10.8)

## 2025-03-22 PROCEDURE — 81003 URINALYSIS AUTO W/O SCOPE: CPT

## 2025-03-22 PROCEDURE — 84484 ASSAY OF TROPONIN QUANT: CPT | Mod: 91

## 2025-03-22 PROCEDURE — 93005 ELECTROCARDIOGRAM TRACING: CPT | Mod: TC

## 2025-03-22 PROCEDURE — 83880 ASSAY OF NATRIURETIC PEPTIDE: CPT

## 2025-03-22 PROCEDURE — 36415 COLL VENOUS BLD VENIPUNCTURE: CPT

## 2025-03-22 PROCEDURE — 85025 COMPLETE CBC W/AUTO DIFF WBC: CPT

## 2025-03-22 PROCEDURE — 80053 COMPREHEN METABOLIC PANEL: CPT

## 2025-03-22 PROCEDURE — 93005 ELECTROCARDIOGRAM TRACING: CPT | Mod: TC | Performed by: EMERGENCY MEDICINE

## 2025-03-22 PROCEDURE — 99284 EMERGENCY DEPT VISIT MOD MDM: CPT

## 2025-03-22 PROCEDURE — 71045 X-RAY EXAM CHEST 1 VIEW: CPT

## 2025-03-22 ASSESSMENT — HEART SCORE
TROPONIN: 1-3 TIMES NORMAL LIMIT
RISK FACTORS: >2 RISK FACTORS OR HX OF ATHEROSCLEROTIC DISEASE
AGE: 65+
HEART SCORE: 5
HISTORY: SLIGHTLY SUSPICIOUS
ECG: NORMAL

## 2025-03-22 ASSESSMENT — FIBROSIS 4 INDEX: FIB4 SCORE: 2.68

## 2025-03-22 NOTE — ED NOTES
Pt discharged to home, wheeled to the lobby. GCS 15. Pt in possession of belongings. Pt provided discharge education and information pertaining to follow up appointments. Pt received copy of discharge instructions and verbalized understanding.     Vitals:    03/22/25 0654   BP: (!) 149/71   Pulse: 60   Resp: 20   Temp: 36.4 °C (97.6 °F)   SpO2: 94%

## 2025-03-22 NOTE — ED PROVIDER NOTES
"ER Provider Note    Scribed for Gibson Escalona II, M.D. by Spencer Melgoza. 3/22/2025  4:39 AM    Primary Care Provider: Wilma Guillermo M.D.    CHIEF COMPLAINT   Chief Complaint   Patient presents with    Shortness of Breath     Pt woke up with shortness of breath at around 2 am    + pace maker  Denied any chest pain     EXTERNAL RECORDS REVIEWED  Reviewed recent labs, hemoglobin is fluctuating for the last year from 12.8, 13.8, 14.2, 12.5.     HPI/ROS  LIMITATION TO HISTORY   Select: : None  OUTSIDE HISTORIAN(S):  Family Son present at bedside clarifies dates.     FRANKY TEMPLE Jr. is a 89 y.o. male with a history of diabetes, stroke, and a pacemaker who is hard of hearing presenting to the ED for evaluation of shortness of breath onset 1 AM. The patient woke from sleep with shortness of breath and felt like he was \"going downhill,\" waking his son and wife to take him to the ED. His symptoms have improved since arrival to the ED. He denies any chest pain, bloody stools. The patient is anticoagulated with Eliquis.     The patient came to the ED on 2/7/25 for TIA. CT imaging did not show any acute abnormality.     PAST MEDICAL HISTORY  Past Medical History:   Diagnosis Date    Asthma     BPH (benign prostatic hyperplasia)     Cataract     Surgically removed bilat    Dental disorder     Upper    Diabetes (HCC)     Diverticulitis     Esophageal stricture     dilated spring 2016    Hiatal hernia     High cholesterol     History of shingles 8/27/2015    Hyperlipidemia 7/8/2015    Hypertension     Hypothyroid     PHN (postherpetic neuralgia) 8/27/2015    Psoriasis 6/14/2017    Stroke (HCC)     Last one was around 11/2021.  Has had several strokes including one in 9/2021.    TIA (transient ischemic attack)     Urinary incontinence     Vitamin D deficiency 8/27/2015     SURGICAL HISTORY  Past Surgical History:   Procedure Laterality Date    CATARACT EXTRACTION WITH IOL      TONSILLECTOMY      VASECTOMY   "     FAMILY HISTORY  Family History   Problem Relation Age of Onset    Stroke Brother     Stroke Father      SOCIAL HISTORY   reports that he has never smoked. He has never used smokeless tobacco. He reports current alcohol use of about 2.4 oz of alcohol per week. He reports that he does not use drugs.    CURRENT MEDICATIONS  Previous Medications    ALCOHOL SWABS    Wipe site with prep pad prior to injection.    AMLODIPINE (NORVASC) 5 MG TAB    Take 1 Tablet by mouth every day.    APIXABAN (ELIQUIS) 5MG TAB    TAKE 1 TABLET BY MOUTH TWICE A DAY    ASCORBIC ACID (VITAMIN C PO)    Take 500 mg by mouth every day.    ATORVASTATIN (LIPITOR) 20 MG TAB    Take 1 Tablet by mouth every day.    BLOOD GLUCOSE MONITORING SUPPL (BLOOD GLUCOSE MONITOR SYSTEM) W/DEVICE KIT    Use as directed for blood sugar monitoring.    CHOLECALCIFEROL (VITAMIN D3 PO)    Take 50 mcg by mouth every day.    CLINDAMYCIN (CLEOCIN) 150 MG CAP    Take 2 Capsules by mouth every 6 hours for infection until gone.    CONTINUOUS GLUCOSE  (FREESTYLE MICHELLE 2 READER) DEVICE    1 Units four times daily - before meals and nightly as needed (feeling shaky). 1 unit    CONTINUOUS GLUCOSE SENSOR (FREESTYLE MICHELLE 2 SENSOR) MISC    Use 1 sensor every 14 days.    CYANOCOBALAMIN (VITAMIN B 12 PO)    Take 500 mcg by mouth every day.    FAMOTIDINE (PEPCID) 40 MG TAB    Take 1 Tablet by mouth every evening.    FINASTERIDE (PROSCAR) 5 MG TAB    Take 1 Tablet by mouth every day.    FUROSEMIDE (LASIX) 20 MG TAB    TAKE 1 TABLET BY MOUTH 1 TIME A DAY AS NEEDED.    GLIMEPIRIDE (AMARYL) 4 MG TAB    Take 1 Tablet by mouth every morning.    GLUCOSE BLOOD (ACCU-CHEK GUIDE) STRIP    use once daily for high or low sugar    GLUCOSE BLOOD (FREESTYLE LITE) STRIP    USE ONE STRIP TO TEST BLOOD SUGAR ONCE DAILY EARLY MORNING BEFORE FIRST MEAL.    LANCETS    Use one lancet to test blood sugar once daily early morning before first meal.    LEVOTHYROXINE (SYNTHROID) 75 MCG TAB     "Take 1 Tablet by mouth every day.    LIOTHYRONINE (CYTOMEL) 5 MCG TAB    TAKE 2 TABLETS BY MOUTH EVERY DAY    LOSARTAN (COZAAR) 25 MG TAB    Take 1 Tablet by mouth 2 times a day.    LOSARTAN (COZAAR) 25 MG TAB    Take 1 Tablet by mouth twice a day    SEMAGLUTIDE,0.25 OR 0.5MG/DOS, 2 MG/1.5ML SOLUTION PEN-INJECTOR    Inject 0.5 mg under the skin every 7 days.    TAMSULOSIN (FLOMAX) 0.4 MG CAPSULE    TAKE 1 CAPSULE BY MOUTH HALF HOUR AFTER BREAKFAST    TRIAMCINOLONE ACETONIDE (KENALOG) 0.1 % CREAM    Apply sparingly to itchy rash areas on body twice daily for up to 2 weeks.  Hold for 1 week. May repeat for flares.     ALLERGIES  Pcn [penicillins] and Sulfa drugs    PHYSICAL EXAM  BP (!) 143/69   Pulse 60   Temp 36.3 °C (97.3 °F) (Temporal)   Resp 18   Ht 1.778 m (5' 10\")   Wt 72.6 kg (160 lb)   SpO2 94%   BMI 22.96 kg/m²   Physical Exam  Constitutional:       General: He is not in acute distress.     Appearance: He is not diaphoretic.   HENT:      Head: Normocephalic and atraumatic.      Ears:      Comments: Hearing implant at right scalp  Cardiovascular:      Rate and Rhythm: Normal rate and regular rhythm.      Comments: Pacemaker at left upper chest wall.  Pulmonary:      Effort: No respiratory distress.   Abdominal:      Tenderness: There is no abdominal tenderness. There is no guarding.   Skin:     Findings: No rash.   Neurological:      General: No focal deficit present.      Mental Status: He is oriented to person, place, and time.      Cranial Nerves: No cranial nerve deficit.      Motor: No weakness.   Psychiatric:         Mood and Affect: Mood normal.          DIAGNOSTIC STUDIES    EKG/LABS  Results for orders placed or performed during the hospital encounter of 03/22/25   CBC with Differential    Collection Time: 03/22/25  3:02 AM   Result Value Ref Range    WBC 4.9 4.8 - 10.8 K/uL    RBC 3.94 (L) 4.70 - 6.10 M/uL    Hemoglobin 12.5 (L) 14.0 - 18.0 g/dL    Hematocrit 39.0 (L) 42.0 - 52.0 %    MCV " 99.0 (H) 81.4 - 97.8 fL    MCH 31.7 27.0 - 33.0 pg    MCHC 32.1 (L) 32.3 - 36.5 g/dL    RDW 52.9 (H) 35.9 - 50.0 fL    Platelet Count 195 164 - 446 K/uL    MPV 10.7 9.0 - 12.9 fL    Neutrophils-Polys 53.90 44.00 - 72.00 %    Lymphocytes 26.80 22.00 - 41.00 %    Monocytes 13.60 (H) 0.00 - 13.40 %    Eosinophils 4.90 0.00 - 6.90 %    Basophils 0.40 0.00 - 1.80 %    Immature Granulocytes 0.40 0.00 - 0.90 %    Nucleated RBC 0.00 0.00 - 0.20 /100 WBC    Neutrophils (Absolute) 2.66 1.82 - 7.42 K/uL    Lymphs (Absolute) 1.32 1.00 - 4.80 K/uL    Monos (Absolute) 0.67 0.00 - 0.85 K/uL    Eos (Absolute) 0.24 0.00 - 0.51 K/uL    Baso (Absolute) 0.02 0.00 - 0.12 K/uL    Immature Granulocytes (abs) 0.02 0.00 - 0.11 K/uL    NRBC (Absolute) 0.00 K/uL   Complete Metabolic Panel (CMP)    Collection Time: 03/22/25  3:02 AM   Result Value Ref Range    Sodium 140 135 - 145 mmol/L    Potassium 4.4 3.6 - 5.5 mmol/L    Chloride 110 96 - 112 mmol/L    Co2 18 (L) 20 - 33 mmol/L    Anion Gap 12.0 7.0 - 16.0    Glucose 109 (H) 65 - 99 mg/dL    Bun 27 (H) 8 - 22 mg/dL    Creatinine 1.11 0.50 - 1.40 mg/dL    Calcium 9.3 8.5 - 10.5 mg/dL    Correct Calcium 9.5 8.5 - 10.5 mg/dL    AST(SGOT) 36 12 - 45 U/L    ALT(SGPT) 24 2 - 50 U/L    Alkaline Phosphatase 72 30 - 99 U/L    Total Bilirubin 0.5 0.1 - 1.5 mg/dL    Albumin 3.8 3.2 - 4.9 g/dL    Total Protein 6.9 6.0 - 8.2 g/dL    Globulin 3.1 1.9 - 3.5 g/dL    A-G Ratio 1.2 g/dL   proBrain Natriuretic Peptide, NT (BNP)    Collection Time: 03/22/25  3:02 AM   Result Value Ref Range    NT-proBNP 557 (H) 0 - 125 pg/mL   Troponins NOW    Collection Time: 03/22/25  3:02 AM   Result Value Ref Range    Troponin T 27 (H) 6 - 19 ng/L   ESTIMATED GFR    Collection Time: 03/22/25  3:02 AM   Result Value Ref Range    GFR (CKD-EPI) 64 >60 mL/min/1.73 m 2   EKG    Collection Time: 03/22/25  4:39 AM   Result Value Ref Range    Report       [facility] Emergency Dept.    Test Date:  2025-03-22  Pt  Name:    FRANKY TEMPLE                  Department: ER  MRN:        [id number]                      Room:  Gender:     Male                         Technician: [id number]  :        [id number]                   Requested By:ER TRIAGE PROTOCOL  Order #:    [id number]                    Reading MD: Gibson Escalona II, MD    Measurements  Intervals                                Axis  Rate:       93                           P:          0  ME:         0                            QRS:        4  QRSD:       180                          T:          -44  QT:         502  QTc:        625    Interpretive Statements  A-V dual-paced complexes w/ some inhibition  No further analysis attempted due to paced rhythm  Compared to ECG 2025 01:53:06  AV dual-paced complex(es) or rhythm no longer present  Electronically Signed On 2025 04:39:30 PDT by Gibson Escalona II, MD     URINALYSIS,CULTURE IF INDICATED    Collection Time: 25  5:04 AM    Specimen: Urine, Clean Catch   Result Value Ref Range    Color Yellow     Character Clear     Specific Gravity 1.015 <1.035    Ph 5.5 5.0 - 8.0    Glucose Negative Negative mg/dL    Ketones Negative Negative mg/dL    Protein Negative Negative mg/dL    Bilirubin Negative Negative    Urobilinogen, Urine 1.0 <=1.0 EU/dL    Nitrite Negative Negative    Leukocyte Esterase Negative Negative    Occult Blood Negative Negative    Micro Urine Req see below    Troponins in two (2) hours    Collection Time: 25  5:05 AM   Result Value Ref Range    Troponin T 29 (H) 6 - 19 ng/L     I have independently interpreted this EKG     RADIOLOGY/PROCEDURES   The attending emergency physician has independently interpreted the diagnostic imaging associated with this visit and am waiting the final reading from the radiologist.   My preliminary interpretation is a follows: No focal pneumonia.  No pneumothorax.  No fluid overload.    Radiologist interpretation:  DX-CHEST-PORTABLE (1 VIEW)   Final Result          1. No change some diffuse interstitial infiltrates compared with 2/7/2025.      2. Pacemaker.                    COURSE & MEDICAL DECISION MAKING     ASSESSMENT, COURSE AND PLAN  Care Narrative:     4:39 AM - This is an emergency department evaluation of a 89 y.o. male who presents with concerns of shortness of breath onset 1 AM, resolved since arriving at the ED.  Vitals reassuring and exam reassuring, paced rhythm, RA saturations of 92%.  Unclear what could have caused the episode.  Labs were ordered by off going physician to expedite workup.  This includes a cardiac workup.  I reviewed the workup and agree with this.  Added UA. Troponin of 27, at baseline. Repeat troponin to be drawn.  Patient has many questions about his medications that he supposed to take.  I will speak with pharmacy about reviewing what he should be on.  I have already reviewed the chest x-ray.  Looks similar to the past x-ray.  There is no focal pneumonia.  There is not fluid overload.  I suspect that what ever symptoms he had earlier was more benign and has resolved.    5:56 AM - I spoke with pharmacy who agreed to look over the patient's medication list.     6:23 AM - I spoke with Cristian Tran, who reviewed the patient's medication list.     6:48 AM - Reviewed second troponin which is slightly higher at 29. This is not a significant increase, within range of prior troponin levels. It is unclear what lead to his presentation to the ED, everything looks to be at baseline. It is possible he has sleep apnea. Recommended he follow-up with his primary provider for this. His heart score is five but troponin is chronically elevated. Suspicion was low for MI. He is asymptomatic for the past 2 hours.    6:51 AM - Patient was reevaluated at bedside. The patient maintains he has been asymptomatic for the last two hours. Discussed pharmacy reviewed the patient's medication list and that we did not see any provider notes where this was  prescribed. Informed him that there is a complete reviewed medication list that will be attached with his discharge papers.     CHEST PAIN:   HEART Score for Major Cardiac Events  HEART Score     History: Slightly suspicious  ECG: Normal  Age: 65+  Risk Factors: >2 risk factors or hx of atherosclerotic disease  Troponin: 1-3 times normal limit    Heart Score: 5    Total Score   0-3 Points = Low Score, risk of MACE 0.9-1.7%.  4-6 Points = Moderate Score, risk of MACE 12-16.6%  7-10 Points = High Score, risk of MACE 50-65%     PROBLEM LIST  # Shortness of breath    DISPOSITION AND DISCUSSIONS  I have discussed management of the patient with the following physicians and DEMETRIO's:  None    Discussion of management with other [facility] or appropriate source(s): [facility]      Escalation of care considered, and ultimately not performed: acute inpatient care management, however at this time, the patient is most appropriate for outpatient management.    Barriers to care at this time, including but not limited to:  None known .     Decision tools and prescription drugs considered including, but not limited to:  Heart Score: 5.    The patient will return for new or worsening symptoms and is stable at the time of discharge.    The patient is referred to a primary physician for blood pressure management, diabetic screening, and for all other preventative health concerns.    DISPOSITION:  Patient will be discharged home in stable condition.    FOLLOW UP:  Wilma Guillermo M.D.  [address]  [address]  [place] [address]  [phone]    Schedule an appointment as soon as possible for a visit   as needed for minor concerns, general check ups. Discuss possible sleep apnea    OUTPATIENT MEDICATIONS:  Current Discharge Medication List         FINAL DIAGNOSIS  1. Shortness of breath Active     ISpencer (Scribe), am scribing for, and in the presence of, RAJ Bishop II.    Electronically signed by: Spencer Melgoza  (Scribe), 3/22/2025    Gibson CARRENO II, M* personally performed the services described in this documentation, as scribed by Spencer Melgoza in my presence, and it is both accurate and complete.     The note accurately reflects work and decisions made by me.  Gibson Escalona II, M.D.  3/22/2025  8:29 AM

## 2025-03-22 NOTE — ED TRIAGE NOTES
Chief Complaint   Patient presents with    Shortness of Breath     Pt woke up with shortness of breath at around 2 am    + pace maker  Denied any chest pain     Pt is accompanied by the family    Pain:  0/10  Ambulatory:  Yes  Oxygen Treatment: No    Pt came in to triage for the above complaints.     Pt is speaking in full sentences, follows commands and responds appropriately to questions.     Respirations are even and unlabored.    Pt placed in lobby. Pt educated on triage process.     Pt encouraged to inform staff for any changes in condition or if needs help while waiting to be room in.      Vitals:    03/22/25 0237   BP: (!) 157/71   Pulse: 64   Resp: 18   Temp: 36.3 °C (97.3 °F)   SpO2: 93%

## 2025-03-26 ENCOUNTER — OFFICE VISIT (OUTPATIENT)
Dept: INTERNAL MEDICINE | Facility: IMAGING CENTER | Age: 89
End: 2025-03-26
Payer: MEDICARE

## 2025-03-26 VITALS
BODY MASS INDEX: 22.96 KG/M2 | TEMPERATURE: 98.1 F | SYSTOLIC BLOOD PRESSURE: 120 MMHG | OXYGEN SATURATION: 96 % | WEIGHT: 160 LBS | DIASTOLIC BLOOD PRESSURE: 60 MMHG | HEART RATE: 60 BPM | RESPIRATION RATE: 14 BRPM

## 2025-03-26 DIAGNOSIS — Z09 HOSPITAL DISCHARGE FOLLOW-UP: Primary | ICD-10-CM

## 2025-03-26 DIAGNOSIS — E11.22 TYPE 2 DIABETES MELLITUS WITH STAGE 3A CHRONIC KIDNEY DISEASE, WITHOUT LONG-TERM CURRENT USE OF INSULIN (HCC): ICD-10-CM

## 2025-03-26 DIAGNOSIS — N18.31 TYPE 2 DIABETES MELLITUS WITH STAGE 3A CHRONIC KIDNEY DISEASE, WITHOUT LONG-TERM CURRENT USE OF INSULIN (HCC): ICD-10-CM

## 2025-03-26 DIAGNOSIS — I10 PRIMARY HYPERTENSION: ICD-10-CM

## 2025-03-26 ASSESSMENT — FIBROSIS 4 INDEX: FIB4 SCORE: 3.28

## 2025-03-26 NOTE — LETTER
March 26, 2025        FRANKY Torin MAVERICK Aguirre.  [address] [place] [address]        Current Outpatient Medications Ordered in Epic   Medication Sig Dispense Refill    amLODIPine (NORVASC) 5 MG Tab Take 1 Tablet by mouth every day. 100 Tablet 3    Continuous Glucose Sensor (FREESTYLE MICHELLE 2 SENSOR) Misc Use 1 sensor every 14 days. 6 Each 11    Continuous Glucose  (FREESTYLE MICHELLE 2 READER) Device 1 Units four times daily - before meals and nightly as needed (feeling shaky). 1 unit 1 Each 6    glucose blood (ACCU-CHEK GUIDE) strip use once daily for high or low sugar 100 Strip 3    famotidine (PEPCID) 40 MG Tab Take 1 Tablet by mouth every evening. 100 Tablet 3    Blood Glucose Monitoring Suppl (BLOOD GLUCOSE MONITOR SYSTEM) w/Device Kit Use as directed for blood sugar monitoring. 1 Kit 0    Lancets Use one lancet to test blood sugar once daily early morning before first meal. 100 Each 11    glucose blood (FREESTYLE LITE) strip USE ONE STRIP TO TEST BLOOD SUGAR ONCE DAILY EARLY MORNING BEFORE FIRST MEAL. 100 Strip 3    losartan (COZAAR) 25 MG Tab Take 1 Tablet by mouth 2 times a day. 200 Tablet 1    finasteride (PROSCAR) 5 MG Tab Take 1 Tablet by mouth every day. 100 Tablet 3    levothyroxine (SYNTHROID) 75 MCG Tab Take 1 Tablet by mouth every day. 100 Tablet 3    liothyronine (CYTOMEL) 5 MCG Tab TAKE 2 TABLETS BY MOUTH EVERY  Tablet 3    glimepiride (AMARYL) 4 MG Tab Take 1 Tablet by mouth every morning. 100 Tablet 3    tamsulosin (FLOMAX) 0.4 MG capsule TAKE 1 CAPSULE BY MOUTH HALF HOUR AFTER BREAKFAST 90 Capsule 3    apixaban (ELIQUIS) 5mg Tab TAKE 1 TABLET BY MOUTH TWICE A  Tablet 3    atorvastatin (LIPITOR) 20 MG Tab Take 1 Tablet by mouth every day. 100 Tablet 3    furosemide (LASIX) 20 MG Tab TAKE 1 TABLET BY MOUTH 1 TIME A DAY AS NEEDED. 100 Tablet 3     No current Epic-ordered facility-administered medications on file.

## 2025-03-26 NOTE — LETTER
March 26, 2025        FRANKY Torinyesi TEMPLE Jr.  [address] [place] [address]        Dear FRANKY:    Current Outpatient Medications Ordered in Epic   Medication Sig Dispense Refill    amLODIPine (NORVASC) 5 MG Tab Take 1 Tablet by mouth every day. 100 Tablet 3    Continuous Glucose Sensor (FREESTYLE MICHELLE 2 SENSOR) Misc Use 1 sensor every 14 days. 6 Each 11    Continuous Glucose  (FREESTYLE MICHELLE 2 READER) Device 1 Units four times daily - before meals and nightly as needed (feeling shaky). 1 unit 1 Each 6    glucose blood (ACCU-CHEK GUIDE) strip use once daily for high or low sugar 100 Strip 3    famotidine (PEPCID) 40 MG Tab Take 1 Tablet by mouth every evening. 100 Tablet 3    Blood Glucose Monitoring Suppl (BLOOD GLUCOSE MONITOR SYSTEM) w/Device Kit Use as directed for blood sugar monitoring. 1 Kit 0    Lancets Use one lancet to test blood sugar once daily early morning before first meal. 100 Each 11    glucose blood (FREESTYLE LITE) strip USE ONE STRIP TO TEST BLOOD SUGAR ONCE DAILY EARLY MORNING BEFORE FIRST MEAL. 100 Strip 3    losartan (COZAAR) 25 MG Tab Take 1 Tablet by mouth 2 times a day. 200 Tablet 1    finasteride (PROSCAR) 5 MG Tab Take 1 Tablet by mouth every day. 100 Tablet 3    levothyroxine (SYNTHROID) 75 MCG Tab Take 1 Tablet by mouth every day. 100 Tablet 3    liothyronine (CYTOMEL) 5 MCG Tab TAKE 2 TABLETS BY MOUTH EVERY  Tablet 3    glimepiride (AMARYL) 4 MG Tab Take 1 Tablet by mouth every morning. 100 Tablet 3    tamsulosin (FLOMAX) 0.4 MG capsule TAKE 1 CAPSULE BY MOUTH HALF HOUR AFTER BREAKFAST 90 Capsule 3    apixaban (ELIQUIS) 5mg Tab TAKE 1 TABLET BY MOUTH TWICE A  Tablet 3    atorvastatin (LIPITOR) 20 MG Tab Take 1 Tablet by mouth every day. 100 Tablet 3    furosemide (LASIX) 20 MG Tab TAKE 1 TABLET BY MOUTH 1 TIME A DAY AS NEEDED. 100 Tablet 3     No current Epic-ordered facility-administered medications on file.

## 2025-03-27 NOTE — PROGRESS NOTES
Verbal consent was acquired by the patient to use [employer] ambient listening note generation during this visit     Patient is a 89 y.o.male.established patient      History of Present Illness  The patient presents for a follow-up visit after an ER visit. He awakened with shortness of breath in the middle of the night on 03/22/2025. He has a history of diabetes, stroke, and a pacemaker. He ended up waking up his son, and they brought him to the ER. He had no chest pain or bloody stools. Labs were ordered, and these were reassuring along with vital signs and exam. He had a paced rhythm and O2 sats were at 92%. Troponin was at baseline, 27. It was decided to discharge the patient home and to follow up here. Blood pressure in the ER was 143/69, and losartan was prescribed at 25 mg twice daily.    His blood pressure readings have been consistently within normal limits, and no alterations were made to his medication regimen during his hospital stay. He has requested a comprehensive list of his medications for better management at home. He has been receiving his medications via mail from [facility], a change from his previous practice of obtaining refills in person. He has expressed uncertainty about his Lasix (furosemide) intake and has requested clarification on this matter. He has been managing his medication schedule independently but has expressed a desire for assistance due to occasional confusion about his regimen. He has been using a pill monitor to organize his medications, which he finds beneficial. He has been taking Tylenol as needed but has not required it since achieving good blood pressure control. He took one dose of Tylenol at 2:00 AM today.    MEDICATIONS  Current: Losartan, amlodipine, tamsulosin, finasteride, famotidine, glimepiride, Eliquis, Lipitor, furosemide (as needed), Tylenol (as needed).            /60 (BP Location: Left arm, Patient Position: Sitting, BP Cuff Size: Adult)   Pulse  60   Temp 36.7 °C (98.1 °F) (Temporal)   Resp 14   Wt 72.6 kg (160 lb)   SpO2 96% , Body mass index is 22.96 kg/m².    Physical Exam  Lungs are clear.  Heart has a steady rhythm.    Vital Signs  Blood pressure is 120/60. Heart rate is 60.    Physical Exam  Constitutional:       General: He is not in acute distress.     Appearance: Normal appearance. He is normal weight. He is not ill-appearing, toxic-appearing or diaphoretic.   HENT:      Head: Normocephalic and atraumatic.      Ears:      Comments:       Nose: Nose normal.   Eyes:      Conjunctiva/sclera: Conjunctivae normal.   Cardiovascular:      Rate and Rhythm: Normal rate and regular rhythm.   Pulmonary:      Effort: Pulmonary effort is normal.      Breath sounds: Normal breath sounds.   Musculoskeletal:      Cervical back: Neck supple.      Right lower leg: No edema.      Left lower leg: No edema.   Skin:     Coloration: Skin is not jaundiced.   Neurological:      General: No focal deficit present.      Mental Status: He is alert.   Psychiatric:         Mood and Affect: Mood normal.         Behavior: Behavior normal.         Thought Content: Thought content normal.         Judgment: Judgment normal.                Admission on 2025, Discharged on 2025   Component Date Value Ref Range Status    Report 2025    Final                    Value:[facility]    Test Date:  2025  Pt Name:    FRANKY TEMPLE                  Department: ER  MRN:        [id number]                      Room:  Gender:     Male                         Technician: [id number]  :        [id number]                   Requested By:ER TRIAGE PROTOCOL  Order #:    [id number]                    Reading MD: Gibson Escalona II, MD    Measurements  Intervals                                Axis  Rate:       93                           P:          0  LA:         0                            QRS:        4  QRSD:       180                          T:           -44  QT:         502  QTc:        625    Interpretive Statements  A-V dual-paced complexes w/ some inhibition  No further analysis attempted due to paced rhythm  Compared to ECG 02/07/2025 01:53:06  AV dual-paced complex(es) or rhythm no longer present  Electronically Signed On 03- 04:39:30 PDT by Gibson Escalona II, MD      WBC 03/22/2025 4.9  4.8 - 10.8 K/uL Final    RBC 03/22/2025 3.94 (L)  4.70 - 6.10 M/uL Final    Hemoglobin 03/22/2025 12.5 (L)  14.0 - 18.0 g/dL Final    Hematocrit 03/22/2025 39.0 (L)  42.0 - 52.0 % Final    MCV 03/22/2025 99.0 (H)  81.4 - 97.8 fL Final    MCH 03/22/2025 31.7  27.0 - 33.0 pg Final    MCHC 03/22/2025 32.1 (L)  32.3 - 36.5 g/dL Final    RDW 03/22/2025 52.9 (H)  35.9 - 50.0 fL Final    Platelet Count 03/22/2025 195  164 - 446 K/uL Final    MPV 03/22/2025 10.7  9.0 - 12.9 fL Final    Neutrophils-Polys 03/22/2025 53.90  44.00 - 72.00 % Final    Lymphocytes 03/22/2025 26.80  22.00 - 41.00 % Final    Monocytes 03/22/2025 13.60 (H)  0.00 - 13.40 % Final    Eosinophils 03/22/2025 4.90  0.00 - 6.90 % Final    Basophils 03/22/2025 0.40  0.00 - 1.80 % Final    Immature Granulocytes 03/22/2025 0.40  0.00 - 0.90 % Final    Nucleated RBC 03/22/2025 0.00  0.00 - 0.20 /100 WBC Final    Neutrophils (Absolute) 03/22/2025 2.66  1.82 - 7.42 K/uL Final    Includes immature neutrophils, if present.    Lymphs (Absolute) 03/22/2025 1.32  1.00 - 4.80 K/uL Final    Monos (Absolute) 03/22/2025 0.67  0.00 - 0.85 K/uL Final    Eos (Absolute) 03/22/2025 0.24  0.00 - 0.51 K/uL Final    Baso (Absolute) 03/22/2025 0.02  0.00 - 0.12 K/uL Final    Immature Granulocytes (abs) 03/22/2025 0.02  0.00 - 0.11 K/uL Final    NRBC (Absolute) 03/22/2025 0.00  K/uL Final    Sodium 03/22/2025 140  135 - 145 mmol/L Final    Potassium 03/22/2025 4.4  3.6 - 5.5 mmol/L Final    Comment: The hemolysis index of the specimen exceeds the allowed tolerance for the  test. Result may be affected.?Specimen recollection  is recommended to  confirm the result.      Chloride 03/22/2025 110  96 - 112 mmol/L Final    Co2 03/22/2025 18 (L)  20 - 33 mmol/L Final    Anion Gap 03/22/2025 12.0  7.0 - 16.0 Final    Glucose 03/22/2025 109 (H)  65 - 99 mg/dL Final    Bun 03/22/2025 27 (H)  8 - 22 mg/dL Final    Creatinine 03/22/2025 1.11  0.50 - 1.40 mg/dL Final    Calcium 03/22/2025 9.3  8.5 - 10.5 mg/dL Final    Correct Calcium 03/22/2025 9.5  8.5 - 10.5 mg/dL Final    AST(SGOT) 03/22/2025 36  12 - 45 U/L Final    Comment: The hemolysis index of the specimen exceeds the allowed tolerance for the  test. Result may be affected.?Specimen recollection is recommended to  confirm the result.      ALT(SGPT) 03/22/2025 24  2 - 50 U/L Final    Alkaline Phosphatase 03/22/2025 72  30 - 99 U/L Final    Total Bilirubin 03/22/2025 0.5  0.1 - 1.5 mg/dL Final    Albumin 03/22/2025 3.8  3.2 - 4.9 g/dL Final    Total Protein 03/22/2025 6.9  6.0 - 8.2 g/dL Final    Globulin 03/22/2025 3.1  1.9 - 3.5 g/dL Final    A-G Ratio 03/22/2025 1.2  g/dL Final    NT-proBNP 03/22/2025 557 (H)  0 - 125 pg/mL Final    Troponin T 03/22/2025 27 (H)  6 - 19 ng/L Final    Comment: Biotin intake of greater than 5 mg per day may interfere with  troponin levels, causing false low values.    The Ultra High Sensitivity Troponin T test has a reference range  for positive troponins that follows the recommendation of ACC for  the 99th percentile reference population.      Troponin T 03/22/2025 29 (H)  6 - 19 ng/L Final    Comment: Biotin intake of greater than 5 mg per day may interfere with  troponin levels, causing false low values.    The Ultra High Sensitivity Troponin T test has a reference range  for positive troponins that follows the recommendation of ACC for  the 99th percentile reference population.      GFR (CKD-EPI) 03/22/2025 64  >60 mL/min/1.73 m 2 Final    Comment: Estimated Glomerular Filtration Rate is calculated using  race neutral CKD-EPI 2021 equation per NKF-ASN  recommendations.      Color 03/22/2025 Yellow   Final    Character 03/22/2025 Clear   Final    Specific Gravity 03/22/2025 1.015  <1.035 Final    Ph 03/22/2025 5.5  5.0 - 8.0 Final    Glucose 03/22/2025 Negative  Negative mg/dL Final    Ketones 03/22/2025 Negative  Negative mg/dL Final    Protein 03/22/2025 Negative  Negative mg/dL Final    Bilirubin 03/22/2025 Negative  Negative Final    Urobilinogen, Urine 03/22/2025 1.0  <=1.0 EU/dL Final    Nitrite 03/22/2025 Negative  Negative Final    Leukocyte Esterase 03/22/2025 Negative  Negative Final    Occult Blood 03/22/2025 Negative  Negative Final    Micro Urine Req 03/22/2025 see below   Final    Comment: Microscopic examination not performed when specimen is clear  and chemically negative for protein, blood, leukocyte esterase  and nitrite.          Lab Results   Component Value Date/Time    HBA1C 6.3 (H) 11/13/2024 08:40 AM           Assessment & Plan  1. Post-ER visit follow-up.-unclear etiology of self limited episode of shortness of breath  His blood pressure is currently well-regulated. He is advised to continue his current regimen of losartan 50 mg twice daily until further notice. He should also persist with his nightly amlodipine 5 mg dosage. For ankle swelling, he is recommended to take furosemide as needed. He is instructed to avoid over-the-counter prostate medications and to adhere to his prescribed tamsulosin and finasteride regimen. He is also advised to abstain from aspirin, Advil, Aleve, or any other blood-thinning agents while on Eliquis. Tylenol remains a safe option for him.       2. DM-stable    3. HTN--stable continue current regimen          There are no diagnoses linked to this encounter.            Note to patients:  This physician note is written for the purpose of communication between medical providers and billing purposes.  There may be aspects of this documentation that are simplified for efficiency and clarity.  Physician notes are not  "intended to capture the entirety of the patient experience.  If you have questions about the way your medical condition and care was documented, please do not hesitate to bring this up at your next visit.     Portions of this chart may have been created with Dragon voice recognition software.  Occasional wrong-word or \"sound alike\" substitutions may have occurred due to the inherent limitations of voice recognition software.  Please read the chart carefully and recognize, using context, where the substitutions have occurred.    "

## 2025-04-02 ENCOUNTER — PHARMACY VISIT (OUTPATIENT)
Dept: PHARMACY | Facility: MEDICAL CENTER | Age: 89
End: 2025-04-02
Payer: COMMERCIAL

## 2025-04-02 PROCEDURE — RXMED WILLOW AMBULATORY MEDICATION CHARGE: Performed by: NURSE PRACTITIONER

## 2025-04-07 ENCOUNTER — NON-PROVIDER VISIT (OUTPATIENT)
Dept: CARDIOLOGY | Facility: MEDICAL CENTER | Age: 89
End: 2025-04-07
Payer: MEDICARE

## 2025-04-07 PROCEDURE — 93294 REM INTERROG EVL PM/LDLS PM: CPT | Performed by: INTERNAL MEDICINE

## 2025-04-07 NOTE — CARDIAC REMOTE MONITOR - SCAN
Device transmission reviewed. Device demonstrated appropriate function.       Electronically Signed by: Nancy Vasquez M.D.    4/19/2025  11:00 PM

## 2025-04-17 ENCOUNTER — PHARMACY VISIT (OUTPATIENT)
Dept: PHARMACY | Facility: MEDICAL CENTER | Age: 89
End: 2025-04-17
Payer: COMMERCIAL

## 2025-04-17 PROCEDURE — RXMED WILLOW AMBULATORY MEDICATION CHARGE: Performed by: NURSE PRACTITIONER

## 2025-05-05 DIAGNOSIS — E11.649 TYPE 2 DIABETES MELLITUS WITH HYPOGLYCEMIA WITHOUT COMA, WITHOUT LONG-TERM CURRENT USE OF INSULIN (HCC): ICD-10-CM

## 2025-05-09 ENCOUNTER — PHARMACY VISIT (OUTPATIENT)
Dept: PHARMACY | Facility: MEDICAL CENTER | Age: 89
End: 2025-05-09
Payer: COMMERCIAL

## 2025-05-09 PROCEDURE — RXMED WILLOW AMBULATORY MEDICATION CHARGE: Performed by: FAMILY MEDICINE

## 2025-05-14 ENCOUNTER — TELEPHONE (OUTPATIENT)
Dept: NEUROLOGY | Facility: MEDICAL CENTER | Age: 89
End: 2025-05-14
Payer: MEDICARE

## 2025-05-14 DIAGNOSIS — E11.22 TYPE 2 DIABETES MELLITUS WITH STAGE 3A CHRONIC KIDNEY DISEASE, WITHOUT LONG-TERM CURRENT USE OF INSULIN (HCC): ICD-10-CM

## 2025-05-14 DIAGNOSIS — N18.31 TYPE 2 DIABETES MELLITUS WITH STAGE 3A CHRONIC KIDNEY DISEASE, WITHOUT LONG-TERM CURRENT USE OF INSULIN (HCC): ICD-10-CM

## 2025-05-19 ENCOUNTER — OFFICE VISIT (OUTPATIENT)
Dept: NEUROLOGY | Facility: MEDICAL CENTER | Age: 89
End: 2025-05-19
Attending: PSYCHIATRY & NEUROLOGY
Payer: MEDICARE

## 2025-05-19 VITALS
TEMPERATURE: 97.2 F | WEIGHT: 161.6 LBS | DIASTOLIC BLOOD PRESSURE: 60 MMHG | OXYGEN SATURATION: 95 % | BODY MASS INDEX: 23.13 KG/M2 | SYSTOLIC BLOOD PRESSURE: 110 MMHG | RESPIRATION RATE: 12 BRPM | HEART RATE: 70 BPM | HEIGHT: 70 IN

## 2025-05-19 DIAGNOSIS — I67.4 HYPERTENSIVE ENCEPHALOPATHY SYNDROME: Primary | ICD-10-CM

## 2025-05-19 PROCEDURE — 3078F DIAST BP <80 MM HG: CPT | Performed by: PSYCHIATRY & NEUROLOGY

## 2025-05-19 PROCEDURE — 3074F SYST BP LT 130 MM HG: CPT | Performed by: PSYCHIATRY & NEUROLOGY

## 2025-05-19 PROCEDURE — 99203 OFFICE O/P NEW LOW 30 MIN: CPT | Performed by: PSYCHIATRY & NEUROLOGY

## 2025-05-19 ASSESSMENT — PATIENT HEALTH QUESTIONNAIRE - PHQ9
5. POOR APPETITE OR OVEREATING: 0 - NOT AT ALL
SUM OF ALL RESPONSES TO PHQ QUESTIONS 1-9: 4
CLINICAL INTERPRETATION OF PHQ2 SCORE: 1

## 2025-05-19 ASSESSMENT — FIBROSIS 4 INDEX: FIB4 SCORE: 3.28

## 2025-05-19 NOTE — PROGRESS NOTES
[facility] NEUROLOGY CLINIC    Date of Service: 05/19/25    Referred by: Wilma Guillermo M.D.  [address]  [place] [id number]      Reason for referral  Hospital follow up.     Previously evaluated by a neurologist   No    History of present illness (HPI)   FRANKY TEMPLE Jr. is a 89 y.o. male. The patient presents for follow-up after an ED visit on 2/7/25 for possible stroke. He woke up that night with a sharp frontal headache, confusion, left-sided weakness, disorientation, and expressive aphasia, which resolved by the time he arrived at the hospital. CT head was unremarkable, CTA neck showed no large vessel occlusion, and MRI could not be performed due to his pacemaker and intracranial magnet. He declined admission and neurology consult, and was discharged with a blood pressure of 200/82. His son-in-law later reported that he remained confused for several days and had no recollection of events leading up to the episode. Since then, he had ongoing unsteadiness, intermittent slurred speech, and slowed processing, but no focal weakness or falls. On 3/22/25, he returned to the ED with acute shortness of breath that woke him from sleep, which resolved spontaneously; the work-up was again unremarkable. He denies chest pain, visual changes, or similar prior episodes. His headaches have improved to 2/10. He has a history of atrial fibrillation (on Eliquis), prior strokes, type 2 diabetes, and stage 3a CKD. His BP remains elevated despite losartan and furosemide. He has not seen a neurologist since his last stroke and is now seeking follow-up.    Vitals from the ED visit dated 2/7/25    BP (!) 184/79  Pulse 60  Temp 36.7 °C (98 °F)  Resp 18  SpO2 95%          ERP notified of BP of 200/82.          He explains that during that episode, it felt like someone hit him in the back of the head with a baseball bat. This lasted for a few hours and resolved by the time he was discharged home. He has had  episodes like this before, and his son mentions that his PCP adjusted his blood pressure medications recently. He denies any visual disturbance or episodes concerning for seizure activity. He takes Losartan and Amlodipine at the moment. He does not check his blood pressure at home.  Since he was discharged, he did not have any more of these episodes and is compliant with blood pressure medications and Eliquis.     Review of Systems (ROS)  Negative for: Fever, chills, chest pain, shortness of breath, cough, diarrhea, constipation, urinary frequency, dysuria, skin rash, swelling.    Medical history  Past Medical History[1]      Surgical history  Past Surgical History[2]      Relevant family history  Family History   Problem Relation Age of Onset    Stroke Brother     Stroke Father          Social history  Social History     Tobacco Use    Smoking status: Never    Smokeless tobacco: Never   Substance Use Topics    Alcohol use: Yes     Alcohol/week: 2.4 oz     Types: 4 Standard drinks or equivalent per week     Comment: weekly         Medications    Active Medications[3]      Screening    Depression Screening    Little interest or pleasure in doing things?  0 - not at all  Feeling down, depressed , or hopeless? 1 - several days  Trouble falling or staying asleep, or sleeping too much?  0 - not at all  Feeling tired or having little energy?  2 - more than half the days  Poor appetite or overeating?  0 - not at all  Feeling bad about yourself - or that you are a failure or have let yourself or your family down? 0 - not at all  Trouble concentrating on things, such as reading the newspaper or watching television? 0 - not at all  Moving or speaking so slowly that other people could have noticed.  Or the opposite - being so fidgety or restless that you have been moving around a lot more than usual?  1 - several days  Thoughts that you would be better off dead, or of hurting yourself?  0 - not at all  Patient Health  Questionnaire Score: 4      If depressive symptoms identified deferred to follow up visit unless specifically addressed in assesment and plan.    Interpretation of PHQ-9 Total Score   Score Severity   1-4 No Depression   5-9 Mild Depression   10-14 Moderate Depression   15-19 Moderately Severe Depression   20-27 Severe Depression          [place] Suicide Severity Rating Scale     Wish to be Dead?: No  Suicidal Thoughts: No    Suicidal Thoughts with Method Without Specific Plan or Intent to Act:    Suicidal Intent Without Specific Plan:    Suicide Intent with Specific Plan:    Suicide Behavior Question: No  How long ago did you do any of these?:    C-SSRS Risk Level: No Risk    Additional Suicide Screening Questions    Suspected or Confirmed Suicide Attempted?:    Harming or killing others?:      [place] Suicide Reassessment     New or continued thoughts about killing self?:    Preparing to end life?:              Physical exam    Vitals:    05/19/25 1246   BP: 110/60   Pulse: 70   Resp: 12   Temp: 36.2 °C (97.2 °F)   SpO2: 95%         On neurological exam, the patient is alert and oriented to all spheres. The speech fluency and comprehension are intact. There are no cranial neuropathies appreciated. Proximal and distal muscle strength are preserved and the muscle tone is normal. There is no drift of the upper extremities. The reflexes are 2+ at the bilateral patellar tendons. Sensation to light touch is intact, and 2-point discrimination is normal. There is no ataxia or dysmetria. Finger and foot tapping are both normal. The patient can stand up from the chair without support and walk unassisted.       Lab Results  Lab Results   Component Value Date/Time    WBC 4.9 03/22/2025 03:02 AM    RBC 3.94 (L) 03/22/2025 03:02 AM    HEMOGLOBIN 12.5 (L) 03/22/2025 03:02 AM    HEMATOCRIT 39.0 (L) 03/22/2025 03:02 AM    MCV 99.0 (H) 03/22/2025 03:02 AM    MCH 31.7 03/22/2025 03:02 AM    MCHC 32.1 (L) 03/22/2025 03:02 AM    MPV  10.7 03/22/2025 03:02 AM    NEUTSPOLYS 53.90 03/22/2025 03:02 AM    LYMPHOCYTES 26.80 03/22/2025 03:02 AM    MONOCYTES 13.60 (H) 03/22/2025 03:02 AM    EOSINOPHILS 4.90 03/22/2025 03:02 AM    BASOPHILS 0.40 03/22/2025 03:02 AM          Lab Results   Component Value Date/Time    SODIUM 140 03/22/2025 03:02 AM    POTASSIUM 4.4 03/22/2025 03:02 AM    CHLORIDE 110 03/22/2025 03:02 AM    CO2 18 (L) 03/22/2025 03:02 AM    GLUCOSE 109 (H) 03/22/2025 03:02 AM    BUN 27 (H) 03/22/2025 03:02 AM    CREATININE 1.11 03/22/2025 03:02 AM       Latest Reference Range & Units 02/07/25 01:06 03/22/25 03:02   Bun 8 - 22 mg/dL 30 (H) 27 (H)   (H): Data is abnormally high     Latest Reference Range & Units 11/13/24 08:40   Glycohemoglobin 4.0 - 5.6 % 6.3 (H)   (H): Data is abnormally high     Latest Reference Range & Units 11/13/24 08:40   Cholesterol,Tot 100 - 199 mg/dL 128   Triglycerides 0 - 149 mg/dL 120   HDL >=40 mg/dL 46   LDL <100 mg/dL 58      Latest Reference Range & Units 11/13/24 08:40   TSH 0.350 - 5.500 uIU/mL 3.250       NEURO-DIAGNOSTICS  CT head: 2/7/25  1. No acute intracranial abnormality is identified, there are nonspecific white matter changes, commonly associated with small vessel ischemic disease. Associated mild cerebral atrophy is noted.     CTA head and neck: 2/7/25  No large vessel occlusion or aneurysm identified     Impression and Plan  Briefly, 89 y.o. male who presented to the ED 2 months ago with an occipital headache, confusion, and left sided weakness in the context of severe hypertension. CT head and CTA head and neck were unremarkable and his symptoms resolved shortly after that. His neurological exam is non-focal.  I explained to him that the most likely diagnosis is hypertensive emergency and encephalopathy vs PRES. Fortunately, his blood pressure seems to be controlled after his PCP adjusted his medications, and he has not had any more symptoms like that. I encouraged him to check his blood  pressure every morning and night before going to bed. Blood pressure goal should be < 130/80. Continue with aggressive pharmacological treatment of hypertension.   I do not think he needs DAPT or anti-platelet monotherapy at this time, as he is already on prophylactic anticoagulation with Eliquis for atrial fibrillation.       Numerous questions were answered to the best of my knowledge. The patient is in agreement with the plan. Return to the clinic PRN    ADMINISTRATIVE BILLING  I spent a total of 38 minutes on this patient encounter.      Melvin Byers MD  Diplomate of the American Board of Psychiatry and Neurology.  General Neurology & Neuro-Oncology.  [facility].   of Clinical Neurology at [facility].         [1]   Past Medical History:  Diagnosis Date    Asthma     BPH (benign prostatic hyperplasia)     Cataract     Surgically removed bilat    Dental disorder     Upper    Diabetes (HCC)     Diverticulitis     Esophageal stricture     dilated spring 2016    Hiatal hernia     High cholesterol     History of shingles 8/27/2015    Hyperlipidemia 7/8/2015    Hypertension     Hypothyroid     PHN (postherpetic neuralgia) 8/27/2015    Psoriasis 6/14/2017    Stroke (HCC)     Last one was around 11/2021.  Has had several strokes including one in 9/2021.    TIA (transient ischemic attack)     Urinary incontinence     Vitamin D deficiency 8/27/2015   [2]   Past Surgical History:  Procedure Laterality Date    CATARACT EXTRACTION WITH IOL      TONSILLECTOMY      VASECTOMY     [3]   Outpatient Medications Marked as Taking for the 5/19/25 encounter (Office Visit) with Melvin Byers M.D.   Medication Sig Dispense Refill    Continuous Glucose Sensor (FREESTYLE MICHELLE 2 SENSOR) Misc Use 1 sensor every 14 days. 6 Each 11    amLODIPine (NORVASC) 5 MG Tab Take 1 Tablet by mouth every day. 100 Tablet 3    Continuous Glucose  (FREESTYLE MICHELLE 2  READER) Device 1 Units four times daily - before meals and nightly as needed (feeling shaky). 1 unit 1 Each 6    famotidine (PEPCID) 40 MG Tab Take 1 Tablet by mouth every evening. 100 Tablet 3    Blood Glucose Monitoring Suppl (BLOOD GLUCOSE MONITOR SYSTEM) w/Device Kit Use as directed for blood sugar monitoring. 1 Kit 0    losartan (COZAAR) 25 MG Tab Take 1 Tablet by mouth 2 times a day. 200 Tablet 1    finasteride (PROSCAR) 5 MG Tab Take 1 Tablet by mouth every day. 100 Tablet 3    levothyroxine (SYNTHROID) 75 MCG Tab Take 1 Tablet by mouth every day. 100 Tablet 3    liothyronine (CYTOMEL) 5 MCG Tab TAKE 2 TABLETS BY MOUTH EVERY  Tablet 3    glimepiride (AMARYL) 4 MG Tab Take 1 Tablet by mouth every morning. 100 Tablet 3    apixaban (ELIQUIS) 5mg Tab TAKE 1 TABLET BY MOUTH TWICE A  Tablet 3    atorvastatin (LIPITOR) 20 MG Tab Take 1 Tablet by mouth every day. 100 Tablet 3    furosemide (LASIX) 20 MG Tab TAKE 1 TABLET BY MOUTH 1 TIME A DAY AS NEEDED. 100 Tablet 3

## 2025-06-09 DIAGNOSIS — I10 HYPERTENSION, UNSPECIFIED TYPE: ICD-10-CM

## 2025-06-09 DIAGNOSIS — E11.649 TYPE 2 DIABETES MELLITUS WITH HYPOGLYCEMIA WITHOUT COMA, WITHOUT LONG-TERM CURRENT USE OF INSULIN (HCC): ICD-10-CM

## 2025-06-09 RX ORDER — LOSARTAN POTASSIUM 25 MG/1
50 TABLET ORAL 2 TIMES DAILY
Qty: 200 TABLET | Refills: 1 | Status: SHIPPED | OUTPATIENT
Start: 2025-06-09

## 2025-06-16 DIAGNOSIS — N18.31 TYPE 2 DIABETES MELLITUS WITH STAGE 3A CHRONIC KIDNEY DISEASE, WITHOUT LONG-TERM CURRENT USE OF INSULIN (HCC): ICD-10-CM

## 2025-06-16 DIAGNOSIS — E11.22 TYPE 2 DIABETES MELLITUS WITH STAGE 3A CHRONIC KIDNEY DISEASE, WITHOUT LONG-TERM CURRENT USE OF INSULIN (HCC): ICD-10-CM

## 2025-06-27 ENCOUNTER — APPOINTMENT (OUTPATIENT)
Dept: URBAN - METROPOLITAN AREA CLINIC 4 | Facility: CLINIC | Age: 89
Setting detail: DERMATOLOGY
End: 2025-06-27

## 2025-06-27 DIAGNOSIS — L20.89 OTHER ATOPIC DERMATITIS: ICD-10-CM

## 2025-06-27 DIAGNOSIS — L57.0 ACTINIC KERATOSIS: ICD-10-CM

## 2025-06-27 PROCEDURE — ? ADDITIONAL NOTES

## 2025-06-27 PROCEDURE — ? LIQUID NITROGEN

## 2025-06-27 PROCEDURE — ? PRESCRIPTION

## 2025-06-27 PROCEDURE — ? COUNSELING

## 2025-06-27 RX ORDER — TRIAMCINOLONE ACETONIDE 1 MG/G
CREAM TOPICAL BID
Qty: 454 | Refills: 0 | Status: ERX

## 2025-06-27 ASSESSMENT — LOCATION SIMPLE DESCRIPTION DERM
LOCATION SIMPLE: LEFT SCALP
LOCATION SIMPLE: RIGHT THIGH

## 2025-06-27 ASSESSMENT — LOCATION DETAILED DESCRIPTION DERM
LOCATION DETAILED: LEFT MEDIAL FRONTAL SCALP
LOCATION DETAILED: RIGHT ANTERIOR PROXIMAL THIGH

## 2025-06-27 ASSESSMENT — LOCATION ZONE DERM
LOCATION ZONE: SCALP
LOCATION ZONE: LEG

## 2025-06-27 NOTE — PROCEDURE: ADDITIONAL NOTES
Additional Notes: Prev Bx sites are doing well
Detail Level: Detailed
Render Risk Assessment In Note?: no

## 2025-07-07 ENCOUNTER — NON-PROVIDER VISIT (OUTPATIENT)
Dept: CARDIOLOGY | Facility: MEDICAL CENTER | Age: 89
End: 2025-07-07
Payer: MEDICARE

## 2025-07-07 PROCEDURE — 93294 REM INTERROG EVL PM/LDLS PM: CPT | Performed by: INTERNAL MEDICINE

## 2025-07-08 NOTE — CARDIAC REMOTE MONITOR - SCAN
Device transmission reviewed. Device demonstrated appropriate function.       Electronically Signed by: Nancy Vasquez M.D.    7/31/2025  12:48 PM

## 2025-07-16 DIAGNOSIS — I48.0 PAF (PAROXYSMAL ATRIAL FIBRILLATION) (HCC): ICD-10-CM

## 2025-07-16 DIAGNOSIS — N40.1 BPH ASSOCIATED WITH NOCTURIA: Primary | ICD-10-CM

## 2025-07-16 DIAGNOSIS — R35.1 BPH ASSOCIATED WITH NOCTURIA: Primary | ICD-10-CM

## 2025-07-16 PROCEDURE — RXMED WILLOW AMBULATORY MEDICATION CHARGE: Performed by: FAMILY MEDICINE

## 2025-07-16 RX ORDER — TAMSULOSIN HYDROCHLORIDE 0.4 MG/1
CAPSULE ORAL
Qty: 100 CAPSULE | Refills: 0 | Status: SHIPPED | OUTPATIENT
Start: 2025-07-16

## 2025-07-16 RX ORDER — FINASTERIDE 5 MG/1
5 TABLET, FILM COATED ORAL DAILY
Qty: 100 TABLET | Refills: 0 | Status: SHIPPED | OUTPATIENT
Start: 2025-07-16

## 2025-07-17 ENCOUNTER — PHARMACY VISIT (OUTPATIENT)
Dept: PHARMACY | Facility: MEDICAL CENTER | Age: 89
End: 2025-07-17
Payer: COMMERCIAL

## 2025-07-18 PROCEDURE — RXMED WILLOW AMBULATORY MEDICATION CHARGE: Performed by: NURSE PRACTITIONER

## 2025-07-18 NOTE — TELEPHONE ENCOUNTER
S/w patient and advised that a f/u is needed and he verbalized understanding. 90 day courtesy refill sent.      Schedulers- Could you please call the patient to for a f/u. Thank you!

## 2025-07-21 ENCOUNTER — PHARMACY VISIT (OUTPATIENT)
Dept: PHARMACY | Facility: MEDICAL CENTER | Age: 89
End: 2025-07-21
Payer: COMMERCIAL

## 2025-07-24 DIAGNOSIS — I48.0 PAF (PAROXYSMAL ATRIAL FIBRILLATION) (HCC): ICD-10-CM

## 2025-07-28 DIAGNOSIS — I48.0 PAF (PAROXYSMAL ATRIAL FIBRILLATION) (HCC): ICD-10-CM

## 2025-07-28 DIAGNOSIS — I10 HYPERTENSION, UNSPECIFIED TYPE: ICD-10-CM

## 2025-07-28 DIAGNOSIS — I65.23 BILATERAL CAROTID ARTERY STENOSIS: ICD-10-CM

## 2025-07-28 PROCEDURE — RXMED WILLOW AMBULATORY MEDICATION CHARGE: Performed by: FAMILY MEDICINE

## 2025-07-28 RX ORDER — ATORVASTATIN CALCIUM 20 MG/1
20 TABLET, FILM COATED ORAL
Qty: 100 TABLET | Refills: 3 | Status: SHIPPED | OUTPATIENT
Start: 2025-07-28

## 2025-07-28 RX ORDER — TAMSULOSIN HYDROCHLORIDE 0.4 MG/1
CAPSULE ORAL
Qty: 90 CAPSULE | Refills: 3 | Status: SHIPPED | OUTPATIENT
Start: 2025-07-28

## 2025-07-28 RX ORDER — AMLODIPINE BESYLATE 5 MG/1
5 TABLET ORAL DAILY
Qty: 100 TABLET | Refills: 3 | Status: SHIPPED | OUTPATIENT
Start: 2025-07-28 | End: 2026-09-01

## 2025-07-28 RX ORDER — LOSARTAN POTASSIUM 25 MG/1
25 TABLET ORAL 2 TIMES DAILY
Qty: 200 TABLET | Refills: 1 | Status: SHIPPED | OUTPATIENT
Start: 2025-07-28

## 2025-08-04 DIAGNOSIS — E03.8 OTHER SPECIFIED HYPOTHYROIDISM: ICD-10-CM

## 2025-08-04 PROCEDURE — RXMED WILLOW AMBULATORY MEDICATION CHARGE: Performed by: FAMILY MEDICINE

## 2025-08-04 RX ORDER — LIOTHYRONINE SODIUM 5 UG/1
TABLET ORAL
Qty: 200 TABLET | Refills: 3 | Status: SHIPPED | OUTPATIENT
Start: 2025-08-04

## 2025-08-05 ENCOUNTER — PHARMACY VISIT (OUTPATIENT)
Dept: PHARMACY | Facility: MEDICAL CENTER | Age: 89
End: 2025-08-05
Payer: COMMERCIAL

## 2025-08-08 ENCOUNTER — PHARMACY VISIT (OUTPATIENT)
Dept: PHARMACY | Facility: MEDICAL CENTER | Age: 89
End: 2025-08-08
Payer: COMMERCIAL

## 2025-08-08 DIAGNOSIS — E03.9 HYPOTHYROIDISM, UNSPECIFIED TYPE: ICD-10-CM

## 2025-08-08 PROCEDURE — RXMED WILLOW AMBULATORY MEDICATION CHARGE: Performed by: FAMILY MEDICINE

## 2025-08-08 RX ORDER — LEVOTHYROXINE SODIUM 75 UG/1
75 TABLET ORAL
Qty: 100 TABLET | Refills: 3 | Status: SHIPPED | OUTPATIENT
Start: 2025-08-08

## 2025-08-11 ENCOUNTER — PHARMACY VISIT (OUTPATIENT)
Dept: PHARMACY | Facility: MEDICAL CENTER | Age: 89
End: 2025-08-11
Payer: COMMERCIAL

## 2025-08-11 ENCOUNTER — OFFICE VISIT (OUTPATIENT)
Dept: INTERNAL MEDICINE | Facility: IMAGING CENTER | Age: 89
End: 2025-08-11
Payer: MEDICARE

## 2025-08-11 VITALS
HEIGHT: 70 IN | TEMPERATURE: 97.8 F | OXYGEN SATURATION: 97 % | DIASTOLIC BLOOD PRESSURE: 68 MMHG | WEIGHT: 160 LBS | HEART RATE: 69 BPM | SYSTOLIC BLOOD PRESSURE: 124 MMHG | RESPIRATION RATE: 14 BRPM | BODY MASS INDEX: 22.9 KG/M2

## 2025-08-11 DIAGNOSIS — R35.0 BENIGN PROSTATIC HYPERPLASIA WITH URINARY FREQUENCY: Primary | ICD-10-CM

## 2025-08-11 DIAGNOSIS — E11.22 TYPE 2 DIABETES MELLITUS WITH STAGE 3A CHRONIC KIDNEY DISEASE, WITHOUT LONG-TERM CURRENT USE OF INSULIN (HCC): ICD-10-CM

## 2025-08-11 DIAGNOSIS — I10 PRIMARY HYPERTENSION: ICD-10-CM

## 2025-08-11 DIAGNOSIS — N40.1 BENIGN PROSTATIC HYPERPLASIA WITH URINARY FREQUENCY: Primary | ICD-10-CM

## 2025-08-11 DIAGNOSIS — N18.31 TYPE 2 DIABETES MELLITUS WITH STAGE 3A CHRONIC KIDNEY DISEASE, WITHOUT LONG-TERM CURRENT USE OF INSULIN (HCC): ICD-10-CM

## 2025-08-11 DIAGNOSIS — E03.9 HYPOTHYROIDISM, UNSPECIFIED TYPE: ICD-10-CM

## 2025-08-11 PROCEDURE — 3078F DIAST BP <80 MM HG: CPT | Performed by: FAMILY MEDICINE

## 2025-08-11 PROCEDURE — 3074F SYST BP LT 130 MM HG: CPT | Performed by: FAMILY MEDICINE

## 2025-08-11 PROCEDURE — 99214 OFFICE O/P EST MOD 30 MIN: CPT | Performed by: FAMILY MEDICINE

## 2025-08-11 ASSESSMENT — FIBROSIS 4 INDEX: FIB4 SCORE: 3.28
